# Patient Record
Sex: FEMALE | Race: AMERICAN INDIAN OR ALASKA NATIVE | NOT HISPANIC OR LATINO | Employment: OTHER | RURAL
[De-identification: names, ages, dates, MRNs, and addresses within clinical notes are randomized per-mention and may not be internally consistent; named-entity substitution may affect disease eponyms.]

---

## 2020-04-20 ENCOUNTER — HISTORICAL (OUTPATIENT)
Dept: ADMINISTRATIVE | Facility: HOSPITAL | Age: 70
End: 2020-04-20

## 2020-05-08 ENCOUNTER — HISTORICAL (OUTPATIENT)
Dept: ADMINISTRATIVE | Facility: HOSPITAL | Age: 70
End: 2020-05-08

## 2020-10-07 ENCOUNTER — OUT OF OFFICE VISIT (OUTPATIENT)
Dept: URBAN - METROPOLITAN AREA MEDICAL CENTER 9 | Facility: MEDICAL CENTER | Age: 70
End: 2020-10-07
Payer: MEDICARE

## 2020-10-07 DIAGNOSIS — D72.828 HIGH BLOOD MONOCYTE COUNT: ICD-10-CM

## 2020-10-07 DIAGNOSIS — R11.0 CHRONIC NAUSEA: ICD-10-CM

## 2020-10-07 DIAGNOSIS — K29.60 ADENOPAPILLOMATOSIS GASTRICA: ICD-10-CM

## 2020-10-07 DIAGNOSIS — R10.84 ABDOMINAL CRAMPING, GENERALIZED: ICD-10-CM

## 2020-10-07 DIAGNOSIS — K29.30 CHRONIC SUPERFICIAL GASTRITIS: ICD-10-CM

## 2020-10-07 PROCEDURE — 43239 EGD BIOPSY SINGLE/MULTIPLE: CPT | Performed by: STUDENT IN AN ORGANIZED HEALTH CARE EDUCATION/TRAINING PROGRAM

## 2020-10-07 PROCEDURE — 99204 OFFICE O/P NEW MOD 45 MIN: CPT | Performed by: PHYSICIAN ASSISTANT

## 2020-10-07 PROCEDURE — 99214 OFFICE O/P EST MOD 30 MIN: CPT | Performed by: PHYSICIAN ASSISTANT

## 2020-10-19 ENCOUNTER — HISTORICAL (OUTPATIENT)
Dept: ADMINISTRATIVE | Facility: HOSPITAL | Age: 70
End: 2020-10-19

## 2020-10-19 LAB
ALBUMIN SERPL BCP-MCNC: 3.6 G/DL (ref 3.5–5)
ALBUMIN/GLOB SERPL: 0.9 {RATIO}
ALP SERPL-CCNC: 102 U/L (ref 55–142)
ALT SERPL W P-5'-P-CCNC: 21 U/L (ref 13–56)
ANION GAP SERPL CALCULATED.3IONS-SCNC: 8.5 MMOL/L (ref 7–16)
AST SERPL W P-5'-P-CCNC: 14 U/L (ref 15–37)
BASOPHILS # BLD AUTO: 0.06 X10E3/UL (ref 0–0.2)
BASOPHILS NFR BLD AUTO: 0.5 % (ref 0–1)
BILIRUB SERPL-MCNC: 0.3 MG/DL (ref 0–1.2)
BUN SERPL-MCNC: 18 MG/DL (ref 7–18)
BUN/CREAT SERPL: 17
CALCIUM SERPL-MCNC: 9.8 MG/DL (ref 8.5–10.1)
CHLORIDE SERPL-SCNC: 97 MMOL/L (ref 98–107)
CHOLEST SERPL-MCNC: 277 MG/DL
CHOLEST/HDLC SERPL: 4 {RATIO}
CO2 SERPL-SCNC: 34 MMOL/L (ref 21–32)
CREAT SERPL-MCNC: 1.04 MG/DL (ref 0.5–1.02)
EOSINOPHIL # BLD AUTO: 0.01 X10E3/UL (ref 0–0.5)
EOSINOPHIL NFR BLD AUTO: 0.1 % (ref 1–4)
ERYTHROCYTE [DISTWIDTH] IN BLOOD BY AUTOMATED COUNT: 15.7 % (ref 11.5–14.5)
GLOBULIN SER-MCNC: 3.9 G/DL (ref 2–4)
GLUCOSE SERPL-MCNC: 126 MG/DL (ref 74–106)
HCT VFR BLD AUTO: 43.2 % (ref 38–47)
HDLC SERPL-MCNC: 69 MG/DL
HGB BLD-MCNC: 13.9 G/DL (ref 12–16)
IMM GRANULOCYTES # BLD AUTO: 0.05 X10E3/UL (ref 0–0.04)
IMM GRANULOCYTES NFR BLD: 0.4 % (ref 0–0.4)
LDLC SERPL CALC-MCNC: 148 MG/DL
LYMPHOCYTES # BLD AUTO: 1.88 X10E3/UL (ref 1–4.8)
LYMPHOCYTES NFR BLD AUTO: 14.5 % (ref 27–41)
MCH RBC QN AUTO: 29.1 PG (ref 27–31)
MCHC RBC AUTO-ENTMCNC: 32.2 G/DL (ref 32–36)
MCV RBC AUTO: 90.6 FL (ref 80–96)
MONOCYTES # BLD AUTO: 0.99 X10E3/UL (ref 0–0.8)
MONOCYTES NFR BLD AUTO: 7.7 % (ref 2–6)
MPC BLD CALC-MCNC: 9.5 FL (ref 9.4–12.4)
NEUTROPHILS # BLD AUTO: 9.95 X10E3/UL (ref 1.8–7.7)
NEUTROPHILS NFR BLD AUTO: 76.8 % (ref 53–65)
NRBC # BLD AUTO: 0 X10E3/UL (ref 0–0)
NRBC, AUTO (.00): 0 /100 (ref 0–0)
PLATELET # BLD AUTO: 420 X10E3/UL (ref 150–400)
POTASSIUM SERPL-SCNC: 3.5 MMOL/L (ref 3.5–5.1)
PROT SERPL-MCNC: 7.5 G/DL (ref 6.4–8.2)
RBC # BLD AUTO: 4.77 X10E6/UL (ref 4.2–5.4)
SODIUM SERPL-SCNC: 136 MMOL/L (ref 136–145)
TRIGL SERPL-MCNC: 299 MG/DL
VIT B12 SERPL-MCNC: 929 PG/ML (ref 193–986)
WBC # BLD AUTO: 12.94 X10E3/UL (ref 4.5–11)

## 2020-10-22 ENCOUNTER — OFFICE VISIT (OUTPATIENT)
Dept: URBAN - METROPOLITAN AREA CLINIC 40 | Facility: CLINIC | Age: 70
End: 2020-10-22

## 2020-10-22 NOTE — HPI-TODAY'S VISIT:
Fidelina Arnold is a 70 y.o. black female with history of chronic back pain and HTN who was admitted on 10/6/2020 for abdominal pain, elevated lipase, leukocytosis on chronic steroids with history of asthma. She has been staying with her daughter in [place] for the last several months, visiting from [place] for injection tx for chronic back pain. Seeing pain and spine specialist, Dr. Maldonado. States that due to lack of treatments in the last two months, she was taking either ibuprofen, Goody's, or Advil at least twice a day for months, until 1-2 weeks ago. She presented to ED recently with worsening midepigastric and LUQ abdominal pain with some nausea. Denies vomiting. Intermittent solid food dysphagia for years, on pantoprazole 40mg daily for years with history of GERD. No prior EGD. Reports a normal colonoscopy >10 years ago, in [place]. No recent rectal bleeding. Believes she may have seen a more pink color with stool a while back. Otherwise, normal consistency stool until onset of severe pain which resulted in some fecal incontinence. No BM in the last 24 hours. Ongoing pain though NPO. Denies alcohol or tobacco smoking. Weight loss of 25 pounds as she has not felt like eating for the last two months. Denies family history of gastric or colonic malignancy. CT A/P w/ IV contrast this admission without acute findings. Diverticulosis without diverticulitis. Prior cholecystectomy. Normal pancreas but lipase mildly elevated at 78 yesterday , now normalized at 26. LFTs normal. Leukocytosis on chronic steroids, improved with AM labs, holding prednisone. Blood cx's negative thusfar. U/A ok without complaint of dysuria. Resting comfortably in bed.    1. Abdominal Pain. Nausea. EGD 10/7 with mild gastritis, otherwise normal. Chronic, inactive gastritis. No Hpylori , per path. -Antiemetics and pain control as needed. -Continue pantoprazole 40mg. May take second daily dose as needed. She has been doing this at home. Add sucralfate 1g po bid with gastritis though no PUD on exam. -Avoid NSAIDs and monitor diet, as discussed. -Patient prefers follow up in [facility] [place] office. Will have her f/u in 2 weeks following discharge. Patient will need colonoscopy as outpatient.   2. Leukocytosis, trending down.  Likely secondary to chronic steroid use for asthma, prescribed by her provider in [place]. -Monitor CBC with outpatient f/u  -Blood cx's NGTD.

## 2020-12-24 ENCOUNTER — HISTORICAL (OUTPATIENT)
Dept: ADMINISTRATIVE | Facility: HOSPITAL | Age: 70
End: 2020-12-24

## 2021-03-12 RX ORDER — ATORVASTATIN CALCIUM 10 MG/1
10 TABLET, FILM COATED ORAL DAILY
COMMUNITY
End: 2021-07-15 | Stop reason: SDUPTHER

## 2021-03-12 RX ORDER — METRONIDAZOLE 500 MG/1
500 TABLET ORAL 2 TIMES DAILY
Status: ON HOLD | COMMUNITY
End: 2021-04-22

## 2021-03-12 RX ORDER — DULOXETIN HYDROCHLORIDE 60 MG/1
60 CAPSULE, DELAYED RELEASE ORAL 2 TIMES DAILY
COMMUNITY
End: 2021-04-06 | Stop reason: SDUPTHER

## 2021-03-12 RX ORDER — BUSPIRONE HYDROCHLORIDE 15 MG/1
15 TABLET ORAL 4 TIMES DAILY
COMMUNITY
End: 2021-07-15 | Stop reason: SDUPTHER

## 2021-03-12 RX ORDER — BENAZEPRIL HYDROCHLORIDE AND HYDROCHLOROTHIAZIDE 10; 12.5 MG/1; MG/1
1 TABLET ORAL DAILY
COMMUNITY
End: 2021-07-15 | Stop reason: ALTCHOICE

## 2021-03-12 RX ORDER — CYPROHEPTADINE HYDROCHLORIDE 4 MG/1
4 TABLET ORAL 3 TIMES DAILY
COMMUNITY
End: 2021-07-15 | Stop reason: SDUPTHER

## 2021-03-12 RX ORDER — CHLORTHALIDONE 25 MG/1
25 TABLET ORAL DAILY
COMMUNITY
End: 2021-07-15 | Stop reason: SDUPTHER

## 2021-03-12 RX ORDER — CIPROFLOXACIN 500 MG/1
500 TABLET ORAL EVERY 12 HOURS
Status: ON HOLD | COMMUNITY
End: 2021-04-22

## 2021-03-12 RX ORDER — AMLODIPINE BESYLATE 10 MG/1
10 TABLET ORAL DAILY
COMMUNITY
End: 2021-07-15 | Stop reason: SDUPTHER

## 2021-03-12 RX ORDER — CYCLOBENZAPRINE HCL 10 MG
10 TABLET ORAL EVERY 8 HOURS
COMMUNITY
End: 2021-04-06 | Stop reason: SDUPTHER

## 2021-03-18 ENCOUNTER — ANESTHESIA (OUTPATIENT)
Dept: PAIN MEDICINE | Facility: HOSPITAL | Age: 71
End: 2021-03-18
Payer: MEDICARE

## 2021-03-18 ENCOUNTER — HOSPITAL ENCOUNTER (OUTPATIENT)
Facility: HOSPITAL | Age: 71
Discharge: HOME | End: 2021-03-18
Attending: PAIN MEDICINE | Admitting: PAIN MEDICINE
Payer: MEDICARE

## 2021-03-18 ENCOUNTER — ANESTHESIA EVENT (OUTPATIENT)
Dept: PAIN MEDICINE | Facility: HOSPITAL | Age: 71
End: 2021-03-18
Payer: MEDICARE

## 2021-03-18 VITALS
OXYGEN SATURATION: 100 % | SYSTOLIC BLOOD PRESSURE: 130 MMHG | TEMPERATURE: 98 F | HEART RATE: 96 BPM | DIASTOLIC BLOOD PRESSURE: 70 MMHG | BODY MASS INDEX: 20.61 KG/M2 | HEIGHT: 62 IN | RESPIRATION RATE: 25 BRPM | WEIGHT: 112 LBS

## 2021-03-18 DIAGNOSIS — M54.14 RADICULOPATHY OF THORACIC REGION: ICD-10-CM

## 2021-03-18 PROCEDURE — 37000008 HC ANESTHESIA 1ST 15 MINUTES: Performed by: PAIN MEDICINE

## 2021-03-18 PROCEDURE — 62321 NJX INTERLAMINAR CRV/THRC: CPT | Performed by: PAIN MEDICINE

## 2021-03-18 PROCEDURE — 63600175 PHARM REV CODE 636 W HCPCS: Performed by: NURSE ANESTHETIST, CERTIFIED REGISTERED

## 2021-03-18 PROCEDURE — D9220A PRA ANESTHESIA: ICD-10-PCS | Mod: ,,, | Performed by: NURSE ANESTHETIST, CERTIFIED REGISTERED

## 2021-03-18 PROCEDURE — 62321 NJX INTERLAMINAR CRV/THRC: CPT | Mod: ,,, | Performed by: PAIN MEDICINE

## 2021-03-18 PROCEDURE — 27000284 HC CANNULA NASAL: Performed by: NURSE ANESTHETIST, CERTIFIED REGISTERED

## 2021-03-18 PROCEDURE — 62321 PR INJ CERV/THORAC, W/GUIDANCE: ICD-10-PCS | Mod: ,,, | Performed by: PAIN MEDICINE

## 2021-03-18 PROCEDURE — 37000009 HC ANESTHESIA EA ADD 15 MINS: Performed by: PAIN MEDICINE

## 2021-03-18 PROCEDURE — D9220A PRA ANESTHESIA: Mod: ,,, | Performed by: NURSE ANESTHETIST, CERTIFIED REGISTERED

## 2021-03-18 PROCEDURE — 27201423 OPTIME MED/SURG SUP & DEVICES STERILE SUPPLY: Performed by: PAIN MEDICINE

## 2021-03-18 RX ORDER — LIDOCAINE HCL/PF 100 MG/5ML
SYRINGE (ML) INTRAVENOUS
Status: DISCONTINUED | OUTPATIENT
Start: 2021-03-18 | End: 2021-03-18

## 2021-03-18 RX ORDER — PROPOFOL 10 MG/ML
INJECTION, EMULSION INTRAVENOUS
Status: DISCONTINUED | OUTPATIENT
Start: 2021-03-18 | End: 2021-03-18

## 2021-03-18 RX ORDER — HYDROCODONE BITARTRATE AND ACETAMINOPHEN 10; 325 MG/1; MG/1
1 TABLET ORAL EVERY 8 HOURS PRN
COMMUNITY
End: 2021-04-06 | Stop reason: SDUPTHER

## 2021-03-18 RX ORDER — FENTANYL CITRATE 50 UG/ML
INJECTION, SOLUTION INTRAMUSCULAR; INTRAVENOUS
Status: DISCONTINUED | OUTPATIENT
Start: 2021-03-18 | End: 2021-03-18

## 2021-03-18 RX ORDER — ONDANSETRON 2 MG/ML
INJECTION INTRAMUSCULAR; INTRAVENOUS
Status: DISCONTINUED | OUTPATIENT
Start: 2021-03-18 | End: 2021-03-18

## 2021-03-18 RX ADMIN — PROPOFOL 50 MG: 10 INJECTION, EMULSION INTRAVENOUS at 10:03

## 2021-03-18 RX ADMIN — FENTANYL CITRATE 50 MCG: 50 INJECTION, SOLUTION INTRAMUSCULAR; INTRAVENOUS at 10:03

## 2021-03-18 RX ADMIN — Medication 50 MG: at 10:03

## 2021-03-18 RX ADMIN — ONDANSETRON 4 MG: 2 INJECTION INTRAMUSCULAR; INTRAVENOUS at 10:03

## 2021-04-03 ENCOUNTER — HISTORICAL (OUTPATIENT)
Dept: ADMINISTRATIVE | Facility: HOSPITAL | Age: 71
End: 2021-04-03

## 2021-04-04 ENCOUNTER — HISTORICAL (OUTPATIENT)
Dept: ADMINISTRATIVE | Facility: HOSPITAL | Age: 71
End: 2021-04-04

## 2021-04-07 ENCOUNTER — OFFICE VISIT (OUTPATIENT)
Dept: PAIN MEDICINE | Facility: CLINIC | Age: 71
End: 2021-04-07
Attending: PAIN MEDICINE
Payer: MEDICARE

## 2021-04-07 VITALS
HEIGHT: 62 IN | WEIGHT: 110 LBS | SYSTOLIC BLOOD PRESSURE: 182 MMHG | DIASTOLIC BLOOD PRESSURE: 87 MMHG | BODY MASS INDEX: 20.24 KG/M2 | HEART RATE: 116 BPM

## 2021-04-07 DIAGNOSIS — M54.14 THORACIC RADICULOPATHY: ICD-10-CM

## 2021-04-07 DIAGNOSIS — F11.90 CHRONIC, CONTINUOUS USE OF OPIOIDS: ICD-10-CM

## 2021-04-07 DIAGNOSIS — M54.40 CHRONIC BILATERAL LOW BACK PAIN WITH SCIATICA, SCIATICA LATERALITY UNSPECIFIED: ICD-10-CM

## 2021-04-07 DIAGNOSIS — M47.816 LUMBAR SPONDYLOSIS: Primary | ICD-10-CM

## 2021-04-07 DIAGNOSIS — G89.29 CHRONIC BILATERAL LOW BACK PAIN WITH SCIATICA, SCIATICA LATERALITY UNSPECIFIED: ICD-10-CM

## 2021-04-07 PROCEDURE — 99999 PR PBB SHADOW E&M-EST. PATIENT-LVL V: ICD-10-PCS | Mod: PBBFAC,,, | Performed by: PAIN MEDICINE

## 2021-04-07 PROCEDURE — 99214 PR OFFICE/OUTPT VISIT, EST, LEVL IV, 30-39 MIN: ICD-10-PCS | Mod: S$PBB,,, | Performed by: PAIN MEDICINE

## 2021-04-07 PROCEDURE — 99999 PR PBB SHADOW E&M-EST. PATIENT-LVL V: CPT | Mod: PBBFAC,,, | Performed by: PAIN MEDICINE

## 2021-04-07 PROCEDURE — 99215 OFFICE O/P EST HI 40 MIN: CPT | Mod: PBBFAC | Performed by: PAIN MEDICINE

## 2021-04-07 PROCEDURE — 99214 OFFICE O/P EST MOD 30 MIN: CPT | Mod: S$PBB,,, | Performed by: PAIN MEDICINE

## 2021-04-07 RX ORDER — HYDROCODONE BITARTRATE AND ACETAMINOPHEN 10; 325 MG/1; MG/1
1 TABLET ORAL EVERY 8 HOURS PRN
Qty: 90 TABLET | Refills: 0 | Status: SHIPPED | OUTPATIENT
Start: 2021-04-07 | End: 2021-05-07

## 2021-04-07 RX ORDER — HYDROCODONE BITARTRATE AND ACETAMINOPHEN 10; 325 MG/1; MG/1
1 TABLET ORAL EVERY 8 HOURS PRN
Qty: 90 TABLET | Refills: 0 | Status: SHIPPED | OUTPATIENT
Start: 2021-05-07 | End: 2021-06-06

## 2021-04-07 RX ORDER — CYCLOBENZAPRINE HCL 10 MG
10 TABLET ORAL EVERY 8 HOURS
Qty: 90 TABLET | Refills: 5 | Status: SHIPPED | OUTPATIENT
Start: 2021-04-07 | End: 2021-05-06 | Stop reason: SDUPTHER

## 2021-04-07 RX ORDER — DULOXETIN HYDROCHLORIDE 60 MG/1
60 CAPSULE, DELAYED RELEASE ORAL 2 TIMES DAILY
Qty: 60 CAPSULE | Refills: 5 | Status: SHIPPED | OUTPATIENT
Start: 2021-04-07 | End: 2021-07-01 | Stop reason: SDUPTHER

## 2021-04-22 ENCOUNTER — ANESTHESIA (OUTPATIENT)
Dept: PAIN MEDICINE | Facility: HOSPITAL | Age: 71
End: 2021-04-22
Payer: MEDICARE

## 2021-04-22 ENCOUNTER — HOSPITAL ENCOUNTER (OUTPATIENT)
Facility: HOSPITAL | Age: 71
Discharge: HOME | End: 2021-04-22
Attending: PAIN MEDICINE | Admitting: PAIN MEDICINE
Payer: MEDICARE

## 2021-04-22 ENCOUNTER — ANESTHESIA EVENT (OUTPATIENT)
Dept: PAIN MEDICINE | Facility: HOSPITAL | Age: 71
End: 2021-04-22
Payer: MEDICARE

## 2021-04-22 VITALS
HEART RATE: 90 BPM | HEIGHT: 62 IN | WEIGHT: 98.81 LBS | TEMPERATURE: 97 F | BODY MASS INDEX: 18.18 KG/M2 | RESPIRATION RATE: 20 BRPM | DIASTOLIC BLOOD PRESSURE: 68 MMHG | SYSTOLIC BLOOD PRESSURE: 136 MMHG | OXYGEN SATURATION: 97 %

## 2021-04-22 DIAGNOSIS — M47.817 SPONDYLOSIS OF LUMBOSACRAL REGION WITHOUT MYELOPATHY OR RADICULOPATHY: Primary | ICD-10-CM

## 2021-04-22 PROCEDURE — 37000009 HC ANESTHESIA EA ADD 15 MINS: Performed by: PAIN MEDICINE

## 2021-04-22 PROCEDURE — 64493 INJ PARAVERT F JNT L/S 1 LEV: CPT | Mod: 50,,, | Performed by: PAIN MEDICINE

## 2021-04-22 PROCEDURE — 27201423 OPTIME MED/SURG SUP & DEVICES STERILE SUPPLY: Performed by: PAIN MEDICINE

## 2021-04-22 PROCEDURE — 25000003 PHARM REV CODE 250: Performed by: PAIN MEDICINE

## 2021-04-22 PROCEDURE — D9220A PRA ANESTHESIA: ICD-10-PCS | Mod: ,,, | Performed by: NURSE ANESTHETIST, CERTIFIED REGISTERED

## 2021-04-22 PROCEDURE — 25000003 PHARM REV CODE 250

## 2021-04-22 PROCEDURE — 64495 INJ PARAVERT F JNT L/S 3 LEV: CPT | Mod: 50,,, | Performed by: PAIN MEDICINE

## 2021-04-22 PROCEDURE — 64495 INJ PARAVERT F JNT L/S 3 LEV: CPT | Mod: 50 | Performed by: PAIN MEDICINE

## 2021-04-22 PROCEDURE — 63600175 PHARM REV CODE 636 W HCPCS

## 2021-04-22 PROCEDURE — 64493 PR INJ DX/THER AGNT PARAVERT FACET JOINT,IMG GUIDE,LUMBAR/SAC,1ST LVL: ICD-10-PCS | Mod: 50,,, | Performed by: PAIN MEDICINE

## 2021-04-22 PROCEDURE — 64493 INJ PARAVERT F JNT L/S 1 LEV: CPT | Mod: 50 | Performed by: PAIN MEDICINE

## 2021-04-22 PROCEDURE — 64494 INJ PARAVERT F JNT L/S 2 LEV: CPT | Mod: 50,,, | Performed by: PAIN MEDICINE

## 2021-04-22 PROCEDURE — 64494 PR INJ DX/THER AGNT PARAVERT FACET JOINT,IMG GUIDE,LUMBAR/SAC, 2ND LEVEL: ICD-10-PCS | Mod: 50,,, | Performed by: PAIN MEDICINE

## 2021-04-22 PROCEDURE — D9220A PRA ANESTHESIA: Mod: ,,, | Performed by: NURSE ANESTHETIST, CERTIFIED REGISTERED

## 2021-04-22 PROCEDURE — 64495 PR INJ DX/THER AGNT PARAVERT FACET JOINT,IMG GUIDE,LUMBAR/SAC, ADD LEVEL: ICD-10-PCS | Mod: 50,,, | Performed by: PAIN MEDICINE

## 2021-04-22 PROCEDURE — 37000008 HC ANESTHESIA 1ST 15 MINUTES: Performed by: PAIN MEDICINE

## 2021-04-22 PROCEDURE — 27000284 HC CANNULA NASAL: Performed by: NURSE ANESTHETIST, CERTIFIED REGISTERED

## 2021-04-22 PROCEDURE — 63600175 PHARM REV CODE 636 W HCPCS: Performed by: NURSE ANESTHETIST, CERTIFIED REGISTERED

## 2021-04-22 PROCEDURE — 64494 INJ PARAVERT F JNT L/S 2 LEV: CPT | Mod: 50 | Performed by: PAIN MEDICINE

## 2021-04-22 RX ORDER — LIDOCAINE HYDROCHLORIDE 20 MG/ML
INJECTION, SOLUTION EPIDURAL; INFILTRATION; INTRACAUDAL; PERINEURAL
Status: DISCONTINUED | OUTPATIENT
Start: 2021-04-22 | End: 2021-04-22

## 2021-04-22 RX ORDER — BUPIVACAINE HYDROCHLORIDE 2.5 MG/ML
INJECTION, SOLUTION INFILTRATION; PERINEURAL
Status: DISCONTINUED | OUTPATIENT
Start: 2021-04-22 | End: 2021-04-22 | Stop reason: HOSPADM

## 2021-04-22 RX ORDER — FENTANYL CITRATE 50 UG/ML
INJECTION, SOLUTION INTRAMUSCULAR; INTRAVENOUS
Status: DISCONTINUED | OUTPATIENT
Start: 2021-04-22 | End: 2021-04-22

## 2021-04-22 RX ORDER — PROPOFOL 10 MG/ML
INJECTION, EMULSION INTRAVENOUS
Status: DISCONTINUED | OUTPATIENT
Start: 2021-04-22 | End: 2021-04-22

## 2021-04-22 RX ORDER — SODIUM CHLORIDE 9 MG/ML
INJECTION, SOLUTION INTRAVENOUS CONTINUOUS
Status: DISCONTINUED | OUTPATIENT
Start: 2021-04-22 | End: 2021-04-22 | Stop reason: HOSPADM

## 2021-04-22 RX ORDER — SODIUM CHLORIDE 9 MG/ML
INJECTION, SOLUTION INTRAVENOUS CONTINUOUS PRN
Status: COMPLETED | OUTPATIENT
Start: 2021-04-22 | End: 2021-04-22

## 2021-04-22 RX ADMIN — LIDOCAINE HYDROCHLORIDE 100 MG: 20 INJECTION, SOLUTION EPIDURAL; INFILTRATION; INTRACAUDAL; PERINEURAL at 08:04

## 2021-04-22 RX ADMIN — PROPOFOL 50 MG: 10 INJECTION, EMULSION INTRAVENOUS at 08:04

## 2021-04-22 RX ADMIN — SODIUM CHLORIDE: 9 INJECTION, SOLUTION INTRAVENOUS at 08:04

## 2021-04-22 RX ADMIN — FENTANYL CITRATE 100 MCG: 50 INJECTION INTRAMUSCULAR; INTRAVENOUS at 08:04

## 2021-05-06 ENCOUNTER — OFFICE VISIT (OUTPATIENT)
Dept: PAIN MEDICINE | Facility: CLINIC | Age: 71
End: 2021-05-06
Payer: MEDICARE

## 2021-05-06 VITALS
RESPIRATION RATE: 20 BRPM | HEIGHT: 62 IN | HEART RATE: 109 BPM | SYSTOLIC BLOOD PRESSURE: 134 MMHG | WEIGHT: 98 LBS | DIASTOLIC BLOOD PRESSURE: 71 MMHG | BODY MASS INDEX: 18.03 KG/M2

## 2021-05-06 DIAGNOSIS — G89.4 CHRONIC PAIN SYNDROME: Chronic | ICD-10-CM

## 2021-05-06 DIAGNOSIS — M54.14 RADICULOPATHY OF THORACIC REGION: Chronic | ICD-10-CM

## 2021-05-06 DIAGNOSIS — M47.817 SPONDYLOSIS OF LUMBOSACRAL REGION WITHOUT MYELOPATHY OR RADICULOPATHY: Primary | ICD-10-CM

## 2021-05-06 PROCEDURE — 99214 HC OFFICE/OUTPT VISIT, EST, LEVL IV, 30-39 MIN: ICD-10-PCS | Mod: PBBFAC,,, | Performed by: PHYSICIAN ASSISTANT

## 2021-05-06 PROCEDURE — 99214 OFFICE O/P EST MOD 30 MIN: CPT | Mod: PBBFAC,,, | Performed by: PHYSICIAN ASSISTANT

## 2021-05-06 PROCEDURE — 99214 OFFICE O/P EST MOD 30 MIN: CPT | Mod: PBBFAC | Performed by: PHYSICIAN ASSISTANT

## 2021-05-06 PROCEDURE — 99214 OFFICE O/P EST MOD 30 MIN: CPT | Mod: S$PBB,,, | Performed by: PHYSICIAN ASSISTANT

## 2021-05-06 RX ORDER — CYCLOBENZAPRINE HCL 10 MG
10 TABLET ORAL EVERY 8 HOURS
Qty: 90 TABLET | Refills: 0 | Status: SHIPPED | OUTPATIENT
Start: 2021-05-06 | End: 2021-06-05

## 2021-05-20 ENCOUNTER — ANESTHESIA EVENT (OUTPATIENT)
Dept: PAIN MEDICINE | Facility: HOSPITAL | Age: 71
End: 2021-05-20
Payer: MEDICARE

## 2021-05-20 ENCOUNTER — ANESTHESIA (OUTPATIENT)
Dept: PAIN MEDICINE | Facility: HOSPITAL | Age: 71
End: 2021-05-20
Payer: MEDICARE

## 2021-05-20 ENCOUNTER — HOSPITAL ENCOUNTER (OUTPATIENT)
Facility: HOSPITAL | Age: 71
Discharge: HOME | End: 2021-05-20
Attending: PAIN MEDICINE | Admitting: PAIN MEDICINE
Payer: MEDICARE

## 2021-05-20 VITALS
DIASTOLIC BLOOD PRESSURE: 64 MMHG | TEMPERATURE: 99 F | BODY MASS INDEX: 17.92 KG/M2 | HEART RATE: 93 BPM | RESPIRATION RATE: 21 BRPM | SYSTOLIC BLOOD PRESSURE: 125 MMHG | WEIGHT: 97.38 LBS | HEIGHT: 62 IN | OXYGEN SATURATION: 98 %

## 2021-05-20 DIAGNOSIS — M47.817 SPONDYLOSIS OF LUMBOSACRAL REGION WITHOUT MYELOPATHY OR RADICULOPATHY: ICD-10-CM

## 2021-05-20 PROCEDURE — 63600175 PHARM REV CODE 636 W HCPCS: Performed by: PAIN MEDICINE

## 2021-05-20 PROCEDURE — D9220A PRA ANESTHESIA: Mod: ,,, | Performed by: NURSE ANESTHETIST, CERTIFIED REGISTERED

## 2021-05-20 PROCEDURE — 64636 PR DESTROY L/S FACET JNT ADDL: ICD-10-PCS | Mod: 50,59,, | Performed by: PAIN MEDICINE

## 2021-05-20 PROCEDURE — 63600175 PHARM REV CODE 636 W HCPCS: Performed by: NURSE ANESTHETIST, CERTIFIED REGISTERED

## 2021-05-20 PROCEDURE — 64635 DESTROY LUMB/SAC FACET JNT: CPT | Mod: 50,,, | Performed by: PAIN MEDICINE

## 2021-05-20 PROCEDURE — 64636 DESTROY L/S FACET JNT ADDL: CPT | Mod: 50 | Performed by: PAIN MEDICINE

## 2021-05-20 PROCEDURE — 64636 DESTROY L/S FACET JNT ADDL: CPT | Mod: 50,,, | Performed by: PAIN MEDICINE

## 2021-05-20 PROCEDURE — 64635 DESTROY LUMB/SAC FACET JNT: CPT | Mod: 50 | Performed by: PAIN MEDICINE

## 2021-05-20 PROCEDURE — 25000003 PHARM REV CODE 250: Performed by: PAIN MEDICINE

## 2021-05-20 PROCEDURE — D9220A PRA ANESTHESIA: ICD-10-PCS | Mod: ,,, | Performed by: NURSE ANESTHETIST, CERTIFIED REGISTERED

## 2021-05-20 PROCEDURE — 64635 PR DESTROY LUMB/SAC FACET JNT: ICD-10-PCS | Mod: 50,,, | Performed by: PAIN MEDICINE

## 2021-05-20 PROCEDURE — 27201423 OPTIME MED/SURG SUP & DEVICES STERILE SUPPLY: Performed by: PAIN MEDICINE

## 2021-05-20 PROCEDURE — 37000009 HC ANESTHESIA EA ADD 15 MINS: Performed by: PAIN MEDICINE

## 2021-05-20 PROCEDURE — 37000008 HC ANESTHESIA 1ST 15 MINUTES: Performed by: PAIN MEDICINE

## 2021-05-20 RX ORDER — PROPOFOL 10 MG/ML
INJECTION, EMULSION INTRAVENOUS
Status: DISCONTINUED | OUTPATIENT
Start: 2021-05-20 | End: 2021-05-20

## 2021-05-20 RX ORDER — ORPHENADRINE CITRATE 30 MG/ML
INJECTION INTRAMUSCULAR; INTRAVENOUS
Status: DISCONTINUED | OUTPATIENT
Start: 2021-05-20 | End: 2021-05-20

## 2021-05-20 RX ORDER — SODIUM CHLORIDE 9 MG/ML
INJECTION, SOLUTION INTRAVENOUS CONTINUOUS
Status: DISCONTINUED | OUTPATIENT
Start: 2021-05-20 | End: 2021-05-20 | Stop reason: HOSPADM

## 2021-05-20 RX ORDER — HYDROCODONE BITARTRATE AND ACETAMINOPHEN 10; 325 MG/1; MG/1
1 TABLET ORAL EVERY 8 HOURS PRN
Qty: 90 TABLET | Refills: 0 | Status: SHIPPED | OUTPATIENT
Start: 2021-06-05 | End: 2021-07-01 | Stop reason: SDUPTHER

## 2021-05-20 RX ORDER — PHENYLEPHRINE HYDROCHLORIDE 10 MG/ML
INJECTION INTRAVENOUS
Status: DISCONTINUED | OUTPATIENT
Start: 2021-05-20 | End: 2021-05-20

## 2021-05-20 RX ORDER — TRIAMCINOLONE ACETONIDE 40 MG/ML
INJECTION, SUSPENSION INTRA-ARTICULAR; INTRAMUSCULAR
Status: DISCONTINUED | OUTPATIENT
Start: 2021-05-20 | End: 2021-05-20 | Stop reason: HOSPADM

## 2021-05-20 RX ORDER — SODIUM CHLORIDE 9 MG/ML
INJECTION, SOLUTION INTRAVENOUS CONTINUOUS PRN
Status: COMPLETED | OUTPATIENT
Start: 2021-05-20 | End: 2021-05-20

## 2021-05-20 RX ORDER — BUPIVACAINE HYDROCHLORIDE 2.5 MG/ML
INJECTION, SOLUTION INFILTRATION; PERINEURAL
Status: DISCONTINUED | OUTPATIENT
Start: 2021-05-20 | End: 2021-05-20 | Stop reason: HOSPADM

## 2021-05-20 RX ADMIN — PROPOFOL 10 MG: 10 INJECTION, EMULSION INTRAVENOUS at 02:05

## 2021-05-20 RX ADMIN — PROPOFOL 40 MG: 10 INJECTION, EMULSION INTRAVENOUS at 02:05

## 2021-05-20 RX ADMIN — PROPOFOL 30 MG: 10 INJECTION, EMULSION INTRAVENOUS at 02:05

## 2021-05-20 RX ADMIN — PHENYLEPHRINE HYDROCHLORIDE 100 MCG: 10 INJECTION INTRAVENOUS at 02:05

## 2021-05-20 RX ADMIN — SODIUM CHLORIDE: 9 INJECTION, SOLUTION INTRAVENOUS at 02:05

## 2021-05-20 RX ADMIN — PHENYLEPHRINE HYDROCHLORIDE 150 MCG: 10 INJECTION INTRAVENOUS at 02:05

## 2021-05-20 RX ADMIN — PROPOFOL 50 MG: 10 INJECTION, EMULSION INTRAVENOUS at 02:05

## 2021-05-20 RX ADMIN — ORPHENADRINE CITRATE 30 MG: 30 INJECTION INTRAMUSCULAR; INTRAVENOUS at 02:05

## 2021-06-07 ENCOUNTER — TELEPHONE (OUTPATIENT)
Dept: FAMILY MEDICINE | Facility: CLINIC | Age: 71
End: 2021-06-07

## 2021-07-01 ENCOUNTER — OFFICE VISIT (OUTPATIENT)
Dept: PAIN MEDICINE | Facility: CLINIC | Age: 71
End: 2021-07-01
Payer: MEDICARE

## 2021-07-01 VITALS
SYSTOLIC BLOOD PRESSURE: 131 MMHG | BODY MASS INDEX: 18.03 KG/M2 | RESPIRATION RATE: 18 BRPM | HEART RATE: 109 BPM | HEIGHT: 62 IN | WEIGHT: 98 LBS | DIASTOLIC BLOOD PRESSURE: 87 MMHG

## 2021-07-01 DIAGNOSIS — M54.14 THORACIC RADICULOPATHY: Chronic | ICD-10-CM

## 2021-07-01 DIAGNOSIS — M43.16 SPONDYLOLISTHESIS, LUMBAR REGION: Chronic | ICD-10-CM

## 2021-07-01 DIAGNOSIS — M47.817 SPONDYLOSIS WITHOUT MYELOPATHY OR RADICULOPATHY, LUMBOSACRAL REGION: Primary | Chronic | ICD-10-CM

## 2021-07-01 DIAGNOSIS — Z79.899 ENCOUNTER FOR LONG-TERM (CURRENT) USE OF OTHER MEDICATIONS: ICD-10-CM

## 2021-07-01 LAB
CTP QC/QA: YES
POC (AMP) AMPHETAMINE: NEGATIVE
POC (BAR) BARBITURATES: NEGATIVE
POC (BUP) BUPRENORPHINE: NEGATIVE
POC (BZO) BENZODIAZEPINES: NEGATIVE
POC (COC) COCAINE: NEGATIVE
POC (MDMA) METHYLENEDIOXYMETHAMPHETAMINE 3,4: NEGATIVE
POC (MET) METHAMPHETAMINE: NEGATIVE
POC (MOP) OPIATES: ABNORMAL
POC (MTD) METHADONE: NEGATIVE
POC (OXY) OXYCODONE: NEGATIVE
POC (PCP) PHENCYCLIDINE: NEGATIVE
POC (TCA) TRICYCLIC ANTIDEPRESSANTS: NEGATIVE
POC TEMPERATURE (URINE): 94
POC THC: NEGATIVE

## 2021-07-01 PROCEDURE — 99214 PR OFFICE/OUTPT VISIT, EST, LEVL IV, 30-39 MIN: ICD-10-PCS | Mod: S$PBB,,, | Performed by: PHYSICIAN ASSISTANT

## 2021-07-01 PROCEDURE — 99214 OFFICE O/P EST MOD 30 MIN: CPT | Mod: S$PBB,,, | Performed by: PHYSICIAN ASSISTANT

## 2021-07-01 PROCEDURE — 99214 OFFICE O/P EST MOD 30 MIN: CPT | Mod: PBBFAC | Performed by: PHYSICIAN ASSISTANT

## 2021-07-01 PROCEDURE — 80305 DRUG TEST PRSMV DIR OPT OBS: CPT | Mod: PBBFAC | Performed by: PHYSICIAN ASSISTANT

## 2021-07-01 RX ORDER — HYDROCODONE BITARTRATE AND ACETAMINOPHEN 10; 325 MG/1; MG/1
1 TABLET ORAL EVERY 8 HOURS
Qty: 90 TABLET | Refills: 0 | Status: ON HOLD | OUTPATIENT
Start: 2021-08-04 | End: 2021-09-09 | Stop reason: HOSPADM

## 2021-07-01 RX ORDER — HYDROCODONE BITARTRATE AND ACETAMINOPHEN 10; 325 MG/1; MG/1
1 TABLET ORAL EVERY 8 HOURS
Qty: 90 TABLET | Refills: 0 | Status: SHIPPED | OUTPATIENT
Start: 2021-07-05 | End: 2021-08-04

## 2021-07-01 RX ORDER — DULOXETIN HYDROCHLORIDE 60 MG/1
60 CAPSULE, DELAYED RELEASE ORAL EVERY 12 HOURS
Qty: 60 CAPSULE | Refills: 2 | Status: SHIPPED | OUTPATIENT
Start: 2021-07-01 | End: 2021-10-04 | Stop reason: SDUPTHER

## 2021-07-01 RX ORDER — CYCLOBENZAPRINE HCL 10 MG
10 TABLET ORAL EVERY 8 HOURS
Qty: 90 TABLET | Refills: 2 | Status: SHIPPED | OUTPATIENT
Start: 2021-07-01 | End: 2021-09-29

## 2021-07-01 RX ORDER — HYDROCODONE BITARTRATE AND ACETAMINOPHEN 10; 325 MG/1; MG/1
1 TABLET ORAL EVERY 8 HOURS
Qty: 90 TABLET | Refills: 0 | Status: ON HOLD | OUTPATIENT
Start: 2021-09-03 | End: 2021-08-31

## 2021-07-15 ENCOUNTER — OFFICE VISIT (OUTPATIENT)
Dept: FAMILY MEDICINE | Facility: CLINIC | Age: 71
End: 2021-07-15
Payer: MEDICARE

## 2021-07-15 VITALS
RESPIRATION RATE: 13 BRPM | TEMPERATURE: 98 F | HEART RATE: 110 BPM | BODY MASS INDEX: 19.32 KG/M2 | HEIGHT: 62 IN | WEIGHT: 105 LBS | DIASTOLIC BLOOD PRESSURE: 92 MMHG | SYSTOLIC BLOOD PRESSURE: 157 MMHG

## 2021-07-15 DIAGNOSIS — R63.0 DECREASED APPETITE: ICD-10-CM

## 2021-07-15 DIAGNOSIS — E78.5 HYPERLIPIDEMIA, UNSPECIFIED HYPERLIPIDEMIA TYPE: ICD-10-CM

## 2021-07-15 DIAGNOSIS — I10 ESSENTIAL (PRIMARY) HYPERTENSION: Primary | ICD-10-CM

## 2021-07-15 DIAGNOSIS — F32.A DEPRESSIVE DISORDER: ICD-10-CM

## 2021-07-15 PROCEDURE — 99213 OFFICE O/P EST LOW 20 MIN: CPT | Mod: ,,, | Performed by: NURSE PRACTITIONER

## 2021-07-15 PROCEDURE — 99213 PR OFFICE/OUTPT VISIT, EST, LEVL III, 20-29 MIN: ICD-10-PCS | Mod: ,,, | Performed by: NURSE PRACTITIONER

## 2021-07-15 RX ORDER — LOSARTAN POTASSIUM AND HYDROCHLOROTHIAZIDE 12.5; 5 MG/1; MG/1
1 TABLET ORAL DAILY
Qty: 90 TABLET | Refills: 0 | Status: ON HOLD | OUTPATIENT
Start: 2021-07-15 | End: 2021-08-31

## 2021-07-15 RX ORDER — PREDNISONE 10 MG/1
TABLET ORAL
COMMUNITY
Start: 2021-07-05 | End: 2022-10-06 | Stop reason: SDUPTHER

## 2021-07-15 RX ORDER — BRIMONIDINE TARTRATE 2 MG/ML
1 SOLUTION/ DROPS OPHTHALMIC 2 TIMES DAILY
COMMUNITY
Start: 2021-06-23

## 2021-07-15 RX ORDER — ATORVASTATIN CALCIUM 10 MG/1
10 TABLET, FILM COATED ORAL NIGHTLY
Qty: 90 TABLET | Refills: 0 | Status: ON HOLD | OUTPATIENT
Start: 2021-07-15 | End: 2021-10-02

## 2021-07-15 RX ORDER — AMLODIPINE BESYLATE 10 MG/1
10 TABLET ORAL DAILY
Qty: 90 TABLET | Refills: 0 | Status: ON HOLD | OUTPATIENT
Start: 2021-07-15 | End: 2021-10-27 | Stop reason: HOSPADM

## 2021-07-15 RX ORDER — LISINOPRIL AND HYDROCHLOROTHIAZIDE 10; 12.5 MG/1; MG/1
1 TABLET ORAL DAILY
COMMUNITY
Start: 2021-04-19 | End: 2021-07-15 | Stop reason: SDUPTHER

## 2021-07-15 RX ORDER — BENAZEPRIL HYDROCHLORIDE AND HYDROCHLOROTHIAZIDE 10; 12.5 MG/1; MG/1
1 TABLET ORAL DAILY
Status: CANCELLED | OUTPATIENT
Start: 2021-07-15

## 2021-07-15 RX ORDER — UMECLIDINIUM 62.5 UG/1
AEROSOL, POWDER ORAL
Status: ON HOLD | COMMUNITY
Start: 2021-03-23 | End: 2021-08-31

## 2021-07-15 RX ORDER — PANTOPRAZOLE SODIUM 40 MG/1
40 TABLET, DELAYED RELEASE ORAL
COMMUNITY
Start: 2021-06-11 | End: 2022-05-06 | Stop reason: SDUPTHER

## 2021-07-15 RX ORDER — BUSPIRONE HYDROCHLORIDE 15 MG/1
15 TABLET ORAL 4 TIMES DAILY
Qty: 360 TABLET | Refills: 0 | Status: SHIPPED | OUTPATIENT
Start: 2021-07-15 | End: 2021-10-28 | Stop reason: SDUPTHER

## 2021-07-15 RX ORDER — LATANOPROST 50 UG/ML
1 SOLUTION/ DROPS OPHTHALMIC NIGHTLY
COMMUNITY
Start: 2021-06-23

## 2021-07-15 RX ORDER — CYPROHEPTADINE HYDROCHLORIDE 4 MG/1
4 TABLET ORAL 3 TIMES DAILY
Qty: 270 TABLET | Refills: 0 | Status: SHIPPED | OUTPATIENT
Start: 2021-07-15 | End: 2021-09-16 | Stop reason: SDUPTHER

## 2021-07-15 RX ORDER — CHLORTHALIDONE 25 MG/1
TABLET ORAL
Qty: 45 TABLET | Refills: 0 | Status: SHIPPED | OUTPATIENT
Start: 2021-07-15 | End: 2022-02-15 | Stop reason: SDUPTHER

## 2021-08-27 ENCOUNTER — HOSPITAL ENCOUNTER (INPATIENT)
Facility: HOSPITAL | Age: 71
LOS: 4 days | Discharge: NURSING, REHAB OR LONG-TERM CARE | DRG: 392 | End: 2021-08-31
Attending: EMERGENCY MEDICINE | Admitting: EMERGENCY MEDICINE
Payer: MEDICARE

## 2021-08-27 DIAGNOSIS — M62.81 MUSCLE WEAKNESS (GENERALIZED): ICD-10-CM

## 2021-08-27 DIAGNOSIS — R10.13 EPIGASTRIC PAIN: ICD-10-CM

## 2021-08-27 DIAGNOSIS — E87.6 HYPOKALEMIA: ICD-10-CM

## 2021-08-27 DIAGNOSIS — E87.1 HYPONATREMIA: ICD-10-CM

## 2021-08-27 DIAGNOSIS — K52.9 COLITIS: Primary | ICD-10-CM

## 2021-08-27 DIAGNOSIS — R19.7 DIARRHEA, UNSPECIFIED TYPE: ICD-10-CM

## 2021-08-27 PROBLEM — D72.829 LEUKOCYTOSIS: Status: ACTIVE | Noted: 2021-08-27

## 2021-08-27 PROBLEM — E78.5 HYPERLIPIDEMIA: Chronic | Status: ACTIVE | Noted: 2021-08-27

## 2021-08-27 PROBLEM — I10 ESSENTIAL HYPERTENSION, BENIGN: Chronic | Status: ACTIVE | Noted: 2021-08-27

## 2021-08-27 LAB
ALBUMIN SERPL BCP-MCNC: 2.6 G/DL (ref 3.5–5)
ALBUMIN/GLOB SERPL: 0.7 {RATIO}
ALP SERPL-CCNC: 119 U/L (ref 55–142)
ALT SERPL W P-5'-P-CCNC: 20 U/L (ref 13–56)
ANION GAP SERPL CALCULATED.3IONS-SCNC: 9 MMOL/L (ref 7–16)
AST SERPL W P-5'-P-CCNC: 28 U/L (ref 15–37)
BASOPHILS # BLD AUTO: 0.01 K/UL (ref 0–0.2)
BASOPHILS NFR BLD AUTO: 0 % (ref 0–1)
BILIRUB SERPL-MCNC: 1 MG/DL (ref 0–1.2)
BILIRUB UR QL STRIP: ABNORMAL
BUN SERPL-MCNC: 17 MG/DL (ref 7–18)
BUN/CREAT SERPL: 14 (ref 6–20)
CALCIUM SERPL-MCNC: 9 MG/DL (ref 8.5–10.1)
CHLORIDE SERPL-SCNC: 89 MMOL/L (ref 98–107)
CLARITY UR: CLEAR
CO2 SERPL-SCNC: 35 MMOL/L (ref 21–32)
COLOR UR: ABNORMAL
CREAT SERPL-MCNC: 1.24 MG/DL (ref 0.55–1.02)
DIFFERENTIAL METHOD BLD: ABNORMAL
EOSINOPHIL # BLD AUTO: 0 K/UL (ref 0–0.5)
EOSINOPHIL NFR BLD AUTO: 0 % (ref 1–4)
ERYTHROCYTE [DISTWIDTH] IN BLOOD BY AUTOMATED COUNT: 15.5 % (ref 11.5–14.5)
GLOBULIN SER-MCNC: 3.9 G/DL (ref 2–4)
GLUCOSE SERPL-MCNC: 93 MG/DL (ref 74–106)
GLUCOSE UR STRIP-MCNC: NEGATIVE MG/DL
HCT VFR BLD AUTO: 42.9 % (ref 38–47)
HGB BLD-MCNC: 14.7 G/DL (ref 12–16)
KETONES UR STRIP-SCNC: ABNORMAL MG/DL
LEUKOCYTE ESTERASE UR QL STRIP: NEGATIVE
LIPASE SERPL-CCNC: 52 U/L (ref 73–393)
LYMPHOCYTES # BLD AUTO: 1.17 K/UL (ref 1–4.8)
LYMPHOCYTES NFR BLD AUTO: 2.9 % (ref 27–41)
LYMPHOCYTES NFR BLD MANUAL: 2 % (ref 27–41)
MCH RBC QN AUTO: 29.5 PG (ref 27–31)
MCHC RBC AUTO-ENTMCNC: 34.3 G/DL (ref 32–36)
MCV RBC AUTO: 86 FL (ref 80–96)
MONOCYTES # BLD AUTO: 1.7 K/UL (ref 0–0.8)
MONOCYTES NFR BLD AUTO: 4.2 % (ref 2–6)
MONOCYTES NFR BLD MANUAL: 6 % (ref 2–6)
MPC BLD CALC-MCNC: 8.6 FL (ref 9.4–12.4)
NEUTROPHILS # BLD AUTO: 37.43 K/UL (ref 1.8–7.7)
NEUTROPHILS NFR BLD AUTO: 92.9 % (ref 53–65)
NEUTS BAND NFR BLD MANUAL: 2 % (ref 1–5)
NEUTS SEG NFR BLD MANUAL: 90 % (ref 50–62)
NITRITE UR QL STRIP: NEGATIVE
NRBC BLD MANUAL-RTO: ABNORMAL %
PH UR STRIP: 7.5 PH UNITS
PLATELET # BLD AUTO: 434 K/UL (ref 150–400)
PLATELET MORPHOLOGY: ABNORMAL
POTASSIUM SERPL-SCNC: 2.6 MMOL/L (ref 3.5–5.1)
POTASSIUM SERPL-SCNC: 2.8 MMOL/L (ref 3.5–5.1)
PROT SERPL-MCNC: 6.5 G/DL (ref 6.4–8.2)
PROT UR QL STRIP: NEGATIVE
RBC # BLD AUTO: 4.99 M/UL (ref 4.2–5.4)
RBC # UR STRIP: NEGATIVE /UL
RBC MORPH BLD: NORMAL
SARS-COV+SARS-COV-2 AG RESP QL IA.RAPID: NEGATIVE
SODIUM SERPL-SCNC: 130 MMOL/L (ref 136–145)
SP GR UR STRIP: 1.01
UROBILINOGEN UR STRIP-ACNC: 1 MG/DL
WBC # BLD AUTO: 40.31 K/UL (ref 4.5–11)

## 2021-08-27 PROCEDURE — 96361 HYDRATE IV INFUSION ADD-ON: CPT

## 2021-08-27 PROCEDURE — 25500020 PHARM REV CODE 255: Performed by: NURSE PRACTITIONER

## 2021-08-27 PROCEDURE — 11000001 HC ACUTE MED/SURG PRIVATE ROOM

## 2021-08-27 PROCEDURE — 63600175 PHARM REV CODE 636 W HCPCS

## 2021-08-27 PROCEDURE — 99284 EMERGENCY DEPT VISIT MOD MDM: CPT | Mod: GF | Performed by: NURSE PRACTITIONER

## 2021-08-27 PROCEDURE — 94640 AIRWAY INHALATION TREATMENT: CPT

## 2021-08-27 PROCEDURE — 83690 ASSAY OF LIPASE: CPT | Performed by: NURSE PRACTITIONER

## 2021-08-27 PROCEDURE — 25000003 PHARM REV CODE 250: Performed by: NURSE PRACTITIONER

## 2021-08-27 PROCEDURE — 81003 URINALYSIS AUTO W/O SCOPE: CPT | Performed by: NURSE PRACTITIONER

## 2021-08-27 PROCEDURE — 93010 ELECTROCARDIOGRAM REPORT: CPT | Mod: ICN,,, | Performed by: HOSPITALIST

## 2021-08-27 PROCEDURE — 63600175 PHARM REV CODE 636 W HCPCS: Performed by: NURSE PRACTITIONER

## 2021-08-27 PROCEDURE — 36415 COLL VENOUS BLD VENIPUNCTURE: CPT | Performed by: NURSE PRACTITIONER

## 2021-08-27 PROCEDURE — 25000003 PHARM REV CODE 250: Performed by: EMERGENCY MEDICINE

## 2021-08-27 PROCEDURE — 99900035 HC TECH TIME PER 15 MIN (STAT)

## 2021-08-27 PROCEDURE — 87506 IADNA-DNA/RNA PROBE TQ 6-11: CPT | Performed by: EMERGENCY MEDICINE

## 2021-08-27 PROCEDURE — 96375 TX/PRO/DX INJ NEW DRUG ADDON: CPT

## 2021-08-27 PROCEDURE — 80053 COMPREHEN METABOLIC PANEL: CPT | Performed by: NURSE PRACTITIONER

## 2021-08-27 PROCEDURE — 93010 EKG 12-LEAD: ICD-10-PCS | Mod: ICN,,, | Performed by: HOSPITALIST

## 2021-08-27 PROCEDURE — 63600175 PHARM REV CODE 636 W HCPCS: Performed by: EMERGENCY MEDICINE

## 2021-08-27 PROCEDURE — 94761 N-INVAS EAR/PLS OXIMETRY MLT: CPT

## 2021-08-27 PROCEDURE — 89055 LEUKOCYTE ASSESSMENT FECAL: CPT | Performed by: NURSE PRACTITIONER

## 2021-08-27 PROCEDURE — S0030 INJECTION, METRONIDAZOLE: HCPCS | Performed by: NURSE PRACTITIONER

## 2021-08-27 PROCEDURE — 84132 ASSAY OF SERUM POTASSIUM: CPT | Performed by: EMERGENCY MEDICINE

## 2021-08-27 PROCEDURE — 87426 SARSCOV CORONAVIRUS AG IA: CPT | Performed by: NURSE PRACTITIONER

## 2021-08-27 PROCEDURE — S5010 5% DEXTROSE AND 0.45% SALINE: HCPCS | Performed by: EMERGENCY MEDICINE

## 2021-08-27 PROCEDURE — 25000242 PHARM REV CODE 250 ALT 637 W/ HCPCS

## 2021-08-27 PROCEDURE — 85025 COMPLETE CBC W/AUTO DIFF WBC: CPT | Performed by: NURSE PRACTITIONER

## 2021-08-27 PROCEDURE — 93005 ELECTROCARDIOGRAM TRACING: CPT

## 2021-08-27 PROCEDURE — 87045 FECES CULTURE AEROBIC BACT: CPT | Performed by: NURSE PRACTITIONER

## 2021-08-27 PROCEDURE — 99285 EMERGENCY DEPT VISIT HI MDM: CPT | Mod: 25,CS

## 2021-08-27 PROCEDURE — 87040 BLOOD CULTURE FOR BACTERIA: CPT | Performed by: NURSE PRACTITIONER

## 2021-08-27 PROCEDURE — 96374 THER/PROPH/DIAG INJ IV PUSH: CPT

## 2021-08-27 RX ORDER — DEXTROSE MONOHYDRATE, SODIUM CHLORIDE, AND POTASSIUM CHLORIDE 50; 2.98; 4.5 G/1000ML; G/1000ML; G/1000ML
INJECTION, SOLUTION INTRAVENOUS ONCE
Status: DISCONTINUED | OUTPATIENT
Start: 2021-08-27 | End: 2021-08-27

## 2021-08-27 RX ORDER — METRONIDAZOLE 500 MG/100ML
500 INJECTION, SOLUTION INTRAVENOUS
Status: DISCONTINUED | OUTPATIENT
Start: 2021-08-27 | End: 2021-08-31 | Stop reason: HOSPADM

## 2021-08-27 RX ORDER — ACETAMINOPHEN 325 MG/1
650 TABLET ORAL EVERY 6 HOURS PRN
Status: DISCONTINUED | OUTPATIENT
Start: 2021-08-27 | End: 2021-08-27

## 2021-08-27 RX ORDER — BUSPIRONE HYDROCHLORIDE 5 MG/1
15 TABLET ORAL 4 TIMES DAILY
Status: DISCONTINUED | OUTPATIENT
Start: 2021-08-27 | End: 2021-08-30

## 2021-08-27 RX ORDER — POTASSIUM CHLORIDE 20 MEQ/1
20 TABLET, EXTENDED RELEASE ORAL 2 TIMES DAILY
Status: DISCONTINUED | OUTPATIENT
Start: 2021-08-27 | End: 2021-08-30

## 2021-08-27 RX ORDER — HYDROCODONE BITARTRATE AND ACETAMINOPHEN 10; 325 MG/1; MG/1
1 TABLET ORAL EVERY 8 HOURS
Status: DISCONTINUED | OUTPATIENT
Start: 2021-08-27 | End: 2021-08-27

## 2021-08-27 RX ORDER — LATANOPROST 50 UG/ML
1 SOLUTION/ DROPS OPHTHALMIC NIGHTLY
Status: DISCONTINUED | OUTPATIENT
Start: 2021-08-27 | End: 2021-08-31 | Stop reason: HOSPADM

## 2021-08-27 RX ORDER — CYPROHEPTADINE HYDROCHLORIDE 4 MG/1
4 TABLET ORAL 3 TIMES DAILY
Status: DISCONTINUED | OUTPATIENT
Start: 2021-08-27 | End: 2021-08-30

## 2021-08-27 RX ORDER — POTASSIUM CHLORIDE 750 MG/1
10 TABLET, EXTENDED RELEASE ORAL 2 TIMES DAILY
Status: DISCONTINUED | OUTPATIENT
Start: 2021-08-27 | End: 2021-08-27

## 2021-08-27 RX ORDER — HYDROCODONE BITARTRATE AND ACETAMINOPHEN 10; 325 MG/1; MG/1
1 TABLET ORAL EVERY 6 HOURS PRN
Status: DISCONTINUED | OUTPATIENT
Start: 2021-08-27 | End: 2021-08-31 | Stop reason: HOSPADM

## 2021-08-27 RX ORDER — POTASSIUM CHLORIDE 7.45 MG/ML
10 INJECTION INTRAVENOUS ONCE
Status: COMPLETED | OUTPATIENT
Start: 2021-08-27 | End: 2021-08-27

## 2021-08-27 RX ORDER — ATORVASTATIN CALCIUM 10 MG/1
10 TABLET, FILM COATED ORAL NIGHTLY
Status: DISCONTINUED | OUTPATIENT
Start: 2021-08-27 | End: 2021-08-30

## 2021-08-27 RX ORDER — ONDANSETRON 2 MG/ML
4 INJECTION INTRAMUSCULAR; INTRAVENOUS
Status: COMPLETED | OUTPATIENT
Start: 2021-08-27 | End: 2021-08-27

## 2021-08-27 RX ORDER — CIPROFLOXACIN 2 MG/ML
400 INJECTION, SOLUTION INTRAVENOUS
Status: DISCONTINUED | OUTPATIENT
Start: 2021-08-28 | End: 2021-08-31 | Stop reason: HOSPADM

## 2021-08-27 RX ORDER — LOSARTAN POTASSIUM 50 MG/1
50 TABLET ORAL DAILY
Status: DISCONTINUED | OUTPATIENT
Start: 2021-08-28 | End: 2021-08-30

## 2021-08-27 RX ORDER — CIPROFLOXACIN 2 MG/ML
400 INJECTION, SOLUTION INTRAVENOUS
Status: COMPLETED | OUTPATIENT
Start: 2021-08-27 | End: 2021-08-27

## 2021-08-27 RX ORDER — ONDANSETRON 2 MG/ML
4 INJECTION INTRAMUSCULAR; INTRAVENOUS EVERY 8 HOURS PRN
Status: DISCONTINUED | OUTPATIENT
Start: 2021-08-27 | End: 2021-08-30

## 2021-08-27 RX ORDER — IPRATROPIUM BROMIDE 0.5 MG/2.5ML
SOLUTION RESPIRATORY (INHALATION)
Status: COMPLETED
Start: 2021-08-27 | End: 2021-08-27

## 2021-08-27 RX ORDER — DULOXETIN HYDROCHLORIDE 30 MG/1
60 CAPSULE, DELAYED RELEASE ORAL EVERY 12 HOURS
Status: DISCONTINUED | OUTPATIENT
Start: 2021-08-27 | End: 2021-08-30

## 2021-08-27 RX ORDER — MORPHINE SULFATE 4 MG/ML
2 INJECTION, SOLUTION INTRAMUSCULAR; INTRAVENOUS
Status: COMPLETED | OUTPATIENT
Start: 2021-08-27 | End: 2021-08-27

## 2021-08-27 RX ORDER — CHLORTHALIDONE 25 MG/1
25 TABLET ORAL DAILY
Status: DISCONTINUED | OUTPATIENT
Start: 2021-08-28 | End: 2021-08-27

## 2021-08-27 RX ORDER — MORPHINE SULFATE 2 MG/ML
INJECTION, SOLUTION INTRAMUSCULAR; INTRAVENOUS
Status: COMPLETED
Start: 2021-08-27 | End: 2021-08-27

## 2021-08-27 RX ORDER — SODIUM CHLORIDE 9 MG/ML
INJECTION, SOLUTION INTRAVENOUS CONTINUOUS
Status: DISCONTINUED | OUTPATIENT
Start: 2021-08-27 | End: 2021-08-30

## 2021-08-27 RX ORDER — DEXTROSE MONOHYDRATE, SODIUM CHLORIDE, AND POTASSIUM CHLORIDE 50; 2.98; 4.5 G/1000ML; G/1000ML; G/1000ML
INJECTION, SOLUTION INTRAVENOUS
Status: DISCONTINUED | OUTPATIENT
Start: 2021-08-27 | End: 2021-08-27

## 2021-08-27 RX ORDER — IPRATROPIUM BROMIDE 0.5 MG/2.5ML
0.5 SOLUTION RESPIRATORY (INHALATION) EVERY 6 HOURS
Status: DISCONTINUED | OUTPATIENT
Start: 2021-08-27 | End: 2021-08-31 | Stop reason: HOSPADM

## 2021-08-27 RX ORDER — AMLODIPINE BESYLATE 5 MG/1
10 TABLET ORAL DAILY
Status: DISCONTINUED | OUTPATIENT
Start: 2021-08-28 | End: 2021-08-30

## 2021-08-27 RX ORDER — PANTOPRAZOLE SODIUM 40 MG/1
40 TABLET, DELAYED RELEASE ORAL
Status: DISCONTINUED | OUTPATIENT
Start: 2021-08-27 | End: 2021-08-30

## 2021-08-27 RX ORDER — MORPHINE SULFATE 4 MG/ML
2 INJECTION, SOLUTION INTRAMUSCULAR; INTRAVENOUS EVERY 6 HOURS PRN
Status: DISCONTINUED | OUTPATIENT
Start: 2021-08-27 | End: 2021-08-30

## 2021-08-27 RX ORDER — SODIUM CHLORIDE 0.9 % (FLUSH) 0.9 %
10 SYRINGE (ML) INJECTION
Status: DISCONTINUED | OUTPATIENT
Start: 2021-08-27 | End: 2021-08-31 | Stop reason: HOSPADM

## 2021-08-27 RX ORDER — BRIMONIDINE TARTRATE 2 MG/ML
1 SOLUTION/ DROPS OPHTHALMIC 2 TIMES DAILY
Status: DISCONTINUED | OUTPATIENT
Start: 2021-08-27 | End: 2021-08-31 | Stop reason: HOSPADM

## 2021-08-27 RX ORDER — CYCLOBENZAPRINE HCL 10 MG
10 TABLET ORAL EVERY 8 HOURS
Status: DISCONTINUED | OUTPATIENT
Start: 2021-08-27 | End: 2021-08-27

## 2021-08-27 RX ORDER — DEXTROSE MONOHYDRATE AND SODIUM CHLORIDE 5; .45 G/100ML; G/100ML
INJECTION, SOLUTION INTRAVENOUS CONTINUOUS
Status: DISCONTINUED | OUTPATIENT
Start: 2021-08-27 | End: 2021-08-30

## 2021-08-27 RX ADMIN — BUSPIRONE HYDROCHLORIDE 15 MG: 5 TABLET ORAL at 08:08

## 2021-08-27 RX ADMIN — ATORVASTATIN CALCIUM 10 MG: 10 TABLET, FILM COATED ORAL at 08:08

## 2021-08-27 RX ADMIN — BUSPIRONE HYDROCHLORIDE 15 MG: 5 TABLET ORAL at 05:08

## 2021-08-27 RX ADMIN — METRONIDAZOLE 500 MG: 500 INJECTION, SOLUTION INTRAVENOUS at 05:08

## 2021-08-27 RX ADMIN — MORPHINE SULFATE 2 MG: 4 INJECTION INTRAVENOUS at 11:08

## 2021-08-27 RX ADMIN — CYPROHEPTADINE HYDROCHLORIDE 4 MG: 4 TABLET ORAL at 08:08

## 2021-08-27 RX ADMIN — POTASSIUM CHLORIDE 10 MEQ: 7.46 INJECTION, SOLUTION INTRAVENOUS at 10:08

## 2021-08-27 RX ADMIN — DULOXETINE HYDROCHLORIDE 60 MG: 30 CAPSULE, DELAYED RELEASE ORAL at 08:08

## 2021-08-27 RX ADMIN — IPRATROPIUM BROMIDE 0.5 MG: 0.5 SOLUTION RESPIRATORY (INHALATION) at 05:08

## 2021-08-27 RX ADMIN — CIPROFLOXACIN 400 MG: 2 INJECTION, SOLUTION INTRAVENOUS at 03:08

## 2021-08-27 RX ADMIN — SODIUM CHLORIDE 1000 ML: 9 INJECTION, SOLUTION INTRAVENOUS at 11:08

## 2021-08-27 RX ADMIN — ONDANSETRON 4 MG: 2 INJECTION INTRAMUSCULAR; INTRAVENOUS at 11:08

## 2021-08-27 RX ADMIN — HYDROCODONE BITARTRATE AND ACETAMINOPHEN 1 TABLET: 10; 325 TABLET ORAL at 06:08

## 2021-08-27 RX ADMIN — POTASSIUM CHLORIDE 10 MEQ: 7.46 INJECTION, SOLUTION INTRAVENOUS at 09:08

## 2021-08-27 RX ADMIN — METRONIDAZOLE 500 MG: 500 INJECTION, SOLUTION INTRAVENOUS at 11:08

## 2021-08-27 RX ADMIN — POTASSIUM CHLORIDE 10 MEQ: 7.46 INJECTION, SOLUTION INTRAVENOUS at 08:08

## 2021-08-27 RX ADMIN — IOPAMIDOL 100 ML: 755 INJECTION, SOLUTION INTRAVENOUS at 01:08

## 2021-08-27 RX ADMIN — POTASSIUM CHLORIDE 10 MEQ: 7.46 INJECTION, SOLUTION INTRAVENOUS at 05:08

## 2021-08-27 RX ADMIN — PANTOPRAZOLE SODIUM 40 MG: 40 TABLET, DELAYED RELEASE ORAL at 05:08

## 2021-08-27 RX ADMIN — POTASSIUM CHLORIDE 20 MEQ: 20 TABLET, EXTENDED RELEASE ORAL at 08:08

## 2021-08-27 RX ADMIN — MORPHINE SULFATE 2 MG: 2 INJECTION, SOLUTION INTRAMUSCULAR; INTRAVENOUS at 10:08

## 2021-08-27 RX ADMIN — DEXTROSE AND SODIUM CHLORIDE: 5; 450 INJECTION, SOLUTION INTRAVENOUS at 05:08

## 2021-08-28 LAB
ANION GAP SERPL CALCULATED.3IONS-SCNC: 9 MMOL/L (ref 7–16)
BASOPHILS # BLD AUTO: 0 K/UL (ref 0–0.2)
BASOPHILS NFR BLD AUTO: 0 % (ref 0–1)
BUN SERPL-MCNC: 10 MG/DL (ref 7–18)
BUN/CREAT SERPL: 14 (ref 6–20)
C COLI+JEJ+UPSA DNA STL QL NAA+NON-PROBE: NEGATIVE
CALCIUM SERPL-MCNC: 8.1 MG/DL (ref 8.5–10.1)
CHLORIDE SERPL-SCNC: 92 MMOL/L (ref 98–107)
CO2 SERPL-SCNC: 30 MMOL/L (ref 21–32)
CREAT SERPL-MCNC: 0.74 MG/DL (ref 0.55–1.02)
DIFFERENTIAL METHOD BLD: ABNORMAL
E COLI SXT1 STL QL IA: NEGATIVE
E COLI SXT2 STL QL IA: NEGATIVE
EOSINOPHIL # BLD AUTO: 0 K/UL (ref 0–0.5)
EOSINOPHIL NFR BLD AUTO: 0 % (ref 1–4)
ERYTHROCYTE [DISTWIDTH] IN BLOOD BY AUTOMATED COUNT: 15.1 % (ref 11.5–14.5)
GLUCOSE SERPL-MCNC: 140 MG/DL (ref 74–106)
HCT VFR BLD AUTO: 37.2 % (ref 38–47)
HGB BLD-MCNC: 12.9 G/DL (ref 12–16)
LYMPHOCYTES # BLD AUTO: 1.9 K/UL (ref 1–4.8)
LYMPHOCYTES NFR BLD AUTO: 4.9 % (ref 27–41)
LYMPHOCYTES NFR BLD MANUAL: 5 % (ref 27–41)
MCH RBC QN AUTO: 29.5 PG (ref 27–31)
MCHC RBC AUTO-ENTMCNC: 34.7 G/DL (ref 32–36)
MCV RBC AUTO: 85.1 FL (ref 80–96)
MONOCYTES # BLD AUTO: 1.78 K/UL (ref 0–0.8)
MONOCYTES NFR BLD AUTO: 4.6 % (ref 2–6)
MONOCYTES NFR BLD MANUAL: 4 % (ref 2–6)
MPC BLD CALC-MCNC: 9 FL (ref 9.4–12.4)
NEUTROPHILS # BLD AUTO: 35 K/UL (ref 1.8–7.7)
NEUTROPHILS NFR BLD AUTO: 90.5 % (ref 53–65)
NEUTS BAND NFR BLD MANUAL: 3 % (ref 1–5)
NEUTS SEG NFR BLD MANUAL: 88 % (ref 50–62)
NOROVIRUS GI+II RNA STL QL NAA+NON-PROBE: NEGATIVE
NRBC BLD MANUAL-RTO: ABNORMAL %
PLATELET # BLD AUTO: 349 K/UL (ref 150–400)
PLATELET MORPHOLOGY: ABNORMAL
POIKILOCYTOSIS BLD QL SMEAR: ABNORMAL
POTASSIUM SERPL-SCNC: 3.1 MMOL/L (ref 3.5–5.1)
RBC # BLD AUTO: 4.37 M/UL (ref 4.2–5.4)
RVA RNA STL QL NAA+NON-PROBE: NEGATIVE
S ENT+BONG DNA STL QL NAA+NON-PROBE: NEGATIVE
SHIGELLA SPECIES NAT: NEGATIVE
SODIUM SERPL-SCNC: 128 MMOL/L (ref 136–145)
V CHOL+PARA+VUL DNA STL QL NAA+NON-PROBE: NEGATIVE
WBC # BLD AUTO: 38.68 K/UL (ref 4.5–11)
Y ENTEROCOL DNA STL QL NAA+NON-PROBE: NEGATIVE

## 2021-08-28 PROCEDURE — 11000001 HC ACUTE MED/SURG PRIVATE ROOM

## 2021-08-28 PROCEDURE — S0030 INJECTION, METRONIDAZOLE: HCPCS | Performed by: NURSE PRACTITIONER

## 2021-08-28 PROCEDURE — 25000242 PHARM REV CODE 250 ALT 637 W/ HCPCS: Performed by: EMERGENCY MEDICINE

## 2021-08-28 PROCEDURE — 36415 COLL VENOUS BLD VENIPUNCTURE: CPT | Performed by: NURSE PRACTITIONER

## 2021-08-28 PROCEDURE — 80048 BASIC METABOLIC PNL TOTAL CA: CPT | Performed by: NURSE PRACTITIONER

## 2021-08-28 PROCEDURE — 85025 COMPLETE CBC W/AUTO DIFF WBC: CPT | Performed by: NURSE PRACTITIONER

## 2021-08-28 PROCEDURE — 99232 SBSQ HOSP IP/OBS MODERATE 35: CPT | Mod: 95,,, | Performed by: EMERGENCY MEDICINE

## 2021-08-28 PROCEDURE — 94640 AIRWAY INHALATION TREATMENT: CPT

## 2021-08-28 PROCEDURE — 87493 C DIFF AMPLIFIED PROBE: CPT | Performed by: EMERGENCY MEDICINE

## 2021-08-28 PROCEDURE — 25000003 PHARM REV CODE 250: Performed by: EMERGENCY MEDICINE

## 2021-08-28 PROCEDURE — 94761 N-INVAS EAR/PLS OXIMETRY MLT: CPT

## 2021-08-28 PROCEDURE — 99232 PR SUBSEQUENT HOSPITAL CARE,LEVL II: ICD-10-PCS | Mod: 95,,, | Performed by: EMERGENCY MEDICINE

## 2021-08-28 PROCEDURE — S5010 5% DEXTROSE AND 0.45% SALINE: HCPCS | Performed by: EMERGENCY MEDICINE

## 2021-08-28 PROCEDURE — 25000003 PHARM REV CODE 250: Performed by: NURSE PRACTITIONER

## 2021-08-28 PROCEDURE — 63600175 PHARM REV CODE 636 W HCPCS: Performed by: NURSE PRACTITIONER

## 2021-08-28 PROCEDURE — 99900035 HC TECH TIME PER 15 MIN (STAT)

## 2021-08-28 RX ADMIN — POTASSIUM CHLORIDE 20 MEQ: 20 TABLET, EXTENDED RELEASE ORAL at 08:08

## 2021-08-28 RX ADMIN — BRIMONIDINE TARTRATE 1 DROP: 2 SOLUTION OPHTHALMIC at 09:08

## 2021-08-28 RX ADMIN — METRONIDAZOLE 500 MG: 500 INJECTION, SOLUTION INTRAVENOUS at 04:08

## 2021-08-28 RX ADMIN — PANTOPRAZOLE SODIUM 40 MG: 40 TABLET, DELAYED RELEASE ORAL at 03:08

## 2021-08-28 RX ADMIN — LOSARTAN POTASSIUM 50 MG: 50 TABLET, FILM COATED ORAL at 08:08

## 2021-08-28 RX ADMIN — CIPROFLOXACIN 400 MG: 2 INJECTION, SOLUTION INTRAVENOUS at 06:08

## 2021-08-28 RX ADMIN — DULOXETINE HYDROCHLORIDE 60 MG: 30 CAPSULE, DELAYED RELEASE ORAL at 08:08

## 2021-08-28 RX ADMIN — BUSPIRONE HYDROCHLORIDE 15 MG: 5 TABLET ORAL at 08:08

## 2021-08-28 RX ADMIN — BUSPIRONE HYDROCHLORIDE 15 MG: 5 TABLET ORAL at 05:08

## 2021-08-28 RX ADMIN — ATORVASTATIN CALCIUM 10 MG: 10 TABLET, FILM COATED ORAL at 09:08

## 2021-08-28 RX ADMIN — PANTOPRAZOLE SODIUM 40 MG: 40 TABLET, DELAYED RELEASE ORAL at 06:08

## 2021-08-28 RX ADMIN — IPRATROPIUM BROMIDE 0.5 MG: 0.5 SOLUTION RESPIRATORY (INHALATION) at 07:08

## 2021-08-28 RX ADMIN — IPRATROPIUM BROMIDE 0.5 MG: 0.5 SOLUTION RESPIRATORY (INHALATION) at 01:08

## 2021-08-28 RX ADMIN — IPRATROPIUM BROMIDE 0.5 MG: 0.5 SOLUTION RESPIRATORY (INHALATION) at 12:08

## 2021-08-28 RX ADMIN — METRONIDAZOLE 500 MG: 500 INJECTION, SOLUTION INTRAVENOUS at 08:08

## 2021-08-28 RX ADMIN — CYPROHEPTADINE HYDROCHLORIDE 4 MG: 4 TABLET ORAL at 03:08

## 2021-08-28 RX ADMIN — CYPROHEPTADINE HYDROCHLORIDE 4 MG: 4 TABLET ORAL at 09:08

## 2021-08-28 RX ADMIN — DEXTROSE AND SODIUM CHLORIDE: 5; 450 INJECTION, SOLUTION INTRAVENOUS at 11:08

## 2021-08-28 RX ADMIN — BUSPIRONE HYDROCHLORIDE 15 MG: 5 TABLET ORAL at 09:08

## 2021-08-28 RX ADMIN — AMLODIPINE BESYLATE 10 MG: 5 TABLET ORAL at 08:08

## 2021-08-28 RX ADMIN — POTASSIUM CHLORIDE 20 MEQ: 20 TABLET, EXTENDED RELEASE ORAL at 09:08

## 2021-08-28 RX ADMIN — HYDROCODONE BITARTRATE AND ACETAMINOPHEN 1 TABLET: 10; 325 TABLET ORAL at 09:08

## 2021-08-28 RX ADMIN — METRONIDAZOLE 500 MG: 500 INJECTION, SOLUTION INTRAVENOUS at 11:08

## 2021-08-28 RX ADMIN — BUSPIRONE HYDROCHLORIDE 15 MG: 5 TABLET ORAL at 01:08

## 2021-08-29 LAB
ANION GAP SERPL CALCULATED.3IONS-SCNC: 9 MMOL/L (ref 7–16)
BASOPHILS # BLD AUTO: 0 K/UL (ref 0–0.2)
BASOPHILS NFR BLD AUTO: 0 % (ref 0–1)
BUN SERPL-MCNC: 6 MG/DL (ref 7–18)
BUN/CREAT SERPL: 10 (ref 6–20)
CALCIUM SERPL-MCNC: 7.9 MG/DL (ref 8.5–10.1)
CHLORIDE SERPL-SCNC: 93 MMOL/L (ref 98–107)
CO2 SERPL-SCNC: 30 MMOL/L (ref 21–32)
CREAT SERPL-MCNC: 0.6 MG/DL (ref 0.55–1.02)
DIFFERENTIAL METHOD BLD: ABNORMAL
EOSINOPHIL # BLD AUTO: 0.02 K/UL (ref 0–0.5)
EOSINOPHIL NFR BLD AUTO: 0.1 % (ref 1–4)
ERYTHROCYTE [DISTWIDTH] IN BLOOD BY AUTOMATED COUNT: 14.9 % (ref 11.5–14.5)
GLUCOSE SERPL-MCNC: 100 MG/DL (ref 74–106)
HCT VFR BLD AUTO: 33.5 % (ref 38–47)
HGB BLD-MCNC: 11.6 G/DL (ref 12–16)
LYMPHOCYTES # BLD AUTO: 1.79 K/UL (ref 1–4.8)
LYMPHOCYTES NFR BLD AUTO: 6.9 % (ref 27–41)
LYMPHOCYTES NFR BLD MANUAL: 7 % (ref 27–41)
MCH RBC QN AUTO: 29.3 PG (ref 27–31)
MCHC RBC AUTO-ENTMCNC: 34.6 G/DL (ref 32–36)
MCV RBC AUTO: 84.6 FL (ref 80–96)
MONOCYTES # BLD AUTO: 1.05 K/UL (ref 0–0.8)
MONOCYTES NFR BLD AUTO: 4.1 % (ref 2–6)
MONOCYTES NFR BLD MANUAL: 6 % (ref 2–6)
MPC BLD CALC-MCNC: 8.5 FL (ref 9.4–12.4)
NEUTROPHILS # BLD AUTO: 23.03 K/UL (ref 1.8–7.7)
NEUTROPHILS NFR BLD AUTO: 88.9 % (ref 53–65)
NEUTS BAND NFR BLD MANUAL: 8 % (ref 1–5)
NEUTS SEG NFR BLD MANUAL: 79 % (ref 50–62)
NRBC BLD MANUAL-RTO: ABNORMAL %
PLATELET # BLD AUTO: 242 K/UL (ref 150–400)
PLATELET MORPHOLOGY: ABNORMAL
POTASSIUM SERPL-SCNC: 3.3 MMOL/L (ref 3.5–5.1)
RBC # BLD AUTO: 3.96 M/UL (ref 4.2–5.4)
RBC MORPH BLD: NORMAL
SODIUM SERPL-SCNC: 129 MMOL/L (ref 136–145)
WBC # BLD AUTO: 25.89 K/UL (ref 4.5–11)
WBC TOXIC VACUOLES BLD QL SMEAR: ABNORMAL

## 2021-08-29 PROCEDURE — 99900035 HC TECH TIME PER 15 MIN (STAT)

## 2021-08-29 PROCEDURE — 36415 COLL VENOUS BLD VENIPUNCTURE: CPT | Performed by: NURSE PRACTITIONER

## 2021-08-29 PROCEDURE — 99232 SBSQ HOSP IP/OBS MODERATE 35: CPT | Mod: 95,,, | Performed by: EMERGENCY MEDICINE

## 2021-08-29 PROCEDURE — 25000242 PHARM REV CODE 250 ALT 637 W/ HCPCS: Performed by: EMERGENCY MEDICINE

## 2021-08-29 PROCEDURE — 94761 N-INVAS EAR/PLS OXIMETRY MLT: CPT

## 2021-08-29 PROCEDURE — 27000944

## 2021-08-29 PROCEDURE — 99232 PR SUBSEQUENT HOSPITAL CARE,LEVL II: ICD-10-PCS | Mod: 95,,, | Performed by: EMERGENCY MEDICINE

## 2021-08-29 PROCEDURE — 63600175 PHARM REV CODE 636 W HCPCS: Performed by: NURSE PRACTITIONER

## 2021-08-29 PROCEDURE — S0030 INJECTION, METRONIDAZOLE: HCPCS | Performed by: NURSE PRACTITIONER

## 2021-08-29 PROCEDURE — 27000221 HC OXYGEN, UP TO 24 HOURS

## 2021-08-29 PROCEDURE — 11000001 HC ACUTE MED/SURG PRIVATE ROOM

## 2021-08-29 PROCEDURE — 94640 AIRWAY INHALATION TREATMENT: CPT

## 2021-08-29 PROCEDURE — 80048 BASIC METABOLIC PNL TOTAL CA: CPT | Performed by: NURSE PRACTITIONER

## 2021-08-29 PROCEDURE — 25000003 PHARM REV CODE 250: Performed by: NURSE PRACTITIONER

## 2021-08-29 PROCEDURE — 25000003 PHARM REV CODE 250: Performed by: EMERGENCY MEDICINE

## 2021-08-29 PROCEDURE — 85025 COMPLETE CBC W/AUTO DIFF WBC: CPT | Performed by: NURSE PRACTITIONER

## 2021-08-29 RX ADMIN — AMLODIPINE BESYLATE 10 MG: 5 TABLET ORAL at 09:08

## 2021-08-29 RX ADMIN — POTASSIUM CHLORIDE 20 MEQ: 20 TABLET, EXTENDED RELEASE ORAL at 09:08

## 2021-08-29 RX ADMIN — METRONIDAZOLE 500 MG: 500 INJECTION, SOLUTION INTRAVENOUS at 05:08

## 2021-08-29 RX ADMIN — PANTOPRAZOLE SODIUM 40 MG: 40 TABLET, DELAYED RELEASE ORAL at 03:08

## 2021-08-29 RX ADMIN — CIPROFLOXACIN 400 MG: 2 INJECTION, SOLUTION INTRAVENOUS at 06:08

## 2021-08-29 RX ADMIN — IPRATROPIUM BROMIDE 0.5 MG: 0.5 SOLUTION RESPIRATORY (INHALATION) at 07:08

## 2021-08-29 RX ADMIN — HYDROCODONE BITARTRATE AND ACETAMINOPHEN 1 TABLET: 10; 325 TABLET ORAL at 11:08

## 2021-08-29 RX ADMIN — BUSPIRONE HYDROCHLORIDE 15 MG: 5 TABLET ORAL at 09:08

## 2021-08-29 RX ADMIN — CYPROHEPTADINE HYDROCHLORIDE 4 MG: 4 TABLET ORAL at 09:08

## 2021-08-29 RX ADMIN — PANTOPRAZOLE SODIUM 40 MG: 40 TABLET, DELAYED RELEASE ORAL at 06:08

## 2021-08-29 RX ADMIN — METRONIDAZOLE 500 MG: 500 INJECTION, SOLUTION INTRAVENOUS at 11:08

## 2021-08-29 RX ADMIN — BUSPIRONE HYDROCHLORIDE 15 MG: 5 TABLET ORAL at 08:08

## 2021-08-29 RX ADMIN — HYDROCODONE BITARTRATE AND ACETAMINOPHEN 1 TABLET: 10; 325 TABLET ORAL at 12:08

## 2021-08-29 RX ADMIN — BRIMONIDINE TARTRATE 1 DROP: 2 SOLUTION OPHTHALMIC at 09:08

## 2021-08-29 RX ADMIN — POTASSIUM CHLORIDE 20 MEQ: 20 TABLET, EXTENDED RELEASE ORAL at 08:08

## 2021-08-29 RX ADMIN — CYPROHEPTADINE HYDROCHLORIDE 4 MG: 4 TABLET ORAL at 03:08

## 2021-08-29 RX ADMIN — LOSARTAN POTASSIUM 50 MG: 50 TABLET, FILM COATED ORAL at 09:08

## 2021-08-29 RX ADMIN — SODIUM CHLORIDE 75 ML/HR: 9 INJECTION, SOLUTION INTRAVENOUS at 03:08

## 2021-08-29 RX ADMIN — DULOXETINE HYDROCHLORIDE 60 MG: 30 CAPSULE, DELAYED RELEASE ORAL at 08:08

## 2021-08-29 RX ADMIN — BUSPIRONE HYDROCHLORIDE 15 MG: 5 TABLET ORAL at 05:08

## 2021-08-29 RX ADMIN — BRIMONIDINE TARTRATE 1 DROP: 2 SOLUTION OPHTHALMIC at 08:08

## 2021-08-29 RX ADMIN — BUSPIRONE HYDROCHLORIDE 15 MG: 5 TABLET ORAL at 12:08

## 2021-08-29 RX ADMIN — HYDROCODONE BITARTRATE AND ACETAMINOPHEN 1 TABLET: 10; 325 TABLET ORAL at 03:08

## 2021-08-29 RX ADMIN — IPRATROPIUM BROMIDE 0.5 MG: 0.5 SOLUTION RESPIRATORY (INHALATION) at 12:08

## 2021-08-29 RX ADMIN — ATORVASTATIN CALCIUM 10 MG: 10 TABLET, FILM COATED ORAL at 08:08

## 2021-08-29 RX ADMIN — SODIUM CHLORIDE: 9 INJECTION, SOLUTION INTRAVENOUS at 09:08

## 2021-08-29 RX ADMIN — DULOXETINE HYDROCHLORIDE 60 MG: 30 CAPSULE, DELAYED RELEASE ORAL at 09:08

## 2021-08-29 RX ADMIN — METRONIDAZOLE 500 MG: 500 INJECTION, SOLUTION INTRAVENOUS at 09:08

## 2021-08-30 LAB
ANION GAP SERPL CALCULATED.3IONS-SCNC: 10 MMOL/L (ref 7–16)
BASOPHILS # BLD AUTO: 0 K/UL (ref 0–0.2)
BASOPHILS NFR BLD AUTO: 0 % (ref 0–1)
BUN SERPL-MCNC: 4 MG/DL (ref 7–18)
BUN/CREAT SERPL: 6 (ref 6–20)
C DIFF TOX A+B STL IA-ACNC: NEGATIVE
CALCIUM SERPL-MCNC: 7.8 MG/DL (ref 8.5–10.1)
CHLORIDE SERPL-SCNC: 98 MMOL/L (ref 98–107)
CO2 SERPL-SCNC: 26 MMOL/L (ref 21–32)
CREAT SERPL-MCNC: 0.62 MG/DL (ref 0.55–1.02)
DIFFERENTIAL METHOD BLD: ABNORMAL
EOSINOPHIL # BLD AUTO: 0.04 K/UL (ref 0–0.5)
EOSINOPHIL NFR BLD AUTO: 0.3 % (ref 1–4)
ERYTHROCYTE [DISTWIDTH] IN BLOOD BY AUTOMATED COUNT: 14.9 % (ref 11.5–14.5)
GLUCOSE SERPL-MCNC: 108 MG/DL (ref 74–106)
HCT VFR BLD AUTO: 34.1 % (ref 38–47)
HGB BLD-MCNC: 11.5 G/DL (ref 12–16)
LYMPHOCYTES # BLD AUTO: 1.83 K/UL (ref 1–4.8)
LYMPHOCYTES NFR BLD AUTO: 12.1 % (ref 27–41)
LYMPHOCYTES NFR BLD MANUAL: 10 % (ref 27–41)
MAGNESIUM SERPL-MCNC: 1.3 MG/DL (ref 1.7–2.3)
MCH RBC QN AUTO: 29 PG (ref 27–31)
MCHC RBC AUTO-ENTMCNC: 33.7 G/DL (ref 32–36)
MCV RBC AUTO: 85.9 FL (ref 80–96)
MONOCYTES # BLD AUTO: 0.72 K/UL (ref 0–0.8)
MONOCYTES NFR BLD AUTO: 4.8 % (ref 2–6)
MONOCYTES NFR BLD MANUAL: 2 % (ref 2–6)
MPC BLD CALC-MCNC: 8.6 FL (ref 9.4–12.4)
NEUTROPHILS # BLD AUTO: 12.52 K/UL (ref 1.8–7.7)
NEUTROPHILS NFR BLD AUTO: 82.8 % (ref 53–65)
NEUTS BAND NFR BLD MANUAL: 1 % (ref 1–5)
NEUTS SEG NFR BLD MANUAL: 87 % (ref 50–62)
NRBC BLD MANUAL-RTO: ABNORMAL %
PLATELET # BLD AUTO: 229 K/UL (ref 150–400)
PLATELET MORPHOLOGY: ABNORMAL
POTASSIUM SERPL-SCNC: 3.3 MMOL/L (ref 3.5–5.1)
RBC # BLD AUTO: 3.97 M/UL (ref 4.2–5.4)
RBC MORPH BLD: NORMAL
SODIUM SERPL-SCNC: 131 MMOL/L (ref 136–145)
WBC # BLD AUTO: 15.11 K/UL (ref 4.5–11)

## 2021-08-30 PROCEDURE — 63600175 PHARM REV CODE 636 W HCPCS: Performed by: NURSE PRACTITIONER

## 2021-08-30 PROCEDURE — 25000242 PHARM REV CODE 250 ALT 637 W/ HCPCS: Performed by: EMERGENCY MEDICINE

## 2021-08-30 PROCEDURE — 25000003 PHARM REV CODE 250: Performed by: EMERGENCY MEDICINE

## 2021-08-30 PROCEDURE — S0030 INJECTION, METRONIDAZOLE: HCPCS | Performed by: NURSE PRACTITIONER

## 2021-08-30 PROCEDURE — 27000221 HC OXYGEN, UP TO 24 HOURS

## 2021-08-30 PROCEDURE — 83735 ASSAY OF MAGNESIUM: CPT | Performed by: NURSE PRACTITIONER

## 2021-08-30 PROCEDURE — 25500020 PHARM REV CODE 255: Performed by: EMERGENCY MEDICINE

## 2021-08-30 PROCEDURE — 99900035 HC TECH TIME PER 15 MIN (STAT)

## 2021-08-30 PROCEDURE — 94761 N-INVAS EAR/PLS OXIMETRY MLT: CPT

## 2021-08-30 PROCEDURE — 97161 PT EVAL LOW COMPLEX 20 MIN: CPT

## 2021-08-30 PROCEDURE — 80048 BASIC METABOLIC PNL TOTAL CA: CPT | Performed by: NURSE PRACTITIONER

## 2021-08-30 PROCEDURE — 97166 OT EVAL MOD COMPLEX 45 MIN: CPT

## 2021-08-30 PROCEDURE — 85025 COMPLETE CBC W/AUTO DIFF WBC: CPT | Performed by: NURSE PRACTITIONER

## 2021-08-30 PROCEDURE — 36415 COLL VENOUS BLD VENIPUNCTURE: CPT | Performed by: NURSE PRACTITIONER

## 2021-08-30 PROCEDURE — 94640 AIRWAY INHALATION TREATMENT: CPT

## 2021-08-30 PROCEDURE — 63600175 PHARM REV CODE 636 W HCPCS: Performed by: EMERGENCY MEDICINE

## 2021-08-30 PROCEDURE — 99232 PR SUBSEQUENT HOSPITAL CARE,LEVL II: ICD-10-PCS | Mod: ,,, | Performed by: NURSE PRACTITIONER

## 2021-08-30 PROCEDURE — C9113 INJ PANTOPRAZOLE SODIUM, VIA: HCPCS | Performed by: NURSE PRACTITIONER

## 2021-08-30 PROCEDURE — 11000001 HC ACUTE MED/SURG PRIVATE ROOM

## 2021-08-30 PROCEDURE — 25000003 PHARM REV CODE 250: Performed by: NURSE PRACTITIONER

## 2021-08-30 PROCEDURE — 99232 SBSQ HOSP IP/OBS MODERATE 35: CPT | Mod: ,,, | Performed by: NURSE PRACTITIONER

## 2021-08-30 RX ORDER — MORPHINE SULFATE 2 MG/ML
2 INJECTION, SOLUTION INTRAMUSCULAR; INTRAVENOUS EVERY 6 HOURS PRN
Status: DISCONTINUED | OUTPATIENT
Start: 2021-08-30 | End: 2021-08-31 | Stop reason: HOSPADM

## 2021-08-30 RX ORDER — PANTOPRAZOLE SODIUM 40 MG/10ML
40 INJECTION, POWDER, LYOPHILIZED, FOR SOLUTION INTRAVENOUS DAILY
Status: DISCONTINUED | OUTPATIENT
Start: 2021-08-30 | End: 2021-08-31 | Stop reason: HOSPADM

## 2021-08-30 RX ORDER — METOCLOPRAMIDE HYDROCHLORIDE 5 MG/ML
10 INJECTION INTRAMUSCULAR; INTRAVENOUS EVERY 6 HOURS
Status: DISCONTINUED | OUTPATIENT
Start: 2021-08-30 | End: 2021-08-31 | Stop reason: HOSPADM

## 2021-08-30 RX ORDER — BISACODYL 10 MG
10 SUPPOSITORY, RECTAL RECTAL DAILY PRN
Status: DISCONTINUED | OUTPATIENT
Start: 2021-08-30 | End: 2021-08-31 | Stop reason: HOSPADM

## 2021-08-30 RX ORDER — DEXTROSE MONOHYDRATE, SODIUM CHLORIDE, AND POTASSIUM CHLORIDE 50; 1.49; 4.5 G/1000ML; G/1000ML; G/1000ML
INJECTION, SOLUTION INTRAVENOUS CONTINUOUS
Status: DISCONTINUED | OUTPATIENT
Start: 2021-08-30 | End: 2021-08-31 | Stop reason: HOSPADM

## 2021-08-30 RX ORDER — MAGNESIUM SULFATE HEPTAHYDRATE 40 MG/ML
2 INJECTION, SOLUTION INTRAVENOUS ONCE
Status: COMPLETED | OUTPATIENT
Start: 2021-08-30 | End: 2021-08-30

## 2021-08-30 RX ORDER — HYDRALAZINE HYDROCHLORIDE 20 MG/ML
10 INJECTION INTRAMUSCULAR; INTRAVENOUS EVERY 4 HOURS PRN
Status: DISCONTINUED | OUTPATIENT
Start: 2021-08-30 | End: 2021-08-31 | Stop reason: HOSPADM

## 2021-08-30 RX ORDER — MAGNESIUM SULFATE HEPTAHYDRATE 40 MG/ML
2 INJECTION, SOLUTION INTRAVENOUS
Status: DISCONTINUED | OUTPATIENT
Start: 2021-08-30 | End: 2021-08-30

## 2021-08-30 RX ADMIN — PANTOPRAZOLE SODIUM 40 MG: 40 INJECTION, POWDER, FOR SOLUTION INTRAVENOUS at 10:08

## 2021-08-30 RX ADMIN — MORPHINE SULFATE 2 MG: 2 INJECTION, SOLUTION INTRAMUSCULAR; INTRAVENOUS at 10:08

## 2021-08-30 RX ADMIN — BRIMONIDINE TARTRATE 1 DROP: 2 SOLUTION OPHTHALMIC at 10:08

## 2021-08-30 RX ADMIN — IPRATROPIUM BROMIDE 0.5 MG: 0.5 SOLUTION RESPIRATORY (INHALATION) at 06:08

## 2021-08-30 RX ADMIN — IPRATROPIUM BROMIDE 0.5 MG: 0.5 SOLUTION RESPIRATORY (INHALATION) at 01:08

## 2021-08-30 RX ADMIN — BISACODYL 10 MG: 10 SUPPOSITORY RECTAL at 01:08

## 2021-08-30 RX ADMIN — MAGNESIUM SULFATE HEPTAHYDRATE 2 G: 40 INJECTION, SOLUTION INTRAVENOUS at 10:08

## 2021-08-30 RX ADMIN — IPRATROPIUM BROMIDE 0.5 MG: 0.5 SOLUTION RESPIRATORY (INHALATION) at 12:08

## 2021-08-30 RX ADMIN — MORPHINE SULFATE 2 MG: 2 INJECTION, SOLUTION INTRAMUSCULAR; INTRAVENOUS at 11:08

## 2021-08-30 RX ADMIN — METOCLOPRAMIDE 10 MG: 5 INJECTION, SOLUTION INTRAMUSCULAR; INTRAVENOUS at 12:08

## 2021-08-30 RX ADMIN — CIPROFLOXACIN 400 MG: 2 INJECTION, SOLUTION INTRAVENOUS at 05:08

## 2021-08-30 RX ADMIN — METRONIDAZOLE 500 MG: 500 INJECTION, SOLUTION INTRAVENOUS at 10:08

## 2021-08-30 RX ADMIN — METOCLOPRAMIDE 10 MG: 5 INJECTION, SOLUTION INTRAMUSCULAR; INTRAVENOUS at 05:08

## 2021-08-30 RX ADMIN — MORPHINE SULFATE 2 MG: 2 INJECTION, SOLUTION INTRAMUSCULAR; INTRAVENOUS at 05:08

## 2021-08-30 RX ADMIN — METOCLOPRAMIDE 10 MG: 5 INJECTION, SOLUTION INTRAMUSCULAR; INTRAVENOUS at 11:08

## 2021-08-30 RX ADMIN — MORPHINE SULFATE 2 MG: 4 INJECTION INTRAVENOUS at 03:08

## 2021-08-30 RX ADMIN — BRIMONIDINE TARTRATE 1 DROP: 2 SOLUTION OPHTHALMIC at 08:08

## 2021-08-30 RX ADMIN — METRONIDAZOLE 500 MG: 500 INJECTION, SOLUTION INTRAVENOUS at 05:08

## 2021-08-30 RX ADMIN — IOPAMIDOL 100 ML: 755 INJECTION, SOLUTION INTRAVENOUS at 02:08

## 2021-08-30 RX ADMIN — POTASSIUM CHLORIDE, DEXTROSE MONOHYDRATE AND SODIUM CHLORIDE: 150; 5; 450 INJECTION, SOLUTION INTRAVENOUS at 12:08

## 2021-08-30 RX ADMIN — LATANOPROST 1 DROP: 50 SOLUTION OPHTHALMIC at 08:08

## 2021-08-30 RX ADMIN — METRONIDAZOLE 500 MG: 500 INJECTION, SOLUTION INTRAVENOUS at 11:08

## 2021-08-31 ENCOUNTER — HOSPITAL ENCOUNTER (INPATIENT)
Facility: HOSPITAL | Age: 71
LOS: 9 days | Discharge: HOME | DRG: 556 | End: 2021-09-09
Attending: EMERGENCY MEDICINE | Admitting: EMERGENCY MEDICINE
Payer: MEDICARE

## 2021-08-31 VITALS
HEART RATE: 82 BPM | SYSTOLIC BLOOD PRESSURE: 111 MMHG | HEIGHT: 62 IN | OXYGEN SATURATION: 96 % | BODY MASS INDEX: 15.09 KG/M2 | DIASTOLIC BLOOD PRESSURE: 69 MMHG | RESPIRATION RATE: 18 BRPM | WEIGHT: 82 LBS | TEMPERATURE: 99 F

## 2021-08-31 DIAGNOSIS — I10 ESSENTIAL HYPERTENSION, BENIGN: Chronic | ICD-10-CM

## 2021-08-31 DIAGNOSIS — M43.16 SPONDYLOLISTHESIS, LUMBAR REGION: Chronic | ICD-10-CM

## 2021-08-31 DIAGNOSIS — M47.817 SPONDYLOSIS WITHOUT MYELOPATHY OR RADICULOPATHY, LUMBOSACRAL REGION: Chronic | ICD-10-CM

## 2021-08-31 DIAGNOSIS — E87.1 HYPONATREMIA: ICD-10-CM

## 2021-08-31 DIAGNOSIS — E78.5 HYPERLIPIDEMIA, UNSPECIFIED HYPERLIPIDEMIA TYPE: Chronic | ICD-10-CM

## 2021-08-31 DIAGNOSIS — E87.6 HYPOKALEMIA: ICD-10-CM

## 2021-08-31 DIAGNOSIS — I10 ESSENTIAL (PRIMARY) HYPERTENSION: ICD-10-CM

## 2021-08-31 DIAGNOSIS — G89.4 CHRONIC PAIN SYNDROME: Primary | Chronic | ICD-10-CM

## 2021-08-31 DIAGNOSIS — K52.9 COLITIS: ICD-10-CM

## 2021-08-31 DIAGNOSIS — M54.14 THORACIC RADICULOPATHY: Chronic | ICD-10-CM

## 2021-08-31 DIAGNOSIS — R63.4 WEIGHT LOSS: ICD-10-CM

## 2021-08-31 DIAGNOSIS — M62.81 MUSCLE WEAKNESS: ICD-10-CM

## 2021-08-31 LAB
ANION GAP SERPL CALCULATED.3IONS-SCNC: 9 MMOL/L (ref 7–16)
ANISOCYTOSIS BLD QL SMEAR: ABNORMAL
BASOPHILS # BLD AUTO: 0.02 K/UL (ref 0–0.2)
BASOPHILS NFR BLD AUTO: 0.2 % (ref 0–1)
BILIRUB UR QL STRIP: NEGATIVE
BUN SERPL-MCNC: 2 MG/DL (ref 7–18)
BUN/CREAT SERPL: 4 (ref 6–20)
CALCIUM SERPL-MCNC: 7.4 MG/DL (ref 8.5–10.1)
CHLORIDE SERPL-SCNC: 97 MMOL/L (ref 98–107)
CLARITY UR: ABNORMAL
CO2 SERPL-SCNC: 28 MMOL/L (ref 21–32)
COLOR UR: YELLOW
CREAT SERPL-MCNC: 0.49 MG/DL (ref 0.55–1.02)
DACRYOCYTES BLD QL SMEAR: ABNORMAL
DIFFERENTIAL METHOD BLD: ABNORMAL
EOSINOPHIL # BLD AUTO: 0.07 K/UL (ref 0–0.5)
EOSINOPHIL NFR BLD AUTO: 0.7 % (ref 1–4)
EOSINOPHIL NFR BLD MANUAL: 2 % (ref 1–4)
ERYTHROCYTE [DISTWIDTH] IN BLOOD BY AUTOMATED COUNT: 14.7 % (ref 11.5–14.5)
GLUCOSE SERPL-MCNC: 106 MG/DL (ref 74–106)
GLUCOSE UR STRIP-MCNC: NEGATIVE MG/DL
HCT VFR BLD AUTO: 34.5 % (ref 38–47)
HGB BLD-MCNC: 11.9 G/DL (ref 12–16)
HYPOCHROMIA BLD QL SMEAR: ABNORMAL
KETONES UR STRIP-SCNC: NEGATIVE MG/DL
LEUKOCYTE ESTERASE UR QL STRIP: NEGATIVE
LYMPHOCYTES # BLD AUTO: 1.58 K/UL (ref 1–4.8)
LYMPHOCYTES NFR BLD AUTO: 15 % (ref 27–41)
LYMPHOCYTES NFR BLD MANUAL: 17 % (ref 27–41)
MAGNESIUM SERPL-MCNC: 1.2 MG/DL (ref 1.7–2.3)
MCH RBC QN AUTO: 29 PG (ref 27–31)
MCHC RBC AUTO-ENTMCNC: 34.5 G/DL (ref 32–36)
MCV RBC AUTO: 84.1 FL (ref 80–96)
MONOCYTES # BLD AUTO: 0.89 K/UL (ref 0–0.8)
MONOCYTES NFR BLD AUTO: 8.5 % (ref 2–6)
MONOCYTES NFR BLD MANUAL: 4 % (ref 2–6)
MPC BLD CALC-MCNC: 8.7 FL (ref 9.4–12.4)
NEUTROPHILS # BLD AUTO: 7.97 K/UL (ref 1.8–7.7)
NEUTROPHILS NFR BLD AUTO: 75.6 % (ref 53–65)
NEUTS BAND NFR BLD MANUAL: 2 % (ref 1–5)
NEUTS SEG NFR BLD MANUAL: 75 % (ref 50–62)
NITRITE UR QL STRIP: NEGATIVE
NRBC BLD MANUAL-RTO: ABNORMAL %
PH UR STRIP: 7.5 PH UNITS
PLATELET # BLD AUTO: 201 K/UL (ref 150–400)
PLATELET MORPHOLOGY: NORMAL
POTASSIUM SERPL-SCNC: 3.4 MMOL/L (ref 3.5–5.1)
PROT UR QL STRIP: NEGATIVE
RBC # BLD AUTO: 4.1 M/UL (ref 4.2–5.4)
RBC # UR STRIP: NEGATIVE /UL
SODIUM SERPL-SCNC: 131 MMOL/L (ref 136–145)
SP GR UR STRIP: 1.02
UROBILINOGEN UR STRIP-ACNC: 0.2 MG/DL
WBC # BLD AUTO: 10.53 K/UL (ref 4.5–11)

## 2021-08-31 PROCEDURE — 63600175 PHARM REV CODE 636 W HCPCS: Performed by: NURSE PRACTITIONER

## 2021-08-31 PROCEDURE — 99239 PR HOSPITAL DISCHARGE DAY,>30 MIN: ICD-10-PCS | Mod: ,,, | Performed by: NURSE PRACTITIONER

## 2021-08-31 PROCEDURE — 99900035 HC TECH TIME PER 15 MIN (STAT)

## 2021-08-31 PROCEDURE — 97110 THERAPEUTIC EXERCISES: CPT

## 2021-08-31 PROCEDURE — 81003 URINALYSIS AUTO W/O SCOPE: CPT | Performed by: NURSE PRACTITIONER

## 2021-08-31 PROCEDURE — 36415 COLL VENOUS BLD VENIPUNCTURE: CPT | Performed by: NURSE PRACTITIONER

## 2021-08-31 PROCEDURE — S0030 INJECTION, METRONIDAZOLE: HCPCS | Performed by: NURSE PRACTITIONER

## 2021-08-31 PROCEDURE — S0030 INJECTION, METRONIDAZOLE: HCPCS | Performed by: EMERGENCY MEDICINE

## 2021-08-31 PROCEDURE — 99239 HOSP IP/OBS DSCHRG MGMT >30: CPT | Mod: ,,, | Performed by: NURSE PRACTITIONER

## 2021-08-31 PROCEDURE — 25000242 PHARM REV CODE 250 ALT 637 W/ HCPCS: Performed by: EMERGENCY MEDICINE

## 2021-08-31 PROCEDURE — 27000221 HC OXYGEN, UP TO 24 HOURS

## 2021-08-31 PROCEDURE — 80048 BASIC METABOLIC PNL TOTAL CA: CPT | Performed by: NURSE PRACTITIONER

## 2021-08-31 PROCEDURE — 25000003 PHARM REV CODE 250: Performed by: NURSE PRACTITIONER

## 2021-08-31 PROCEDURE — 11000004 HC SNF PRIVATE

## 2021-08-31 PROCEDURE — 85025 COMPLETE CBC W/AUTO DIFF WBC: CPT | Performed by: NURSE PRACTITIONER

## 2021-08-31 PROCEDURE — 94761 N-INVAS EAR/PLS OXIMETRY MLT: CPT

## 2021-08-31 PROCEDURE — 63600175 PHARM REV CODE 636 W HCPCS: Performed by: EMERGENCY MEDICINE

## 2021-08-31 PROCEDURE — 94640 AIRWAY INHALATION TREATMENT: CPT

## 2021-08-31 PROCEDURE — 97530 THERAPEUTIC ACTIVITIES: CPT

## 2021-08-31 PROCEDURE — 25000003 PHARM REV CODE 250: Performed by: EMERGENCY MEDICINE

## 2021-08-31 PROCEDURE — 83735 ASSAY OF MAGNESIUM: CPT | Performed by: NURSE PRACTITIONER

## 2021-08-31 PROCEDURE — C9113 INJ PANTOPRAZOLE SODIUM, VIA: HCPCS | Performed by: NURSE PRACTITIONER

## 2021-08-31 RX ORDER — ACETAMINOPHEN 325 MG/1
650 TABLET ORAL EVERY 6 HOURS PRN
Status: DISCONTINUED | OUTPATIENT
Start: 2021-08-31 | End: 2021-09-09 | Stop reason: HOSPADM

## 2021-08-31 RX ORDER — TALC
6 POWDER (GRAM) TOPICAL NIGHTLY PRN
Status: DISCONTINUED | OUTPATIENT
Start: 2021-08-31 | End: 2021-09-09 | Stop reason: HOSPADM

## 2021-08-31 RX ORDER — BUSPIRONE HYDROCHLORIDE 5 MG/1
15 TABLET ORAL 4 TIMES DAILY
Status: DISCONTINUED | OUTPATIENT
Start: 2021-08-31 | End: 2021-08-31

## 2021-08-31 RX ORDER — ATORVASTATIN CALCIUM 10 MG/1
10 TABLET, FILM COATED ORAL NIGHTLY
Status: DISCONTINUED | OUTPATIENT
Start: 2021-08-31 | End: 2021-09-09 | Stop reason: HOSPADM

## 2021-08-31 RX ORDER — MORPHINE SULFATE 2 MG/ML
2 INJECTION, SOLUTION INTRAMUSCULAR; INTRAVENOUS EVERY 6 HOURS PRN
Status: DISCONTINUED | OUTPATIENT
Start: 2021-08-31 | End: 2021-09-01

## 2021-08-31 RX ORDER — HYDROCODONE BITARTRATE AND ACETAMINOPHEN 10; 325 MG/1; MG/1
1 TABLET ORAL EVERY 8 HOURS PRN
Status: DISCONTINUED | OUTPATIENT
Start: 2021-08-31 | End: 2021-09-09 | Stop reason: HOSPADM

## 2021-08-31 RX ORDER — PREDNISONE 5 MG/1
5 TABLET ORAL DAILY
Status: DISCONTINUED | OUTPATIENT
Start: 2021-09-01 | End: 2021-09-09 | Stop reason: HOSPADM

## 2021-08-31 RX ORDER — PANTOPRAZOLE SODIUM 40 MG/1
40 TABLET, DELAYED RELEASE ORAL
Status: DISCONTINUED | OUTPATIENT
Start: 2021-09-01 | End: 2021-09-09 | Stop reason: HOSPADM

## 2021-08-31 RX ORDER — AMLODIPINE BESYLATE 5 MG/1
10 TABLET ORAL DAILY
Status: DISCONTINUED | OUTPATIENT
Start: 2021-09-01 | End: 2021-09-09 | Stop reason: HOSPADM

## 2021-08-31 RX ORDER — CYCLOBENZAPRINE HCL 10 MG
10 TABLET ORAL 3 TIMES DAILY PRN
Status: DISCONTINUED | OUTPATIENT
Start: 2021-08-31 | End: 2021-09-09 | Stop reason: HOSPADM

## 2021-08-31 RX ORDER — ONDANSETRON 4 MG/1
4 TABLET, ORALLY DISINTEGRATING ORAL EVERY 8 HOURS PRN
Status: DISCONTINUED | OUTPATIENT
Start: 2021-08-31 | End: 2021-09-09 | Stop reason: HOSPADM

## 2021-08-31 RX ORDER — CHLORTHALIDONE 25 MG/1
25 TABLET ORAL DAILY
Status: DISCONTINUED | OUTPATIENT
Start: 2021-09-01 | End: 2021-09-09 | Stop reason: HOSPADM

## 2021-08-31 RX ORDER — BUSPIRONE HYDROCHLORIDE 5 MG/1
15 TABLET ORAL 2 TIMES DAILY
Status: DISCONTINUED | OUTPATIENT
Start: 2021-08-31 | End: 2021-09-09 | Stop reason: HOSPADM

## 2021-08-31 RX ORDER — AMOXICILLIN 250 MG
1 CAPSULE ORAL 2 TIMES DAILY PRN
Status: DISCONTINUED | OUTPATIENT
Start: 2021-08-31 | End: 2021-09-09 | Stop reason: HOSPADM

## 2021-08-31 RX ORDER — MENTHOL AND ZINC OXIDE .44; 20.625 G/100G; G/100G
OINTMENT TOPICAL 2 TIMES DAILY
Status: DISCONTINUED | OUTPATIENT
Start: 2021-08-31 | End: 2021-08-31 | Stop reason: HOSPADM

## 2021-08-31 RX ORDER — CYPROHEPTADINE HYDROCHLORIDE 4 MG/1
4 TABLET ORAL 3 TIMES DAILY
Status: DISCONTINUED | OUTPATIENT
Start: 2021-08-31 | End: 2021-09-09 | Stop reason: HOSPADM

## 2021-08-31 RX ORDER — DULOXETIN HYDROCHLORIDE 30 MG/1
60 CAPSULE, DELAYED RELEASE ORAL EVERY 12 HOURS
Status: DISCONTINUED | OUTPATIENT
Start: 2021-08-31 | End: 2021-09-09 | Stop reason: HOSPADM

## 2021-08-31 RX ORDER — LATANOPROST 50 UG/ML
1 SOLUTION/ DROPS OPHTHALMIC NIGHTLY
Status: DISCONTINUED | OUTPATIENT
Start: 2021-08-31 | End: 2021-09-09 | Stop reason: HOSPADM

## 2021-08-31 RX ORDER — BRIMONIDINE TARTRATE 2 MG/ML
1 SOLUTION/ DROPS OPHTHALMIC 2 TIMES DAILY
Status: DISCONTINUED | OUTPATIENT
Start: 2021-08-31 | End: 2021-09-09 | Stop reason: HOSPADM

## 2021-08-31 RX ORDER — ENOXAPARIN SODIUM 100 MG/ML
30 INJECTION SUBCUTANEOUS EVERY 24 HOURS
Status: DISCONTINUED | OUTPATIENT
Start: 2021-08-31 | End: 2021-08-31 | Stop reason: HOSPADM

## 2021-08-31 RX ADMIN — METOCLOPRAMIDE 10 MG: 5 INJECTION, SOLUTION INTRAMUSCULAR; INTRAVENOUS at 01:08

## 2021-08-31 RX ADMIN — BRIMONIDINE TARTRATE 1 DROP: 2 SOLUTION OPHTHALMIC at 09:08

## 2021-08-31 RX ADMIN — HYDROCODONE BITARTRATE AND ACETAMINOPHEN 1 TABLET: 10; 325 TABLET ORAL at 07:08

## 2021-08-31 RX ADMIN — POTASSIUM CHLORIDE, DEXTROSE MONOHYDRATE AND SODIUM CHLORIDE: 150; 5; 450 INJECTION, SOLUTION INTRAVENOUS at 03:08

## 2021-08-31 RX ADMIN — ENOXAPARIN SODIUM 30 MG: 30 INJECTION SUBCUTANEOUS at 04:08

## 2021-08-31 RX ADMIN — CYPROHEPTADINE HYDROCHLORIDE 4 MG: 4 TABLET ORAL at 08:08

## 2021-08-31 RX ADMIN — IPRATROPIUM BROMIDE 0.5 MG: 0.5 SOLUTION RESPIRATORY (INHALATION) at 06:08

## 2021-08-31 RX ADMIN — BUSPIRONE HYDROCHLORIDE 15 MG: 5 TABLET ORAL at 08:08

## 2021-08-31 RX ADMIN — DULOXETINE 60 MG: 30 CAPSULE, DELAYED RELEASE ORAL at 08:08

## 2021-08-31 RX ADMIN — PANTOPRAZOLE SODIUM 40 MG: 40 INJECTION, POWDER, FOR SOLUTION INTRAVENOUS at 09:08

## 2021-08-31 RX ADMIN — MORPHINE SULFATE 2 MG: 2 INJECTION, SOLUTION INTRAMUSCULAR; INTRAVENOUS at 05:08

## 2021-08-31 RX ADMIN — MORPHINE SULFATE 2 MG: 2 INJECTION, SOLUTION INTRAMUSCULAR; INTRAVENOUS at 12:08

## 2021-08-31 RX ADMIN — METRONIDAZOLE 500 MG: 500 INJECTION, SOLUTION INTRAVENOUS at 09:08

## 2021-08-31 RX ADMIN — Medication: at 09:08

## 2021-08-31 RX ADMIN — IPRATROPIUM BROMIDE 0.5 MG: 0.5 SOLUTION RESPIRATORY (INHALATION) at 12:08

## 2021-08-31 RX ADMIN — ONDANSETRON 4 MG: 4 TABLET, ORALLY DISINTEGRATING ORAL at 07:08

## 2021-08-31 RX ADMIN — METRONIDAZOLE 500 MG: 500 INJECTION, SOLUTION INTRAVENOUS at 04:08

## 2021-08-31 RX ADMIN — IPRATROPIUM BROMIDE 0.5 MG: 0.5 SOLUTION RESPIRATORY (INHALATION) at 01:08

## 2021-08-31 RX ADMIN — CIPROFLOXACIN 400 MG: 2 INJECTION, SOLUTION INTRAVENOUS at 05:08

## 2021-08-31 RX ADMIN — BRIMONIDINE TARTRATE 1 DROP: 2 SOLUTION OPHTHALMIC at 08:08

## 2021-08-31 RX ADMIN — LATANOPROST 1 DROP: 50 SOLUTION OPHTHALMIC at 08:08

## 2021-08-31 RX ADMIN — METOCLOPRAMIDE 10 MG: 5 INJECTION, SOLUTION INTRAMUSCULAR; INTRAVENOUS at 05:08

## 2021-08-31 RX ADMIN — MORPHINE SULFATE 2 MG: 2 INJECTION, SOLUTION INTRAMUSCULAR; INTRAVENOUS at 11:08

## 2021-08-31 RX ADMIN — ATORVASTATIN CALCIUM 10 MG: 10 TABLET, FILM COATED ORAL at 08:08

## 2021-09-01 PROBLEM — Z79.899 DVT PROPHYLAXIS: Status: ACTIVE | Noted: 2021-09-01

## 2021-09-01 PROBLEM — R10.13 EPIGASTRIC PAIN: Status: RESOLVED | Noted: 2021-08-27 | Resolved: 2021-09-01

## 2021-09-01 PROBLEM — R63.4 WEIGHT LOSS: Status: ACTIVE | Noted: 2021-09-01

## 2021-09-01 PROBLEM — E83.42 HYPOMAGNESEMIA: Status: ACTIVE | Noted: 2021-09-01

## 2021-09-01 LAB — MAGNESIUM SERPL-MCNC: 1 MG/DL (ref 1.7–2.3)

## 2021-09-01 PROCEDURE — 27000221 HC OXYGEN, UP TO 24 HOURS

## 2021-09-01 PROCEDURE — 94761 N-INVAS EAR/PLS OXIMETRY MLT: CPT

## 2021-09-01 PROCEDURE — 11000004 HC SNF PRIVATE

## 2021-09-01 PROCEDURE — 25000003 PHARM REV CODE 250: Performed by: NURSE PRACTITIONER

## 2021-09-01 PROCEDURE — 63600175 PHARM REV CODE 636 W HCPCS: Performed by: EMERGENCY MEDICINE

## 2021-09-01 PROCEDURE — 99305 PR NURSING FACILITY CARE, INIT, MOD SEVERITY: ICD-10-PCS | Mod: AI,,, | Performed by: EMERGENCY MEDICINE

## 2021-09-01 PROCEDURE — 63600175 PHARM REV CODE 636 W HCPCS: Performed by: NURSE PRACTITIONER

## 2021-09-01 PROCEDURE — 83735 ASSAY OF MAGNESIUM: CPT | Performed by: NURSE PRACTITIONER

## 2021-09-01 PROCEDURE — 36415 COLL VENOUS BLD VENIPUNCTURE: CPT | Performed by: NURSE PRACTITIONER

## 2021-09-01 PROCEDURE — 99900035 HC TECH TIME PER 15 MIN (STAT)

## 2021-09-01 PROCEDURE — 25000003 PHARM REV CODE 250: Performed by: EMERGENCY MEDICINE

## 2021-09-01 PROCEDURE — 99305 1ST NF CARE MODERATE MDM 35: CPT | Mod: AI,,, | Performed by: EMERGENCY MEDICINE

## 2021-09-01 RX ORDER — DEXTROSE MONOHYDRATE, SODIUM CHLORIDE, AND POTASSIUM CHLORIDE 50; 1.49; 4.5 G/1000ML; G/1000ML; G/1000ML
INJECTION, SOLUTION INTRAVENOUS CONTINUOUS
Status: DISCONTINUED | OUTPATIENT
Start: 2021-09-01 | End: 2021-09-07

## 2021-09-01 RX ORDER — LANOLIN ALCOHOL/MO/W.PET/CERES
400 CREAM (GRAM) TOPICAL DAILY
Status: DISCONTINUED | OUTPATIENT
Start: 2021-09-01 | End: 2021-09-02

## 2021-09-01 RX ORDER — ENOXAPARIN SODIUM 100 MG/ML
30 INJECTION SUBCUTANEOUS EVERY 24 HOURS
Status: DISCONTINUED | OUTPATIENT
Start: 2021-09-01 | End: 2021-09-09 | Stop reason: HOSPADM

## 2021-09-01 RX ORDER — METRONIDAZOLE 500 MG/1
500 TABLET ORAL EVERY 8 HOURS
Status: DISCONTINUED | OUTPATIENT
Start: 2021-09-01 | End: 2021-09-03 | Stop reason: ALTCHOICE

## 2021-09-01 RX ORDER — CIPROFLOXACIN 500 MG/1
500 TABLET ORAL EVERY 12 HOURS
Status: DISCONTINUED | OUTPATIENT
Start: 2021-09-01 | End: 2021-09-03 | Stop reason: ALTCHOICE

## 2021-09-01 RX ORDER — POTASSIUM CHLORIDE 20 MEQ/1
20 TABLET, EXTENDED RELEASE ORAL DAILY
Status: DISCONTINUED | OUTPATIENT
Start: 2021-09-02 | End: 2021-09-01

## 2021-09-01 RX ADMIN — BUSPIRONE HYDROCHLORIDE 15 MG: 5 TABLET ORAL at 08:09

## 2021-09-01 RX ADMIN — PREDNISONE 5 MG: 5 TABLET ORAL at 09:09

## 2021-09-01 RX ADMIN — DULOXETINE 60 MG: 30 CAPSULE, DELAYED RELEASE ORAL at 08:09

## 2021-09-01 RX ADMIN — MAGNESIUM OXIDE 400 MG: 400 TABLET ORAL at 06:09

## 2021-09-01 RX ADMIN — HYDROCODONE BITARTRATE AND ACETAMINOPHEN 1 TABLET: 10; 325 TABLET ORAL at 08:09

## 2021-09-01 RX ADMIN — POTASSIUM CHLORIDE, DEXTROSE MONOHYDRATE AND SODIUM CHLORIDE 75 ML/HR: 150; 5; 450 INJECTION, SOLUTION INTRAVENOUS at 06:09

## 2021-09-01 RX ADMIN — LATANOPROST 1 DROP: 50 SOLUTION OPHTHALMIC at 08:09

## 2021-09-01 RX ADMIN — HYDROCODONE BITARTRATE AND ACETAMINOPHEN 1 TABLET: 10; 325 TABLET ORAL at 06:09

## 2021-09-01 RX ADMIN — CHLORTHALIDONE 25 MG: 25 TABLET ORAL at 09:09

## 2021-09-01 RX ADMIN — PANTOPRAZOLE SODIUM 40 MG: 40 TABLET, DELAYED RELEASE ORAL at 04:09

## 2021-09-01 RX ADMIN — METRONIDAZOLE 500 MG: 500 TABLET ORAL at 10:09

## 2021-09-01 RX ADMIN — DULOXETINE 60 MG: 30 CAPSULE, DELAYED RELEASE ORAL at 09:09

## 2021-09-01 RX ADMIN — ATORVASTATIN CALCIUM 10 MG: 10 TABLET, FILM COATED ORAL at 08:09

## 2021-09-01 RX ADMIN — CYPROHEPTADINE HYDROCHLORIDE 4 MG: 4 TABLET ORAL at 03:09

## 2021-09-01 RX ADMIN — ENOXAPARIN SODIUM 30 MG: 30 INJECTION SUBCUTANEOUS at 06:09

## 2021-09-01 RX ADMIN — AMLODIPINE BESYLATE 10 MG: 5 TABLET ORAL at 09:09

## 2021-09-01 RX ADMIN — PANTOPRAZOLE SODIUM 40 MG: 40 TABLET, DELAYED RELEASE ORAL at 05:09

## 2021-09-01 RX ADMIN — BUSPIRONE HYDROCHLORIDE 15 MG: 5 TABLET ORAL at 09:09

## 2021-09-01 RX ADMIN — BRIMONIDINE TARTRATE 1 DROP: 2 SOLUTION OPHTHALMIC at 09:09

## 2021-09-01 RX ADMIN — BRIMONIDINE TARTRATE 1 DROP: 2 SOLUTION OPHTHALMIC at 08:09

## 2021-09-01 RX ADMIN — CYPROHEPTADINE HYDROCHLORIDE 4 MG: 4 TABLET ORAL at 08:09

## 2021-09-01 RX ADMIN — HYDROCODONE BITARTRATE AND ACETAMINOPHEN 1 TABLET: 10; 325 TABLET ORAL at 03:09

## 2021-09-01 RX ADMIN — CYPROHEPTADINE HYDROCHLORIDE 4 MG: 4 TABLET ORAL at 09:09

## 2021-09-01 RX ADMIN — CIPROFLOXACIN HYDROCHLORIDE 500 MG: 500 TABLET, FILM COATED ORAL at 08:09

## 2021-09-02 PROCEDURE — 27000944

## 2021-09-02 PROCEDURE — 97161 PT EVAL LOW COMPLEX 20 MIN: CPT

## 2021-09-02 PROCEDURE — 63600175 PHARM REV CODE 636 W HCPCS: Performed by: EMERGENCY MEDICINE

## 2021-09-02 PROCEDURE — 99900035 HC TECH TIME PER 15 MIN (STAT)

## 2021-09-02 PROCEDURE — 11000004 HC SNF PRIVATE

## 2021-09-02 PROCEDURE — 25000003 PHARM REV CODE 250: Performed by: EMERGENCY MEDICINE

## 2021-09-02 PROCEDURE — 94761 N-INVAS EAR/PLS OXIMETRY MLT: CPT

## 2021-09-02 PROCEDURE — 63600175 PHARM REV CODE 636 W HCPCS: Performed by: NURSE PRACTITIONER

## 2021-09-02 PROCEDURE — 27000221 HC OXYGEN, UP TO 24 HOURS

## 2021-09-02 PROCEDURE — 97166 OT EVAL MOD COMPLEX 45 MIN: CPT

## 2021-09-02 PROCEDURE — 25000003 PHARM REV CODE 250: Performed by: NURSE PRACTITIONER

## 2021-09-02 RX ORDER — LANOLIN ALCOHOL/MO/W.PET/CERES
400 CREAM (GRAM) TOPICAL 2 TIMES DAILY
Status: COMPLETED | OUTPATIENT
Start: 2021-09-02 | End: 2021-09-07

## 2021-09-02 RX ADMIN — ATORVASTATIN CALCIUM 10 MG: 10 TABLET, FILM COATED ORAL at 08:09

## 2021-09-02 RX ADMIN — BUSPIRONE HYDROCHLORIDE 15 MG: 5 TABLET ORAL at 08:09

## 2021-09-02 RX ADMIN — MAGNESIUM OXIDE 400 MG: 400 TABLET ORAL at 08:09

## 2021-09-02 RX ADMIN — CYPROHEPTADINE HYDROCHLORIDE 4 MG: 4 TABLET ORAL at 08:09

## 2021-09-02 RX ADMIN — PANTOPRAZOLE SODIUM 40 MG: 40 TABLET, DELAYED RELEASE ORAL at 05:09

## 2021-09-02 RX ADMIN — CYPROHEPTADINE HYDROCHLORIDE 4 MG: 4 TABLET ORAL at 03:09

## 2021-09-02 RX ADMIN — METRONIDAZOLE 500 MG: 500 TABLET ORAL at 01:09

## 2021-09-02 RX ADMIN — CHLORTHALIDONE 25 MG: 25 TABLET ORAL at 08:09

## 2021-09-02 RX ADMIN — METRONIDAZOLE 500 MG: 500 TABLET ORAL at 05:09

## 2021-09-02 RX ADMIN — AMLODIPINE BESYLATE 10 MG: 5 TABLET ORAL at 08:09

## 2021-09-02 RX ADMIN — PREDNISONE 5 MG: 5 TABLET ORAL at 08:09

## 2021-09-02 RX ADMIN — HYDROCODONE BITARTRATE AND ACETAMINOPHEN 1 TABLET: 10; 325 TABLET ORAL at 04:09

## 2021-09-02 RX ADMIN — PANTOPRAZOLE SODIUM 40 MG: 40 TABLET, DELAYED RELEASE ORAL at 03:09

## 2021-09-02 RX ADMIN — LATANOPROST 1 DROP: 50 SOLUTION OPHTHALMIC at 08:09

## 2021-09-02 RX ADMIN — BRIMONIDINE TARTRATE 1 DROP: 2 SOLUTION OPHTHALMIC at 08:09

## 2021-09-02 RX ADMIN — CIPROFLOXACIN HYDROCHLORIDE 500 MG: 500 TABLET, FILM COATED ORAL at 08:09

## 2021-09-02 RX ADMIN — POTASSIUM CHLORIDE, DEXTROSE MONOHYDRATE AND SODIUM CHLORIDE: 150; 5; 450 INJECTION, SOLUTION INTRAVENOUS at 10:09

## 2021-09-02 RX ADMIN — ENOXAPARIN SODIUM 30 MG: 30 INJECTION SUBCUTANEOUS at 04:09

## 2021-09-02 RX ADMIN — DULOXETINE 60 MG: 30 CAPSULE, DELAYED RELEASE ORAL at 08:09

## 2021-09-02 RX ADMIN — METRONIDAZOLE 500 MG: 500 TABLET ORAL at 09:09

## 2021-09-02 RX ADMIN — POTASSIUM CHLORIDE, DEXTROSE MONOHYDRATE AND SODIUM CHLORIDE: 150; 5; 450 INJECTION, SOLUTION INTRAVENOUS at 08:09

## 2021-09-02 RX ADMIN — HYDROCODONE BITARTRATE AND ACETAMINOPHEN 1 TABLET: 10; 325 TABLET ORAL at 09:09

## 2021-09-02 RX ADMIN — HYDROCODONE BITARTRATE AND ACETAMINOPHEN 1 TABLET: 10; 325 TABLET ORAL at 12:09

## 2021-09-03 LAB
BACTERIA BLD CULT: NORMAL
BACTERIA BLD CULT: NORMAL

## 2021-09-03 PROCEDURE — 25000003 PHARM REV CODE 250: Performed by: EMERGENCY MEDICINE

## 2021-09-03 PROCEDURE — 63600175 PHARM REV CODE 636 W HCPCS: Performed by: EMERGENCY MEDICINE

## 2021-09-03 PROCEDURE — 94761 N-INVAS EAR/PLS OXIMETRY MLT: CPT

## 2021-09-03 PROCEDURE — 11000004 HC SNF PRIVATE

## 2021-09-03 PROCEDURE — 99900035 HC TECH TIME PER 15 MIN (STAT)

## 2021-09-03 PROCEDURE — 25000003 PHARM REV CODE 250: Performed by: NURSE PRACTITIONER

## 2021-09-03 PROCEDURE — 27000221 HC OXYGEN, UP TO 24 HOURS

## 2021-09-03 PROCEDURE — 97110 THERAPEUTIC EXERCISES: CPT | Mod: CQ

## 2021-09-03 PROCEDURE — 63600175 PHARM REV CODE 636 W HCPCS: Performed by: NURSE PRACTITIONER

## 2021-09-03 RX ADMIN — POTASSIUM CHLORIDE, DEXTROSE MONOHYDRATE AND SODIUM CHLORIDE: 150; 5; 450 INJECTION, SOLUTION INTRAVENOUS at 12:09

## 2021-09-03 RX ADMIN — DULOXETINE 60 MG: 30 CAPSULE, DELAYED RELEASE ORAL at 08:09

## 2021-09-03 RX ADMIN — CHLORTHALIDONE 25 MG: 25 TABLET ORAL at 09:09

## 2021-09-03 RX ADMIN — BRIMONIDINE TARTRATE 1 DROP: 2 SOLUTION OPHTHALMIC at 08:09

## 2021-09-03 RX ADMIN — AMLODIPINE BESYLATE 10 MG: 5 TABLET ORAL at 09:09

## 2021-09-03 RX ADMIN — MAGNESIUM OXIDE 400 MG: 400 TABLET ORAL at 08:09

## 2021-09-03 RX ADMIN — DULOXETINE 60 MG: 30 CAPSULE, DELAYED RELEASE ORAL at 09:09

## 2021-09-03 RX ADMIN — CYPROHEPTADINE HYDROCHLORIDE 4 MG: 4 TABLET ORAL at 08:09

## 2021-09-03 RX ADMIN — HYDROCODONE BITARTRATE AND ACETAMINOPHEN 1 TABLET: 10; 325 TABLET ORAL at 09:09

## 2021-09-03 RX ADMIN — ENOXAPARIN SODIUM 30 MG: 30 INJECTION SUBCUTANEOUS at 04:09

## 2021-09-03 RX ADMIN — MAGNESIUM OXIDE 400 MG: 400 TABLET ORAL at 09:09

## 2021-09-03 RX ADMIN — PANTOPRAZOLE SODIUM 40 MG: 40 TABLET, DELAYED RELEASE ORAL at 04:09

## 2021-09-03 RX ADMIN — LATANOPROST 1 DROP: 50 SOLUTION OPHTHALMIC at 08:09

## 2021-09-03 RX ADMIN — CYPROHEPTADINE HYDROCHLORIDE 4 MG: 4 TABLET ORAL at 02:09

## 2021-09-03 RX ADMIN — ATORVASTATIN CALCIUM 10 MG: 10 TABLET, FILM COATED ORAL at 08:09

## 2021-09-03 RX ADMIN — CIPROFLOXACIN HYDROCHLORIDE 500 MG: 500 TABLET, FILM COATED ORAL at 09:09

## 2021-09-03 RX ADMIN — BUSPIRONE HYDROCHLORIDE 15 MG: 5 TABLET ORAL at 09:09

## 2021-09-03 RX ADMIN — PANTOPRAZOLE SODIUM 40 MG: 40 TABLET, DELAYED RELEASE ORAL at 05:09

## 2021-09-03 RX ADMIN — METRONIDAZOLE 500 MG: 500 TABLET ORAL at 05:09

## 2021-09-03 RX ADMIN — CYPROHEPTADINE HYDROCHLORIDE 4 MG: 4 TABLET ORAL at 09:09

## 2021-09-03 RX ADMIN — BUSPIRONE HYDROCHLORIDE 15 MG: 5 TABLET ORAL at 08:09

## 2021-09-03 RX ADMIN — PREDNISONE 5 MG: 5 TABLET ORAL at 09:09

## 2021-09-03 RX ADMIN — BRIMONIDINE TARTRATE 1 DROP: 2 SOLUTION OPHTHALMIC at 09:09

## 2021-09-04 PROCEDURE — 63600175 PHARM REV CODE 636 W HCPCS: Performed by: NURSE PRACTITIONER

## 2021-09-04 PROCEDURE — 25000003 PHARM REV CODE 250: Performed by: NURSE PRACTITIONER

## 2021-09-04 PROCEDURE — 94761 N-INVAS EAR/PLS OXIMETRY MLT: CPT

## 2021-09-04 PROCEDURE — 27000221 HC OXYGEN, UP TO 24 HOURS

## 2021-09-04 PROCEDURE — 99900035 HC TECH TIME PER 15 MIN (STAT)

## 2021-09-04 PROCEDURE — 11000004 HC SNF PRIVATE

## 2021-09-04 PROCEDURE — 25000003 PHARM REV CODE 250: Performed by: EMERGENCY MEDICINE

## 2021-09-04 PROCEDURE — 63600175 PHARM REV CODE 636 W HCPCS: Performed by: EMERGENCY MEDICINE

## 2021-09-04 RX ADMIN — POTASSIUM CHLORIDE, DEXTROSE MONOHYDRATE AND SODIUM CHLORIDE: 150; 5; 450 INJECTION, SOLUTION INTRAVENOUS at 03:09

## 2021-09-04 RX ADMIN — ONDANSETRON 4 MG: 4 TABLET, ORALLY DISINTEGRATING ORAL at 07:09

## 2021-09-04 RX ADMIN — ENOXAPARIN SODIUM 30 MG: 30 INJECTION SUBCUTANEOUS at 04:09

## 2021-09-04 RX ADMIN — POTASSIUM CHLORIDE, DEXTROSE MONOHYDRATE AND SODIUM CHLORIDE: 150; 5; 450 INJECTION, SOLUTION INTRAVENOUS at 04:09

## 2021-09-04 RX ADMIN — AMLODIPINE BESYLATE 10 MG: 5 TABLET ORAL at 08:09

## 2021-09-04 RX ADMIN — BRIMONIDINE TARTRATE 1 DROP: 2 SOLUTION OPHTHALMIC at 08:09

## 2021-09-04 RX ADMIN — CYPROHEPTADINE HYDROCHLORIDE 4 MG: 4 TABLET ORAL at 03:09

## 2021-09-04 RX ADMIN — DULOXETINE 60 MG: 30 CAPSULE, DELAYED RELEASE ORAL at 08:09

## 2021-09-04 RX ADMIN — PANTOPRAZOLE SODIUM 40 MG: 40 TABLET, DELAYED RELEASE ORAL at 04:09

## 2021-09-04 RX ADMIN — BUSPIRONE HYDROCHLORIDE 15 MG: 5 TABLET ORAL at 08:09

## 2021-09-04 RX ADMIN — MAGNESIUM OXIDE 400 MG: 400 TABLET ORAL at 08:09

## 2021-09-04 RX ADMIN — PREDNISONE 5 MG: 5 TABLET ORAL at 08:09

## 2021-09-04 RX ADMIN — ATORVASTATIN CALCIUM 10 MG: 10 TABLET, FILM COATED ORAL at 08:09

## 2021-09-04 RX ADMIN — PANTOPRAZOLE SODIUM 40 MG: 40 TABLET, DELAYED RELEASE ORAL at 05:09

## 2021-09-04 RX ADMIN — CHLORTHALIDONE 25 MG: 25 TABLET ORAL at 08:09

## 2021-09-04 RX ADMIN — HYDROCODONE BITARTRATE AND ACETAMINOPHEN 1 TABLET: 10; 325 TABLET ORAL at 03:09

## 2021-09-04 RX ADMIN — CYPROHEPTADINE HYDROCHLORIDE 4 MG: 4 TABLET ORAL at 08:09

## 2021-09-04 RX ADMIN — LATANOPROST 1 DROP: 50 SOLUTION OPHTHALMIC at 08:09

## 2021-09-05 PROCEDURE — 27000221 HC OXYGEN, UP TO 24 HOURS

## 2021-09-05 PROCEDURE — 63600175 PHARM REV CODE 636 W HCPCS: Performed by: NURSE PRACTITIONER

## 2021-09-05 PROCEDURE — 99900035 HC TECH TIME PER 15 MIN (STAT)

## 2021-09-05 PROCEDURE — 11000004 HC SNF PRIVATE

## 2021-09-05 PROCEDURE — 27000944

## 2021-09-05 PROCEDURE — 63600175 PHARM REV CODE 636 W HCPCS: Performed by: EMERGENCY MEDICINE

## 2021-09-05 PROCEDURE — 94761 N-INVAS EAR/PLS OXIMETRY MLT: CPT

## 2021-09-05 PROCEDURE — 25000003 PHARM REV CODE 250: Performed by: NURSE PRACTITIONER

## 2021-09-05 PROCEDURE — 25000003 PHARM REV CODE 250: Performed by: EMERGENCY MEDICINE

## 2021-09-05 RX ADMIN — POTASSIUM CHLORIDE, DEXTROSE MONOHYDRATE AND SODIUM CHLORIDE: 150; 5; 450 INJECTION, SOLUTION INTRAVENOUS at 08:09

## 2021-09-05 RX ADMIN — BUSPIRONE HYDROCHLORIDE 15 MG: 5 TABLET ORAL at 08:09

## 2021-09-05 RX ADMIN — MAGNESIUM OXIDE 400 MG: 400 TABLET ORAL at 08:09

## 2021-09-05 RX ADMIN — ENOXAPARIN SODIUM 30 MG: 30 INJECTION SUBCUTANEOUS at 04:09

## 2021-09-05 RX ADMIN — DULOXETINE 60 MG: 30 CAPSULE, DELAYED RELEASE ORAL at 08:09

## 2021-09-05 RX ADMIN — BRIMONIDINE TARTRATE 1 DROP: 2 SOLUTION OPHTHALMIC at 08:09

## 2021-09-05 RX ADMIN — CHLORTHALIDONE 25 MG: 25 TABLET ORAL at 08:09

## 2021-09-05 RX ADMIN — PREDNISONE 5 MG: 5 TABLET ORAL at 08:09

## 2021-09-05 RX ADMIN — AMLODIPINE BESYLATE 10 MG: 5 TABLET ORAL at 08:09

## 2021-09-05 RX ADMIN — PANTOPRAZOLE SODIUM 40 MG: 40 TABLET, DELAYED RELEASE ORAL at 03:09

## 2021-09-05 RX ADMIN — CYPROHEPTADINE HYDROCHLORIDE 4 MG: 4 TABLET ORAL at 08:09

## 2021-09-05 RX ADMIN — SENNOSIDES AND DOCUSATE SODIUM 1 TABLET: 8.6; 5 TABLET ORAL at 08:09

## 2021-09-05 RX ADMIN — PANTOPRAZOLE SODIUM 40 MG: 40 TABLET, DELAYED RELEASE ORAL at 06:09

## 2021-09-05 RX ADMIN — CYPROHEPTADINE HYDROCHLORIDE 4 MG: 4 TABLET ORAL at 03:09

## 2021-09-05 RX ADMIN — ONDANSETRON 4 MG: 4 TABLET, ORALLY DISINTEGRATING ORAL at 07:09

## 2021-09-05 RX ADMIN — POTASSIUM CHLORIDE, DEXTROSE MONOHYDRATE AND SODIUM CHLORIDE: 150; 5; 450 INJECTION, SOLUTION INTRAVENOUS at 06:09

## 2021-09-05 RX ADMIN — ATORVASTATIN CALCIUM 10 MG: 10 TABLET, FILM COATED ORAL at 08:09

## 2021-09-05 RX ADMIN — LATANOPROST 1 DROP: 50 SOLUTION OPHTHALMIC at 08:09

## 2021-09-05 RX ADMIN — HYDROCODONE BITARTRATE AND ACETAMINOPHEN 1 TABLET: 10; 325 TABLET ORAL at 04:09

## 2021-09-06 PROCEDURE — 27000221 HC OXYGEN, UP TO 24 HOURS

## 2021-09-06 PROCEDURE — 99900035 HC TECH TIME PER 15 MIN (STAT)

## 2021-09-06 PROCEDURE — 11000004 HC SNF PRIVATE

## 2021-09-06 PROCEDURE — 63600175 PHARM REV CODE 636 W HCPCS: Performed by: NURSE PRACTITIONER

## 2021-09-06 PROCEDURE — 25000003 PHARM REV CODE 250: Performed by: EMERGENCY MEDICINE

## 2021-09-06 PROCEDURE — 94761 N-INVAS EAR/PLS OXIMETRY MLT: CPT

## 2021-09-06 PROCEDURE — 27000944

## 2021-09-06 PROCEDURE — 25000003 PHARM REV CODE 250: Performed by: NURSE PRACTITIONER

## 2021-09-06 PROCEDURE — 63600175 PHARM REV CODE 636 W HCPCS: Performed by: EMERGENCY MEDICINE

## 2021-09-06 RX ADMIN — CHLORTHALIDONE 25 MG: 25 TABLET ORAL at 08:09

## 2021-09-06 RX ADMIN — POTASSIUM CHLORIDE, DEXTROSE MONOHYDRATE AND SODIUM CHLORIDE: 150; 5; 450 INJECTION, SOLUTION INTRAVENOUS at 10:09

## 2021-09-06 RX ADMIN — MAGNESIUM OXIDE 400 MG: 400 TABLET ORAL at 09:09

## 2021-09-06 RX ADMIN — SENNOSIDES AND DOCUSATE SODIUM 1 TABLET: 8.6; 5 TABLET ORAL at 09:09

## 2021-09-06 RX ADMIN — PANTOPRAZOLE SODIUM 40 MG: 40 TABLET, DELAYED RELEASE ORAL at 06:09

## 2021-09-06 RX ADMIN — PREDNISONE 5 MG: 5 TABLET ORAL at 08:09

## 2021-09-06 RX ADMIN — AMLODIPINE BESYLATE 10 MG: 5 TABLET ORAL at 08:09

## 2021-09-06 RX ADMIN — DULOXETINE 60 MG: 30 CAPSULE, DELAYED RELEASE ORAL at 09:09

## 2021-09-06 RX ADMIN — BRIMONIDINE TARTRATE 1 DROP: 2 SOLUTION OPHTHALMIC at 09:09

## 2021-09-06 RX ADMIN — BUSPIRONE HYDROCHLORIDE 15 MG: 5 TABLET ORAL at 09:09

## 2021-09-06 RX ADMIN — BUSPIRONE HYDROCHLORIDE 15 MG: 5 TABLET ORAL at 08:09

## 2021-09-06 RX ADMIN — HYDROCODONE BITARTRATE AND ACETAMINOPHEN 1 TABLET: 10; 325 TABLET ORAL at 04:09

## 2021-09-06 RX ADMIN — CYPROHEPTADINE HYDROCHLORIDE 4 MG: 4 TABLET ORAL at 02:09

## 2021-09-06 RX ADMIN — BRIMONIDINE TARTRATE 1 DROP: 2 SOLUTION OPHTHALMIC at 08:09

## 2021-09-06 RX ADMIN — ATORVASTATIN CALCIUM 10 MG: 10 TABLET, FILM COATED ORAL at 09:09

## 2021-09-06 RX ADMIN — CYPROHEPTADINE HYDROCHLORIDE 4 MG: 4 TABLET ORAL at 09:09

## 2021-09-06 RX ADMIN — DULOXETINE 60 MG: 30 CAPSULE, DELAYED RELEASE ORAL at 08:09

## 2021-09-06 RX ADMIN — CYPROHEPTADINE HYDROCHLORIDE 4 MG: 4 TABLET ORAL at 08:09

## 2021-09-06 RX ADMIN — PANTOPRAZOLE SODIUM 40 MG: 40 TABLET, DELAYED RELEASE ORAL at 04:09

## 2021-09-06 RX ADMIN — LATANOPROST 1 DROP: 50 SOLUTION OPHTHALMIC at 09:09

## 2021-09-06 RX ADMIN — HYDROCODONE BITARTRATE AND ACETAMINOPHEN 1 TABLET: 10; 325 TABLET ORAL at 09:09

## 2021-09-06 RX ADMIN — ENOXAPARIN SODIUM 30 MG: 30 INJECTION SUBCUTANEOUS at 04:09

## 2021-09-06 RX ADMIN — MAGNESIUM OXIDE 400 MG: 400 TABLET ORAL at 08:09

## 2021-09-07 PROCEDURE — 11000004 HC SNF PRIVATE

## 2021-09-07 PROCEDURE — 94761 N-INVAS EAR/PLS OXIMETRY MLT: CPT

## 2021-09-07 PROCEDURE — 97535 SELF CARE MNGMENT TRAINING: CPT

## 2021-09-07 PROCEDURE — 27000944

## 2021-09-07 PROCEDURE — 25000003 PHARM REV CODE 250: Performed by: NURSE PRACTITIONER

## 2021-09-07 PROCEDURE — 25000003 PHARM REV CODE 250: Performed by: EMERGENCY MEDICINE

## 2021-09-07 PROCEDURE — 63600175 PHARM REV CODE 636 W HCPCS: Performed by: NURSE PRACTITIONER

## 2021-09-07 PROCEDURE — 99900035 HC TECH TIME PER 15 MIN (STAT)

## 2021-09-07 PROCEDURE — 97110 THERAPEUTIC EXERCISES: CPT | Mod: CQ

## 2021-09-07 PROCEDURE — 63600175 PHARM REV CODE 636 W HCPCS: Performed by: EMERGENCY MEDICINE

## 2021-09-07 RX ADMIN — POTASSIUM CHLORIDE, DEXTROSE MONOHYDRATE AND SODIUM CHLORIDE: 150; 5; 450 INJECTION, SOLUTION INTRAVENOUS at 08:09

## 2021-09-07 RX ADMIN — ENOXAPARIN SODIUM 30 MG: 30 INJECTION SUBCUTANEOUS at 04:09

## 2021-09-07 RX ADMIN — LATANOPROST 1 DROP: 50 SOLUTION OPHTHALMIC at 09:09

## 2021-09-07 RX ADMIN — DULOXETINE 60 MG: 30 CAPSULE, DELAYED RELEASE ORAL at 08:09

## 2021-09-07 RX ADMIN — BRIMONIDINE TARTRATE 1 DROP: 2 SOLUTION OPHTHALMIC at 08:09

## 2021-09-07 RX ADMIN — CHLORTHALIDONE 25 MG: 25 TABLET ORAL at 08:09

## 2021-09-07 RX ADMIN — SENNOSIDES AND DOCUSATE SODIUM 1 TABLET: 8.6; 5 TABLET ORAL at 09:09

## 2021-09-07 RX ADMIN — CYPROHEPTADINE HYDROCHLORIDE 4 MG: 4 TABLET ORAL at 02:09

## 2021-09-07 RX ADMIN — MAGNESIUM OXIDE 400 MG: 400 TABLET ORAL at 08:09

## 2021-09-07 RX ADMIN — HYDROCODONE BITARTRATE AND ACETAMINOPHEN 1 TABLET: 10; 325 TABLET ORAL at 08:09

## 2021-09-07 RX ADMIN — PANTOPRAZOLE SODIUM 40 MG: 40 TABLET, DELAYED RELEASE ORAL at 06:09

## 2021-09-07 RX ADMIN — BUSPIRONE HYDROCHLORIDE 15 MG: 5 TABLET ORAL at 08:09

## 2021-09-07 RX ADMIN — CYPROHEPTADINE HYDROCHLORIDE 4 MG: 4 TABLET ORAL at 08:09

## 2021-09-07 RX ADMIN — PANTOPRAZOLE SODIUM 40 MG: 40 TABLET, DELAYED RELEASE ORAL at 04:09

## 2021-09-07 RX ADMIN — DULOXETINE 60 MG: 30 CAPSULE, DELAYED RELEASE ORAL at 09:09

## 2021-09-07 RX ADMIN — BUSPIRONE HYDROCHLORIDE 15 MG: 5 TABLET ORAL at 09:09

## 2021-09-07 RX ADMIN — MELATONIN TAB 3 MG 6 MG: 3 TAB at 09:09

## 2021-09-07 RX ADMIN — ATORVASTATIN CALCIUM 10 MG: 10 TABLET, FILM COATED ORAL at 09:09

## 2021-09-07 RX ADMIN — AMLODIPINE BESYLATE 10 MG: 5 TABLET ORAL at 08:09

## 2021-09-07 RX ADMIN — CYPROHEPTADINE HYDROCHLORIDE 4 MG: 4 TABLET ORAL at 09:09

## 2021-09-07 RX ADMIN — PREDNISONE 5 MG: 5 TABLET ORAL at 08:09

## 2021-09-08 LAB
ALBUMIN SERPL BCP-MCNC: 2 G/DL (ref 3.5–5)
ALBUMIN/GLOB SERPL: 0.7 {RATIO}
ALP SERPL-CCNC: 214 U/L (ref 55–142)
ALT SERPL W P-5'-P-CCNC: 25 U/L (ref 13–56)
ANION GAP SERPL CALCULATED.3IONS-SCNC: 9 MMOL/L (ref 7–16)
ANION GAP SERPL CALCULATED.3IONS-SCNC: 9 MMOL/L (ref 7–16)
AST SERPL W P-5'-P-CCNC: 33 U/L (ref 15–37)
BASOPHILS # BLD AUTO: 0.02 K/UL (ref 0–0.2)
BASOPHILS NFR BLD AUTO: 0.3 % (ref 0–1)
BILIRUB SERPL-MCNC: 0.3 MG/DL (ref 0–1.2)
BUN SERPL-MCNC: 5 MG/DL (ref 7–18)
BUN SERPL-MCNC: 5 MG/DL (ref 7–18)
BUN/CREAT SERPL: 8 (ref 6–20)
BUN/CREAT SERPL: 8 (ref 6–20)
CALCIUM SERPL-MCNC: 7.6 MG/DL (ref 8.5–10.1)
CALCIUM SERPL-MCNC: 7.6 MG/DL (ref 8.5–10.1)
CHLORIDE SERPL-SCNC: 98 MMOL/L (ref 98–107)
CHLORIDE SERPL-SCNC: 99 MMOL/L (ref 98–107)
CO2 SERPL-SCNC: 34 MMOL/L (ref 21–32)
CO2 SERPL-SCNC: 34 MMOL/L (ref 21–32)
CREAT SERPL-MCNC: 0.6 MG/DL (ref 0.55–1.02)
CREAT SERPL-MCNC: 0.61 MG/DL (ref 0.55–1.02)
DIFFERENTIAL METHOD BLD: ABNORMAL
EOSINOPHIL # BLD AUTO: 0.02 K/UL (ref 0–0.5)
EOSINOPHIL NFR BLD AUTO: 0.3 % (ref 1–4)
ERYTHROCYTE [DISTWIDTH] IN BLOOD BY AUTOMATED COUNT: 15.1 % (ref 11.5–14.5)
GLOBULIN SER-MCNC: 2.9 G/DL (ref 2–4)
GLUCOSE SERPL-MCNC: 75 MG/DL (ref 74–106)
GLUCOSE SERPL-MCNC: 75 MG/DL (ref 74–106)
HCT VFR BLD AUTO: 29.7 % (ref 38–47)
HGB BLD-MCNC: 9.9 G/DL (ref 12–16)
LYMPHOCYTES # BLD AUTO: 2.49 K/UL (ref 1–4.8)
LYMPHOCYTES NFR BLD AUTO: 41.8 % (ref 27–41)
MAGNESIUM SERPL-MCNC: 1.3 MG/DL (ref 1.7–2.3)
MCH RBC QN AUTO: 28.3 PG (ref 27–31)
MCHC RBC AUTO-ENTMCNC: 33.3 G/DL (ref 32–36)
MCV RBC AUTO: 84.9 FL (ref 80–96)
MONOCYTES # BLD AUTO: 0.74 K/UL (ref 0–0.8)
MONOCYTES NFR BLD AUTO: 12.4 % (ref 2–6)
MPC BLD CALC-MCNC: 8.4 FL (ref 9.4–12.4)
NEUTROPHILS # BLD AUTO: 2.68 K/UL (ref 1.8–7.7)
NEUTROPHILS NFR BLD AUTO: 45.2 % (ref 53–65)
PLATELET # BLD AUTO: 620 K/UL (ref 150–400)
POTASSIUM SERPL-SCNC: 2.5 MMOL/L (ref 3.5–5.1)
POTASSIUM SERPL-SCNC: 2.5 MMOL/L (ref 3.5–5.1)
POTASSIUM SERPL-SCNC: 3 MMOL/L (ref 3.5–5.1)
PROT SERPL-MCNC: 4.9 G/DL (ref 6.4–8.2)
RBC # BLD AUTO: 3.5 M/UL (ref 4.2–5.4)
SODIUM SERPL-SCNC: 138 MMOL/L (ref 136–145)
SODIUM SERPL-SCNC: 139 MMOL/L (ref 136–145)
WBC # BLD AUTO: 5.95 K/UL (ref 4.5–11)

## 2021-09-08 PROCEDURE — 27000944

## 2021-09-08 PROCEDURE — 99900035 HC TECH TIME PER 15 MIN (STAT)

## 2021-09-08 PROCEDURE — 11000004 HC SNF PRIVATE

## 2021-09-08 PROCEDURE — 25000003 PHARM REV CODE 250: Performed by: EMERGENCY MEDICINE

## 2021-09-08 PROCEDURE — 83735 ASSAY OF MAGNESIUM: CPT | Performed by: EMERGENCY MEDICINE

## 2021-09-08 PROCEDURE — 80048 BASIC METABOLIC PNL TOTAL CA: CPT | Mod: XB | Performed by: EMERGENCY MEDICINE

## 2021-09-08 PROCEDURE — 36415 COLL VENOUS BLD VENIPUNCTURE: CPT | Performed by: EMERGENCY MEDICINE

## 2021-09-08 PROCEDURE — 85025 COMPLETE CBC W/AUTO DIFF WBC: CPT | Performed by: NURSE PRACTITIONER

## 2021-09-08 PROCEDURE — 93010 EKG 12-LEAD: ICD-10-PCS | Mod: ,,, | Performed by: INTERNAL MEDICINE

## 2021-09-08 PROCEDURE — 93010 ELECTROCARDIOGRAM REPORT: CPT | Mod: ,,, | Performed by: INTERNAL MEDICINE

## 2021-09-08 PROCEDURE — 84132 ASSAY OF SERUM POTASSIUM: CPT | Performed by: EMERGENCY MEDICINE

## 2021-09-08 PROCEDURE — 36415 COLL VENOUS BLD VENIPUNCTURE: CPT | Performed by: NURSE PRACTITIONER

## 2021-09-08 PROCEDURE — 94761 N-INVAS EAR/PLS OXIMETRY MLT: CPT

## 2021-09-08 PROCEDURE — 93005 ELECTROCARDIOGRAM TRACING: CPT

## 2021-09-08 PROCEDURE — 25000003 PHARM REV CODE 250: Performed by: NURSE PRACTITIONER

## 2021-09-08 PROCEDURE — 63600175 PHARM REV CODE 636 W HCPCS: Performed by: NURSE PRACTITIONER

## 2021-09-08 PROCEDURE — 80048 BASIC METABOLIC PNL TOTAL CA: CPT | Performed by: NURSE PRACTITIONER

## 2021-09-08 PROCEDURE — 63600175 PHARM REV CODE 636 W HCPCS: Performed by: EMERGENCY MEDICINE

## 2021-09-08 RX ORDER — POTASSIUM CHLORIDE 7.45 MG/ML
10 INJECTION INTRAVENOUS ONCE
Status: DISCONTINUED | OUTPATIENT
Start: 2021-09-08 | End: 2021-09-08 | Stop reason: SDUPTHER

## 2021-09-08 RX ORDER — LANOLIN ALCOHOL/MO/W.PET/CERES
400 CREAM (GRAM) TOPICAL 3 TIMES DAILY
Status: DISCONTINUED | OUTPATIENT
Start: 2021-09-08 | End: 2021-09-09 | Stop reason: HOSPADM

## 2021-09-08 RX ORDER — POTASSIUM CHLORIDE 7.45 MG/ML
10 INJECTION INTRAVENOUS
Status: COMPLETED | OUTPATIENT
Start: 2021-09-08 | End: 2021-09-08

## 2021-09-08 RX ORDER — POTASSIUM CHLORIDE 7.45 MG/ML
10 INJECTION INTRAVENOUS ONCE
Status: DISCONTINUED | OUTPATIENT
Start: 2021-09-08 | End: 2021-09-08

## 2021-09-08 RX ORDER — POTASSIUM CHLORIDE 7.45 MG/ML
10 INJECTION INTRAVENOUS ONCE
Status: COMPLETED | OUTPATIENT
Start: 2021-09-08 | End: 2021-09-08

## 2021-09-08 RX ORDER — POTASSIUM CHLORIDE 20 MEQ/1
20 TABLET, EXTENDED RELEASE ORAL 2 TIMES DAILY
Status: DISCONTINUED | OUTPATIENT
Start: 2021-09-08 | End: 2021-09-09

## 2021-09-08 RX ADMIN — CYPROHEPTADINE HYDROCHLORIDE 4 MG: 4 TABLET ORAL at 08:09

## 2021-09-08 RX ADMIN — DULOXETINE 60 MG: 30 CAPSULE, DELAYED RELEASE ORAL at 08:09

## 2021-09-08 RX ADMIN — POTASSIUM CHLORIDE 20 MEQ: 20 TABLET, EXTENDED RELEASE ORAL at 08:09

## 2021-09-08 RX ADMIN — BUSPIRONE HYDROCHLORIDE 15 MG: 5 TABLET ORAL at 08:09

## 2021-09-08 RX ADMIN — LATANOPROST 1 DROP: 50 SOLUTION OPHTHALMIC at 08:09

## 2021-09-08 RX ADMIN — CHLORTHALIDONE 25 MG: 25 TABLET ORAL at 08:09

## 2021-09-08 RX ADMIN — POTASSIUM CHLORIDE 10 MEQ: 7.46 INJECTION, SOLUTION INTRAVENOUS at 01:09

## 2021-09-08 RX ADMIN — PREDNISONE 5 MG: 5 TABLET ORAL at 08:09

## 2021-09-08 RX ADMIN — POTASSIUM CHLORIDE 10 MEQ: 7.46 INJECTION, SOLUTION INTRAVENOUS at 12:09

## 2021-09-08 RX ADMIN — PANTOPRAZOLE SODIUM 40 MG: 40 TABLET, DELAYED RELEASE ORAL at 06:09

## 2021-09-08 RX ADMIN — PANTOPRAZOLE SODIUM 40 MG: 40 TABLET, DELAYED RELEASE ORAL at 04:09

## 2021-09-08 RX ADMIN — ATORVASTATIN CALCIUM 10 MG: 10 TABLET, FILM COATED ORAL at 08:09

## 2021-09-08 RX ADMIN — MAGNESIUM OXIDE 400 MG: 400 TABLET ORAL at 08:09

## 2021-09-08 RX ADMIN — BRIMONIDINE TARTRATE 1 DROP: 2 SOLUTION OPHTHALMIC at 08:09

## 2021-09-08 RX ADMIN — POTASSIUM CHLORIDE 10 MEQ: 7.46 INJECTION, SOLUTION INTRAVENOUS at 10:09

## 2021-09-08 RX ADMIN — AMLODIPINE BESYLATE 10 MG: 5 TABLET ORAL at 08:09

## 2021-09-08 RX ADMIN — POTASSIUM CHLORIDE 10 MEQ: 7.46 INJECTION, SOLUTION INTRAVENOUS at 04:09

## 2021-09-08 RX ADMIN — MAGNESIUM OXIDE 400 MG: 400 TABLET ORAL at 01:09

## 2021-09-08 RX ADMIN — ENOXAPARIN SODIUM 30 MG: 30 INJECTION SUBCUTANEOUS at 04:09

## 2021-09-08 RX ADMIN — HYDROCODONE BITARTRATE AND ACETAMINOPHEN 1 TABLET: 10; 325 TABLET ORAL at 08:09

## 2021-09-08 RX ADMIN — CYPROHEPTADINE HYDROCHLORIDE 4 MG: 4 TABLET ORAL at 02:09

## 2021-09-09 VITALS
OXYGEN SATURATION: 96 % | DIASTOLIC BLOOD PRESSURE: 60 MMHG | TEMPERATURE: 99 F | SYSTOLIC BLOOD PRESSURE: 109 MMHG | RESPIRATION RATE: 20 BRPM | WEIGHT: 85 LBS | HEART RATE: 104 BPM | HEIGHT: 62 IN | BODY MASS INDEX: 15.64 KG/M2

## 2021-09-09 PROBLEM — K52.9 COLITIS: Status: RESOLVED | Noted: 2021-08-27 | Resolved: 2021-09-09

## 2021-09-09 PROBLEM — E87.1 HYPONATREMIA: Status: RESOLVED | Noted: 2021-08-27 | Resolved: 2021-09-09

## 2021-09-09 PROCEDURE — 25000003 PHARM REV CODE 250: Performed by: NURSE PRACTITIONER

## 2021-09-09 PROCEDURE — 25000003 PHARM REV CODE 250: Performed by: EMERGENCY MEDICINE

## 2021-09-09 PROCEDURE — 63600175 PHARM REV CODE 636 W HCPCS: Performed by: NURSE PRACTITIONER

## 2021-09-09 PROCEDURE — 99316 PR NURSING FAC DISCHRGE DAY,MORE 30 MIN: ICD-10-PCS | Mod: ,,, | Performed by: EMERGENCY MEDICINE

## 2021-09-09 PROCEDURE — 27000944

## 2021-09-09 PROCEDURE — 94761 N-INVAS EAR/PLS OXIMETRY MLT: CPT

## 2021-09-09 PROCEDURE — 99316 NF DSCHRG MGMT 30 MIN+: CPT | Mod: ,,, | Performed by: EMERGENCY MEDICINE

## 2021-09-09 PROCEDURE — 99900035 HC TECH TIME PER 15 MIN (STAT)

## 2021-09-09 RX ORDER — POTASSIUM CHLORIDE 20 MEQ/1
40 TABLET, EXTENDED RELEASE ORAL 2 TIMES DAILY
Qty: 60 TABLET | Refills: 0 | Status: SHIPPED | OUTPATIENT
Start: 2021-09-09 | End: 2022-02-15

## 2021-09-09 RX ORDER — LANOLIN ALCOHOL/MO/W.PET/CERES
400 CREAM (GRAM) TOPICAL 3 TIMES DAILY
Qty: 120 TABLET | Refills: 0 | Status: SHIPPED | OUTPATIENT
Start: 2021-09-09 | End: 2022-02-15

## 2021-09-09 RX ORDER — POTASSIUM CHLORIDE 20 MEQ/1
40 TABLET, EXTENDED RELEASE ORAL 2 TIMES DAILY
Status: DISCONTINUED | OUTPATIENT
Start: 2021-09-09 | End: 2021-09-09 | Stop reason: HOSPADM

## 2021-09-09 RX ORDER — UMECLIDINIUM 62.5 UG/1
AEROSOL, POWDER ORAL
Qty: 30 EACH | Refills: 0 | Status: SHIPPED | OUTPATIENT
Start: 2021-09-09 | End: 2022-08-26 | Stop reason: SDUPTHER

## 2021-09-09 RX ORDER — LOSARTAN POTASSIUM AND HYDROCHLOROTHIAZIDE 12.5; 5 MG/1; MG/1
1 TABLET ORAL DAILY
Qty: 90 TABLET | Refills: 0 | Status: SHIPPED | OUTPATIENT
Start: 2021-09-09 | End: 2022-02-15 | Stop reason: SDUPTHER

## 2021-09-09 RX ADMIN — MAGNESIUM OXIDE 400 MG: 400 TABLET ORAL at 08:09

## 2021-09-09 RX ADMIN — POTASSIUM CHLORIDE 40 MEQ: 20 TABLET, EXTENDED RELEASE ORAL at 08:09

## 2021-09-09 RX ADMIN — CYPROHEPTADINE HYDROCHLORIDE 4 MG: 4 TABLET ORAL at 08:09

## 2021-09-09 RX ADMIN — AMLODIPINE BESYLATE 10 MG: 5 TABLET ORAL at 08:09

## 2021-09-09 RX ADMIN — PREDNISONE 5 MG: 5 TABLET ORAL at 08:09

## 2021-09-09 RX ADMIN — CHLORTHALIDONE 25 MG: 25 TABLET ORAL at 08:09

## 2021-09-09 RX ADMIN — BUSPIRONE HYDROCHLORIDE 15 MG: 5 TABLET ORAL at 08:09

## 2021-09-09 RX ADMIN — BRIMONIDINE TARTRATE 1 DROP: 2 SOLUTION OPHTHALMIC at 08:09

## 2021-09-09 RX ADMIN — DULOXETINE 60 MG: 30 CAPSULE, DELAYED RELEASE ORAL at 08:09

## 2021-09-09 RX ADMIN — PANTOPRAZOLE SODIUM 40 MG: 40 TABLET, DELAYED RELEASE ORAL at 06:09

## 2021-09-10 ENCOUNTER — TELEPHONE (OUTPATIENT)
Dept: FAMILY MEDICINE | Facility: CLINIC | Age: 71
End: 2021-09-10
Payer: MEDICARE

## 2021-09-16 ENCOUNTER — OFFICE VISIT (OUTPATIENT)
Dept: FAMILY MEDICINE | Facility: CLINIC | Age: 71
End: 2021-09-16
Payer: MEDICARE

## 2021-09-16 VITALS
DIASTOLIC BLOOD PRESSURE: 73 MMHG | TEMPERATURE: 97 F | WEIGHT: 85 LBS | OXYGEN SATURATION: 100 % | SYSTOLIC BLOOD PRESSURE: 124 MMHG | HEIGHT: 62 IN | BODY MASS INDEX: 15.64 KG/M2 | HEART RATE: 108 BPM

## 2021-09-16 DIAGNOSIS — R63.0 DECREASED APPETITE: ICD-10-CM

## 2021-09-16 DIAGNOSIS — D72.829 LEUKOCYTOSIS, UNSPECIFIED TYPE: ICD-10-CM

## 2021-09-16 DIAGNOSIS — E87.6 HYPOKALEMIA: Primary | ICD-10-CM

## 2021-09-16 PROCEDURE — 80053 COMPREHEN METABOLIC PANEL: CPT | Mod: ,,, | Performed by: CLINICAL MEDICAL LABORATORY

## 2021-09-16 PROCEDURE — 99495 TRANSJ CARE MGMT MOD F2F 14D: CPT | Mod: ,,, | Performed by: NURSE PRACTITIONER

## 2021-09-16 PROCEDURE — 80053 COMPREHENSIVE METABOLIC PANEL: ICD-10-PCS | Mod: ,,, | Performed by: CLINICAL MEDICAL LABORATORY

## 2021-09-16 PROCEDURE — 85025 CBC WITH DIFFERENTIAL: ICD-10-PCS | Mod: ,,, | Performed by: CLINICAL MEDICAL LABORATORY

## 2021-09-16 PROCEDURE — 99495 TCM SERVICES (MODERATE COMPLEXITY): ICD-10-PCS | Mod: ,,, | Performed by: NURSE PRACTITIONER

## 2021-09-16 PROCEDURE — 85025 COMPLETE CBC W/AUTO DIFF WBC: CPT | Mod: ,,, | Performed by: CLINICAL MEDICAL LABORATORY

## 2021-09-16 RX ORDER — HYDROCODONE BITARTRATE AND ACETAMINOPHEN 10; 325 MG/1; MG/1
1 TABLET ORAL EVERY 6 HOURS PRN
COMMUNITY
End: 2021-10-04 | Stop reason: SDUPTHER

## 2021-09-16 RX ORDER — CYPROHEPTADINE HYDROCHLORIDE 4 MG/1
4 TABLET ORAL 3 TIMES DAILY
Qty: 270 TABLET | Refills: 0 | Status: SHIPPED | OUTPATIENT
Start: 2021-09-16 | End: 2021-12-15

## 2021-09-17 LAB
ALBUMIN SERPL BCP-MCNC: 3.3 G/DL (ref 3.5–5)
ALBUMIN/GLOB SERPL: 0.8 {RATIO}
ALP SERPL-CCNC: 214 U/L (ref 55–142)
ALT SERPL W P-5'-P-CCNC: 34 U/L (ref 13–56)
ANION GAP SERPL CALCULATED.3IONS-SCNC: 19 MMOL/L (ref 7–16)
AST SERPL W P-5'-P-CCNC: 47 U/L (ref 15–37)
BASOPHILS # BLD AUTO: 0.02 K/UL (ref 0–0.2)
BASOPHILS NFR BLD AUTO: 0.2 % (ref 0–1)
BILIRUB SERPL-MCNC: 0.5 MG/DL (ref 0–1.2)
BUN SERPL-MCNC: 19 MG/DL (ref 7–18)
BUN/CREAT SERPL: 17 (ref 6–20)
CALCIUM SERPL-MCNC: 7.8 MG/DL (ref 8.5–10.1)
CHLORIDE SERPL-SCNC: 93 MMOL/L (ref 98–107)
CO2 SERPL-SCNC: 22 MMOL/L (ref 21–32)
CREAT SERPL-MCNC: 1.1 MG/DL (ref 0.55–1.02)
DIFFERENTIAL METHOD BLD: ABNORMAL
EOSINOPHIL # BLD AUTO: 0 K/UL (ref 0–0.5)
EOSINOPHIL NFR BLD AUTO: 0 % (ref 1–4)
ERYTHROCYTE [DISTWIDTH] IN BLOOD BY AUTOMATED COUNT: 17.9 % (ref 11.5–14.5)
GLOBULIN SER-MCNC: 4 G/DL (ref 2–4)
GLUCOSE SERPL-MCNC: 92 MG/DL (ref 74–106)
HCT VFR BLD AUTO: 39.5 % (ref 38–47)
HGB BLD-MCNC: 13.2 G/DL (ref 12–16)
IMM GRANULOCYTES # BLD AUTO: 0.11 K/UL (ref 0–0.04)
IMM GRANULOCYTES NFR BLD: 0.9 % (ref 0–0.4)
LYMPHOCYTES # BLD AUTO: 0.74 K/UL (ref 1–4.8)
LYMPHOCYTES NFR BLD AUTO: 6.2 % (ref 27–41)
MCH RBC QN AUTO: 29.1 PG (ref 27–31)
MCHC RBC AUTO-ENTMCNC: 33.4 G/DL (ref 32–36)
MCV RBC AUTO: 87.2 FL (ref 80–96)
MONOCYTES # BLD AUTO: 0.74 K/UL (ref 0–0.8)
MONOCYTES NFR BLD AUTO: 6.2 % (ref 2–6)
MPC BLD CALC-MCNC: 10 FL (ref 9.4–12.4)
NEUTROPHILS # BLD AUTO: 10.31 K/UL (ref 1.8–7.7)
NEUTROPHILS NFR BLD AUTO: 86.5 % (ref 53–65)
NRBC # BLD AUTO: 0 X10E3/UL
NRBC, AUTO (.00): 0 %
PLATELET # BLD AUTO: 772 K/UL (ref 150–400)
POTASSIUM SERPL-SCNC: 5.6 MMOL/L (ref 3.5–5.1)
PROT SERPL-MCNC: 7.3 G/DL (ref 6.4–8.2)
RBC # BLD AUTO: 4.53 M/UL (ref 4.2–5.4)
SODIUM SERPL-SCNC: 128 MMOL/L (ref 136–145)
WBC # BLD AUTO: 11.92 K/UL (ref 4.5–11)

## 2021-09-20 ENCOUNTER — TELEPHONE (OUTPATIENT)
Dept: FAMILY MEDICINE | Facility: CLINIC | Age: 71
End: 2021-09-20

## 2021-10-02 ENCOUNTER — HOSPITAL ENCOUNTER (INPATIENT)
Facility: HOSPITAL | Age: 71
LOS: 2 days | Discharge: HOME | DRG: 536 | End: 2021-10-04
Attending: EMERGENCY MEDICINE | Admitting: EMERGENCY MEDICINE
Payer: MEDICARE

## 2021-10-02 DIAGNOSIS — R53.1 WEAKNESS: ICD-10-CM

## 2021-10-02 DIAGNOSIS — S32.592A FRACTURE OF MULTIPLE PUBIC RAMI, LEFT, CLOSED, INITIAL ENCOUNTER: Primary | ICD-10-CM
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
## 2021-10-02 DIAGNOSIS — I10 ESSENTIAL HYPERTENSION, BENIGN: Chronic | ICD-10-CM

## 2021-10-02 DIAGNOSIS — R55 SYNCOPE, UNSPECIFIED SYNCOPE TYPE: ICD-10-CM

## 2021-10-02 DIAGNOSIS — E78.5 HYPERLIPIDEMIA, UNSPECIFIED HYPERLIPIDEMIA TYPE: ICD-10-CM

## 2021-10-02 DIAGNOSIS — E87.6 HYPOKALEMIA: ICD-10-CM

## 2021-10-02 DIAGNOSIS — M62.81 MUSCLE WEAKNESS: ICD-10-CM

## 2021-10-02 LAB
ALBUMIN SERPL BCP-MCNC: 2.8 G/DL (ref 3.5–5)
ALBUMIN/GLOB SERPL: 0.9 {RATIO}
ALP SERPL-CCNC: 101 U/L (ref 55–142)
ALT SERPL W P-5'-P-CCNC: 27 U/L (ref 13–56)
ANION GAP SERPL CALCULATED.3IONS-SCNC: 10 MMOL/L (ref 7–16)
ANISOCYTOSIS BLD QL SMEAR: ABNORMAL
AST SERPL W P-5'-P-CCNC: 21 U/L (ref 15–37)
BASOPHILS # BLD AUTO: 0.01 K/UL (ref 0–0.2)
BASOPHILS NFR BLD AUTO: 0.1 % (ref 0–1)
BILIRUB SERPL-MCNC: 0.5 MG/DL (ref 0–1.2)
BILIRUB UR QL STRIP: NEGATIVE
BUN SERPL-MCNC: 9 MG/DL (ref 7–18)
BUN/CREAT SERPL: 11 (ref 6–20)
CALCIUM SERPL-MCNC: 8.7 MG/DL (ref 8.5–10.1)
CHLORIDE SERPL-SCNC: 95 MMOL/L (ref 98–107)
CLARITY UR: CLEAR
CO2 SERPL-SCNC: 29 MMOL/L (ref 21–32)
COLOR UR: YELLOW
CREAT SERPL-MCNC: 0.8 MG/DL (ref 0.55–1.02)
DIFFERENTIAL METHOD BLD: ABNORMAL
EOSINOPHIL # BLD AUTO: 0.01 K/UL (ref 0–0.5)
EOSINOPHIL NFR BLD AUTO: 0.1 % (ref 1–4)
EOSINOPHIL NFR BLD MANUAL: 1 % (ref 1–4)
ERYTHROCYTE [DISTWIDTH] IN BLOOD BY AUTOMATED COUNT: 17.5 % (ref 11.5–14.5)
GLOBULIN SER-MCNC: 3 G/DL (ref 2–4)
GLUCOSE SERPL-MCNC: 93 MG/DL (ref 74–106)
GLUCOSE UR STRIP-MCNC: NEGATIVE MG/DL
HCT VFR BLD AUTO: 33.5 % (ref 38–47)
HGB BLD-MCNC: 11.1 G/DL (ref 12–16)
KETONES UR STRIP-SCNC: NEGATIVE MG/DL
LEUKOCYTE ESTERASE UR QL STRIP: NEGATIVE
LIPASE SERPL-CCNC: 98 U/L (ref 73–393)
LYMPHOCYTES # BLD AUTO: 0.69 K/UL (ref 1–4.8)
LYMPHOCYTES NFR BLD AUTO: 5.5 % (ref 27–41)
LYMPHOCYTES NFR BLD MANUAL: 8 % (ref 27–41)
MAGNESIUM SERPL-MCNC: 1.9 MG/DL (ref 1.7–2.3)
MCH RBC QN AUTO: 28.9 PG (ref 27–31)
MCHC RBC AUTO-ENTMCNC: 33.1 G/DL (ref 32–36)
MCV RBC AUTO: 87.2 FL (ref 80–96)
MONOCYTES # BLD AUTO: 0.84 K/UL (ref 0–0.8)
MONOCYTES NFR BLD AUTO: 6.7 % (ref 2–6)
MONOCYTES NFR BLD MANUAL: 6 % (ref 2–6)
MPC BLD CALC-MCNC: 8.5 FL (ref 9.4–12.4)
NEUTROPHILS # BLD AUTO: 11.04 K/UL (ref 1.8–7.7)
NEUTROPHILS NFR BLD AUTO: 87.6 % (ref 53–65)
NEUTS BAND NFR BLD MANUAL: 3 % (ref 1–5)
NEUTS SEG NFR BLD MANUAL: 82 % (ref 50–62)
NITRITE UR QL STRIP: NEGATIVE
NRBC BLD MANUAL-RTO: ABNORMAL %
PH UR STRIP: 7 PH UNITS
PLATELET # BLD AUTO: 252 K/UL (ref 150–400)
PLATELET MORPHOLOGY: ABNORMAL
POTASSIUM SERPL-SCNC: 3.1 MMOL/L (ref 3.5–5.1)
PROT SERPL-MCNC: 5.8 G/DL (ref 6.4–8.2)
PROT UR QL STRIP: NEGATIVE
RBC # BLD AUTO: 3.84 M/UL (ref 4.2–5.4)
RBC # UR STRIP: NEGATIVE /UL
SARS-COV+SARS-COV-2 AG RESP QL IA.RAPID: NEGATIVE
SODIUM SERPL-SCNC: 131 MMOL/L (ref 136–145)
SP GR UR STRIP: 1.01
TROPONIN I SERPL-MCNC: <0.017 NG/ML
UROBILINOGEN UR STRIP-ACNC: 0.2 MG/DL
WBC # BLD AUTO: 12.59 K/UL (ref 4.5–11)

## 2021-10-02 PROCEDURE — 80053 COMPREHEN METABOLIC PANEL: CPT | Performed by: NURSE PRACTITIONER

## 2021-10-02 PROCEDURE — 83735 ASSAY OF MAGNESIUM: CPT | Performed by: NURSE PRACTITIONER

## 2021-10-02 PROCEDURE — 99305 1ST NF CARE MODERATE MDM 35: CPT | Mod: AI,,, | Performed by: NURSE PRACTITIONER

## 2021-10-02 PROCEDURE — 96376 TX/PRO/DX INJ SAME DRUG ADON: CPT

## 2021-10-02 PROCEDURE — 96361 HYDRATE IV INFUSION ADD-ON: CPT

## 2021-10-02 PROCEDURE — 99285 EMERGENCY DEPT VISIT HI MDM: CPT | Mod: GF | Performed by: NURSE PRACTITIONER

## 2021-10-02 PROCEDURE — 63600175 PHARM REV CODE 636 W HCPCS: Performed by: NURSE PRACTITIONER

## 2021-10-02 PROCEDURE — 93010 EKG 12-LEAD: ICD-10-PCS | Mod: ICN,,, | Performed by: HOSPITALIST

## 2021-10-02 PROCEDURE — 99900035 HC TECH TIME PER 15 MIN (STAT)

## 2021-10-02 PROCEDURE — 94761 N-INVAS EAR/PLS OXIMETRY MLT: CPT

## 2021-10-02 PROCEDURE — 99305 PR NURSING FACILITY CARE, INIT, MOD SEVERITY: ICD-10-PCS | Mod: AI,,, | Performed by: NURSE PRACTITIONER

## 2021-10-02 PROCEDURE — 11000001 HC ACUTE MED/SURG PRIVATE ROOM

## 2021-10-02 PROCEDURE — 25000003 PHARM REV CODE 250: Performed by: NURSE PRACTITIONER

## 2021-10-02 PROCEDURE — 84484 ASSAY OF TROPONIN QUANT: CPT | Performed by: NURSE PRACTITIONER

## 2021-10-02 PROCEDURE — 81003 URINALYSIS AUTO W/O SCOPE: CPT | Performed by: NURSE PRACTITIONER

## 2021-10-02 PROCEDURE — 99285 EMERGENCY DEPT VISIT HI MDM: CPT | Mod: 25,CS

## 2021-10-02 PROCEDURE — 96374 THER/PROPH/DIAG INJ IV PUSH: CPT

## 2021-10-02 PROCEDURE — 27000944

## 2021-10-02 PROCEDURE — 96375 TX/PRO/DX INJ NEW DRUG ADDON: CPT

## 2021-10-02 PROCEDURE — 87426 SARSCOV CORONAVIRUS AG IA: CPT | Performed by: NURSE PRACTITIONER

## 2021-10-02 PROCEDURE — 36415 COLL VENOUS BLD VENIPUNCTURE: CPT | Performed by: NURSE PRACTITIONER

## 2021-10-02 PROCEDURE — 85025 COMPLETE CBC W/AUTO DIFF WBC: CPT | Performed by: NURSE PRACTITIONER

## 2021-10-02 PROCEDURE — 93010 ELECTROCARDIOGRAM REPORT: CPT | Mod: ICN,,, | Performed by: HOSPITALIST

## 2021-10-02 PROCEDURE — 93005 ELECTROCARDIOGRAM TRACING: CPT

## 2021-10-02 PROCEDURE — 11000004 HC SNF PRIVATE

## 2021-10-02 PROCEDURE — 83690 ASSAY OF LIPASE: CPT | Performed by: NURSE PRACTITIONER

## 2021-10-02 RX ORDER — DULOXETIN HYDROCHLORIDE 30 MG/1
60 CAPSULE, DELAYED RELEASE ORAL EVERY 12 HOURS
Status: DISCONTINUED | OUTPATIENT
Start: 2021-10-02 | End: 2021-10-04 | Stop reason: HOSPADM

## 2021-10-02 RX ORDER — LOSARTAN POTASSIUM 50 MG/1
50 TABLET ORAL DAILY
Status: DISCONTINUED | OUTPATIENT
Start: 2021-10-03 | End: 2021-10-04 | Stop reason: HOSPADM

## 2021-10-02 RX ORDER — MORPHINE SULFATE 4 MG/ML
2 INJECTION, SOLUTION INTRAMUSCULAR; INTRAVENOUS
Status: COMPLETED | OUTPATIENT
Start: 2021-10-02 | End: 2021-10-02

## 2021-10-02 RX ORDER — ACETAMINOPHEN 325 MG/1
650 TABLET ORAL EVERY 6 HOURS PRN
Status: DISCONTINUED | OUTPATIENT
Start: 2021-10-02 | End: 2021-10-04 | Stop reason: HOSPADM

## 2021-10-02 RX ORDER — MORPHINE SULFATE 4 MG/ML
2 INJECTION, SOLUTION INTRAMUSCULAR; INTRAVENOUS
Status: DISCONTINUED | OUTPATIENT
Start: 2021-10-02 | End: 2021-10-04 | Stop reason: HOSPADM

## 2021-10-02 RX ORDER — POTASSIUM CHLORIDE 20 MEQ/1
40 TABLET, EXTENDED RELEASE ORAL 2 TIMES DAILY
Status: DISCONTINUED | OUTPATIENT
Start: 2021-10-02 | End: 2021-10-03

## 2021-10-02 RX ORDER — LATANOPROST 50 UG/ML
1 SOLUTION/ DROPS OPHTHALMIC NIGHTLY
Status: DISCONTINUED | OUTPATIENT
Start: 2021-10-02 | End: 2021-10-04 | Stop reason: HOSPADM

## 2021-10-02 RX ORDER — PANTOPRAZOLE SODIUM 40 MG/1
40 TABLET, DELAYED RELEASE ORAL
Status: DISCONTINUED | OUTPATIENT
Start: 2021-10-02 | End: 2021-10-04 | Stop reason: HOSPADM

## 2021-10-02 RX ORDER — AMOXICILLIN 250 MG
1 CAPSULE ORAL 2 TIMES DAILY
Status: DISCONTINUED | OUTPATIENT
Start: 2021-10-02 | End: 2021-10-04 | Stop reason: HOSPADM

## 2021-10-02 RX ORDER — BUSPIRONE HYDROCHLORIDE 5 MG/1
10 TABLET ORAL 3 TIMES DAILY
Status: DISCONTINUED | OUTPATIENT
Start: 2021-10-02 | End: 2021-10-04 | Stop reason: HOSPADM

## 2021-10-02 RX ORDER — SODIUM CHLORIDE 9 MG/ML
INJECTION, SOLUTION INTRAVENOUS CONTINUOUS
Status: DISCONTINUED | OUTPATIENT
Start: 2021-10-02 | End: 2021-10-03

## 2021-10-02 RX ORDER — LANOLIN ALCOHOL/MO/W.PET/CERES
400 CREAM (GRAM) TOPICAL 2 TIMES DAILY
Status: DISCONTINUED | OUTPATIENT
Start: 2021-10-02 | End: 2021-10-04 | Stop reason: HOSPADM

## 2021-10-02 RX ORDER — POTASSIUM CHLORIDE 20 MEQ/1
20 TABLET, EXTENDED RELEASE ORAL
Status: COMPLETED | OUTPATIENT
Start: 2021-10-02 | End: 2021-10-02

## 2021-10-02 RX ORDER — KETOROLAC TROMETHAMINE 30 MG/ML
15 INJECTION, SOLUTION INTRAMUSCULAR; INTRAVENOUS
Status: COMPLETED | OUTPATIENT
Start: 2021-10-02 | End: 2021-10-02

## 2021-10-02 RX ORDER — ONDANSETRON 2 MG/ML
4 INJECTION INTRAMUSCULAR; INTRAVENOUS EVERY 8 HOURS PRN
Status: DISCONTINUED | OUTPATIENT
Start: 2021-10-02 | End: 2021-10-04 | Stop reason: HOSPADM

## 2021-10-02 RX ORDER — ENOXAPARIN SODIUM 100 MG/ML
30 INJECTION SUBCUTANEOUS EVERY 24 HOURS
Status: DISCONTINUED | OUTPATIENT
Start: 2021-10-02 | End: 2021-10-04 | Stop reason: HOSPADM

## 2021-10-02 RX ORDER — IPRATROPIUM BROMIDE 0.5 MG/2.5ML
0.5 SOLUTION RESPIRATORY (INHALATION) EVERY 6 HOURS
Status: DISCONTINUED | OUTPATIENT
Start: 2021-10-02 | End: 2021-10-02

## 2021-10-02 RX ORDER — BRIMONIDINE TARTRATE 2 MG/ML
1 SOLUTION/ DROPS OPHTHALMIC 2 TIMES DAILY
Status: DISCONTINUED | OUTPATIENT
Start: 2021-10-02 | End: 2021-10-04 | Stop reason: HOSPADM

## 2021-10-02 RX ORDER — ONDANSETRON 2 MG/ML
4 INJECTION INTRAMUSCULAR; INTRAVENOUS
Status: COMPLETED | OUTPATIENT
Start: 2021-10-02 | End: 2021-10-02

## 2021-10-02 RX ORDER — HYDROCODONE BITARTRATE AND ACETAMINOPHEN 10; 325 MG/1; MG/1
1 TABLET ORAL EVERY 6 HOURS PRN
Status: DISCONTINUED | OUTPATIENT
Start: 2021-10-02 | End: 2021-10-04 | Stop reason: HOSPADM

## 2021-10-02 RX ORDER — PREDNISONE 5 MG/1
5 TABLET ORAL DAILY
Status: DISCONTINUED | OUTPATIENT
Start: 2021-10-03 | End: 2021-10-04 | Stop reason: HOSPADM

## 2021-10-02 RX ORDER — SODIUM CHLORIDE 0.9 % (FLUSH) 0.9 %
10 SYRINGE (ML) INJECTION
Status: DISCONTINUED | OUTPATIENT
Start: 2021-10-02 | End: 2021-10-04 | Stop reason: HOSPADM

## 2021-10-02 RX ORDER — MORPHINE SULFATE 4 MG/ML
2 INJECTION, SOLUTION INTRAMUSCULAR; INTRAVENOUS
Status: DISCONTINUED | OUTPATIENT
Start: 2021-10-02 | End: 2021-10-02

## 2021-10-02 RX ORDER — AMLODIPINE BESYLATE 5 MG/1
10 TABLET ORAL DAILY
Status: DISCONTINUED | OUTPATIENT
Start: 2021-10-03 | End: 2021-10-03

## 2021-10-02 RX ADMIN — MAGNESIUM OXIDE 400 MG: 400 TABLET ORAL at 09:10

## 2021-10-02 RX ADMIN — POTASSIUM CHLORIDE 20 MEQ: 20 TABLET, EXTENDED RELEASE ORAL at 04:10

## 2021-10-02 RX ADMIN — MORPHINE SULFATE 2 MG: 4 INJECTION INTRAVENOUS at 04:10

## 2021-10-02 RX ADMIN — DULOXETINE 60 MG: 30 CAPSULE, DELAYED RELEASE ORAL at 09:10

## 2021-10-02 RX ADMIN — HYDROCODONE BITARTRATE AND ACETAMINOPHEN 1 TABLET: 10; 325 TABLET ORAL at 09:10

## 2021-10-02 RX ADMIN — KETOROLAC TROMETHAMINE 15 MG: 30 INJECTION, SOLUTION INTRAMUSCULAR at 04:10

## 2021-10-02 RX ADMIN — POTASSIUM CHLORIDE 40 MEQ: 20 TABLET, EXTENDED RELEASE ORAL at 09:10

## 2021-10-02 RX ADMIN — ONDANSETRON 4 MG: 2 INJECTION INTRAMUSCULAR; INTRAVENOUS at 02:10

## 2021-10-02 RX ADMIN — PANTOPRAZOLE SODIUM 40 MG: 40 TABLET, DELAYED RELEASE ORAL at 06:10

## 2021-10-02 RX ADMIN — LATANOPROST 1 DROP: 50 SOLUTION OPHTHALMIC at 09:10

## 2021-10-02 RX ADMIN — ENOXAPARIN SODIUM 30 MG: 30 INJECTION SUBCUTANEOUS at 05:10

## 2021-10-02 RX ADMIN — SODIUM CHLORIDE 500 ML: 9 INJECTION, SOLUTION INTRAVENOUS at 02:10

## 2021-10-02 RX ADMIN — SENNOSIDES AND DOCUSATE SODIUM 1 TABLET: 50; 8.6 TABLET ORAL at 09:10

## 2021-10-02 RX ADMIN — BUSPIRONE HYDROCHLORIDE 10 MG: 5 TABLET ORAL at 09:10

## 2021-10-02 RX ADMIN — SODIUM CHLORIDE: 9 INJECTION, SOLUTION INTRAVENOUS at 05:10

## 2021-10-02 RX ADMIN — MORPHINE SULFATE 2 MG: 4 INJECTION INTRAVENOUS at 02:10

## 2021-10-03 PROBLEM — S20.212A RIB CONTUSION, LEFT, INITIAL ENCOUNTER: Status: ACTIVE | Noted: 2021-10-03

## 2021-10-03 LAB
ANION GAP SERPL CALCULATED.3IONS-SCNC: 7 MMOL/L (ref 7–16)
BASOPHILS # BLD AUTO: 0.01 K/UL (ref 0–0.2)
BASOPHILS NFR BLD AUTO: 0.1 % (ref 0–1)
BUN SERPL-MCNC: 8 MG/DL (ref 7–18)
BUN/CREAT SERPL: 15 (ref 6–20)
CALCIUM SERPL-MCNC: 7.6 MG/DL (ref 8.5–10.1)
CHLORIDE SERPL-SCNC: 104 MMOL/L (ref 98–107)
CO2 SERPL-SCNC: 31 MMOL/L (ref 21–32)
CREAT SERPL-MCNC: 0.55 MG/DL (ref 0.55–1.02)
DIFFERENTIAL METHOD BLD: ABNORMAL
EOSINOPHIL # BLD AUTO: 0.02 K/UL (ref 0–0.5)
EOSINOPHIL NFR BLD AUTO: 0.2 % (ref 1–4)
ERYTHROCYTE [DISTWIDTH] IN BLOOD BY AUTOMATED COUNT: 18.1 % (ref 11.5–14.5)
GLUCOSE SERPL-MCNC: 81 MG/DL (ref 74–106)
HCT VFR BLD AUTO: 29.2 % (ref 38–47)
HGB BLD-MCNC: 9.6 G/DL (ref 12–16)
LYMPHOCYTES # BLD AUTO: 2.99 K/UL (ref 1–4.8)
LYMPHOCYTES NFR BLD AUTO: 27.2 % (ref 27–41)
MCH RBC QN AUTO: 29.4 PG (ref 27–31)
MCHC RBC AUTO-ENTMCNC: 32.9 G/DL (ref 32–36)
MCV RBC AUTO: 89.6 FL (ref 80–96)
MONOCYTES # BLD AUTO: 1.2 K/UL (ref 0–0.8)
MONOCYTES NFR BLD AUTO: 10.9 % (ref 2–6)
MPC BLD CALC-MCNC: 8.1 FL (ref 9.4–12.4)
NEUTROPHILS # BLD AUTO: 6.78 K/UL (ref 1.8–7.7)
NEUTROPHILS NFR BLD AUTO: 61.6 % (ref 53–65)
PLATELET # BLD AUTO: 255 K/UL (ref 150–400)
POTASSIUM SERPL-SCNC: 5.3 MMOL/L (ref 3.5–5.1)
RBC # BLD AUTO: 3.26 M/UL (ref 4.2–5.4)
SODIUM SERPL-SCNC: 137 MMOL/L (ref 136–145)
WBC # BLD AUTO: 11 K/UL (ref 4.5–11)

## 2021-10-03 PROCEDURE — 80048 BASIC METABOLIC PNL TOTAL CA: CPT | Performed by: NURSE PRACTITIONER

## 2021-10-03 PROCEDURE — 11000001 HC ACUTE MED/SURG PRIVATE ROOM

## 2021-10-03 PROCEDURE — 25000003 PHARM REV CODE 250: Performed by: NURSE PRACTITIONER

## 2021-10-03 PROCEDURE — 94761 N-INVAS EAR/PLS OXIMETRY MLT: CPT

## 2021-10-03 PROCEDURE — 27000944

## 2021-10-03 PROCEDURE — 85025 COMPLETE CBC W/AUTO DIFF WBC: CPT | Performed by: NURSE PRACTITIONER

## 2021-10-03 PROCEDURE — 11000004 HC SNF PRIVATE

## 2021-10-03 PROCEDURE — 36415 COLL VENOUS BLD VENIPUNCTURE: CPT | Performed by: NURSE PRACTITIONER

## 2021-10-03 PROCEDURE — 63600175 PHARM REV CODE 636 W HCPCS: Performed by: NURSE PRACTITIONER

## 2021-10-03 PROCEDURE — 99900035 HC TECH TIME PER 15 MIN (STAT)

## 2021-10-03 RX ORDER — CYPROHEPTADINE HYDROCHLORIDE 4 MG/1
4 TABLET ORAL 3 TIMES DAILY
Status: DISCONTINUED | OUTPATIENT
Start: 2021-10-03 | End: 2021-10-04 | Stop reason: HOSPADM

## 2021-10-03 RX ADMIN — PREDNISONE 5 MG: 5 TABLET ORAL at 08:10

## 2021-10-03 RX ADMIN — BUSPIRONE HYDROCHLORIDE 10 MG: 5 TABLET ORAL at 02:10

## 2021-10-03 RX ADMIN — BUSPIRONE HYDROCHLORIDE 10 MG: 5 TABLET ORAL at 08:10

## 2021-10-03 RX ADMIN — BUSPIRONE HYDROCHLORIDE 10 MG: 5 TABLET ORAL at 09:10

## 2021-10-03 RX ADMIN — LATANOPROST 1 DROP: 50 SOLUTION OPHTHALMIC at 09:10

## 2021-10-03 RX ADMIN — PANTOPRAZOLE SODIUM 40 MG: 40 TABLET, DELAYED RELEASE ORAL at 04:10

## 2021-10-03 RX ADMIN — DULOXETINE 60 MG: 30 CAPSULE, DELAYED RELEASE ORAL at 09:10

## 2021-10-03 RX ADMIN — SENNOSIDES AND DOCUSATE SODIUM 1 TABLET: 50; 8.6 TABLET ORAL at 09:10

## 2021-10-03 RX ADMIN — BRIMONIDINE TARTRATE 1 DROP: 2 SOLUTION OPHTHALMIC at 08:10

## 2021-10-03 RX ADMIN — SENNOSIDES AND DOCUSATE SODIUM 1 TABLET: 50; 8.6 TABLET ORAL at 08:10

## 2021-10-03 RX ADMIN — ENOXAPARIN SODIUM 30 MG: 30 INJECTION SUBCUTANEOUS at 04:10

## 2021-10-03 RX ADMIN — CYPROHEPTADINE HYDROCHLORIDE 4 MG: 4 TABLET ORAL at 09:10

## 2021-10-03 RX ADMIN — MORPHINE SULFATE 2 MG: 4 INJECTION INTRAVENOUS at 02:10

## 2021-10-03 RX ADMIN — MAGNESIUM OXIDE 400 MG: 400 TABLET ORAL at 09:10

## 2021-10-03 RX ADMIN — PANTOPRAZOLE SODIUM 40 MG: 40 TABLET, DELAYED RELEASE ORAL at 06:10

## 2021-10-03 RX ADMIN — LOSARTAN POTASSIUM 50 MG: 50 TABLET, FILM COATED ORAL at 08:10

## 2021-10-03 RX ADMIN — HYDROCODONE BITARTRATE AND ACETAMINOPHEN 1 TABLET: 10; 325 TABLET ORAL at 03:10

## 2021-10-03 RX ADMIN — HYDROCODONE BITARTRATE AND ACETAMINOPHEN 1 TABLET: 10; 325 TABLET ORAL at 09:10

## 2021-10-03 RX ADMIN — BRIMONIDINE TARTRATE 1 DROP: 2 SOLUTION OPHTHALMIC at 09:10

## 2021-10-03 RX ADMIN — DULOXETINE 60 MG: 30 CAPSULE, DELAYED RELEASE ORAL at 08:10

## 2021-10-03 RX ADMIN — CYPROHEPTADINE HYDROCHLORIDE 4 MG: 4 TABLET ORAL at 02:10

## 2021-10-03 RX ADMIN — MAGNESIUM OXIDE 400 MG: 400 TABLET ORAL at 08:10

## 2021-10-04 ENCOUNTER — OFFICE VISIT (OUTPATIENT)
Dept: PAIN MEDICINE | Facility: CLINIC | Age: 71
End: 2021-10-04
Payer: MEDICARE

## 2021-10-04 VITALS
OXYGEN SATURATION: 96 % | HEIGHT: 62 IN | DIASTOLIC BLOOD PRESSURE: 77 MMHG | SYSTOLIC BLOOD PRESSURE: 125 MMHG | HEART RATE: 110 BPM | BODY MASS INDEX: 16.32 KG/M2 | WEIGHT: 88.69 LBS | TEMPERATURE: 99 F | RESPIRATION RATE: 20 BRPM

## 2021-10-04 DIAGNOSIS — M43.16 SPONDYLOLISTHESIS, LUMBAR REGION: Chronic | ICD-10-CM

## 2021-10-04 DIAGNOSIS — M47.817 SPONDYLOSIS WITHOUT MYELOPATHY OR RADICULOPATHY, LUMBOSACRAL REGION: Chronic | ICD-10-CM

## 2021-10-04 DIAGNOSIS — M54.16 LUMBAR RADICULOPATHY: Primary | Chronic | ICD-10-CM

## 2021-10-04 DIAGNOSIS — M54.14 THORACIC RADICULOPATHY: Chronic | ICD-10-CM

## 2021-10-04 PROBLEM — E78.5 HYPERLIPIDEMIA: Chronic | Status: RESOLVED | Noted: 2021-08-27 | Resolved: 2021-10-04

## 2021-10-04 PROBLEM — E87.6 HYPOKALEMIA: Status: RESOLVED | Noted: 2021-08-27 | Resolved: 2021-10-04

## 2021-10-04 PROBLEM — E78.49 OTHER HYPERLIPIDEMIA: Status: ACTIVE | Noted: 2021-08-27

## 2021-10-04 PROBLEM — S32.592A FRACTURE OF MULTIPLE PUBIC RAMI, LEFT, CLOSED, INITIAL ENCOUNTER: Chronic | Status: ACTIVE | Noted: 2021-10-02

## 2021-10-04 PROBLEM — E87.1 HYPONATREMIA: Status: RESOLVED | Noted: 2021-08-27 | Resolved: 2021-10-04

## 2021-10-04 LAB
ANION GAP SERPL CALCULATED.3IONS-SCNC: 6 MMOL/L (ref 7–16)
BASOPHILS # BLD AUTO: 0.02 K/UL (ref 0–0.2)
BASOPHILS NFR BLD AUTO: 0.2 % (ref 0–1)
BUN SERPL-MCNC: 8 MG/DL (ref 7–18)
BUN/CREAT SERPL: 16 (ref 6–20)
CALCIUM SERPL-MCNC: 8 MG/DL (ref 8.5–10.1)
CHLORIDE SERPL-SCNC: 104 MMOL/L (ref 98–107)
CO2 SERPL-SCNC: 30 MMOL/L (ref 21–32)
CREAT SERPL-MCNC: 0.49 MG/DL (ref 0.55–1.02)
DIFFERENTIAL METHOD BLD: ABNORMAL
EOSINOPHIL # BLD AUTO: 0.07 K/UL (ref 0–0.5)
EOSINOPHIL NFR BLD AUTO: 0.6 % (ref 1–4)
ERYTHROCYTE [DISTWIDTH] IN BLOOD BY AUTOMATED COUNT: 18.4 % (ref 11.5–14.5)
FECAL OCCULT BLOOD SCREEN, POC: NEGATIVE
GLUCOSE SERPL-MCNC: 80 MG/DL (ref 74–106)
HCT VFR BLD AUTO: 30.1 % (ref 38–47)
HGB BLD-MCNC: 9.7 G/DL (ref 12–16)
LYMPHOCYTES # BLD AUTO: 2.65 K/UL (ref 1–4.8)
LYMPHOCYTES NFR BLD AUTO: 23.1 % (ref 27–41)
MCH RBC QN AUTO: 29 PG (ref 27–31)
MCHC RBC AUTO-ENTMCNC: 32.2 G/DL (ref 32–36)
MCV RBC AUTO: 90.1 FL (ref 80–96)
MONOCYTES # BLD AUTO: 0.86 K/UL (ref 0–0.8)
MONOCYTES NFR BLD AUTO: 7.5 % (ref 2–6)
MPC BLD CALC-MCNC: 7.6 FL (ref 9.4–12.4)
NEUTROPHILS # BLD AUTO: 7.89 K/UL (ref 1.8–7.7)
NEUTROPHILS NFR BLD AUTO: 68.6 % (ref 53–65)
OCCULT BLOOD: NEGATIVE
PLATELET # BLD AUTO: 276 K/UL (ref 150–400)
POTASSIUM SERPL-SCNC: 4.2 MMOL/L (ref 3.5–5.1)
RBC # BLD AUTO: 3.34 M/UL (ref 4.2–5.4)
SODIUM SERPL-SCNC: 136 MMOL/L (ref 136–145)
WBC # BLD AUTO: 11.49 K/UL (ref 4.5–11)

## 2021-10-04 PROCEDURE — 99441 PR PHYSICIAN TELEPHONE EVALUATION 5-10 MIN: CPT | Mod: 95,,, | Performed by: PHYSICIAN ASSISTANT

## 2021-10-04 PROCEDURE — 99316 PR NURSING FAC DISCHRGE DAY,MORE 30 MIN: ICD-10-PCS | Mod: ,,, | Performed by: EMERGENCY MEDICINE

## 2021-10-04 PROCEDURE — 85025 COMPLETE CBC W/AUTO DIFF WBC: CPT | Performed by: EMERGENCY MEDICINE

## 2021-10-04 PROCEDURE — 36415 COLL VENOUS BLD VENIPUNCTURE: CPT | Performed by: NURSE PRACTITIONER

## 2021-10-04 PROCEDURE — 36415 COLL VENOUS BLD VENIPUNCTURE: CPT | Performed by: EMERGENCY MEDICINE

## 2021-10-04 PROCEDURE — 94761 N-INVAS EAR/PLS OXIMETRY MLT: CPT

## 2021-10-04 PROCEDURE — 82271 OCCULT BLOOD OTHER SOURCES: CPT | Performed by: NURSE PRACTITIONER

## 2021-10-04 PROCEDURE — 63600175 PHARM REV CODE 636 W HCPCS: Performed by: NURSE PRACTITIONER

## 2021-10-04 PROCEDURE — 97162 PT EVAL MOD COMPLEX 30 MIN: CPT

## 2021-10-04 PROCEDURE — 99441 PR PHYSICIAN TELEPHONE EVALUATION 5-10 MIN: ICD-10-PCS | Mod: 95,,, | Performed by: PHYSICIAN ASSISTANT

## 2021-10-04 PROCEDURE — 99316 NF DSCHRG MGMT 30 MIN+: CPT | Mod: ,,, | Performed by: EMERGENCY MEDICINE

## 2021-10-04 PROCEDURE — 80048 BASIC METABOLIC PNL TOTAL CA: CPT | Performed by: NURSE PRACTITIONER

## 2021-10-04 PROCEDURE — 25000003 PHARM REV CODE 250: Performed by: NURSE PRACTITIONER

## 2021-10-04 PROCEDURE — 97165 OT EVAL LOW COMPLEX 30 MIN: CPT

## 2021-10-04 PROCEDURE — 99900035 HC TECH TIME PER 15 MIN (STAT)

## 2021-10-04 RX ORDER — DULOXETIN HYDROCHLORIDE 60 MG/1
60 CAPSULE, DELAYED RELEASE ORAL EVERY 12 HOURS
Qty: 60 CAPSULE | Refills: 0 | Status: SHIPPED | OUTPATIENT
Start: 2021-10-04 | End: 2021-11-02 | Stop reason: SDUPTHER

## 2021-10-04 RX ORDER — HYDROCODONE BITARTRATE AND ACETAMINOPHEN 10; 325 MG/1; MG/1
1 TABLET ORAL EVERY 8 HOURS
Qty: 90 TABLET | Refills: 0 | Status: SHIPPED | OUTPATIENT
Start: 2021-10-04 | End: 2021-11-02 | Stop reason: SDUPTHER

## 2021-10-04 RX ORDER — CYCLOBENZAPRINE HCL 10 MG
10 TABLET ORAL EVERY 8 HOURS
Qty: 30 TABLET | Refills: 0 | Status: SHIPPED | OUTPATIENT
Start: 2021-10-04 | End: 2021-11-02 | Stop reason: SDUPTHER

## 2021-10-04 RX ORDER — ATORVASTATIN CALCIUM 10 MG/1
10 TABLET, FILM COATED ORAL NIGHTLY
Qty: 90 TABLET | Refills: 0 | Status: SHIPPED | OUTPATIENT
Start: 2021-10-04 | End: 2022-08-15 | Stop reason: SDUPTHER

## 2021-10-04 RX ORDER — TALC
6 POWDER (GRAM) TOPICAL NIGHTLY PRN
Status: CANCELLED | OUTPATIENT
Start: 2021-10-04

## 2021-10-04 RX ORDER — SODIUM CHLORIDE 0.9 % (FLUSH) 0.9 %
10 SYRINGE (ML) INJECTION
Status: CANCELLED | OUTPATIENT
Start: 2021-10-04

## 2021-10-04 RX ADMIN — LOSARTAN POTASSIUM 50 MG: 50 TABLET, FILM COATED ORAL at 08:10

## 2021-10-04 RX ADMIN — PREDNISONE 5 MG: 5 TABLET ORAL at 08:10

## 2021-10-04 RX ADMIN — MORPHINE SULFATE 2 MG: 4 INJECTION INTRAVENOUS at 09:10

## 2021-10-04 RX ADMIN — MORPHINE SULFATE 2 MG: 4 INJECTION INTRAVENOUS at 04:10

## 2021-10-04 RX ADMIN — BRIMONIDINE TARTRATE 1 DROP: 2 SOLUTION OPHTHALMIC at 08:10

## 2021-10-04 RX ADMIN — MAGNESIUM OXIDE 400 MG: 400 TABLET ORAL at 08:10

## 2021-10-04 RX ADMIN — CYPROHEPTADINE HYDROCHLORIDE 4 MG: 4 TABLET ORAL at 08:10

## 2021-10-04 RX ADMIN — BUSPIRONE HYDROCHLORIDE 10 MG: 5 TABLET ORAL at 08:10

## 2021-10-04 RX ADMIN — DULOXETINE 60 MG: 30 CAPSULE, DELAYED RELEASE ORAL at 08:10

## 2021-10-04 RX ADMIN — SENNOSIDES AND DOCUSATE SODIUM 1 TABLET: 50; 8.6 TABLET ORAL at 08:10

## 2021-10-04 RX ADMIN — PANTOPRAZOLE SODIUM 40 MG: 40 TABLET, DELAYED RELEASE ORAL at 06:10

## 2021-10-05 ENCOUNTER — TELEPHONE (OUTPATIENT)
Dept: FAMILY MEDICINE | Facility: CLINIC | Age: 71
End: 2021-10-05
Payer: MEDICARE

## 2021-10-19 ENCOUNTER — TELEPHONE (OUTPATIENT)
Dept: PAIN MEDICINE | Facility: CLINIC | Age: 71
End: 2021-10-19

## 2021-10-21 ENCOUNTER — HOSPITAL ENCOUNTER (EMERGENCY)
Facility: HOSPITAL | Age: 71
Discharge: HOME | End: 2021-10-21
Payer: MEDICARE

## 2021-10-21 VITALS
BODY MASS INDEX: 19.51 KG/M2 | TEMPERATURE: 98 F | HEIGHT: 62 IN | OXYGEN SATURATION: 95 % | WEIGHT: 106 LBS | HEART RATE: 99 BPM | DIASTOLIC BLOOD PRESSURE: 80 MMHG | SYSTOLIC BLOOD PRESSURE: 127 MMHG | RESPIRATION RATE: 20 BRPM

## 2021-10-21 DIAGNOSIS — N39.0 URINARY TRACT INFECTION WITHOUT HEMATURIA, SITE UNSPECIFIED: ICD-10-CM
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
## 2021-10-21 DIAGNOSIS — M25.552 ACUTE HIP PAIN, LEFT: ICD-10-CM
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
## 2021-10-21 LAB
ALBUMIN SERPL BCP-MCNC: 2.7 G/DL (ref 3.5–5)
ALBUMIN/GLOB SERPL: 0.8 {RATIO}
ALP SERPL-CCNC: 173 U/L (ref 55–142)
ALT SERPL W P-5'-P-CCNC: 18 U/L (ref 13–56)
ANION GAP SERPL CALCULATED.3IONS-SCNC: 9 MMOL/L (ref 7–16)
AST SERPL W P-5'-P-CCNC: 15 U/L (ref 15–37)
BACTERIA #/AREA URNS HPF: ABNORMAL /HPF
BASOPHILS # BLD AUTO: 0.02 K/UL (ref 0–0.2)
BASOPHILS NFR BLD AUTO: 0.2 % (ref 0–1)
BILIRUB SERPL-MCNC: 0.4 MG/DL (ref 0–1.2)
BILIRUB UR QL STRIP: NEGATIVE
BUN SERPL-MCNC: 15 MG/DL (ref 7–18)
BUN/CREAT SERPL: 15 (ref 6–20)
CALCIUM SERPL-MCNC: 8.6 MG/DL (ref 8.5–10.1)
CHLORIDE SERPL-SCNC: 97 MMOL/L (ref 98–107)
CLARITY UR: ABNORMAL
CO2 SERPL-SCNC: 31 MMOL/L (ref 21–32)
COLOR UR: YELLOW
CREAT SERPL-MCNC: 0.98 MG/DL (ref 0.55–1.02)
DIFFERENTIAL METHOD BLD: ABNORMAL
EOSINOPHIL # BLD AUTO: 0.02 K/UL (ref 0–0.5)
EOSINOPHIL NFR BLD AUTO: 0.2 % (ref 1–4)
ERYTHROCYTE [DISTWIDTH] IN BLOOD BY AUTOMATED COUNT: 17.4 % (ref 11.5–14.5)
GLOBULIN SER-MCNC: 3.2 G/DL (ref 2–4)
GLUCOSE SERPL-MCNC: 80 MG/DL (ref 74–106)
GLUCOSE UR STRIP-MCNC: NEGATIVE MG/DL
HCT VFR BLD AUTO: 33.6 % (ref 38–47)
HGB BLD-MCNC: 11.1 G/DL (ref 12–16)
KETONES UR STRIP-SCNC: NEGATIVE MG/DL
LEUKOCYTE ESTERASE UR QL STRIP: ABNORMAL
LYMPHOCYTES # BLD AUTO: 3.31 K/UL (ref 1–4.8)
LYMPHOCYTES NFR BLD AUTO: 30.5 % (ref 27–41)
MAGNESIUM SERPL-MCNC: 1.7 MG/DL (ref 1.7–2.3)
MCH RBC QN AUTO: 29.4 PG (ref 27–31)
MCHC RBC AUTO-ENTMCNC: 33 G/DL (ref 32–36)
MCV RBC AUTO: 89.1 FL (ref 80–96)
MONOCYTES # BLD AUTO: 1.03 K/UL (ref 0–0.8)
MONOCYTES NFR BLD AUTO: 9.5 % (ref 2–6)
MPC BLD CALC-MCNC: 8.2 FL (ref 9.4–12.4)
NEUTROPHILS # BLD AUTO: 6.47 K/UL (ref 1.8–7.7)
NEUTROPHILS NFR BLD AUTO: 59.6 % (ref 53–65)
NITRITE UR QL STRIP: NEGATIVE
PH UR STRIP: 6.5 PH UNITS
PLATELET # BLD AUTO: 385 K/UL (ref 150–400)
POTASSIUM SERPL-SCNC: 3.7 MMOL/L (ref 3.5–5.1)
PROT SERPL-MCNC: 5.9 G/DL (ref 6.4–8.2)
PROT UR QL STRIP: NEGATIVE
RBC # BLD AUTO: 3.77 M/UL (ref 4.2–5.4)
RBC # UR STRIP: ABNORMAL /UL
RBC #/AREA URNS HPF: ABNORMAL /HPF
SODIUM SERPL-SCNC: 133 MMOL/L (ref 136–145)
SP GR UR STRIP: 1.01
SQUAMOUS #/AREA URNS LPF: ABNORMAL /LPF
UROBILINOGEN UR STRIP-ACNC: 0.2 MG/DL
WBC # BLD AUTO: 10.85 K/UL (ref 4.5–11)
WBC #/AREA URNS HPF: ABNORMAL /HPF

## 2021-10-21 PROCEDURE — 93010 ELECTROCARDIOGRAM REPORT: CPT | Performed by: HOSPITALIST

## 2021-10-21 PROCEDURE — 80053 COMPREHEN METABOLIC PANEL: CPT | Performed by: NURSE PRACTITIONER

## 2021-10-21 PROCEDURE — 93005 ELECTROCARDIOGRAM TRACING: CPT

## 2021-10-21 PROCEDURE — 99285 EMERGENCY DEPT VISIT HI MDM: CPT | Mod: 25

## 2021-10-21 PROCEDURE — 25000003 PHARM REV CODE 250: Performed by: NURSE PRACTITIONER

## 2021-10-21 PROCEDURE — 85025 COMPLETE CBC W/AUTO DIFF WBC: CPT | Performed by: NURSE PRACTITIONER

## 2021-10-21 PROCEDURE — 81003 URINALYSIS AUTO W/O SCOPE: CPT | Performed by: NURSE PRACTITIONER

## 2021-10-21 PROCEDURE — 63600175 PHARM REV CODE 636 W HCPCS: Performed by: NURSE PRACTITIONER

## 2021-10-21 PROCEDURE — 83735 ASSAY OF MAGNESIUM: CPT | Performed by: NURSE PRACTITIONER

## 2021-10-21 PROCEDURE — 96375 TX/PRO/DX INJ NEW DRUG ADDON: CPT

## 2021-10-21 PROCEDURE — 99283 EMERGENCY DEPT VISIT LOW MDM: CPT | Mod: GF | Performed by: NURSE PRACTITIONER

## 2021-10-21 PROCEDURE — 96374 THER/PROPH/DIAG INJ IV PUSH: CPT

## 2021-10-21 PROCEDURE — 36415 COLL VENOUS BLD VENIPUNCTURE: CPT | Performed by: NURSE PRACTITIONER

## 2021-10-21 PROCEDURE — 81001 URINALYSIS AUTO W/SCOPE: CPT | Performed by: NURSE PRACTITIONER

## 2021-10-21 RX ORDER — CEFUROXIME AXETIL 250 MG/1
250 TABLET ORAL EVERY 12 HOURS
Qty: 14 TABLET | Refills: 0 | Status: SHIPPED | OUTPATIENT
Start: 2021-10-21 | End: 2021-10-28

## 2021-10-21 RX ORDER — KETOROLAC TROMETHAMINE 30 MG/ML
15 INJECTION, SOLUTION INTRAMUSCULAR; INTRAVENOUS
Status: COMPLETED | OUTPATIENT
Start: 2021-10-21 | End: 2021-10-21

## 2021-10-21 RX ORDER — ORPHENADRINE CITRATE 30 MG/ML
30 INJECTION INTRAMUSCULAR; INTRAVENOUS
Status: COMPLETED | OUTPATIENT
Start: 2021-10-21 | End: 2021-10-21

## 2021-10-21 RX ADMIN — CEFTRIAXONE 1 G: 1 INJECTION, POWDER, FOR SOLUTION INTRAMUSCULAR; INTRAVENOUS at 10:10

## 2021-10-21 RX ADMIN — KETOROLAC TROMETHAMINE 15 MG: 30 INJECTION, SOLUTION INTRAMUSCULAR at 08:10

## 2021-10-21 RX ADMIN — SODIUM CHLORIDE 500 ML: 9 INJECTION, SOLUTION INTRAVENOUS at 08:10

## 2021-10-21 RX ADMIN — ORPHENADRINE CITRATE 30 MG: 60 INJECTION INTRAMUSCULAR; INTRAVENOUS at 10:10

## 2021-10-22 ENCOUNTER — TELEPHONE (OUTPATIENT)
Dept: EMERGENCY MEDICINE | Facility: HOSPITAL | Age: 71
End: 2021-10-22

## 2021-10-25 ENCOUNTER — HOSPITAL ENCOUNTER (EMERGENCY)
Facility: HOSPITAL | Age: 71
Discharge: HOME | DRG: 560 | End: 2021-10-26
Attending: EMERGENCY MEDICINE | Admitting: EMERGENCY MEDICINE
Payer: MEDICARE

## 2021-10-25 DIAGNOSIS — M62.81 MUSCLE WEAKNESS: ICD-10-CM

## 2021-10-25 DIAGNOSIS — I10 ESSENTIAL HYPERTENSION, BENIGN: Chronic | ICD-10-CM

## 2021-10-25 DIAGNOSIS — S32.10XD CLOSED FRACTURE OF SACRUM WITH ROUTINE HEALING, UNSPECIFIED PORTION OF SACRUM, SUBSEQUENT ENCOUNTER: Primary | ICD-10-CM

## 2021-10-25 LAB
ALBUMIN SERPL BCP-MCNC: 2.6 G/DL (ref 3.5–5)
ALBUMIN/GLOB SERPL: 0.8 {RATIO}
ALP SERPL-CCNC: 170 U/L (ref 55–142)
ALT SERPL W P-5'-P-CCNC: 17 U/L (ref 13–56)
ANION GAP SERPL CALCULATED.3IONS-SCNC: 10 MMOL/L (ref 7–16)
AST SERPL W P-5'-P-CCNC: 15 U/L (ref 15–37)
BASOPHILS # BLD AUTO: 0.01 K/UL (ref 0–0.2)
BASOPHILS NFR BLD AUTO: 0.1 % (ref 0–1)
BILIRUB SERPL-MCNC: 0.4 MG/DL (ref 0–1.2)
BUN SERPL-MCNC: 12 MG/DL (ref 7–18)
BUN/CREAT SERPL: 18 (ref 6–20)
CALCIUM SERPL-MCNC: 9 MG/DL (ref 8.5–10.1)
CHLORIDE SERPL-SCNC: 100 MMOL/L (ref 98–107)
CO2 SERPL-SCNC: 31 MMOL/L (ref 21–32)
CREAT SERPL-MCNC: 0.66 MG/DL (ref 0.55–1.02)
DIFFERENTIAL METHOD BLD: ABNORMAL
EOSINOPHIL # BLD AUTO: 0 K/UL (ref 0–0.5)
EOSINOPHIL NFR BLD AUTO: 0 % (ref 1–4)
ERYTHROCYTE [DISTWIDTH] IN BLOOD BY AUTOMATED COUNT: 17.8 % (ref 11.5–14.5)
GLOBULIN SER-MCNC: 3.1 G/DL (ref 2–4)
GLUCOSE SERPL-MCNC: 103 MG/DL (ref 74–106)
HCT VFR BLD AUTO: 34.9 % (ref 38–47)
HGB BLD-MCNC: 11.4 G/DL (ref 12–16)
LYMPHOCYTES # BLD AUTO: 1.6 K/UL (ref 1–4.8)
LYMPHOCYTES NFR BLD AUTO: 13.4 % (ref 27–41)
MAGNESIUM SERPL-MCNC: 1.9 MG/DL (ref 1.7–2.3)
MCH RBC QN AUTO: 28.9 PG (ref 27–31)
MCHC RBC AUTO-ENTMCNC: 32.7 G/DL (ref 32–36)
MCV RBC AUTO: 88.4 FL (ref 80–96)
MONOCYTES # BLD AUTO: 0.35 K/UL (ref 0–0.8)
MONOCYTES NFR BLD AUTO: 2.9 % (ref 2–6)
MPC BLD CALC-MCNC: 8.2 FL (ref 9.4–12.4)
NEUTROPHILS # BLD AUTO: 9.98 K/UL (ref 1.8–7.7)
NEUTROPHILS NFR BLD AUTO: 83.6 % (ref 53–65)
PLATELET # BLD AUTO: 395 K/UL (ref 150–400)
POTASSIUM SERPL-SCNC: 3.8 MMOL/L (ref 3.5–5.1)
PROT SERPL-MCNC: 5.7 G/DL (ref 6.4–8.2)
RBC # BLD AUTO: 3.95 M/UL (ref 4.2–5.4)
SODIUM SERPL-SCNC: 137 MMOL/L (ref 136–145)
WBC # BLD AUTO: 11.94 K/UL (ref 4.5–11)

## 2021-10-25 PROCEDURE — 96374 THER/PROPH/DIAG INJ IV PUSH: CPT

## 2021-10-25 PROCEDURE — 11000004 HC SNF PRIVATE

## 2021-10-25 PROCEDURE — 83735 ASSAY OF MAGNESIUM: CPT | Performed by: NURSE PRACTITIONER

## 2021-10-25 PROCEDURE — 99284 EMERGENCY DEPT VISIT MOD MDM: CPT | Mod: GF | Performed by: NURSE PRACTITIONER

## 2021-10-25 PROCEDURE — 63600175 PHARM REV CODE 636 W HCPCS: Performed by: NURSE PRACTITIONER

## 2021-10-25 PROCEDURE — 99285 EMERGENCY DEPT VISIT HI MDM: CPT | Mod: 25

## 2021-10-25 PROCEDURE — 25000003 PHARM REV CODE 250: Performed by: NURSE PRACTITIONER

## 2021-10-25 PROCEDURE — 84075 ASSAY ALKALINE PHOSPHATASE: CPT | Performed by: NURSE PRACTITIONER

## 2021-10-25 PROCEDURE — 80053 COMPREHEN METABOLIC PANEL: CPT | Performed by: NURSE PRACTITIONER

## 2021-10-25 PROCEDURE — 25000242 PHARM REV CODE 250 ALT 637 W/ HCPCS: Performed by: EMERGENCY MEDICINE

## 2021-10-25 PROCEDURE — 94640 AIRWAY INHALATION TREATMENT: CPT

## 2021-10-25 PROCEDURE — 99900035 HC TECH TIME PER 15 MIN (STAT)

## 2021-10-25 PROCEDURE — 94761 N-INVAS EAR/PLS OXIMETRY MLT: CPT

## 2021-10-25 PROCEDURE — 36415 COLL VENOUS BLD VENIPUNCTURE: CPT | Performed by: NURSE PRACTITIONER

## 2021-10-25 PROCEDURE — 27000944

## 2021-10-25 PROCEDURE — 85025 COMPLETE CBC W/AUTO DIFF WBC: CPT | Performed by: NURSE PRACTITIONER

## 2021-10-25 RX ORDER — AMOXICILLIN 250 MG
1 CAPSULE ORAL 2 TIMES DAILY
Status: DISCONTINUED | OUTPATIENT
Start: 2021-10-25 | End: 2021-10-27 | Stop reason: HOSPADM

## 2021-10-25 RX ORDER — PREDNISONE 5 MG/1
10 TABLET ORAL DAILY
Status: DISCONTINUED | OUTPATIENT
Start: 2021-10-26 | End: 2021-10-27 | Stop reason: HOSPADM

## 2021-10-25 RX ORDER — DULOXETIN HYDROCHLORIDE 30 MG/1
60 CAPSULE, DELAYED RELEASE ORAL EVERY 12 HOURS
Status: DISCONTINUED | OUTPATIENT
Start: 2021-10-25 | End: 2021-10-27 | Stop reason: HOSPADM

## 2021-10-25 RX ORDER — HYDROCHLOROTHIAZIDE 12.5 MG/1
12.5 TABLET ORAL DAILY
Status: DISCONTINUED | OUTPATIENT
Start: 2021-10-26 | End: 2021-10-27 | Stop reason: HOSPADM

## 2021-10-25 RX ORDER — LANOLIN ALCOHOL/MO/W.PET/CERES
400 CREAM (GRAM) TOPICAL 3 TIMES DAILY
Status: DISCONTINUED | OUTPATIENT
Start: 2021-10-25 | End: 2021-10-27 | Stop reason: HOSPADM

## 2021-10-25 RX ORDER — LOSARTAN POTASSIUM AND HYDROCHLOROTHIAZIDE 12.5; 5 MG/1; MG/1
1 TABLET ORAL DAILY
Status: DISCONTINUED | OUTPATIENT
Start: 2021-10-26 | End: 2021-10-25

## 2021-10-25 RX ORDER — ATORVASTATIN CALCIUM 10 MG/1
10 TABLET, FILM COATED ORAL NIGHTLY
Status: DISCONTINUED | OUTPATIENT
Start: 2021-10-25 | End: 2021-10-27 | Stop reason: HOSPADM

## 2021-10-25 RX ORDER — KETOROLAC TROMETHAMINE 30 MG/ML
15 INJECTION, SOLUTION INTRAMUSCULAR; INTRAVENOUS
Status: COMPLETED | OUTPATIENT
Start: 2021-10-25 | End: 2021-10-25

## 2021-10-25 RX ORDER — CYCLOBENZAPRINE HCL 10 MG
10 TABLET ORAL EVERY 8 HOURS
Status: DISCONTINUED | OUTPATIENT
Start: 2021-10-25 | End: 2021-10-26

## 2021-10-25 RX ORDER — PANTOPRAZOLE SODIUM 40 MG/1
40 TABLET, DELAYED RELEASE ORAL
Status: DISCONTINUED | OUTPATIENT
Start: 2021-10-25 | End: 2021-10-27 | Stop reason: HOSPADM

## 2021-10-25 RX ORDER — CHLORTHALIDONE 25 MG/1
25 TABLET ORAL DAILY
Status: DISCONTINUED | OUTPATIENT
Start: 2021-10-26 | End: 2021-10-27 | Stop reason: HOSPADM

## 2021-10-25 RX ORDER — BRIMONIDINE TARTRATE 2 MG/ML
1 SOLUTION/ DROPS OPHTHALMIC 2 TIMES DAILY
Status: DISCONTINUED | OUTPATIENT
Start: 2021-10-25 | End: 2021-10-27 | Stop reason: HOSPADM

## 2021-10-25 RX ORDER — CALCIUM CARBONATE 200(500)MG
500 TABLET,CHEWABLE ORAL 2 TIMES DAILY PRN
Status: DISCONTINUED | OUTPATIENT
Start: 2021-10-25 | End: 2021-10-27 | Stop reason: HOSPADM

## 2021-10-25 RX ORDER — POTASSIUM CHLORIDE 20 MEQ/1
40 TABLET, EXTENDED RELEASE ORAL 2 TIMES DAILY
Status: DISCONTINUED | OUTPATIENT
Start: 2021-10-25 | End: 2021-10-27 | Stop reason: HOSPADM

## 2021-10-25 RX ORDER — HYDROCODONE BITARTRATE AND ACETAMINOPHEN 10; 325 MG/1; MG/1
1 TABLET ORAL EVERY 8 HOURS
Status: DISCONTINUED | OUTPATIENT
Start: 2021-10-25 | End: 2021-10-26

## 2021-10-25 RX ORDER — CYPROHEPTADINE HYDROCHLORIDE 4 MG/1
4 TABLET ORAL 3 TIMES DAILY
Status: DISCONTINUED | OUTPATIENT
Start: 2021-10-25 | End: 2021-10-27 | Stop reason: HOSPADM

## 2021-10-25 RX ORDER — ACETAMINOPHEN 325 MG/1
650 TABLET ORAL EVERY 6 HOURS PRN
Status: DISCONTINUED | OUTPATIENT
Start: 2021-10-25 | End: 2021-10-27 | Stop reason: HOSPADM

## 2021-10-25 RX ORDER — CEFUROXIME AXETIL 250 MG/1
250 TABLET ORAL EVERY 12 HOURS
Status: DISCONTINUED | OUTPATIENT
Start: 2021-10-25 | End: 2021-10-27 | Stop reason: HOSPADM

## 2021-10-25 RX ORDER — TALC
6 POWDER (GRAM) TOPICAL NIGHTLY PRN
Status: DISCONTINUED | OUTPATIENT
Start: 2021-10-25 | End: 2021-10-27 | Stop reason: HOSPADM

## 2021-10-25 RX ORDER — LATANOPROST 50 UG/ML
1 SOLUTION/ DROPS OPHTHALMIC NIGHTLY
Status: DISCONTINUED | OUTPATIENT
Start: 2021-10-25 | End: 2021-10-27 | Stop reason: HOSPADM

## 2021-10-25 RX ORDER — LOSARTAN POTASSIUM 50 MG/1
50 TABLET ORAL DAILY
Status: DISCONTINUED | OUTPATIENT
Start: 2021-10-26 | End: 2021-10-27 | Stop reason: HOSPADM

## 2021-10-25 RX ORDER — IPRATROPIUM BROMIDE 0.5 MG/2.5ML
0.5 SOLUTION RESPIRATORY (INHALATION) EVERY 6 HOURS
Status: DISCONTINUED | OUTPATIENT
Start: 2021-10-25 | End: 2021-10-27 | Stop reason: HOSPADM

## 2021-10-25 RX ADMIN — SENNOSIDES AND DOCUSATE SODIUM 1 TABLET: 8.6; 5 TABLET ORAL at 09:10

## 2021-10-25 RX ADMIN — CYPROHEPTADINE HYDROCHLORIDE 4 MG: 4 TABLET ORAL at 09:10

## 2021-10-25 RX ADMIN — CEFUROXIME AXETIL 250 MG: 250 TABLET ORAL at 09:10

## 2021-10-25 RX ADMIN — CYCLOBENZAPRINE 10 MG: 10 TABLET, FILM COATED ORAL at 09:10

## 2021-10-25 RX ADMIN — HYDROCODONE BITARTRATE AND ACETAMINOPHEN 1 TABLET: 10; 325 TABLET ORAL at 09:10

## 2021-10-25 RX ADMIN — MAGNESIUM OXIDE 400 MG: 400 TABLET ORAL at 09:10

## 2021-10-25 RX ADMIN — DULOXETINE 60 MG: 30 CAPSULE, DELAYED RELEASE ORAL at 09:10

## 2021-10-25 RX ADMIN — IPRATROPIUM BROMIDE 0.5 MG: 0.5 SOLUTION RESPIRATORY (INHALATION) at 05:10

## 2021-10-25 RX ADMIN — PANTOPRAZOLE SODIUM 40 MG: 40 TABLET, DELAYED RELEASE ORAL at 05:10

## 2021-10-25 RX ADMIN — KETOROLAC TROMETHAMINE 15 MG: 30 INJECTION, SOLUTION INTRAMUSCULAR at 02:10

## 2021-10-25 RX ADMIN — ATORVASTATIN CALCIUM 10 MG: 10 TABLET, FILM COATED ORAL at 09:10

## 2021-10-26 VITALS
TEMPERATURE: 98 F | DIASTOLIC BLOOD PRESSURE: 83 MMHG | HEIGHT: 62 IN | SYSTOLIC BLOOD PRESSURE: 146 MMHG | HEART RATE: 94 BPM | BODY MASS INDEX: 15.92 KG/M2 | RESPIRATION RATE: 20 BRPM | OXYGEN SATURATION: 97 % | WEIGHT: 86.5 LBS

## 2021-10-26 PROBLEM — M62.81 MUSCLE WEAKNESS (GENERALIZED): Status: RESOLVED | Noted: 2021-08-27 | Resolved: 2021-10-26

## 2021-10-26 PROBLEM — J43.9 PULMONARY EMPHYSEMA: Status: ACTIVE | Noted: 2021-10-26

## 2021-10-26 PROBLEM — K21.9 GASTROESOPHAGEAL REFLUX DISEASE WITHOUT ESOPHAGITIS: Status: ACTIVE | Noted: 2021-10-26

## 2021-10-26 PROCEDURE — 63600175 PHARM REV CODE 636 W HCPCS: Performed by: NURSE PRACTITIONER

## 2021-10-26 PROCEDURE — 94640 AIRWAY INHALATION TREATMENT: CPT

## 2021-10-26 PROCEDURE — 25000003 PHARM REV CODE 250: Performed by: EMERGENCY MEDICINE

## 2021-10-26 PROCEDURE — 25000242 PHARM REV CODE 250 ALT 637 W/ HCPCS: Performed by: EMERGENCY MEDICINE

## 2021-10-26 PROCEDURE — 25000003 PHARM REV CODE 250: Performed by: NURSE PRACTITIONER

## 2021-10-26 PROCEDURE — 99900035 HC TECH TIME PER 15 MIN (STAT)

## 2021-10-26 PROCEDURE — 99305 1ST NF CARE MODERATE MDM 35: CPT | Mod: AI,,, | Performed by: EMERGENCY MEDICINE

## 2021-10-26 PROCEDURE — 94761 N-INVAS EAR/PLS OXIMETRY MLT: CPT

## 2021-10-26 PROCEDURE — 99305 PR NURSING FACILITY CARE, INIT, MOD SEVERITY: ICD-10-PCS | Mod: AI,,, | Performed by: EMERGENCY MEDICINE

## 2021-10-26 PROCEDURE — 11000004 HC SNF PRIVATE

## 2021-10-26 PROCEDURE — 27000944

## 2021-10-26 RX ORDER — METHOCARBAMOL 500 MG/1
500 TABLET, FILM COATED ORAL 4 TIMES DAILY
Status: DISCONTINUED | OUTPATIENT
Start: 2021-10-26 | End: 2021-10-26

## 2021-10-26 RX ORDER — GABAPENTIN 100 MG/1
100 CAPSULE ORAL NIGHTLY
Status: DISCONTINUED | OUTPATIENT
Start: 2021-10-26 | End: 2021-10-27 | Stop reason: HOSPADM

## 2021-10-26 RX ORDER — ENOXAPARIN SODIUM 100 MG/ML
30 INJECTION SUBCUTANEOUS EVERY 24 HOURS
Status: DISCONTINUED | OUTPATIENT
Start: 2021-10-26 | End: 2021-10-27 | Stop reason: HOSPADM

## 2021-10-26 RX ORDER — HYDROCODONE BITARTRATE AND ACETAMINOPHEN 10; 325 MG/1; MG/1
1 TABLET ORAL EVERY 8 HOURS PRN
Status: DISCONTINUED | OUTPATIENT
Start: 2021-10-26 | End: 2021-10-27 | Stop reason: HOSPADM

## 2021-10-26 RX ORDER — METHOCARBAMOL 500 MG/1
500 TABLET, FILM COATED ORAL 3 TIMES DAILY PRN
Status: DISCONTINUED | OUTPATIENT
Start: 2021-10-26 | End: 2021-10-27 | Stop reason: HOSPADM

## 2021-10-26 RX ADMIN — IPRATROPIUM BROMIDE 0.5 MG: 0.5 SOLUTION RESPIRATORY (INHALATION) at 07:10

## 2021-10-26 RX ADMIN — LOSARTAN POTASSIUM 50 MG: 50 TABLET, FILM COATED ORAL at 09:10

## 2021-10-26 RX ADMIN — POTASSIUM CHLORIDE 40 MEQ: 20 TABLET, EXTENDED RELEASE ORAL at 09:10

## 2021-10-26 RX ADMIN — HYDROCHLOROTHIAZIDE 12.5 MG: 12.5 TABLET ORAL at 09:10

## 2021-10-26 RX ADMIN — CYCLOBENZAPRINE 10 MG: 10 TABLET, FILM COATED ORAL at 06:10

## 2021-10-26 RX ADMIN — MAGNESIUM OXIDE 400 MG: 400 TABLET ORAL at 09:10

## 2021-10-26 RX ADMIN — CHLORTHALIDONE 25 MG: 25 TABLET ORAL at 09:10

## 2021-10-26 RX ADMIN — CEFUROXIME AXETIL 250 MG: 250 TABLET ORAL at 09:10

## 2021-10-26 RX ADMIN — METHOCARBAMOL 500 MG: 500 TABLET ORAL at 02:10

## 2021-10-26 RX ADMIN — SENNOSIDES AND DOCUSATE SODIUM 1 TABLET: 8.6; 5 TABLET ORAL at 09:10

## 2021-10-26 RX ADMIN — PREDNISONE 10 MG: 5 TABLET ORAL at 09:10

## 2021-10-26 RX ADMIN — CYPROHEPTADINE HYDROCHLORIDE 4 MG: 4 TABLET ORAL at 09:10

## 2021-10-26 RX ADMIN — BRIMONIDINE TARTRATE 1 DROP: 2 SOLUTION OPHTHALMIC at 09:10

## 2021-10-26 RX ADMIN — DULOXETINE 60 MG: 30 CAPSULE, DELAYED RELEASE ORAL at 09:10

## 2021-10-26 RX ADMIN — CYPROHEPTADINE HYDROCHLORIDE 4 MG: 4 TABLET ORAL at 02:10

## 2021-10-26 RX ADMIN — IPRATROPIUM BROMIDE 0.5 MG: 0.5 SOLUTION RESPIRATORY (INHALATION) at 12:10

## 2021-10-26 RX ADMIN — HYDROCODONE BITARTRATE AND ACETAMINOPHEN 1 TABLET: 10; 325 TABLET ORAL at 02:10

## 2021-10-26 RX ADMIN — HYDROCODONE BITARTRATE AND ACETAMINOPHEN 1 TABLET: 10; 325 TABLET ORAL at 06:10

## 2021-10-26 RX ADMIN — MAGNESIUM OXIDE 400 MG: 400 TABLET ORAL at 02:10

## 2021-10-26 RX ADMIN — PANTOPRAZOLE SODIUM 40 MG: 40 TABLET, DELAYED RELEASE ORAL at 06:10

## 2021-10-27 PROCEDURE — 99900035 HC TECH TIME PER 15 MIN (STAT)

## 2021-10-28 ENCOUNTER — TELEPHONE (OUTPATIENT)
Dept: FAMILY MEDICINE | Facility: CLINIC | Age: 71
End: 2021-10-28
Payer: MEDICARE

## 2021-10-28 DIAGNOSIS — F32.A DEPRESSIVE DISORDER: ICD-10-CM

## 2021-10-28 RX ORDER — BUSPIRONE HYDROCHLORIDE 15 MG/1
15 TABLET ORAL 4 TIMES DAILY
Qty: 360 TABLET | Refills: 0 | Status: SHIPPED | OUTPATIENT
Start: 2021-10-28 | End: 2022-02-15 | Stop reason: SDUPTHER

## 2021-10-28 RX ORDER — BUSPIRONE HYDROCHLORIDE 15 MG/1
15 TABLET ORAL 4 TIMES DAILY
Qty: 360 TABLET | Refills: 0 | Status: CANCELLED | OUTPATIENT
Start: 2021-10-28 | End: 2022-01-26

## 2021-11-03 ENCOUNTER — OFFICE VISIT (OUTPATIENT)
Dept: PAIN MEDICINE | Facility: CLINIC | Age: 71
End: 2021-11-03
Payer: MEDICARE

## 2021-11-03 VITALS
DIASTOLIC BLOOD PRESSURE: 83 MMHG | RESPIRATION RATE: 20 BRPM | WEIGHT: 86 LBS | SYSTOLIC BLOOD PRESSURE: 138 MMHG | HEART RATE: 104 BPM | BODY MASS INDEX: 15.83 KG/M2 | HEIGHT: 62 IN

## 2021-11-03 DIAGNOSIS — M47.817 SPONDYLOSIS WITHOUT MYELOPATHY OR RADICULOPATHY, LUMBOSACRAL REGION: Chronic | ICD-10-CM

## 2021-11-03 DIAGNOSIS — M43.16 SPONDYLOLISTHESIS, LUMBAR REGION: Chronic | ICD-10-CM

## 2021-11-03 DIAGNOSIS — Z79.899 ENCOUNTER FOR LONG-TERM (CURRENT) USE OF OTHER MEDICATIONS: ICD-10-CM

## 2021-11-03 DIAGNOSIS — M54.16 LUMBAR RADICULOPATHY: Primary | Chronic | ICD-10-CM

## 2021-11-03 DIAGNOSIS — R10.2 PELVIC PAIN IN FEMALE: ICD-10-CM

## 2021-11-03 DIAGNOSIS — M54.14 THORACIC RADICULOPATHY: Chronic | ICD-10-CM

## 2021-11-03 LAB

## 2021-11-03 PROCEDURE — 99214 OFFICE O/P EST MOD 30 MIN: CPT | Mod: PBBFAC | Performed by: PHYSICIAN ASSISTANT

## 2021-11-03 PROCEDURE — G0481 PR DRUG TEST DEF 8-14 CLASSES: ICD-10-PCS | Mod: ,,, | Performed by: CLINICAL MEDICAL LABORATORY

## 2021-11-03 PROCEDURE — 99214 PR OFFICE/OUTPT VISIT, EST, LEVL IV, 30-39 MIN: ICD-10-PCS | Mod: S$PBB,25,, | Performed by: PHYSICIAN ASSISTANT

## 2021-11-03 PROCEDURE — G0481 DRUG TEST DEF 8-14 CLASSES: HCPCS | Mod: ,,, | Performed by: CLINICAL MEDICAL LABORATORY

## 2021-11-03 PROCEDURE — 80305 DRUG TEST PRSMV DIR OPT OBS: CPT | Mod: PBBFAC | Performed by: PHYSICIAN ASSISTANT

## 2021-11-03 PROCEDURE — 99214 OFFICE O/P EST MOD 30 MIN: CPT | Mod: S$PBB,25,, | Performed by: PHYSICIAN ASSISTANT

## 2021-11-03 PROCEDURE — 96372 THER/PROPH/DIAG INJ SC/IM: CPT | Mod: PBBFAC | Performed by: PHYSICIAN ASSISTANT

## 2021-11-03 RX ORDER — KETOROLAC TROMETHAMINE 30 MG/ML
60 INJECTION, SOLUTION INTRAMUSCULAR; INTRAVENOUS
Status: COMPLETED | OUTPATIENT
Start: 2021-11-03 | End: 2021-11-03

## 2021-11-03 RX ORDER — HYDROCODONE BITARTRATE AND ACETAMINOPHEN 10; 325 MG/1; MG/1
1 TABLET ORAL EVERY 8 HOURS
Qty: 90 TABLET | Refills: 0 | Status: SHIPPED | OUTPATIENT
Start: 2021-11-03 | End: 2021-12-03

## 2021-11-03 RX ORDER — HYDROCODONE BITARTRATE AND ACETAMINOPHEN 10; 325 MG/1; MG/1
1 TABLET ORAL EVERY 8 HOURS
Qty: 90 TABLET | Refills: 0 | Status: SHIPPED | OUTPATIENT
Start: 2022-01-02 | End: 2022-01-05 | Stop reason: SDUPTHER

## 2021-11-03 RX ORDER — CYCLOBENZAPRINE HCL 10 MG
10 TABLET ORAL EVERY 8 HOURS
Qty: 30 TABLET | Refills: 2 | Status: SHIPPED | OUTPATIENT
Start: 2021-11-03 | End: 2021-11-29 | Stop reason: SDUPTHER

## 2021-11-03 RX ORDER — DULOXETIN HYDROCHLORIDE 60 MG/1
60 CAPSULE, DELAYED RELEASE ORAL EVERY 12 HOURS
Qty: 60 CAPSULE | Refills: 2 | Status: SHIPPED | OUTPATIENT
Start: 2021-11-03 | End: 2021-12-07 | Stop reason: SDUPTHER

## 2021-11-03 RX ORDER — HYDROCODONE BITARTRATE AND ACETAMINOPHEN 10; 325 MG/1; MG/1
1 TABLET ORAL EVERY 8 HOURS
Qty: 90 TABLET | Refills: 0 | Status: SHIPPED | OUTPATIENT
Start: 2021-12-03 | End: 2021-12-07 | Stop reason: SDUPTHER

## 2021-11-03 RX ADMIN — KETOROLAC TROMETHAMINE 60 MG: 30 INJECTION, SOLUTION INTRAMUSCULAR at 08:11

## 2021-11-05 LAB
6-ACETYLMORPHINE, URINE (RUSH): NEGATIVE 10 NG/ML
7-AMINOCLONAZEPAM, URINE (RUSH): NEGATIVE 25 NG/ML
A-HYDROXYALPRAZOLAM, URINE (RUSH): NEGATIVE 25 NG/ML
ACETYL FENTANYL, URINE (RUSH): NEGATIVE 2.5 NG/ML
ACETYL NORFENTANYL OXALATE, URINE (RUSH): NEGATIVE 5 NG/ML
AMPHET UR QL SCN: NEGATIVE 100 NG/ML
BENZOYLECGONINE, URINE (RUSH): NEGATIVE 100 NG/ML
BUPRENORPHINE UR QL SCN: NEGATIVE 25 NG/ML
CODEINE, URINE (RUSH): NEGATIVE 25 NG/ML
CREAT UR-MCNC: 35 MG/DL (ref 28–219)
EDDP, URINE (RUSH): NEGATIVE 25 NG/ML
FENTANYL, URINE (RUSH): NEGATIVE 2.5 NG/ML
HYDROCODONE, URINE (RUSH): NEGATIVE 25 NG/ML
HYDROMORPHONE, URINE (RUSH): NEGATIVE 25 NG/ML
LORAZEPAM, URINE (RUSH): NEGATIVE 25 NG/ML
METHADONE UR QL SCN: NEGATIVE 25 NG/ML
METHAMPHET UR QL SCN: NEGATIVE 100 NG/ML
MORPHINE, URINE (RUSH): NEGATIVE 25 NG/ML
NORBUPRENORPHINE, URINE (RUSH): NEGATIVE 25 NG/ML
NORDIAZEPAM, URINE (RUSH): NEGATIVE 25 NG/ML
NORFENTANYL OXALATE, URINE (RUSH): NEGATIVE 5 NG/ML
NORHYDROCODONE, URINE (RUSH): NEGATIVE 50 NG/ML
NOROXYCODONE HCL, URINE (RUSH): NEGATIVE 50 NG/ML
OXAZEPAM, URINE (RUSH): NEGATIVE 25 NG/ML
OXYCODONE UR QL SCN: NEGATIVE 25 NG/ML
OXYMORPHONE, URINE (RUSH): NEGATIVE 25 NG/ML
PH UR STRIP: 7.5 PH UNITS
SP GR UR STRIP: 1.01
TAPENTADOL, URINE (RUSH): NEGATIVE 25 NG/ML
TEMAZEPAM, URINE (RUSH): NEGATIVE 25 NG/ML
THC-COOH, URINE (RUSH): NEGATIVE 25 NG/ML
TRAMADOL, URINE (RUSH): NEGATIVE 100 NG/ML

## 2021-11-29 DIAGNOSIS — M47.817 SPONDYLOSIS WITHOUT MYELOPATHY OR RADICULOPATHY, LUMBOSACRAL REGION: Chronic | ICD-10-CM

## 2021-11-29 DIAGNOSIS — M54.14 THORACIC RADICULOPATHY: Chronic | ICD-10-CM

## 2021-11-29 DIAGNOSIS — M43.16 SPONDYLOLISTHESIS, LUMBAR REGION: Chronic | ICD-10-CM

## 2021-11-29 DIAGNOSIS — M54.16 LUMBAR RADICULOPATHY: Chronic | ICD-10-CM

## 2021-11-29 RX ORDER — CYCLOBENZAPRINE HCL 10 MG
10 TABLET ORAL EVERY 8 HOURS
Qty: 30 TABLET | Refills: 2 | Status: SHIPPED | OUTPATIENT
Start: 2021-11-29 | End: 2021-12-07 | Stop reason: SDUPTHER

## 2021-12-07 RX ORDER — HYDROCODONE BITARTRATE AND ACETAMINOPHEN 10; 325 MG/1; MG/1
1 TABLET ORAL EVERY 8 HOURS
Qty: 90 TABLET | Refills: 0 | Status: CANCELLED | OUTPATIENT
Start: 2021-12-07 | End: 2022-01-06

## 2021-12-07 RX ORDER — HYDROCODONE BITARTRATE AND ACETAMINOPHEN 10; 325 MG/1; MG/1
1 TABLET ORAL EVERY 8 HOURS
Qty: 90 TABLET | Refills: 0 | Status: CANCELLED | OUTPATIENT
Start: 2022-01-02 | End: 2022-02-01

## 2021-12-08 ENCOUNTER — OFFICE VISIT (OUTPATIENT)
Dept: PAIN MEDICINE | Facility: CLINIC | Age: 71
End: 2021-12-08
Payer: MEDICARE

## 2021-12-08 VITALS
SYSTOLIC BLOOD PRESSURE: 154 MMHG | HEIGHT: 62 IN | BODY MASS INDEX: 16.2 KG/M2 | RESPIRATION RATE: 18 BRPM | HEART RATE: 108 BPM | DIASTOLIC BLOOD PRESSURE: 77 MMHG | WEIGHT: 88 LBS

## 2021-12-08 DIAGNOSIS — S32.000S LUMBAR COMPRESSION FRACTURE, SEQUELA: ICD-10-CM

## 2021-12-08 DIAGNOSIS — S22.000S FRACTURE, THORACIC VERTEBRA, COMPRESSION, SEQUELA: ICD-10-CM

## 2021-12-08 DIAGNOSIS — M47.817 SPONDYLOSIS WITHOUT MYELOPATHY OR RADICULOPATHY, LUMBOSACRAL REGION: Chronic | ICD-10-CM

## 2021-12-08 DIAGNOSIS — M54.14 THORACIC RADICULOPATHY: Primary | Chronic | ICD-10-CM

## 2021-12-08 DIAGNOSIS — M54.16 LUMBAR RADICULOPATHY: Chronic | ICD-10-CM

## 2021-12-08 DIAGNOSIS — M43.16 SPONDYLOLISTHESIS, LUMBAR REGION: Chronic | ICD-10-CM

## 2021-12-08 PROCEDURE — 99214 OFFICE O/P EST MOD 30 MIN: CPT | Mod: S$PBB,,, | Performed by: PHYSICIAN ASSISTANT

## 2021-12-08 PROCEDURE — 99214 PR OFFICE/OUTPT VISIT, EST, LEVL IV, 30-39 MIN: ICD-10-PCS | Mod: S$PBB,,, | Performed by: PHYSICIAN ASSISTANT

## 2021-12-08 PROCEDURE — 99215 OFFICE O/P EST HI 40 MIN: CPT | Mod: PBBFAC | Performed by: PHYSICIAN ASSISTANT

## 2021-12-08 RX ORDER — HYDROCODONE BITARTRATE AND ACETAMINOPHEN 10; 325 MG/1; MG/1
1 TABLET ORAL EVERY 8 HOURS
Qty: 90 TABLET | Refills: 0 | Status: SHIPPED | OUTPATIENT
Start: 2021-12-31 | End: 2022-01-30

## 2021-12-08 RX ORDER — DULOXETIN HYDROCHLORIDE 60 MG/1
60 CAPSULE, DELAYED RELEASE ORAL EVERY 12 HOURS
Qty: 60 CAPSULE | Refills: 1 | Status: SHIPPED | OUTPATIENT
Start: 2021-12-08 | End: 2022-01-05 | Stop reason: SDUPTHER

## 2021-12-08 RX ORDER — CYCLOBENZAPRINE HCL 10 MG
10 TABLET ORAL EVERY 8 HOURS
Qty: 30 TABLET | Refills: 1 | Status: SHIPPED | OUTPATIENT
Start: 2021-12-08 | End: 2022-01-05 | Stop reason: SDUPTHER

## 2021-12-23 ENCOUNTER — ANESTHESIA (OUTPATIENT)
Dept: PAIN MEDICINE | Facility: HOSPITAL | Age: 71
End: 2021-12-23
Payer: MEDICARE

## 2021-12-23 ENCOUNTER — ANESTHESIA EVENT (OUTPATIENT)
Dept: PAIN MEDICINE | Facility: HOSPITAL | Age: 71
End: 2021-12-23
Payer: MEDICARE

## 2021-12-23 ENCOUNTER — HOSPITAL ENCOUNTER (OUTPATIENT)
Facility: HOSPITAL | Age: 71
Discharge: HOME | End: 2021-12-23
Attending: PAIN MEDICINE | Admitting: PAIN MEDICINE
Payer: MEDICARE

## 2021-12-23 VITALS
SYSTOLIC BLOOD PRESSURE: 129 MMHG | OXYGEN SATURATION: 99 % | DIASTOLIC BLOOD PRESSURE: 75 MMHG | BODY MASS INDEX: 16.01 KG/M2 | HEIGHT: 62 IN | WEIGHT: 87 LBS | RESPIRATION RATE: 10 BRPM | HEART RATE: 122 BPM | TEMPERATURE: 98 F

## 2021-12-23 DIAGNOSIS — M54.6 PAIN IN THORACIC SPINE: ICD-10-CM

## 2021-12-23 PROCEDURE — D9220A PRA ANESTHESIA: ICD-10-PCS | Mod: ,,, | Performed by: NURSE ANESTHETIST, CERTIFIED REGISTERED

## 2021-12-23 PROCEDURE — 25000003 PHARM REV CODE 250: Performed by: NURSE ANESTHETIST, CERTIFIED REGISTERED

## 2021-12-23 PROCEDURE — 62321 NJX INTERLAMINAR CRV/THRC: CPT | Performed by: PAIN MEDICINE

## 2021-12-23 PROCEDURE — 27000655: Performed by: NURSE ANESTHETIST, CERTIFIED REGISTERED

## 2021-12-23 PROCEDURE — 25000003 PHARM REV CODE 250: Performed by: PAIN MEDICINE

## 2021-12-23 PROCEDURE — 62321 NJX INTERLAMINAR CRV/THRC: CPT | Mod: ,,, | Performed by: PAIN MEDICINE

## 2021-12-23 PROCEDURE — 62321 PR INJ CERV/THORAC, W/GUIDANCE: ICD-10-PCS | Mod: ,,, | Performed by: PAIN MEDICINE

## 2021-12-23 PROCEDURE — 27000260 *HC AIRWAY ORAL: Performed by: NURSE ANESTHETIST, CERTIFIED REGISTERED

## 2021-12-23 PROCEDURE — 37000009 HC ANESTHESIA EA ADD 15 MINS: Performed by: PAIN MEDICINE

## 2021-12-23 PROCEDURE — 25500020 PHARM REV CODE 255: Performed by: PAIN MEDICINE

## 2021-12-23 PROCEDURE — 63600175 PHARM REV CODE 636 W HCPCS: Performed by: NURSE ANESTHETIST, CERTIFIED REGISTERED

## 2021-12-23 PROCEDURE — 27000284 HC CANNULA NASAL: Performed by: NURSE ANESTHETIST, CERTIFIED REGISTERED

## 2021-12-23 PROCEDURE — 27201423 OPTIME MED/SURG SUP & DEVICES STERILE SUPPLY: Performed by: PAIN MEDICINE

## 2021-12-23 PROCEDURE — 63600175 PHARM REV CODE 636 W HCPCS: Performed by: PAIN MEDICINE

## 2021-12-23 PROCEDURE — D9220A PRA ANESTHESIA: Mod: ,,, | Performed by: NURSE ANESTHETIST, CERTIFIED REGISTERED

## 2021-12-23 PROCEDURE — 37000008 HC ANESTHESIA 1ST 15 MINUTES: Performed by: PAIN MEDICINE

## 2021-12-23 RX ORDER — KETAMINE HYDROCHLORIDE 50 MG/ML
INJECTION, SOLUTION INTRAMUSCULAR; INTRAVENOUS
Status: DISCONTINUED | OUTPATIENT
Start: 2021-12-23 | End: 2021-12-23

## 2021-12-23 RX ORDER — TRIAMCINOLONE ACETONIDE 40 MG/ML
INJECTION, SUSPENSION INTRA-ARTICULAR; INTRAMUSCULAR
Status: DISCONTINUED | OUTPATIENT
Start: 2021-12-23 | End: 2021-12-23 | Stop reason: HOSPADM

## 2021-12-23 RX ORDER — LIDOCAINE HYDROCHLORIDE 20 MG/ML
INJECTION, SOLUTION EPIDURAL; INFILTRATION; INTRACAUDAL; PERINEURAL
Status: DISCONTINUED | OUTPATIENT
Start: 2021-12-23 | End: 2021-12-23

## 2021-12-23 RX ORDER — SODIUM CHLORIDE 9 MG/ML
INJECTION, SOLUTION INTRAVENOUS CONTINUOUS
Status: DISCONTINUED | OUTPATIENT
Start: 2021-12-23 | End: 2021-12-23 | Stop reason: HOSPADM

## 2021-12-23 RX ORDER — PROPOFOL 10 MG/ML
VIAL (ML) INTRAVENOUS
Status: DISCONTINUED | OUTPATIENT
Start: 2021-12-23 | End: 2021-12-23

## 2021-12-23 RX ORDER — ETOMIDATE 2 MG/ML
INJECTION INTRAVENOUS
Status: DISCONTINUED | OUTPATIENT
Start: 2021-12-23 | End: 2021-12-23

## 2021-12-23 RX ORDER — FENTANYL CITRATE 50 UG/ML
INJECTION, SOLUTION INTRAMUSCULAR; INTRAVENOUS
Status: DISCONTINUED | OUTPATIENT
Start: 2021-12-23 | End: 2021-12-23

## 2021-12-23 RX ORDER — IOPAMIDOL 612 MG/ML
INJECTION, SOLUTION INTRATHECAL
Status: DISCONTINUED | OUTPATIENT
Start: 2021-12-23 | End: 2021-12-23 | Stop reason: HOSPADM

## 2021-12-23 RX ADMIN — ETOMIDATE 2 MG: 2 INJECTION, SOLUTION INTRAVENOUS at 10:12

## 2021-12-23 RX ADMIN — PROPOFOL 10 MG: 10 INJECTION, EMULSION INTRAVENOUS at 10:12

## 2021-12-23 RX ADMIN — KETAMINE HYDROCHLORIDE 5 MG: 50 INJECTION INTRAMUSCULAR; INTRAVENOUS at 10:12

## 2021-12-23 RX ADMIN — FENTANYL CITRATE 100 MCG: 50 INJECTION INTRAMUSCULAR; INTRAVENOUS at 10:12

## 2021-12-23 RX ADMIN — PROPOFOL 20 MG: 10 INJECTION, EMULSION INTRAVENOUS at 10:12

## 2021-12-23 RX ADMIN — SODIUM CHLORIDE: 9 INJECTION, SOLUTION INTRAVENOUS at 10:12

## 2021-12-23 RX ADMIN — LIDOCAINE HYDROCHLORIDE 50 MG: 20 INJECTION, SOLUTION EPIDURAL; INFILTRATION; INTRACAUDAL; PERINEURAL at 10:12

## 2022-01-04 ENCOUNTER — TELEPHONE (OUTPATIENT)
Dept: FAMILY MEDICINE | Facility: CLINIC | Age: 72
End: 2022-01-04
Payer: MEDICARE

## 2022-01-04 NOTE — TELEPHONE ENCOUNTER
Pt requesting periactin refilled, to the pharmacy of [facility]. Was last wrote on 09/16.2021 by oscar 4mg 3 times daily 1/2 tablet.

## 2022-01-05 NOTE — PROGRESS NOTES
Disclaimer:  This note has been generated using voice recognition software.  There may be type of graft focal areas that have been missed during a proof reading      Subjective:      Patient ID: Amelie Cerrato is a 71 y.o. female.    Chief Complaint: Low-back Pain, Joint Pain, and Mid-back Pain      Mid-back Pain  This is a chronic problem. The current episode started more than 1 year ago. The problem occurs daily. The problem has been gradually improving since onset. Associated symptoms include leg pain. Pertinent negatives include no bladder incontinence, chest pain or dysuria.   Low-back Pain  Associated symptoms include leg pain. Pertinent negatives include no bladder incontinence, chest pain or dysuria.     Review of Systems   Constitutional: Negative for appetite change, chills, diaphoresis, fatigue and unexpected weight change.   HENT: Negative for drooling, ear discharge, ear pain, facial swelling, nosebleeds, sore throat, trouble swallowing, voice change and goiter.    Eyes: Negative for photophobia, pain, discharge, redness and visual disturbance.   Respiratory: Negative for apnea, cough, choking, chest tightness, shortness of breath, wheezing and stridor.    Cardiovascular: Negative for chest pain, palpitations and leg swelling.   Gastrointestinal: Negative for abdominal distention, change in bowel habit, diarrhea, rectal pain, vomiting, fecal incontinence and change in bowel habit.   Endocrine: Negative for cold intolerance, heat intolerance, polydipsia, polyphagia and polyuria.   Genitourinary: Negative for bladder incontinence, dysuria, flank pain, frequency, hematuria and hot flashes.   Musculoskeletal: Positive for arthralgias, back pain, leg pain, myalgias, neck pain and neck stiffness.   Integumentary:  Negative for color change, pallor and rash.   Allergic/Immunologic: Negative for immunocompromised state.   Neurological: Negative for dizziness, vertigo, seizures, syncope, facial asymmetry, speech  "difficulty, light-headedness, disturbances in coordination, memory loss and coordination difficulties.   Hematological: Negative for adenopathy. Does not bruise/bleed easily.   Psychiatric/Behavioral: Negative for agitation, behavioral problems, confusion, decreased concentration, dysphoric mood, hallucinations, self-injury and suicidal ideas. The patient is not nervous/anxious and is not hyperactive.             Objective:  Vitals:    01/10/22 0903   BP: (!) 145/85   Pulse: 103   Resp: 18   Weight: 39 kg (86 lb)   Height: 5' 2" (1.575 m)   PainSc: 0-No pain         Physical Exam  Vitals and nursing note reviewed. Exam conducted with a chaperone present.   Constitutional:       General: She is awake. She is not in acute distress.     Appearance: Normal appearance. She is not toxic-appearing.   HENT:      Head: Normocephalic and atraumatic.      Nose: Nose normal.      Mouth/Throat:      Mouth: Mucous membranes are moist.      Pharynx: Oropharynx is clear.   Eyes:      Conjunctiva/sclera: Conjunctivae normal.      Pupils: Pupils are equal, round, and reactive to light.   Cardiovascular:      Rate and Rhythm: Normal rate.   Pulmonary:      Effort: Pulmonary effort is normal. No respiratory distress.   Abdominal:      Palpations: Abdomen is soft.   Musculoskeletal:         General: Normal range of motion.      Cervical back: Normal range of motion and neck supple.   Skin:     General: Skin is warm and dry.   Neurological:      General: No focal deficit present.      Mental Status: She is alert and oriented to person, place, and time. Mental status is at baseline.      Cranial Nerves: Cranial nerves are intact. No cranial nerve deficit (II-XII).   Psychiatric:         Mood and Affect: Mood normal.         Behavior: Behavior normal. Behavior is cooperative.         Thought Content: Thought content normal.           CT Hip Without Contrast Right  Narrative: EXAMINATION:  CT HIP WITHOUT CONTRAST RIGHT    CLINICAL " HISTORY:  Hip trauma, fracture suspected, xray done;    TECHNIQUE:  Axial CT imaging of the right hip is performed without contrast.  Computer reformatting is viewed in the sagittal and coronal plane.    CT dose reduction technique used - Dose Rite and tube current modulation.    COMPARISON:  X-ray 21 October 2021, 2 October 2021    FINDINGS:  There is osteopenia with limited detail.  There is suggestion of right sacral wing fracture nondisplaced.  There is left sacral wing fracture with small amount of sclerosis around the fracture.  Comminuted fracture of the left pubic bone incompletely imaged.  There is mild-to-moderate displacement of the visualized fragments.  No other definite evidence of fracture is seen. The alignment appears within normal limits.    Muscles, tendons and ligaments appear within normal limits for CT.    No abnormal fluid collection or abnormal soft tissue density is identified.    No other abnormalities are identified.  Impression: Nondisplaced right sacral wing fracture with osteopenia, detail somewhat limited.  There is left sacral wing fracture with sclerosis suggesting a more subacute injury on the left.  There is comminuted left pubic bone fracture appears subacute, incompletely imaged.    Electronically signed by: Marc Acosta  Date:    10/25/2021  Time:    12:34       Office Visit on 11/03/2021   Component Date Value Ref Range Status    POC Amphetamines 11/03/2021 Negative  Negative, Inconclusive Final    POC Barbiturates 11/03/2021 Negative  Negative, Inconclusive Final    POC Benzodiazepines 11/03/2021 Negative  Negative, Inconclusive Final    POC Cocaine 11/03/2021 Negative  Negative, Inconclusive Final    POC THC 11/03/2021 Negative  Negative, Inconclusive Final    POC Methadone 11/03/2021 Negative  Negative, Inconclusive Final    POC Methamphetamine 11/03/2021 Negative  Negative, Inconclusive Final    POC Opiates 11/03/2021 Negative  Negative, Inconclusive Final     POC Oxycodone 11/03/2021 Negative  Negative, Inconclusive Final    POC Phencyclidine 11/03/2021 Negative  Negative, Inconclusive Final    POC Methylenedioxymethamphetamine * 11/03/2021 Negative  Negative, Inconclusive Final    POC Tricyclic Antidepressants 11/03/2021 Negative  Negative, Inconclusive Final    POC Buprenorphine 11/03/2021 Negative   Final     Acceptable 11/03/2021 Yes   Final    POC Temperature (Urine) 11/03/2021 92   Final    pH, UA 11/03/2021 7.5  5.0, 5.5, 6.0, 6.5, 7.0, 7.5, 8.0 pH Units Final    Specific [place], UA 11/03/2021 1.010  <=1.005, 1.010, 1.015, 1.020, 1.025, 1.030 Final    Creatinine, Urine 11/03/2021 35  28 - 219 mg/dL Final    6-Acetylmorphine 11/03/2021 Negative  10 ng/mL Final    7-Aminoclonazepam 11/03/2021 Negative  Negative 25 ng/mL Final    a-Hydroxyalprazolam 11/03/2021 Negative  25 ng/mL Final    Acetyl Fentanyl 11/03/2021 Negative  2.5 ng/mL Final    Acetyl Norfentanyl Oxalate 11/03/2021 Negative  5 ng/mL Final    Benzoylecgonine 11/03/2021 Negative  100 ng/mL Final    Buprenorphine 11/03/2021 Negative  25 ng/mL Final    Codeine 11/03/2021 Negative  25 ng/mL Final    EDDP 11/03/2021 Negative  25 ng/mL Final    Fentanyl 11/03/2021 Negative  2.5 ng/mL Final    Hydrocodone 11/03/2021 Negative  25 ng/mL Final    Hydromorphone 11/03/2021 Negative  25 ng/mL Final    Lorazepam 11/03/2021 Negative  25 ng/mL Final    Morphine 11/03/2021 Negative  25 ng/mL Final    Norbuprenorphine 11/03/2021 Negative  25 ng/mL Final    Nordiazepam 11/03/2021 Negative  25 ng/mL Final    Norfentanyl Oxalate 11/03/2021 Negative  5 ng/mL Final    Norhydrocodone 11/03/2021 Negative  50 ng/mL Final    Noroxycodone HCL 11/03/2021 Negative  50 ng/mL Final    Oxazepam 11/03/2021 Negative  25 ng/mL Final    Oxymorphone 11/03/2021 Negative  25 ng/mL Final    Tapentadol 11/03/2021 Negative  25 ng/mL Final    Temazepam 11/03/2021 Negative  25 ng/mL Final     THC-COOH 11/03/2021 Negative  25 ng/mL Final    Tramadol 11/03/2021 Negative  100 ng/mL Final    Amphetamine, Urine 11/03/2021 Negative  Negative 100 ng/mL Final    Methamphetamines, Urine 11/03/2021 Negative  Negative 100 ng/mL Final    Methadone, Urine 11/03/2021 Negative  Negative 25 ng/mL Final    Oxycodone, Urine 11/03/2021 Negative  Negative 25 ng/mL Final   Admission on 10/25/2021, Discharged on 10/26/2021   Component Date Value Ref Range Status    Magnesium 10/25/2021 1.9  1.7 - 2.3 mg/dL Final    Sodium 10/25/2021 137  136 - 145 mmol/L Final    Potassium 10/25/2021 3.8  3.5 - 5.1 mmol/L Final    Chloride 10/25/2021 100  98 - 107 mmol/L Final    CO2 10/25/2021 31  21 - 32 mmol/L Final    Anion Gap 10/25/2021 10  7 - 16 mmol/L Final    Glucose 10/25/2021 103  74 - 106 mg/dL Final    BUN 10/25/2021 12  7 - 18 mg/dL Final    Creatinine 10/25/2021 0.66  0.55 - 1.02 mg/dL Final    BUN/Creatinine Ratio 10/25/2021 18  6 - 20 Final    Calcium 10/25/2021 9.0  8.5 - 10.1 mg/dL Final    Total Protein 10/25/2021 5.7* 6.4 - 8.2 g/dL Final    Albumin 10/25/2021 2.6* 3.5 - 5.0 g/dL Final    Globulin 10/25/2021 3.1  2.0 - 4.0 g/dL Final    A/G Ratio 10/25/2021 0.8   Final    Bilirubin, Total 10/25/2021 0.4  0.0 - 1.2 mg/dL Final    Alk Phos 10/25/2021 170* 55 - 142 U/L Final    ALT 10/25/2021 17  13 - 56 U/L Final    AST 10/25/2021 15  15 - 37 U/L Final    eGFR  10/25/2021 114  >=60 mL/min/1.73m² Final    WBC 10/25/2021 11.94* 4.50 - 11.00 K/uL Final    RBC 10/25/2021 3.95* 4.20 - 5.40 M/uL Final    Hemoglobin 10/25/2021 11.4* 12.0 - 16.0 g/dL Final    Hematocrit 10/25/2021 34.9* 38.0 - 47.0 % Final    MCV 10/25/2021 88.4  80.0 - 96.0 fL Final    MCH 10/25/2021 28.9  27.0 - 31.0 pg Final    MCHC 10/25/2021 32.7  32.0 - 36.0 g/dL Final    RDW 10/25/2021 17.8* 11.5 - 14.5 % Final    Platelet Count 10/25/2021 395  150 - 400 K/uL Final    MPV 10/25/2021 8.2* 9.4 - 12.4 fL Final     Neutrophils % 10/25/2021 83.6* 53.0 - 65.0 % Final    Lymphocytes % 10/25/2021 13.4* 27.0 - 41.0 % Final    Neutrophils, Abs 10/25/2021 9.98* 1.80 - 7.70 K/uL Final    Lymphocytes, Absolute 10/25/2021 1.60  1.00 - 4.80 K/uL Final    Diff Type 10/25/2021 Auto   Final    Monocytes % 10/25/2021 2.9  2.0 - 6.0 % Final    Eosinophils % 10/25/2021 0.0* 1.0 - 4.0 % Final    Basophils % 10/25/2021 0.1  0.0 - 1.0 % Final    Monocytes, Absolute 10/25/2021 0.35  0.00 - 0.80 K/uL Final    Eosinophils, Absolute 10/25/2021 0.00  0.00 - 0.50 K/uL Final    Basophils, Absolute 10/25/2021 0.01  0.00 - 0.20 K/uL Final   Admission on 10/21/2021, Discharged on 10/21/2021   Component Date Value Ref Range Status    Sodium 10/21/2021 133* 136 - 145 mmol/L Final    Potassium 10/21/2021 3.7  3.5 - 5.1 mmol/L Final    Chloride 10/21/2021 97* 98 - 107 mmol/L Final    CO2 10/21/2021 31  21 - 32 mmol/L Final    Anion Gap 10/21/2021 9  7 - 16 mmol/L Final    Glucose 10/21/2021 80  74 - 106 mg/dL Final    BUN 10/21/2021 15  7 - 18 mg/dL Final    Creatinine 10/21/2021 0.98  0.55 - 1.02 mg/dL Final    BUN/Creatinine Ratio 10/21/2021 15  6 - 20 Final    Calcium 10/21/2021 8.6  8.5 - 10.1 mg/dL Final    Total Protein 10/21/2021 5.9* 6.4 - 8.2 g/dL Final    Albumin 10/21/2021 2.7* 3.5 - 5.0 g/dL Final    Globulin 10/21/2021 3.2  2.0 - 4.0 g/dL Final    A/G Ratio 10/21/2021 0.8   Final    Bilirubin, Total 10/21/2021 0.4  >0.0 - 1.2 mg/dL Final    Alk Phos 10/21/2021 173* 55 - 142 U/L Final    ALT 10/21/2021 18  13 - 56 U/L Final    AST 10/21/2021 15  15 - 37 U/L Final    eGFR  10/21/2021 72  >=60 mL/min/1.73m² Final    Color, UA 10/21/2021 Yellow  Colorless, Straw, Yellow, Dark Yellow Final    Clarity, UA 10/21/2021 Slightly Cloudy* Clear Final    pH, UA 10/21/2021 6.5  5.0, 5.5, 6.0, 6.5, 7.0, 7.5, 8.0 pH Units Final    Leukocytes, UA 10/21/2021 Moderate* Negative Final    Nitrites, UA 10/21/2021  Negative  Negative Final    Protein, UA 10/21/2021 Negative  Negative Final    Glucose, UA 10/21/2021 Negative  Negative mg/dL Final    Ketones, UA 10/21/2021 Negative  Negative, Trace mg/dL Final    Urobilinogen, UA 10/21/2021 0.2  0.2, 1.0 mg/dL Final    Bilirubin, UA 10/21/2021 Negative  Negative Final    Blood, UA 10/21/2021 Trace-Intact* Negative Final    Specific [place], UA 10/21/2021 1.010  <=1.005, 1.010, 1.015, 1.020, 1.025, 1.030 Final    Magnesium 10/21/2021 1.7  1.7 - 2.3 mg/dL Final    WBC 10/21/2021 10.85  4.50 - 11.00 K/uL Final    RBC 10/21/2021 3.77* 4.20 - 5.40 M/uL Final    Hemoglobin 10/21/2021 11.1* 12.0 - 16.0 g/dL Final    Hematocrit 10/21/2021 33.6* 38.0 - 47.0 % Final    MCV 10/21/2021 89.1  80.0 - 96.0 fL Final    MCH 10/21/2021 29.4  27.0 - 31.0 pg Final    MCHC 10/21/2021 33.0  32.0 - 36.0 g/dL Final    RDW 10/21/2021 17.4* 11.5 - 14.5 % Final    Platelet Count 10/21/2021 385  150 - 400 K/uL Final    MPV 10/21/2021 8.2* 9.4 - 12.4 fL Final    Neutrophils % 10/21/2021 59.6  53.0 - 65.0 % Final    Lymphocytes % 10/21/2021 30.5  27.0 - 41.0 % Final    Neutrophils, Abs 10/21/2021 6.47  1.80 - 7.70 K/uL Final    Lymphocytes, Absolute 10/21/2021 3.31  1.00 - 4.80 K/uL Final    Diff Type 10/21/2021 Auto   Final    Monocytes % 10/21/2021 9.5* 2.0 - 6.0 % Final    Eosinophils % 10/21/2021 0.2* 1.0 - 4.0 % Final    Basophils % 10/21/2021 0.2  0.0 - 1.0 % Final    Monocytes, Absolute 10/21/2021 1.03* 0.00 - 0.80 K/uL Final    Eosinophils, Absolute 10/21/2021 0.02  0.00 - 0.50 K/uL Final    Basophils, Absolute 10/21/2021 0.02  0.00 - 0.20 K/uL Final    WBC, UA 10/21/2021 5-10* None Seen, 0-5 /hpf Final    RBC, UA 10/21/2021 0-3  None Seen, 0-3 /hpf Final    Bacteria, UA 10/21/2021 Few* None Seen, None Seen To Occasional, Rare /hpf Final    Squamous Epithelial Cells, UA 10/21/2021 Occasional* None Seen, Rare, None Seen To Occasional /lpf Final   Admission on  10/02/2021, Discharged on 10/04/2021   Component Date Value Ref Range Status    Sodium 10/02/2021 131* 136 - 145 mmol/L Final    Potassium 10/02/2021 3.1* 3.5 - 5.1 mmol/L Final    Chloride 10/02/2021 95* 98 - 107 mmol/L Final    CO2 10/02/2021 29  21 - 32 mmol/L Final    Anion Gap 10/02/2021 10  7 - 16 mmol/L Final    Glucose 10/02/2021 93  74 - 106 mg/dL Final    BUN 10/02/2021 9  7 - 18 mg/dL Final    Creatinine 10/02/2021 0.80  0.55 - 1.02 mg/dL Final    BUN/Creatinine Ratio 10/02/2021 11  6 - 20 Final    Calcium 10/02/2021 8.7  8.5 - 10.1 mg/dL Final    Total Protein 10/02/2021 5.8* 6.4 - 8.2 g/dL Final    Albumin 10/02/2021 2.8* 3.5 - 5.0 g/dL Final    Globulin 10/02/2021 3.0  2.0 - 4.0 g/dL Final    A/G Ratio 10/02/2021 0.9   Final    Bilirubin, Total 10/02/2021 0.5  >0.0 - 1.2 mg/dL Final    Alk Phos 10/02/2021 101  55 - 142 U/L Final    ALT 10/02/2021 27  13 - 56 U/L Final    AST 10/02/2021 21  15 - 37 U/L Final    eGFR  10/02/2021 91  >=60 mL/min/1.73m² Final    Color, UA 10/02/2021 Yellow  Colorless, Straw, Yellow, Dark Yellow Final    Clarity, UA 10/02/2021 Clear  Clear Final    pH, UA 10/02/2021 7.0  5.0, 5.5, 6.0, 6.5, 7.0, 7.5, 8.0 pH Units Final    Leukocytes, UA 10/02/2021 Negative  Negative Final    Nitrites, UA 10/02/2021 Negative  Negative Final    Protein, UA 10/02/2021 Negative  Negative Final    Glucose, UA 10/02/2021 Negative  Negative mg/dL Final    Ketones, UA 10/02/2021 Negative  Negative, Trace mg/dL Final    Urobilinogen, UA 10/02/2021 0.2  0.2, 1.0 mg/dL Final    Bilirubin, UA 10/02/2021 Negative  Negative Final    Blood, UA 10/02/2021 Negative  Negative Final    Specific [place], UA 10/02/2021 1.010  <=1.005, 1.010, 1.015, 1.020, 1.025, 1.030 Final    WBC 10/02/2021 12.59* 4.50 - 11.00 K/uL Final    RBC 10/02/2021 3.84* 4.20 - 5.40 M/uL Final    Hemoglobin 10/02/2021 11.1* 12.0 - 16.0 g/dL Final    Hematocrit 10/02/2021 33.5* 38.0 -  47.0 % Final    MCV 10/02/2021 87.2  80.0 - 96.0 fL Final    MCH 10/02/2021 28.9  27.0 - 31.0 pg Final    MCHC 10/02/2021 33.1  32.0 - 36.0 g/dL Final    RDW 10/02/2021 17.5* 11.5 - 14.5 % Final    Platelet Count 10/02/2021 252  150 - 400 K/uL Final    MPV 10/02/2021 8.5* 9.4 - 12.4 fL Final    Neutrophils % 10/02/2021 87.6* 53.0 - 65.0 % Final    Lymphocytes % 10/02/2021 5.5* 27.0 - 41.0 % Final    Neutrophils, Abs 10/02/2021 11.04* 1.80 - 7.70 K/uL Final    Lymphocytes, Absolute 10/02/2021 0.69* 1.00 - 4.80 K/uL Final    Diff Type 10/02/2021 Manual   Final    Monocytes % 10/02/2021 6.7* 2.0 - 6.0 % Final    Eosinophils % 10/02/2021 0.1* 1.0 - 4.0 % Final    Basophils % 10/02/2021 0.1  0.0 - 1.0 % Final    Monocytes, Absolute 10/02/2021 0.84* 0.00 - 0.80 K/uL Final    Eosinophils, Absolute 10/02/2021 0.01  0.00 - 0.50 K/uL Final    Basophils, Absolute 10/02/2021 0.01  0.00 - 0.20 K/uL Final    Troponin-I 10/02/2021 <0.017  <=0.056 ng/mL Final    Lipase 10/02/2021 98  73 - 393 U/L Final    Magnesium 10/02/2021 1.9  1.7 - 2.3 mg/dL Final    Segmented Neutrophils, Man % 10/02/2021 82* 50 - 62 % Final    Bands, Man % 10/02/2021 3  1 - 5 % Final    Lymphocytes, Man % 10/02/2021 8* 27 - 41 % Final    Monocytes, Man % 10/02/2021 6  2 - 6 % Final    Eosinophils, Man % 10/02/2021 1  1 - 4 % Final    Platelet Morphology 10/02/2021 Large & Giant Platelets* Normal Final    Anisocytosis 10/02/2021 1+   Final    COVID-19 Ag 10/02/2021 Negative  Negative, Invalid Final    Sodium 10/03/2021 137  136 - 145 mmol/L Final    Potassium 10/03/2021 5.3* 3.5 - 5.1 mmol/L Final    Chloride 10/03/2021 104  98 - 107 mmol/L Final    CO2 10/03/2021 31  21 - 32 mmol/L Final    Anion Gap 10/03/2021 7  7 - 16 mmol/L Final    Glucose 10/03/2021 81  74 - 106 mg/dL Final    BUN 10/03/2021 8  7 - 18 mg/dL Final    Creatinine 10/03/2021 0.55  0.55 - 1.02 mg/dL Final    BUN/Creatinine Ratio 10/03/2021 15  6 - 20  Final    Calcium 10/03/2021 7.6* 8.5 - 10.1 mg/dL Final    eGFR  10/03/2021 140  >=60 mL/min/1.73m² Final    WBC 10/03/2021 11.00  4.50 - 11.00 K/uL Final    RBC 10/03/2021 3.26* 4.20 - 5.40 M/uL Final    Hemoglobin 10/03/2021 9.6* 12.0 - 16.0 g/dL Final    Hematocrit 10/03/2021 29.2* 38.0 - 47.0 % Final    MCV 10/03/2021 89.6  80.0 - 96.0 fL Final    MCH 10/03/2021 29.4  27.0 - 31.0 pg Final    MCHC 10/03/2021 32.9  32.0 - 36.0 g/dL Final    RDW 10/03/2021 18.1* 11.5 - 14.5 % Final    Platelet Count 10/03/2021 255  150 - 400 K/uL Final    MPV 10/03/2021 8.1* 9.4 - 12.4 fL Final    Neutrophils % 10/03/2021 61.6  53.0 - 65.0 % Final    Lymphocytes % 10/03/2021 27.2  27.0 - 41.0 % Final    Neutrophils, Abs 10/03/2021 6.78  1.80 - 7.70 K/uL Final    Lymphocytes, Absolute 10/03/2021 2.99  1.00 - 4.80 K/uL Final    Diff Type 10/03/2021 Auto   Final    Monocytes % 10/03/2021 10.9* 2.0 - 6.0 % Final    Eosinophils % 10/03/2021 0.2* 1.0 - 4.0 % Final    Basophils % 10/03/2021 0.1  0.0 - 1.0 % Final    Monocytes, Absolute 10/03/2021 1.20* 0.00 - 0.80 K/uL Final    Eosinophils, Absolute 10/03/2021 0.02  0.00 - 0.50 K/uL Final    Basophils, Absolute 10/03/2021 0.01  0.00 - 0.20 K/uL Final    Occult Blood 10/04/2021 Negative  Negative, Invalid Final    Sodium 10/04/2021 136  136 - 145 mmol/L Final    Potassium 10/04/2021 4.2  3.5 - 5.1 mmol/L Final    Chloride 10/04/2021 104  98 - 107 mmol/L Final    CO2 10/04/2021 30  21 - 32 mmol/L Final    Anion Gap 10/04/2021 6* 7 - 16 mmol/L Final    Glucose 10/04/2021 80  74 - 106 mg/dL Final    BUN 10/04/2021 8  7 - 18 mg/dL Final    Creatinine 10/04/2021 0.49* 0.55 - 1.02 mg/dL Final    BUN/Creatinine Ratio 10/04/2021 16  6 - 20 Final    Calcium 10/04/2021 8.0* 8.5 - 10.1 mg/dL Final    eGFR  10/04/2021 160  >=60 mL/min/1.73m² Final    WBC 10/04/2021 11.49* 4.50 - 11.00 K/uL Final    RBC 10/04/2021 3.34* 4.20 - 5.40  M/uL Final    Hemoglobin 10/04/2021 9.7* 12.0 - 16.0 g/dL Final    Hematocrit 10/04/2021 30.1* 38.0 - 47.0 % Final    MCV 10/04/2021 90.1  80.0 - 96.0 fL Final    MCH 10/04/2021 29.0  27.0 - 31.0 pg Final    MCHC 10/04/2021 32.2  32.0 - 36.0 g/dL Final    RDW 10/04/2021 18.4* 11.5 - 14.5 % Final    Platelet Count 10/04/2021 276  150 - 400 K/uL Final    MPV 10/04/2021 7.6* 9.4 - 12.4 fL Final    Neutrophils % 10/04/2021 68.6* 53.0 - 65.0 % Final    Lymphocytes % 10/04/2021 23.1* 27.0 - 41.0 % Final    Neutrophils, Abs 10/04/2021 7.89* 1.80 - 7.70 K/uL Final    Lymphocytes, Absolute 10/04/2021 2.65  1.00 - 4.80 K/uL Final    Diff Type 10/04/2021 Auto   Final    Monocytes % 10/04/2021 7.5* 2.0 - 6.0 % Final    Eosinophils % 10/04/2021 0.6* 1.0 - 4.0 % Final    Basophils % 10/04/2021 0.2  0.0 - 1.0 % Final    Monocytes, Absolute 10/04/2021 0.86* 0.00 - 0.80 K/uL Final    Eosinophils, Absolute 10/04/2021 0.07  0.00 - 0.50 K/uL Final    Basophils, Absolute 10/04/2021 0.02  0.00 - 0.20 K/uL Final   Office Visit on 09/16/2021   Component Date Value Ref Range Status    Sodium 09/16/2021 128* 136 - 145 mmol/L Final    Potassium 09/16/2021 5.6* 3.5 - 5.1 mmol/L Final    Chloride 09/16/2021 93* 98 - 107 mmol/L Final    CO2 09/16/2021 22  21 - 32 mmol/L Final    Anion Gap 09/16/2021 19* 7 - 16 mmol/L Final    Glucose 09/16/2021 92  74 - 106 mg/dL Final    BUN 09/16/2021 19* 7 - 18 mg/dL Final    Creatinine 09/16/2021 1.10* 0.55 - 1.02 mg/dL Final    BUN/Creatinine Ratio 09/16/2021 17  6 - 20 Final    Calcium 09/16/2021 7.8* 8.5 - 10.1 mg/dL Final    Total Protein 09/16/2021 7.3  6.4 - 8.2 g/dL Final    Albumin 09/16/2021 3.3* 3.5 - 5.0 g/dL Final    Globulin 09/16/2021 4.0  2.0 - 4.0 g/dL Final    A/G Ratio 09/16/2021 0.8   Final    Bilirubin, Total 09/16/2021 0.5  >0.0 - 1.2 mg/dL Final    Alk Phos 09/16/2021 214* 55 - 142 U/L Final    ALT 09/16/2021 34  13 - 56 U/L Final    AST 09/16/2021  47* 15 - 37 U/L Final    eGFR  09/16/2021 63  >=60 mL/min/1.73m² Final    WBC 09/16/2021 11.92* 4.50 - 11.00 K/uL Final    RBC 09/16/2021 4.53  4.20 - 5.40 M/uL Final    Hemoglobin 09/16/2021 13.2  12.0 - 16.0 g/dL Final    Hematocrit 09/16/2021 39.5  38.0 - 47.0 % Final    MCV 09/16/2021 87.2  80.0 - 96.0 fL Final    MCH 09/16/2021 29.1  27.0 - 31.0 pg Final    MCHC 09/16/2021 33.4  32.0 - 36.0 g/dL Final    RDW 09/16/2021 17.9* 11.5 - 14.5 % Final    Platelet Count 09/16/2021 772* 150 - 400 K/uL Final    MPV 09/16/2021 10.0  9.4 - 12.4 fL Final    Neutrophils % 09/16/2021 86.5* 53.0 - 65.0 % Final    Lymphocytes % 09/16/2021 6.2* 27.0 - 41.0 % Final    Monocytes % 09/16/2021 6.2* 2.0 - 6.0 % Final    Eosinophils % 09/16/2021 0.0* 1.0 - 4.0 % Final    Basophils % 09/16/2021 0.2  0.0 - 1.0 % Final    Immature Granulocytes % 09/16/2021 0.9* 0.0 - 0.4 % Final    nRBC, Auto 09/16/2021 0.0  <=0.0 % Final    Neutrophils, Abs 09/16/2021 10.31* 1.80 - 7.70 K/uL Final    Lymphocytes, Absolute 09/16/2021 0.74* 1.00 - 4.80 K/uL Final    Monocytes, Absolute 09/16/2021 0.74  0.00 - 0.80 K/uL Final    Eosinophils, Absolute 09/16/2021 0.00  0.00 - 0.50 K/uL Final    Basophils, Absolute 09/16/2021 0.02  0.00 - 0.20 K/uL Final    Immature Granulocytes, Absolute 09/16/2021 0.11* 0.00 - 0.04 K/uL Final    nRBC, Absolute 09/16/2021 0.00  <=0.00 x10e3/uL Final    Diff Type 09/16/2021 Auto   Final   Admission on 08/31/2021, Discharged on 09/09/2021   Component Date Value Ref Range Status    Magnesium 08/31/2021 1.2* 1.7 - 2.3 mg/dL Final    Color, UA 08/31/2021 Yellow  Colorless, Straw, Yellow, Dark Yellow Final    Clarity, UA 08/31/2021 Slightly Cloudy* Clear Final    pH, UA 08/31/2021 7.5  5.0, 5.5, 6.0, 6.5, 7.0, 7.5, 8.0 pH Units Final    Leukocytes, UA 08/31/2021 Negative  Negative Final    Nitrites, UA 08/31/2021 Negative  Negative Final    Protein, UA 08/31/2021 Negative  Negative  Final    Glucose, UA 08/31/2021 Negative  Negative mg/dL Final    Ketones, UA 08/31/2021 Negative  Negative, Trace mg/dL Final    Urobilinogen, UA 08/31/2021 0.2  0.2, 1.0 mg/dL Final    Bilirubin, UA 08/31/2021 Negative  Negative Final    Blood, UA 08/31/2021 Negative  Negative Final    Specific Gravity, UA 08/31/2021 1.020  <=1.005, 1.010, 1.015, 1.020, 1.025, 1.030 Final    Magnesium 09/01/2021 1.0* 1.7 - 2.3 mg/dL Final    Sodium 09/08/2021 139  136 - 145 mmol/L Final    Potassium 09/08/2021 2.5* 3.5 - 5.1 mmol/L Final    Chloride 09/08/2021 99  98 - 107 mmol/L Final    CO2 09/08/2021 34* 21 - 32 mmol/L Final    Anion Gap 09/08/2021 9  7 - 16 mmol/L Final    Glucose 09/08/2021 75  74 - 106 mg/dL Final    BUN 09/08/2021 5* 7 - 18 mg/dL Final    Creatinine 09/08/2021 0.61  0.55 - 1.02 mg/dL Final    BUN/Creatinine Ratio 09/08/2021 8  6 - 20 Final    Calcium 09/08/2021 7.6* 8.5 - 10.1 mg/dL Final    eGFR  09/08/2021 124  >=60 mL/min/1.73m² Final    WBC 09/08/2021 5.95  4.50 - 11.00 K/uL Final    RBC 09/08/2021 3.50* 4.20 - 5.40 M/uL Final    Hemoglobin 09/08/2021 9.9* 12.0 - 16.0 g/dL Final    Hematocrit 09/08/2021 29.7* 38.0 - 47.0 % Final    MCV 09/08/2021 84.9  80.0 - 96.0 fL Final    MCH 09/08/2021 28.3  27.0 - 31.0 pg Final    MCHC 09/08/2021 33.3  32.0 - 36.0 g/dL Final    RDW 09/08/2021 15.1* 11.5 - 14.5 % Final    Platelet Count 09/08/2021 620* 150 - 400 K/uL Final    MPV 09/08/2021 8.4* 9.4 - 12.4 fL Final    Neutrophils % 09/08/2021 45.2* 53.0 - 65.0 % Final    Lymphocytes % 09/08/2021 41.8* 27.0 - 41.0 % Final    Neutrophils, Abs 09/08/2021 2.68  1.80 - 7.70 K/uL Final    Lymphocytes, Absolute 09/08/2021 2.49  1.00 - 4.80 K/uL Final    Diff Type 09/08/2021 Auto   Final    Monocytes % 09/08/2021 12.4* 2.0 - 6.0 % Final    Eosinophils % 09/08/2021 0.3* 1.0 - 4.0 % Final    Basophils % 09/08/2021 0.3  0.0 - 1.0 % Final    Monocytes, Absolute 09/08/2021  0.74  0.00 - 0.80 K/uL Final    Eosinophils, Absolute 09/08/2021 0.02  0.00 - 0.50 K/uL Final    Basophils, Absolute 09/08/2021 0.02  0.00 - 0.20 K/uL Final    Magnesium 09/08/2021 1.3* 1.7 - 2.3 mg/dL Final    Sodium 09/08/2021 138  136 - 145 mmol/L Final    Potassium 09/08/2021 2.5* 3.5 - 5.1 mmol/L Final    Chloride 09/08/2021 98  98 - 107 mmol/L Final    CO2 09/08/2021 34* 21 - 32 mmol/L Final    Anion Gap 09/08/2021 9  7 - 16 mmol/L Final    Glucose 09/08/2021 75  74 - 106 mg/dL Final    BUN 09/08/2021 5* 7 - 18 mg/dL Final    Creatinine 09/08/2021 0.60  0.55 - 1.02 mg/dL Final    BUN/Creatinine Ratio 09/08/2021 8  6 - 20 Final    Calcium 09/08/2021 7.6* 8.5 - 10.1 mg/dL Final    Total Protein 09/08/2021 4.9* 6.4 - 8.2 g/dL Final    Albumin 09/08/2021 2.0* 3.5 - 5.0 g/dL Final    Globulin 09/08/2021 2.9  2.0 - 4.0 g/dL Final    A/G Ratio 09/08/2021 0.7   Final    Bilirubin, Total 09/08/2021 0.3  >0.0 - 1.2 mg/dL Final    Alk Phos 09/08/2021 214* 55 - 142 U/L Final    ALT 09/08/2021 25  13 - 56 U/L Final    AST 09/08/2021 33  15 - 37 U/L Final    eGFR  09/08/2021 127  >=60 mL/min/1.73m² Final    Potassium 09/08/2021 3.0* 3.5 - 5.1 mmol/L Final   No results displayed because visit has over 200 results.              Assessment:      1. Thoracic radiculopathy    2. Spondylolisthesis, lumbar region    3. Lumbar radiculopathy    4. Spondylosis without myelopathy or radiculopathy, lumbosacral region          No orders of the defined types were placed in this encounter.        A's of Opioid Risk Assessment  Activity:Patient can perform ADL.   Analgesia:Patients pain is partially controlled by current medication. Patient has tried OTC medications such as Tylenol and Ibuprofen with out relief.   Adverse Effects: Patient denies constipation or sedation.  Aberrant Behavior:  reviewed with no aberrant drug seeking/taking behavior.  Overdose reversal drug naloxone discussed    Drug  screen reviewed      Requested Prescriptions     Pending Prescriptions Disp Refills    cyclobenzaprine (FLEXERIL) 10 MG tablet 30 tablet 1     Sig: Take 1 tablet (10 mg total) by mouth every 8 (eight) hours.    DULoxetine (CYMBALTA) 60 MG capsule 60 capsule 1     Sig: Take 1 capsule (60 mg total) by mouth every 12 (twelve) hours.    HYDROcodone-acetaminophen (NORCO)  mg per tablet 90 tablet 0     Sig: Take 1 tablet by mouth every 8 (eight) hours.    HYDROcodone-acetaminophen (NORCO)  mg per tablet 90 tablet 0     Sig: Take 1 tablet by mouth every 8 (eight) hours.         Plan:    Follow-up after thoracic T9/10 epidural steroid injection December 23, 2021, she states she is 95% relief is maintaining this relief to date    She would like to continue conservative management this point    Continue home exercise program as directed    Continue current medication    Follow-up 2 months    Dr. Burns, December 2022    Bring original prescription medication bottles/container/box with labels to each visit

## 2022-01-10 ENCOUNTER — OFFICE VISIT (OUTPATIENT)
Dept: PAIN MEDICINE | Facility: CLINIC | Age: 72
End: 2022-01-10
Payer: MEDICARE

## 2022-01-10 VITALS
DIASTOLIC BLOOD PRESSURE: 85 MMHG | BODY MASS INDEX: 15.83 KG/M2 | SYSTOLIC BLOOD PRESSURE: 145 MMHG | WEIGHT: 86 LBS | HEIGHT: 62 IN | HEART RATE: 103 BPM | RESPIRATION RATE: 18 BRPM

## 2022-01-10 DIAGNOSIS — M54.16 LUMBAR RADICULOPATHY: Chronic | ICD-10-CM

## 2022-01-10 DIAGNOSIS — M43.16 SPONDYLOLISTHESIS, LUMBAR REGION: Chronic | ICD-10-CM

## 2022-01-10 DIAGNOSIS — M47.817 SPONDYLOSIS WITHOUT MYELOPATHY OR RADICULOPATHY, LUMBOSACRAL REGION: Chronic | ICD-10-CM

## 2022-01-10 DIAGNOSIS — M54.14 THORACIC RADICULOPATHY: Primary | Chronic | ICD-10-CM

## 2022-01-10 PROCEDURE — 99214 OFFICE O/P EST MOD 30 MIN: CPT | Mod: PBBFAC | Performed by: PHYSICIAN ASSISTANT

## 2022-01-10 PROCEDURE — 99214 OFFICE O/P EST MOD 30 MIN: CPT | Mod: S$PBB,,, | Performed by: PHYSICIAN ASSISTANT

## 2022-01-10 PROCEDURE — 99214 PR OFFICE/OUTPT VISIT, EST, LEVL IV, 30-39 MIN: ICD-10-PCS | Mod: S$PBB,,, | Performed by: PHYSICIAN ASSISTANT

## 2022-01-10 RX ORDER — CYCLOBENZAPRINE HCL 10 MG
10 TABLET ORAL EVERY 8 HOURS
Qty: 30 TABLET | Refills: 1 | Status: SHIPPED | OUTPATIENT
Start: 2022-01-10 | End: 2022-03-17 | Stop reason: SDUPTHER

## 2022-01-10 RX ORDER — DULOXETIN HYDROCHLORIDE 60 MG/1
60 CAPSULE, DELAYED RELEASE ORAL EVERY 12 HOURS
Qty: 60 CAPSULE | Refills: 1 | Status: SHIPPED | OUTPATIENT
Start: 2022-01-10 | End: 2022-03-17 | Stop reason: SDUPTHER

## 2022-01-10 RX ORDER — HYDROCODONE BITARTRATE AND ACETAMINOPHEN 10; 325 MG/1; MG/1
1 TABLET ORAL EVERY 8 HOURS
Qty: 90 TABLET | Refills: 0 | Status: SHIPPED | OUTPATIENT
Start: 2022-01-28 | End: 2022-03-17 | Stop reason: SDUPTHER

## 2022-01-10 RX ORDER — HYDROCODONE BITARTRATE AND ACETAMINOPHEN 10; 325 MG/1; MG/1
1 TABLET ORAL EVERY 8 HOURS
Qty: 90 TABLET | Refills: 0 | Status: SHIPPED | OUTPATIENT
Start: 2022-03-01 | End: 2022-02-15 | Stop reason: SDUPTHER

## 2022-01-10 NOTE — PATIENT INSTRUCTIONS
Patient Education       Radiculopathy Discharge Instructions   About this topic   Nerves leave the spine and go out to the body. Sometimes, a nerve is damaged where it leaves the spine. This is called radiculopathy. You may have pain and weakness of your arms or legs. You may also have numbness or tingling. These signs are caused by the nerve being pinched or squeezed. You may notice problems in your neck, face, shoulders, or arms. Other people have problems in the hips, legs, and feet. Sometimes, the pain is in your chest and back. Where you feel these signs on your body will depend on what nerve is hurt.  Radiculopathy may be caused by many things. The spinal canal may become smaller or more narrow which is spinal stenosis. Problems with the discs in your back can also cause pressure on your nerves as they leave your spine.       What care is needed at home?   · Ask your doctor what you need to do when you go home. Make sure you ask questions if you do not understand what the doctor says. This way you will know what you need to do.  · Practice good ways to lift. Always keep your back straight and bend your knees when picking up an object.  · Do not lift heavy objects that make you strain your arms, legs, or back to lift them up.  · You can use a cane, walker, or crutches to help you get around and reduce your chances of falling or getting hurt.  · Avoid sitting or standing for a long period of time.  · Do not drive unless your doctor says it is OK.  · Always sit and stand up straight.  · Sleep on a mattress that gives you good support.  · Keep a healthy weight.  · Get lots of rest.  What follow-up care is needed?   · Your doctor may ask you to make visits to the office to check on your progress. Be sure to keep these visits. Together you can make a plan for more care.  · Your doctor may have you see a chiropractor or massage therapist.  · You may also need to see a physical therapist (PT). The PT will teach you  exercises to help you improve movement and reduce pain.  What drugs may be needed?   The doctor may order drugs to:  · Help with pain  · Relax the muscles  Will physical activity be limited?   You may have to limit your activity. Talk to your doctor about the right amount of activity for you.  When do I need to call the doctor?   · Back pain after a fall or something hits against your spine  · Redness or swelling on the back or spine  · Pain that travels down to your legs below the knee  · Weakness or numbness in your buttocks, thigh, leg, or pelvis  · Weakness or numbness in your shoulders, arms, neck, or face  · Not able to walk or pain that increases when you walk or move affected body parts  · Pain that awakens you at night or gets worse when you lie down  · Pain will not go away after treatment  · Loss of control of bowel movements or bladder function  Teach Back: Helping You Understand   The Teach Back Method helps you understand the information we are giving you. After you talk with the staff, tell them in your own words what you learned. This helps to make sure the staff has described each thing clearly. It also helps to explain things that may have been confusing. Before going home, make sure you can do these:  · I can tell you about my condition.  · I can tell you what may help ease my pain.  · I can tell you what I will do if I have more pain; weakness or numbness in my buttocks, thigh, leg, or pelvis; or more pain when I lie down.  Where can I learn more?   [employer]  https://orthoinfo.aaos.org/en/diseases--conditions/cervical-radiculopathy-pinched-nerve   Last Reviewed Date   2021-09-28  Consumer Information Use and Disclaimer   This information is not specific medical advice and does not replace information you receive from your health care provider. This is only a brief summary of general information. It does NOT include all information about conditions, illnesses,  injuries, tests, procedures, treatments, therapies, discharge instructions or life-style choices that may apply to you. You must talk with your health care provider for complete information about your health and treatment options. This information should not be used to decide whether or not to accept your health care providers advice, instructions or recommendations. Only your health care provider has the knowledge and training to provide advice that is right for you.  Copyright   Copyright © 2021 [employer] and its affiliates and/or licensors. All rights reserved.

## 2022-01-31 DIAGNOSIS — I10 ESSENTIAL (PRIMARY) HYPERTENSION: ICD-10-CM

## 2022-01-31 RX ORDER — LOSARTAN POTASSIUM AND HYDROCHLOROTHIAZIDE 12.5; 5 MG/1; MG/1
1 TABLET ORAL DAILY
Qty: 90 TABLET | Refills: 0 | Status: CANCELLED | OUTPATIENT
Start: 2022-01-31 | End: 2022-05-01

## 2022-02-15 ENCOUNTER — OFFICE VISIT (OUTPATIENT)
Dept: FAMILY MEDICINE | Facility: CLINIC | Age: 72
End: 2022-02-15
Payer: MEDICARE

## 2022-02-15 VITALS
BODY MASS INDEX: 16.38 KG/M2 | HEIGHT: 62 IN | DIASTOLIC BLOOD PRESSURE: 90 MMHG | TEMPERATURE: 92 F | SYSTOLIC BLOOD PRESSURE: 189 MMHG | WEIGHT: 89 LBS

## 2022-02-15 DIAGNOSIS — F32.A DEPRESSIVE DISORDER: Chronic | ICD-10-CM

## 2022-02-15 DIAGNOSIS — Z13.220 SCREENING FOR LIPOID DISORDERS: ICD-10-CM

## 2022-02-15 DIAGNOSIS — R63.0 DECREASED APPETITE: Chronic | ICD-10-CM

## 2022-02-15 DIAGNOSIS — Z11.59 NEED FOR HEPATITIS C SCREENING TEST: ICD-10-CM

## 2022-02-15 DIAGNOSIS — I10 ESSENTIAL (PRIMARY) HYPERTENSION: Primary | Chronic | ICD-10-CM

## 2022-02-15 LAB
ANION GAP SERPL CALCULATED.3IONS-SCNC: 8 MMOL/L (ref 7–16)
BASOPHILS # BLD AUTO: 0.05 K/UL (ref 0–0.2)
BASOPHILS NFR BLD AUTO: 0.5 % (ref 0–1)
BUN SERPL-MCNC: 14 MG/DL (ref 7–18)
BUN/CREAT SERPL: 16 (ref 6–20)
CALCIUM SERPL-MCNC: 9.3 MG/DL (ref 8.5–10.1)
CHLORIDE SERPL-SCNC: 107 MMOL/L (ref 98–107)
CHOLEST SERPL-MCNC: 183 MG/DL (ref 0–200)
CHOLEST/HDLC SERPL: 2 {RATIO}
CO2 SERPL-SCNC: 30 MMOL/L (ref 21–32)
CREAT SERPL-MCNC: 0.85 MG/DL (ref 0.55–1.02)
DIFFERENTIAL METHOD BLD: ABNORMAL
EOSINOPHIL # BLD AUTO: 0.26 K/UL (ref 0–0.5)
EOSINOPHIL NFR BLD AUTO: 2.9 % (ref 1–4)
ERYTHROCYTE [DISTWIDTH] IN BLOOD BY AUTOMATED COUNT: 18.6 % (ref 11.5–14.5)
GLUCOSE SERPL-MCNC: 79 MG/DL (ref 74–106)
HCT VFR BLD AUTO: 39.8 % (ref 38–47)
HCV AB SER QL: NORMAL
HDLC SERPL-MCNC: 92 MG/DL (ref 40–60)
HGB BLD-MCNC: 12.5 G/DL (ref 12–16)
IMM GRANULOCYTES # BLD AUTO: 0.03 K/UL (ref 0–0.04)
IMM GRANULOCYTES NFR BLD: 0.3 % (ref 0–0.4)
LDLC SERPL CALC-MCNC: 66 MG/DL
LDLC/HDLC SERPL: 0.7 {RATIO}
LYMPHOCYTES # BLD AUTO: 2.95 K/UL (ref 1–4.8)
LYMPHOCYTES NFR BLD AUTO: 32.4 % (ref 27–41)
MCH RBC QN AUTO: 29.3 PG (ref 27–31)
MCHC RBC AUTO-ENTMCNC: 31.4 G/DL (ref 32–36)
MCV RBC AUTO: 93.4 FL (ref 80–96)
MONOCYTES # BLD AUTO: 0.84 K/UL (ref 0–0.8)
MONOCYTES NFR BLD AUTO: 9.2 % (ref 2–6)
MPC BLD CALC-MCNC: 9.2 FL (ref 9.4–12.4)
NEUTROPHILS # BLD AUTO: 4.98 K/UL (ref 1.8–7.7)
NEUTROPHILS NFR BLD AUTO: 54.7 % (ref 53–65)
NONHDLC SERPL-MCNC: 91 MG/DL
NRBC # BLD AUTO: 0 X10E3/UL
NRBC, AUTO (.00): 0 %
PLATELET # BLD AUTO: 374 K/UL (ref 150–400)
POTASSIUM SERPL-SCNC: 5 MMOL/L (ref 3.5–5.1)
RBC # BLD AUTO: 4.26 M/UL (ref 4.2–5.4)
SODIUM SERPL-SCNC: 140 MMOL/L (ref 136–145)
TRIGL SERPL-MCNC: 125 MG/DL (ref 35–150)
VLDLC SERPL-MCNC: 25 MG/DL
WBC # BLD AUTO: 9.11 K/UL (ref 4.5–11)

## 2022-02-15 PROCEDURE — 80061 LIPID PANEL: ICD-10-PCS | Mod: ,,, | Performed by: CLINICAL MEDICAL LABORATORY

## 2022-02-15 PROCEDURE — 85025 CBC WITH DIFFERENTIAL: ICD-10-PCS | Mod: ,,, | Performed by: CLINICAL MEDICAL LABORATORY

## 2022-02-15 PROCEDURE — 80048 BASIC METABOLIC PANEL: ICD-10-PCS | Mod: ,,, | Performed by: CLINICAL MEDICAL LABORATORY

## 2022-02-15 PROCEDURE — 80061 LIPID PANEL: CPT | Mod: ,,, | Performed by: CLINICAL MEDICAL LABORATORY

## 2022-02-15 PROCEDURE — 99214 PR OFFICE/OUTPT VISIT, EST, LEVL IV, 30-39 MIN: ICD-10-PCS | Mod: ,,, | Performed by: FAMILY MEDICINE

## 2022-02-15 PROCEDURE — 86803 HEPATITIS C ANTIBODY: ICD-10-PCS | Mod: ,,, | Performed by: CLINICAL MEDICAL LABORATORY

## 2022-02-15 PROCEDURE — 80048 BASIC METABOLIC PNL TOTAL CA: CPT | Mod: ,,, | Performed by: CLINICAL MEDICAL LABORATORY

## 2022-02-15 PROCEDURE — 99214 OFFICE O/P EST MOD 30 MIN: CPT | Mod: ,,, | Performed by: FAMILY MEDICINE

## 2022-02-15 PROCEDURE — 85025 COMPLETE CBC W/AUTO DIFF WBC: CPT | Mod: ,,, | Performed by: CLINICAL MEDICAL LABORATORY

## 2022-02-15 PROCEDURE — 86803 HEPATITIS C AB TEST: CPT | Mod: ,,, | Performed by: CLINICAL MEDICAL LABORATORY

## 2022-02-15 RX ORDER — BUSPIRONE HYDROCHLORIDE 15 MG/1
15 TABLET ORAL 3 TIMES DAILY
Qty: 270 TABLET | Refills: 1 | Status: SHIPPED | OUTPATIENT
Start: 2022-02-15 | End: 2022-08-15 | Stop reason: SDUPTHER

## 2022-02-15 RX ORDER — LOSARTAN POTASSIUM AND HYDROCHLOROTHIAZIDE 12.5; 5 MG/1; MG/1
1 TABLET ORAL DAILY
Qty: 90 TABLET | Refills: 1 | Status: SHIPPED | OUTPATIENT
Start: 2022-02-15 | End: 2022-08-15 | Stop reason: SDUPTHER

## 2022-02-15 RX ORDER — CYPROHEPTADINE HYDROCHLORIDE 4 MG/1
4 TABLET ORAL 3 TIMES DAILY
Qty: 90 TABLET | Refills: 1 | Status: SHIPPED | OUTPATIENT
Start: 2022-02-15 | End: 2022-05-06 | Stop reason: SDUPTHER

## 2022-02-15 RX ORDER — CHLORTHALIDONE 25 MG/1
TABLET ORAL
Qty: 45 TABLET | Refills: 1 | Status: SHIPPED | OUTPATIENT
Start: 2022-02-15 | End: 2022-08-15 | Stop reason: SDUPTHER

## 2022-02-15 NOTE — PROGRESS NOTES
Mani Washington MD   [facility]  [facility] [address] [place] [address]  [phone]      PATIENT NAME: Amelie Cerrato  : 1950  DATE: 2/15/22  MRN: [id number]      Billing Provider: Mani Washington MD  Level of Service:   Patient PCP Information       Provider PCP Type    Mani Washington MD General            Reason for Visit / Chief Complaint: Follow-up (Medication refills/)       Update PCP  Update Chief Complaint         History of Present Illness / Problem Focused Workflow     Amelie Cerrato presents to the clinic with Follow-up (Medication refills/)       70 yo AAF here for follow up treatment of HTN and MDD.  Needs med refills.  Used to be on periactin but has not been for past 7  Months approximately.  Reports that appetite is poor and has been losing weight.  Would like to restart med.  Attends pain management for chronic back pain.  Says that she feel several years ago and fractured multiple vertebrae.        Review of Systems     Review of Systems   Constitutional: Positive for appetite change and unexpected weight change. Negative for activity change, chills and fever.   HENT: Negative for sore throat.    Eyes: Negative for pain.   Respiratory: Negative for cough, chest tightness and shortness of breath.    Cardiovascular: Negative for chest pain and palpitations.   Gastrointestinal: Negative for abdominal pain, anal bleeding, blood in stool, change in bowel habit, nausea, vomiting and change in bowel habit.   Musculoskeletal: Positive for arthralgias and back pain.   Neurological: Negative for dizziness, syncope and weakness.   Psychiatric/Behavioral: Negative for confusion.        Medical / Social / Family History     Past Medical History:   Diagnosis Date    Acid reflux     Anxiety     Chronic pain syndrome     Coccyx sprain     broken tailbone     COPD (chronic obstructive pulmonary disease)     Depression     Fall  at home 10/01/2021    Fracture of multiple pubic rami, left, closed, initial encounter 10/01/2021    HLD (hyperlipidemia)     Hypertension     Hypokalemia     Hypomagnesemia     Low back pain     Lumbar radiculopathy     Lumbar stenosis     Lumbosacral spondylosis     Neuropathy     Radiculopathy, lumbosacral region     Renal disorder     Spondylolisthesis, lumbar region     Spondylosis without myelopathy or radiculopathy, lumbosacral region     Thoracic radiculopathy        Past Surgical History:   Procedure Laterality Date    CHOLECYSTECTOMY      CYSTOSCOPY      EPIDURAL STEROID INJECTION INTO LUMBAR SPINE N/A 05/27/2020    L1-2 WILD     EPIDURAL STEROID INJECTION INTO THORACIC SPINE N/A 02/03/2021    T9-10 WILD     EPIDURAL STEROID INJECTION INTO THORACIC SPINE N/A 3/18/2021    Procedure: INJECTION, STEROID, SPINE, THORACIC, EPIDURAL;  Surgeon: Arelis Burns MD;  Location: [facility];  Service: Pain Management;  Laterality: N/A;    EPIDURAL STEROID INJECTION INTO THORACIC SPINE N/A 12/23/2021    Procedure: Injection-steroid-epidural-thoracic T9/10;  Surgeon: Arelis Burns MD;  Location: [facility];  Service: Pain Management;  Laterality: N/A;  HAD VAC  WILL BRING CARD    HYSTERECTOMY      INJECTION OF ANESTHETIC AGENT AROUND MEDIAL BRANCH NERVES INNERVATING LUMBAR FACET JOINT Bilateral 4/22/2021    Procedure: Block-nerve-medial branch-lumbar Bilateral L3-4,4-5,5-S1 MBB #2;  Surgeon: Arelis Burns MD;  Location: [facility];  Service: Pain Management;  Laterality: Bilateral;    INJECTION OF FACET JOINT Bilateral 12/04/2020    RADIOFREQUENCY ABLATION OF LUMBAR MEDIAL BRANCH NERVE AT SINGLE LEVEL Bilateral 5/20/2021    Procedure: RADIOFREQUENCY ABLATION, NERVE, SPINAL, LUMBAR, MEDIAL BRANCH, 1 LEVEL;  Surgeon: Arelis Burns MD;  Location: [facility];  Service: Pain Management;  Laterality: Bilateral;  Bilateral L3-S1 RFTC (both sides same day)        Social History    reports that she has quit smoking. She has never used smokeless tobacco. She reports that she does not drink alcohol and does not use drugs.   Social History     Tobacco Use    Smoking status: Former Smoker    Smokeless tobacco: Never Used   Substance Use Topics    Alcohol use: Never     Comment: unknown    Drug use: Never     Types: Hydrocodone       Family History  Family History   Problem Relation Age of Onset    Hypertension Mother     Heart disease Father        Medications and Allergies     Medications  Outpatient Medications Marked as Taking for the 2/15/22 encounter (Office Visit) with Mani Washington MD   Medication Sig Dispense Refill    atorvastatin (LIPITOR) 10 MG tablet Take 1 tablet (10 mg total) by mouth every evening. 90 tablet 0    brimonidine 0.2% (ALPHAGAN) 0.2 % Drop Place 1 drop into both eyes 2 (two) times daily.      cyclobenzaprine (FLEXERIL) 10 MG tablet Take 1 tablet (10 mg total) by mouth every 8 (eight) hours. 30 tablet 1    DULoxetine (CYMBALTA) 60 MG capsule Take 1 capsule (60 mg total) by mouth every 12 (twelve) hours. 60 capsule 1    HYDROcodone-acetaminophen (NORCO)  mg per tablet Take 1 tablet by mouth every 8 (eight) hours. 90 tablet 0    INCRUSE ELLIPTA 62.5 mcg/actuation inhalation capsule INHALE ONE PUFF BY MOUTH DAILY AT THE SAME TIME EACH DAY 30 each 0    latanoprost 0.005 % ophthalmic solution Place 1 drop into both eyes every evening.      pantoprazole (PROTONIX) 40 MG tablet Take 40 mg by mouth 2 (two) times daily before meals.      predniSONE (DELTASONE) 10 MG tablet TAKE ONE TABLET BY MOUTH EVERY DAY OR EVERY OTHER DAY as directed      [DISCONTINUED] busPIRone (BUSPAR) 15 MG tablet Take 1 tablet (15 mg total) by mouth 4 (four) times daily. 360 tablet 0    [DISCONTINUED] chlorthalidone (HYGROTEN) 25 MG Tab Take one half tablet by mouth once daily (Patient taking differently: 25 mg. Take one half tablet by mouth once daily)  45 tablet 0    [DISCONTINUED] losartan-hydrochlorothiazide 50-12.5 mg (HYZAAR) 50-12.5 mg per tablet Take 1 tablet by mouth once daily. 90 tablet 0       Allergies  Review of patient's allergies indicates:   Allergen Reactions    Sulfa (sulfonamide antibiotics) Nausea And Vomiting       Physical Examination     Vitals:    02/15/22 0911   BP: (!) 189/90   Temp: (!) 92 °F (33.3 °C)     Physical Exam  Vitals reviewed.   Constitutional:       Appearance: Normal appearance.   HENT:      Head: Normocephalic and atraumatic.   Eyes:      Extraocular Movements: Extraocular movements intact.      Conjunctiva/sclera: Conjunctivae normal.      Pupils: Pupils are equal, round, and reactive to light.   Cardiovascular:      Rate and Rhythm: Normal rate and regular rhythm.      Heart sounds: Normal heart sounds.   Pulmonary:      Effort: Pulmonary effort is normal.      Breath sounds: Normal breath sounds.   Musculoskeletal:         General: Normal range of motion.      Cervical back: Normal range of motion.   Skin:     General: Skin is warm and dry.   Neurological:      General: No focal deficit present.      Mental Status: She is alert and oriented to person, place, and time.      Gait: Gait abnormal (ambulates with cane).   Psychiatric:         Mood and Affect: Mood normal.         Behavior: Behavior normal.          Assessment and Plan (including Health Maintenance)      Problem List  Smart Sets  Document Outside HM   :    Plan: will restart periactin  Continue all other current care  Will get labs today.  Has been anemic in past.  Says that she is UTD on colonoscopy        Health Maintenance Due   Topic Date Due    Hepatitis C Screening  Never done    Lipid Panel  Never done    COVID-19 Vaccine (1) Never done    Pneumococcal Vaccines (Age 65+) (1 of 2 - PPSV23) Never done    TETANUS VACCINE  Never done    Mammogram  Never done    DEXA SCAN  Never done    Shingles Vaccine (1 of 2) Never done    Influenza Vaccine (1)  Never done       Problem List Items Addressed This Visit        Psychiatric    Depressive disorder    Relevant Medications    busPIRone (BUSPAR) 15 MG tablet       Cardiac/Vascular    Essential (primary) hypertension - Primary    Relevant Medications    chlorthalidone (HYGROTEN) 25 MG Tab    losartan-hydrochlorothiazide 50-12.5 mg (HYZAAR) 50-12.5 mg per tablet    Other Relevant Orders    CBC Auto Differential    Basic Metabolic Panel    Lipid Panel       Other    Decreased appetite    Relevant Medications    cyproheptadine (PERIACTIN) 4 mg tablet      Other Visit Diagnoses     Screening for lipoid disorders        Relevant Orders    Lipid Panel    Need for hepatitis C screening test        Relevant Orders    Hepatitis C Antibody          Health Maintenance Topics with due status: Not Due       Topic Last Completion Date    Colorectal Cancer Screening 10/04/2021       Future Appointments   Date Time Provider Department Center   3/22/2022  8:00 AM SAILAJA [facility]   5/12/2022  2:00 PM AWV [facility]URSEN[facility]            Signature:  MD NINA [facility]  [facility]  [address] [place] [id number]  [phone]    Date of encounter: 2/15/22

## 2022-03-11 DIAGNOSIS — Z71.89 COMPLEX CARE COORDINATION: ICD-10-CM

## 2022-03-17 NOTE — PROGRESS NOTES
Disclaimer:  This note has been generated using voice recognition software.  There may be type of graft focal areas that have been missed during a proof reading      Subjective:      Patient ID: Amelie Cerrato is a 71 y.o. female.    Chief Complaint: Low-back Pain      Mid-back Pain  This is a chronic problem. The current episode started more than 1 year ago. The problem occurs daily. The problem has been waxing and waning since onset. Associated symptoms include leg pain. Pertinent negatives include no bladder incontinence, chest pain or dysuria.   Low-back Pain  Associated symptoms include leg pain. Pertinent negatives include no bladder incontinence, chest pain or dysuria.     Review of Systems   Constitutional: Negative for appetite change, chills, fatigue and unexpected weight change.   HENT: Negative for drooling, ear discharge, ear pain, facial swelling, nosebleeds, sore throat, trouble swallowing, voice change and goiter.    Eyes: Negative for photophobia, pain, discharge, redness and visual disturbance.   Respiratory: Negative for apnea, cough, choking, chest tightness, shortness of breath, wheezing and stridor.    Cardiovascular: Negative for chest pain, palpitations and leg swelling.   Gastrointestinal: Negative for abdominal distention, change in bowel habit, diarrhea, rectal pain, vomiting, fecal incontinence and change in bowel habit.   Endocrine: Negative for cold intolerance, heat intolerance, polydipsia, polyphagia and polyuria.   Genitourinary: Negative for bladder incontinence, dysuria, flank pain, frequency, hematuria and hot flashes.   Musculoskeletal: Positive for arthralgias, back pain, leg pain, myalgias, neck pain and neck stiffness.   Integumentary:  Negative for color change, pallor and rash.   Allergic/Immunologic: Negative for immunocompromised state.   Neurological: Negative for dizziness, vertigo, seizures, syncope, facial asymmetry, speech difficulty, light-headedness, disturbances in  "coordination, memory loss and coordination difficulties.   Hematological: Negative for adenopathy. Does not bruise/bleed easily.   Psychiatric/Behavioral: Negative for agitation, behavioral problems, confusion, decreased concentration, dysphoric mood, hallucinations, self-injury and suicidal ideas. The patient is not nervous/anxious and is not hyperactive.             Objective:  Vitals:    03/22/22 0806   BP: (!) 200/86   Pulse: (!) 111   Resp: 18   Weight: 40.8 kg (90 lb)   Height: 5' 2" (1.575 m)   PainSc:   3         Physical Exam  Vitals and nursing note reviewed. Exam conducted with a chaperone present.   Constitutional:       General: She is awake. She is not in acute distress.     Appearance: Normal appearance.   HENT:      Head: Normocephalic and atraumatic.      Nose: Nose normal.      Mouth/Throat:      Mouth: Mucous membranes are moist.      Pharynx: Oropharynx is clear.   Eyes:      Conjunctiva/sclera: Conjunctivae normal.      Pupils: Pupils are equal, round, and reactive to light.   Cardiovascular:      Rate and Rhythm: Normal rate.   Pulmonary:      Effort: Pulmonary effort is normal. No respiratory distress.   Abdominal:      Palpations: Abdomen is soft.   Musculoskeletal:         General: Normal range of motion.      Right shoulder: Tenderness present.      Left shoulder: Tenderness present.      Cervical back: Normal range of motion and neck supple. Tenderness present.      Thoracic back: Tenderness present.      Lumbar back: Tenderness present.   Skin:     General: Skin is warm and dry.   Neurological:      General: No focal deficit present.      Mental Status: She is alert and oriented to person, place, and time. Mental status is at baseline.      Cranial Nerves: Cranial nerves are intact. No cranial nerve deficit (II-XII).   Psychiatric:         Mood and Affect: Mood normal.         Behavior: Behavior normal. Behavior is cooperative.         Thought Content: Thought content normal.           CT " Hip Without Contrast Right  Narrative: EXAMINATION:  CT HIP WITHOUT CONTRAST RIGHT    CLINICAL HISTORY:  Hip trauma, fracture suspected, xray done;    TECHNIQUE:  Axial CT imaging of the right hip is performed without contrast.  Computer reformatting is viewed in the sagittal and coronal plane.    CT dose reduction technique used - Dose Rite and tube current modulation.    COMPARISON:  X-ray 21 October 2021, 2 October 2021    FINDINGS:  There is osteopenia with limited detail.  There is suggestion of right sacral wing fracture nondisplaced.  There is left sacral wing fracture with small amount of sclerosis around the fracture.  Comminuted fracture of the left pubic bone incompletely imaged.  There is mild-to-moderate displacement of the visualized fragments.  No other definite evidence of fracture is seen. The alignment appears within normal limits.    Muscles, tendons and ligaments appear within normal limits for CT.    No abnormal fluid collection or abnormal soft tissue density is identified.    No other abnormalities are identified.  Impression: Nondisplaced right sacral wing fracture with osteopenia, detail somewhat limited.  There is left sacral wing fracture with sclerosis suggesting a more subacute injury on the left.  There is comminuted left pubic bone fracture appears subacute, incompletely imaged.    Electronically signed by: Marc Acosta  Date:    10/25/2021  Time:    12:34       Office Visit on 02/15/2022   Component Date Value Ref Range Status    Sodium 02/15/2022 140  136 - 145 mmol/L Final    Potassium 02/15/2022 5.0  3.5 - 5.1 mmol/L Final    Chloride 02/15/2022 107  98 - 107 mmol/L Final    CO2 02/15/2022 30  21 - 32 mmol/L Final    Anion Gap 02/15/2022 8  7 - 16 mmol/L Final    Glucose 02/15/2022 79  74 - 106 mg/dL Final    BUN 02/15/2022 14  7 - 18 mg/dL Final    Creatinine 02/15/2022 0.85  0.55 - 1.02 mg/dL Final    BUN/Creatinine Ratio 02/15/2022 16  6 - 20 Final    Calcium  02/15/2022 9.3  8.5 - 10.1 mg/dL Final    eGFR 02/15/2022 70  >=60 mL/min/1.73m² Final    Triglycerides 02/15/2022 125  35 - 150 mg/dL Final    Cholesterol 02/15/2022 183  0 - 200 mg/dL Final    HDL Cholesterol 02/15/2022 92 (A) 40 - 60 mg/dL Final    Cholesterol/HDL Ratio (Risk Factor) 02/15/2022 2.0   Final    Non-HDL 02/15/2022 91  mg/dL Final    LDL Calculated 02/15/2022 66  mg/dL Final    LDL/HDL 02/15/2022 0.7   Final    VLDL 02/15/2022 25  mg/dL Final    Hepatitis C Ab 02/15/2022 Non-Reactive  Non-Reactive Final    WBC 02/15/2022 9.11  4.50 - 11.00 K/uL Final    RBC 02/15/2022 4.26  4.20 - 5.40 M/uL Final    Hemoglobin 02/15/2022 12.5  12.0 - 16.0 g/dL Final    Hematocrit 02/15/2022 39.8  38.0 - 47.0 % Final    MCV 02/15/2022 93.4  80.0 - 96.0 fL Final    MCH 02/15/2022 29.3  27.0 - 31.0 pg Final    MCHC 02/15/2022 31.4 (A) 32.0 - 36.0 g/dL Final    RDW 02/15/2022 18.6 (A) 11.5 - 14.5 % Final    Platelet Count 02/15/2022 374  150 - 400 K/uL Final    MPV 02/15/2022 9.2 (A) 9.4 - 12.4 fL Final    Neutrophils % 02/15/2022 54.7  53.0 - 65.0 % Final    Lymphocytes % 02/15/2022 32.4  27.0 - 41.0 % Final    Monocytes % 02/15/2022 9.2 (A) 2.0 - 6.0 % Final    Eosinophils % 02/15/2022 2.9  1.0 - 4.0 % Final    Basophils % 02/15/2022 0.5  0.0 - 1.0 % Final    Immature Granulocytes % 02/15/2022 0.3  0.0 - 0.4 % Final    nRBC, Auto 02/15/2022 0.0  <=0.0 % Final    Neutrophils, Abs 02/15/2022 4.98  1.80 - 7.70 K/uL Final    Lymphocytes, Absolute 02/15/2022 2.95  1.00 - 4.80 K/uL Final    Monocytes, Absolute 02/15/2022 0.84 (A) 0.00 - 0.80 K/uL Final    Eosinophils, Absolute 02/15/2022 0.26  0.00 - 0.50 K/uL Final    Basophils, Absolute 02/15/2022 0.05  0.00 - 0.20 K/uL Final    Immature Granulocytes, Absolute 02/15/2022 0.03  0.00 - 0.04 K/uL Final    nRBC, Absolute 02/15/2022 0.00  <=0.00 x10e3/uL Final    Diff Type 02/15/2022 Auto   Final   Office Visit on 11/03/2021   Component Date  Value Ref Range Status    POC Amphetamines 11/03/2021 Negative  Negative, Inconclusive Final    POC Barbiturates 11/03/2021 Negative  Negative, Inconclusive Final    POC Benzodiazepines 11/03/2021 Negative  Negative, Inconclusive Final    POC Cocaine 11/03/2021 Negative  Negative, Inconclusive Final    POC THC 11/03/2021 Negative  Negative, Inconclusive Final    POC Methadone 11/03/2021 Negative  Negative, Inconclusive Final    POC Methamphetamine 11/03/2021 Negative  Negative, Inconclusive Final    POC Opiates 11/03/2021 Negative  Negative, Inconclusive Final    POC Oxycodone 11/03/2021 Negative  Negative, Inconclusive Final    POC Phencyclidine 11/03/2021 Negative  Negative, Inconclusive Final    POC Methylenedioxymethamphetamine * 11/03/2021 Negative  Negative, Inconclusive Final    POC Tricyclic Antidepressants 11/03/2021 Negative  Negative, Inconclusive Final    POC Buprenorphine 11/03/2021 Negative   Final     Acceptable 11/03/2021 Yes   Final    POC Temperature (Urine) 11/03/2021 92   Final    pH, UA 11/03/2021 7.5  5.0, 5.5, 6.0, 6.5, 7.0, 7.5, 8.0 pH Units Final    Specific [place], UA 11/03/2021 1.010  <=1.005, 1.010, 1.015, 1.020, 1.025, 1.030 Final    Creatinine, Urine 11/03/2021 35  28 - 219 mg/dL Final    6-Acetylmorphine 11/03/2021 Negative  10 ng/mL Final    7-Aminoclonazepam 11/03/2021 Negative  Negative 25 ng/mL Final    a-Hydroxyalprazolam 11/03/2021 Negative  25 ng/mL Final    Acetyl Fentanyl 11/03/2021 Negative  2.5 ng/mL Final    Acetyl Norfentanyl Oxalate 11/03/2021 Negative  5 ng/mL Final    Benzoylecgonine 11/03/2021 Negative  100 ng/mL Final    Buprenorphine 11/03/2021 Negative  25 ng/mL Final    Codeine 11/03/2021 Negative  25 ng/mL Final    EDDP 11/03/2021 Negative  25 ng/mL Final    Fentanyl 11/03/2021 Negative  2.5 ng/mL Final    Hydrocodone 11/03/2021 Negative  25 ng/mL Final    Hydromorphone 11/03/2021 Negative  25 ng/mL Final    Lorazepam  11/03/2021 Negative  25 ng/mL Final    Morphine 11/03/2021 Negative  25 ng/mL Final    Norbuprenorphine 11/03/2021 Negative  25 ng/mL Final    Nordiazepam 11/03/2021 Negative  25 ng/mL Final    Norfentanyl Oxalate 11/03/2021 Negative  5 ng/mL Final    Norhydrocodone 11/03/2021 Negative  50 ng/mL Final    Noroxycodone HCL 11/03/2021 Negative  50 ng/mL Final    Oxazepam 11/03/2021 Negative  25 ng/mL Final    Oxymorphone 11/03/2021 Negative  25 ng/mL Final    Tapentadol 11/03/2021 Negative  25 ng/mL Final    Temazepam 11/03/2021 Negative  25 ng/mL Final    THC-COOH 11/03/2021 Negative  25 ng/mL Final    Tramadol 11/03/2021 Negative  100 ng/mL Final    Amphetamine, Urine 11/03/2021 Negative  Negative 100 ng/mL Final    Methamphetamines, Urine 11/03/2021 Negative  Negative 100 ng/mL Final    Methadone, Urine 11/03/2021 Negative  Negative 25 ng/mL Final    Oxycodone, Urine 11/03/2021 Negative  Negative 25 ng/mL Final   Admission on 10/25/2021, Discharged on 10/26/2021   Component Date Value Ref Range Status    Magnesium 10/25/2021 1.9  1.7 - 2.3 mg/dL Final    Sodium 10/25/2021 137  136 - 145 mmol/L Final    Potassium 10/25/2021 3.8  3.5 - 5.1 mmol/L Final    Chloride 10/25/2021 100  98 - 107 mmol/L Final    CO2 10/25/2021 31  21 - 32 mmol/L Final    Anion Gap 10/25/2021 10  7 - 16 mmol/L Final    Glucose 10/25/2021 103  74 - 106 mg/dL Final    BUN 10/25/2021 12  7 - 18 mg/dL Final    Creatinine 10/25/2021 0.66  0.55 - 1.02 mg/dL Final    BUN/Creatinine Ratio 10/25/2021 18  6 - 20 Final    Calcium 10/25/2021 9.0  8.5 - 10.1 mg/dL Final    Total Protein 10/25/2021 5.7 (A) 6.4 - 8.2 g/dL Final    Albumin 10/25/2021 2.6 (A) 3.5 - 5.0 g/dL Final    Globulin 10/25/2021 3.1  2.0 - 4.0 g/dL Final    A/G Ratio 10/25/2021 0.8   Final    Bilirubin, Total 10/25/2021 0.4  0.0 - 1.2 mg/dL Final    Alk Phos 10/25/2021 170 (A) 55 - 142 U/L Final    ALT 10/25/2021 17  13 - 56 U/L Final    AST 10/25/2021  15  15 - 37 U/L Final    eGFR  10/25/2021 114  >=60 mL/min/1.73m² Final    WBC 10/25/2021 11.94 (A) 4.50 - 11.00 K/uL Final    RBC 10/25/2021 3.95 (A) 4.20 - 5.40 M/uL Final    Hemoglobin 10/25/2021 11.4 (A) 12.0 - 16.0 g/dL Final    Hematocrit 10/25/2021 34.9 (A) 38.0 - 47.0 % Final    MCV 10/25/2021 88.4  80.0 - 96.0 fL Final    MCH 10/25/2021 28.9  27.0 - 31.0 pg Final    MCHC 10/25/2021 32.7  32.0 - 36.0 g/dL Final    RDW 10/25/2021 17.8 (A) 11.5 - 14.5 % Final    Platelet Count 10/25/2021 395  150 - 400 K/uL Final    MPV 10/25/2021 8.2 (A) 9.4 - 12.4 fL Final    Neutrophils % 10/25/2021 83.6 (A) 53.0 - 65.0 % Final    Lymphocytes % 10/25/2021 13.4 (A) 27.0 - 41.0 % Final    Neutrophils, Abs 10/25/2021 9.98 (A) 1.80 - 7.70 K/uL Final    Lymphocytes, Absolute 10/25/2021 1.60  1.00 - 4.80 K/uL Final    Diff Type 10/25/2021 Auto   Final    Monocytes % 10/25/2021 2.9  2.0 - 6.0 % Final    Eosinophils % 10/25/2021 0.0 (A) 1.0 - 4.0 % Final    Basophils % 10/25/2021 0.1  0.0 - 1.0 % Final    Monocytes, Absolute 10/25/2021 0.35  0.00 - 0.80 K/uL Final    Eosinophils, Absolute 10/25/2021 0.00  0.00 - 0.50 K/uL Final    Basophils, Absolute 10/25/2021 0.01  0.00 - 0.20 K/uL Final   Admission on 10/21/2021, Discharged on 10/21/2021   Component Date Value Ref Range Status    Sodium 10/21/2021 133 (A) 136 - 145 mmol/L Final    Potassium 10/21/2021 3.7  3.5 - 5.1 mmol/L Final    Chloride 10/21/2021 97 (A) 98 - 107 mmol/L Final    CO2 10/21/2021 31  21 - 32 mmol/L Final    Anion Gap 10/21/2021 9  7 - 16 mmol/L Final    Glucose 10/21/2021 80  74 - 106 mg/dL Final    BUN 10/21/2021 15  7 - 18 mg/dL Final    Creatinine 10/21/2021 0.98  0.55 - 1.02 mg/dL Final    BUN/Creatinine Ratio 10/21/2021 15  6 - 20 Final    Calcium 10/21/2021 8.6  8.5 - 10.1 mg/dL Final    Total Protein 10/21/2021 5.9 (A) 6.4 - 8.2 g/dL Final    Albumin 10/21/2021 2.7 (A) 3.5 - 5.0 g/dL Final    Globulin  10/21/2021 3.2  2.0 - 4.0 g/dL Final    A/G Ratio 10/21/2021 0.8   Final    Bilirubin, Total 10/21/2021 0.4  >0.0 - 1.2 mg/dL Final    Alk Phos 10/21/2021 173 (A) 55 - 142 U/L Final    ALT 10/21/2021 18  13 - 56 U/L Final    AST 10/21/2021 15  15 - 37 U/L Final    eGFR  10/21/2021 72  >=60 mL/min/1.73m² Final    Color, UA 10/21/2021 Yellow  Colorless, Straw, Yellow, Dark Yellow Final    Clarity, UA 10/21/2021 Slightly Cloudy (A) Clear Final    pH, UA 10/21/2021 6.5  5.0, 5.5, 6.0, 6.5, 7.0, 7.5, 8.0 pH Units Final    Leukocytes, UA 10/21/2021 Moderate (A) Negative Final    Nitrites, UA 10/21/2021 Negative  Negative Final    Protein, UA 10/21/2021 Negative  Negative Final    Glucose, UA 10/21/2021 Negative  Negative mg/dL Final    Ketones, UA 10/21/2021 Negative  Negative, Trace mg/dL Final    Urobilinogen, UA 10/21/2021 0.2  0.2, 1.0 mg/dL Final    Bilirubin, UA 10/21/2021 Negative  Negative Final    Blood, UA 10/21/2021 Trace-Intact (A) Negative Final    Specific [place], UA 10/21/2021 1.010  <=1.005, 1.010, 1.015, 1.020, 1.025, 1.030 Final    Magnesium 10/21/2021 1.7  1.7 - 2.3 mg/dL Final    WBC 10/21/2021 10.85  4.50 - 11.00 K/uL Final    RBC 10/21/2021 3.77 (A) 4.20 - 5.40 M/uL Final    Hemoglobin 10/21/2021 11.1 (A) 12.0 - 16.0 g/dL Final    Hematocrit 10/21/2021 33.6 (A) 38.0 - 47.0 % Final    MCV 10/21/2021 89.1  80.0 - 96.0 fL Final    MCH 10/21/2021 29.4  27.0 - 31.0 pg Final    MCHC 10/21/2021 33.0  32.0 - 36.0 g/dL Final    RDW 10/21/2021 17.4 (A) 11.5 - 14.5 % Final    Platelet Count 10/21/2021 385  150 - 400 K/uL Final    MPV 10/21/2021 8.2 (A) 9.4 - 12.4 fL Final    Neutrophils % 10/21/2021 59.6  53.0 - 65.0 % Final    Lymphocytes % 10/21/2021 30.5  27.0 - 41.0 % Final    Neutrophils, Abs 10/21/2021 6.47  1.80 - 7.70 K/uL Final    Lymphocytes, Absolute 10/21/2021 3.31  1.00 - 4.80 K/uL Final    Diff Type 10/21/2021 Auto   Final    Monocytes %  10/21/2021 9.5 (A) 2.0 - 6.0 % Final    Eosinophils % 10/21/2021 0.2 (A) 1.0 - 4.0 % Final    Basophils % 10/21/2021 0.2  0.0 - 1.0 % Final    Monocytes, Absolute 10/21/2021 1.03 (A) 0.00 - 0.80 K/uL Final    Eosinophils, Absolute 10/21/2021 0.02  0.00 - 0.50 K/uL Final    Basophils, Absolute 10/21/2021 0.02  0.00 - 0.20 K/uL Final    WBC, UA 10/21/2021 5-10 (A) None Seen, 0-5 /hpf Final    RBC, UA 10/21/2021 0-3  None Seen, 0-3 /hpf Final    Bacteria, UA 10/21/2021 Few (A) None Seen, None Seen To Occasional, Rare /hpf Final    Squamous Epithelial Cells, UA 10/21/2021 Occasional (A) None Seen, Rare, None Seen To Occasional /lpf Final   Admission on 10/02/2021, Discharged on 10/04/2021   Component Date Value Ref Range Status    Sodium 10/02/2021 131 (A) 136 - 145 mmol/L Final    Potassium 10/02/2021 3.1 (A) 3.5 - 5.1 mmol/L Final    Chloride 10/02/2021 95 (A) 98 - 107 mmol/L Final    CO2 10/02/2021 29  21 - 32 mmol/L Final    Anion Gap 10/02/2021 10  7 - 16 mmol/L Final    Glucose 10/02/2021 93  74 - 106 mg/dL Final    BUN 10/02/2021 9  7 - 18 mg/dL Final    Creatinine 10/02/2021 0.80  0.55 - 1.02 mg/dL Final    BUN/Creatinine Ratio 10/02/2021 11  6 - 20 Final    Calcium 10/02/2021 8.7  8.5 - 10.1 mg/dL Final    Total Protein 10/02/2021 5.8 (A) 6.4 - 8.2 g/dL Final    Albumin 10/02/2021 2.8 (A) 3.5 - 5.0 g/dL Final    Globulin 10/02/2021 3.0  2.0 - 4.0 g/dL Final    A/G Ratio 10/02/2021 0.9   Final    Bilirubin, Total 10/02/2021 0.5  >0.0 - 1.2 mg/dL Final    Alk Phos 10/02/2021 101  55 - 142 U/L Final    ALT 10/02/2021 27  13 - 56 U/L Final    AST 10/02/2021 21  15 - 37 U/L Final    eGFR  10/02/2021 91  >=60 mL/min/1.73m² Final    Color, UA 10/02/2021 Yellow  Colorless, Straw, Yellow, Dark Yellow Final    Clarity, UA 10/02/2021 Clear  Clear Final    pH, UA 10/02/2021 7.0  5.0, 5.5, 6.0, 6.5, 7.0, 7.5, 8.0 pH Units Final    Leukocytes, UA 10/02/2021 Negative  Negative  Final    Nitrites, UA 10/02/2021 Negative  Negative Final    Protein, UA 10/02/2021 Negative  Negative Final    Glucose, UA 10/02/2021 Negative  Negative mg/dL Final    Ketones, UA 10/02/2021 Negative  Negative, Trace mg/dL Final    Urobilinogen, UA 10/02/2021 0.2  0.2, 1.0 mg/dL Final    Bilirubin, UA 10/02/2021 Negative  Negative Final    Blood, UA 10/02/2021 Negative  Negative Final    Specific [place], UA 10/02/2021 1.010  <=1.005, 1.010, 1.015, 1.020, 1.025, 1.030 Final    WBC 10/02/2021 12.59 (A) 4.50 - 11.00 K/uL Final    RBC 10/02/2021 3.84 (A) 4.20 - 5.40 M/uL Final    Hemoglobin 10/02/2021 11.1 (A) 12.0 - 16.0 g/dL Final    Hematocrit 10/02/2021 33.5 (A) 38.0 - 47.0 % Final    MCV 10/02/2021 87.2  80.0 - 96.0 fL Final    MCH 10/02/2021 28.9  27.0 - 31.0 pg Final    MCHC 10/02/2021 33.1  32.0 - 36.0 g/dL Final    RDW 10/02/2021 17.5 (A) 11.5 - 14.5 % Final    Platelet Count 10/02/2021 252  150 - 400 K/uL Final    MPV 10/02/2021 8.5 (A) 9.4 - 12.4 fL Final    Neutrophils % 10/02/2021 87.6 (A) 53.0 - 65.0 % Final    Lymphocytes % 10/02/2021 5.5 (A) 27.0 - 41.0 % Final    Neutrophils, Abs 10/02/2021 11.04 (A) 1.80 - 7.70 K/uL Final    Lymphocytes, Absolute 10/02/2021 0.69 (A) 1.00 - 4.80 K/uL Final    Diff Type 10/02/2021 Manual   Final    Monocytes % 10/02/2021 6.7 (A) 2.0 - 6.0 % Final    Eosinophils % 10/02/2021 0.1 (A) 1.0 - 4.0 % Final    Basophils % 10/02/2021 0.1  0.0 - 1.0 % Final    Monocytes, Absolute 10/02/2021 0.84 (A) 0.00 - 0.80 K/uL Final    Eosinophils, Absolute 10/02/2021 0.01  0.00 - 0.50 K/uL Final    Basophils, Absolute 10/02/2021 0.01  0.00 - 0.20 K/uL Final    Troponin-I 10/02/2021 <0.017  <=0.056 ng/mL Final    Lipase 10/02/2021 98  73 - 393 U/L Final    Magnesium 10/02/2021 1.9  1.7 - 2.3 mg/dL Final    Segmented Neutrophils, Man % 10/02/2021 82 (A) 50 - 62 % Final    Bands, Man % 10/02/2021 3  1 - 5 % Final    Lymphocytes, Man % 10/02/2021 8 (A) 27 -  41 % Final    Monocytes, Man % 10/02/2021 6  2 - 6 % Final    Eosinophils, Man % 10/02/2021 1  1 - 4 % Final    Platelet Morphology 10/02/2021 Large & Giant Platelets (A) Normal Final    Anisocytosis 10/02/2021 1+   Final    COVID-19 Ag 10/02/2021 Negative  Negative, Invalid Final    Sodium 10/03/2021 137  136 - 145 mmol/L Final    Potassium 10/03/2021 5.3 (A) 3.5 - 5.1 mmol/L Final    Chloride 10/03/2021 104  98 - 107 mmol/L Final    CO2 10/03/2021 31  21 - 32 mmol/L Final    Anion Gap 10/03/2021 7  7 - 16 mmol/L Final    Glucose 10/03/2021 81  74 - 106 mg/dL Final    BUN 10/03/2021 8  7 - 18 mg/dL Final    Creatinine 10/03/2021 0.55  0.55 - 1.02 mg/dL Final    BUN/Creatinine Ratio 10/03/2021 15  6 - 20 Final    Calcium 10/03/2021 7.6 (A) 8.5 - 10.1 mg/dL Final    eGFR  10/03/2021 140  >=60 mL/min/1.73m² Final    WBC 10/03/2021 11.00  4.50 - 11.00 K/uL Final    RBC 10/03/2021 3.26 (A) 4.20 - 5.40 M/uL Final    Hemoglobin 10/03/2021 9.6 (A) 12.0 - 16.0 g/dL Final    Hematocrit 10/03/2021 29.2 (A) 38.0 - 47.0 % Final    MCV 10/03/2021 89.6  80.0 - 96.0 fL Final    MCH 10/03/2021 29.4  27.0 - 31.0 pg Final    MCHC 10/03/2021 32.9  32.0 - 36.0 g/dL Final    RDW 10/03/2021 18.1 (A) 11.5 - 14.5 % Final    Platelet Count 10/03/2021 255  150 - 400 K/uL Final    MPV 10/03/2021 8.1 (A) 9.4 - 12.4 fL Final    Neutrophils % 10/03/2021 61.6  53.0 - 65.0 % Final    Lymphocytes % 10/03/2021 27.2  27.0 - 41.0 % Final    Neutrophils, Abs 10/03/2021 6.78  1.80 - 7.70 K/uL Final    Lymphocytes, Absolute 10/03/2021 2.99  1.00 - 4.80 K/uL Final    Diff Type 10/03/2021 Auto   Final    Monocytes % 10/03/2021 10.9 (A) 2.0 - 6.0 % Final    Eosinophils % 10/03/2021 0.2 (A) 1.0 - 4.0 % Final    Basophils % 10/03/2021 0.1  0.0 - 1.0 % Final    Monocytes, Absolute 10/03/2021 1.20 (A) 0.00 - 0.80 K/uL Final    Eosinophils, Absolute 10/03/2021 0.02  0.00 - 0.50 K/uL Final    Basophils,  Absolute 10/03/2021 0.01  0.00 - 0.20 K/uL Final    Occult Blood 10/04/2021 Negative  Negative, Invalid Final    Sodium 10/04/2021 136  136 - 145 mmol/L Final    Potassium 10/04/2021 4.2  3.5 - 5.1 mmol/L Final    Chloride 10/04/2021 104  98 - 107 mmol/L Final    CO2 10/04/2021 30  21 - 32 mmol/L Final    Anion Gap 10/04/2021 6 (A) 7 - 16 mmol/L Final    Glucose 10/04/2021 80  74 - 106 mg/dL Final    BUN 10/04/2021 8  7 - 18 mg/dL Final    Creatinine 10/04/2021 0.49 (A) 0.55 - 1.02 mg/dL Final    BUN/Creatinine Ratio 10/04/2021 16  6 - 20 Final    Calcium 10/04/2021 8.0 (A) 8.5 - 10.1 mg/dL Final    eGFR  10/04/2021 160  >=60 mL/min/1.73m² Final    WBC 10/04/2021 11.49 (A) 4.50 - 11.00 K/uL Final    RBC 10/04/2021 3.34 (A) 4.20 - 5.40 M/uL Final    Hemoglobin 10/04/2021 9.7 (A) 12.0 - 16.0 g/dL Final    Hematocrit 10/04/2021 30.1 (A) 38.0 - 47.0 % Final    MCV 10/04/2021 90.1  80.0 - 96.0 fL Final    MCH 10/04/2021 29.0  27.0 - 31.0 pg Final    MCHC 10/04/2021 32.2  32.0 - 36.0 g/dL Final    RDW 10/04/2021 18.4 (A) 11.5 - 14.5 % Final    Platelet Count 10/04/2021 276  150 - 400 K/uL Final    MPV 10/04/2021 7.6 (A) 9.4 - 12.4 fL Final    Neutrophils % 10/04/2021 68.6 (A) 53.0 - 65.0 % Final    Lymphocytes % 10/04/2021 23.1 (A) 27.0 - 41.0 % Final    Neutrophils, Abs 10/04/2021 7.89 (A) 1.80 - 7.70 K/uL Final    Lymphocytes, Absolute 10/04/2021 2.65  1.00 - 4.80 K/uL Final    Diff Type 10/04/2021 Auto   Final    Monocytes % 10/04/2021 7.5 (A) 2.0 - 6.0 % Final    Eosinophils % 10/04/2021 0.6 (A) 1.0 - 4.0 % Final    Basophils % 10/04/2021 0.2  0.0 - 1.0 % Final    Monocytes, Absolute 10/04/2021 0.86 (A) 0.00 - 0.80 K/uL Final    Eosinophils, Absolute 10/04/2021 0.07  0.00 - 0.50 K/uL Final    Basophils, Absolute 10/04/2021 0.02  0.00 - 0.20 K/uL Final           Assessment:      1. Spondylosis without myelopathy or radiculopathy, lumbosacral region    2.  Spondylolisthesis, lumbar region    3. Thoracic radiculopathy    4. Encounter for long-term (current) use of other medications    5. Fracture, thoracic vertebra, compression, sequela          Orders Placed This Encounter   Procedures    Ambulatory referral/consult to Internal Medicine     Standing Status:   Future     Standing Expiration Date:   4/23/2023     Referral Priority:   Routine     Referral Type:   Consultation     Referral Reason:   Specialty Services Required     Referred to Provider:   Tracey Kumari NP     Number of Visits Requested:   1    POCT Urine Drug Screen Presump     Interpretive Information:     Negative:  No drug detected at the cut off level.   Positive:  This result represents presumptive positive for the   tested drug, other substances may yield a positive response other   than the analyte of interest. This result should be utilized for   diagnostic purpose only. Confirmation testing will be performed upon physician request only.            A's of Opioid Risk Assessment  Activity:Patient can perform ADL.   Analgesia:Patients pain is partially controlled by current medication. Patient has tried OTC medications such as Tylenol and Ibuprofen with out relief.   Adverse Effects: Patient denies constipation or sedation.  Aberrant Behavior:  reviewed with no aberrant drug seeking/taking behavior.  Overdose reversal drug naloxone discussed    Drug screen reviewed      Requested Prescriptions     Signed Prescriptions Disp Refills    cyclobenzaprine (FLEXERIL) 10 MG tablet 30 tablet 1     Sig: Take 1 tablet (10 mg total) by mouth every 8 (eight) hours.    DULoxetine (CYMBALTA) 60 MG capsule 60 capsule 1     Sig: Take 1 capsule (60 mg total) by mouth every 12 (twelve) hours.    HYDROcodone-acetaminophen (NORCO)  mg per tablet 90 tablet 0     Sig: Take 1 tablet by mouth every 8 (eight) hours.    HYDROcodone-acetaminophen (NORCO)  mg per tablet 90 tablet 0     Sig: Take 1 tablet  by mouth every 8 (eight) hours.         Plan:    Continues complaining late affects of thoracic compression fractures    Requesting assistance with osteoporosis treatment/Prolia    Will make referral to osteoporosis clinic [facility] Pamela Kumari    She would like to continue conservative management this point    Continue home exercise program as directed    Continue current medication    Follow-up 2 months    Dr. Burns, December 2022    Bring original prescription medication bottles/container/box with labels to each visit

## 2022-03-22 ENCOUNTER — OFFICE VISIT (OUTPATIENT)
Dept: PAIN MEDICINE | Facility: CLINIC | Age: 72
End: 2022-03-22
Payer: MEDICARE

## 2022-03-22 VITALS
DIASTOLIC BLOOD PRESSURE: 86 MMHG | HEART RATE: 111 BPM | RESPIRATION RATE: 18 BRPM | BODY MASS INDEX: 16.56 KG/M2 | SYSTOLIC BLOOD PRESSURE: 200 MMHG | WEIGHT: 90 LBS | HEIGHT: 62 IN

## 2022-03-22 DIAGNOSIS — M43.16 SPONDYLOLISTHESIS, LUMBAR REGION: Chronic | ICD-10-CM

## 2022-03-22 DIAGNOSIS — S22.000S FRACTURE, THORACIC VERTEBRA, COMPRESSION, SEQUELA: ICD-10-CM

## 2022-03-22 DIAGNOSIS — M47.817 SPONDYLOSIS WITHOUT MYELOPATHY OR RADICULOPATHY, LUMBOSACRAL REGION: Primary | Chronic | ICD-10-CM

## 2022-03-22 DIAGNOSIS — Z79.899 ENCOUNTER FOR LONG-TERM (CURRENT) USE OF OTHER MEDICATIONS: ICD-10-CM

## 2022-03-22 DIAGNOSIS — M54.14 THORACIC RADICULOPATHY: Chronic | ICD-10-CM

## 2022-03-22 LAB
CTP QC/QA: YES
POC (AMP) AMPHETAMINE: NEGATIVE
POC (BAR) BARBITURATES: NEGATIVE
POC (BUP) BUPRENORPHINE: NEGATIVE
POC (BZO) BENZODIAZEPINES: NEGATIVE
POC (COC) COCAINE: NEGATIVE
POC (MDMA) METHYLENEDIOXYMETHAMPHETAMINE 3,4: NEGATIVE
POC (MET) METHAMPHETAMINE: NEGATIVE
POC (MOP) OPIATES: ABNORMAL
POC (MTD) METHADONE: NEGATIVE
POC (OXY) OXYCODONE: NEGATIVE
POC (PCP) PHENCYCLIDINE: NEGATIVE
POC (TCA) TRICYCLIC ANTIDEPRESSANTS: NEGATIVE
POC TEMPERATURE (URINE): 92
POC THC: NEGATIVE

## 2022-03-22 PROCEDURE — 99214 PR OFFICE/OUTPT VISIT, EST, LEVL IV, 30-39 MIN: ICD-10-PCS | Mod: S$PBB,,, | Performed by: PHYSICIAN ASSISTANT

## 2022-03-22 PROCEDURE — 99214 OFFICE O/P EST MOD 30 MIN: CPT | Mod: S$PBB,,, | Performed by: PHYSICIAN ASSISTANT

## 2022-03-22 PROCEDURE — 99214 OFFICE O/P EST MOD 30 MIN: CPT | Mod: PBBFAC | Performed by: PHYSICIAN ASSISTANT

## 2022-03-22 PROCEDURE — 80305 DRUG TEST PRSMV DIR OPT OBS: CPT | Mod: PBBFAC | Performed by: PHYSICIAN ASSISTANT

## 2022-03-22 RX ORDER — HYDROCODONE BITARTRATE AND ACETAMINOPHEN 10; 325 MG/1; MG/1
1 TABLET ORAL EVERY 8 HOURS
Qty: 90 TABLET | Refills: 0 | Status: SHIPPED | OUTPATIENT
Start: 2022-04-29 | End: 2022-05-18 | Stop reason: SDUPTHER

## 2022-03-22 RX ORDER — CYCLOBENZAPRINE HCL 10 MG
10 TABLET ORAL EVERY 8 HOURS
Qty: 30 TABLET | Refills: 1 | Status: SHIPPED | OUTPATIENT
Start: 2022-03-22 | End: 2022-05-09

## 2022-03-22 RX ORDER — HYDROCODONE BITARTRATE AND ACETAMINOPHEN 10; 325 MG/1; MG/1
1 TABLET ORAL EVERY 8 HOURS
Qty: 90 TABLET | Refills: 0 | Status: SHIPPED | OUTPATIENT
Start: 2022-03-30 | End: 2022-04-29

## 2022-03-22 RX ORDER — DULOXETIN HYDROCHLORIDE 60 MG/1
60 CAPSULE, DELAYED RELEASE ORAL EVERY 12 HOURS
Qty: 60 CAPSULE | Refills: 1 | Status: SHIPPED | OUTPATIENT
Start: 2022-03-22 | End: 2022-05-18 | Stop reason: SDUPTHER

## 2022-03-25 DIAGNOSIS — Z78.0 ENCOUNTER FOR OSTEOPOROSIS SCREENING IN ASYMPTOMATIC POSTMENOPAUSAL PATIENT: Primary | ICD-10-CM

## 2022-03-25 DIAGNOSIS — Z13.820 ENCOUNTER FOR OSTEOPOROSIS SCREENING IN ASYMPTOMATIC POSTMENOPAUSAL PATIENT: Primary | ICD-10-CM

## 2022-04-21 ENCOUNTER — HOSPITAL ENCOUNTER (EMERGENCY)
Facility: HOSPITAL | Age: 72
Discharge: HOME | End: 2022-04-21
Attending: EMERGENCY MEDICINE
Payer: MEDICARE

## 2022-04-21 VITALS
SYSTOLIC BLOOD PRESSURE: 157 MMHG | OXYGEN SATURATION: 99 % | HEART RATE: 98 BPM | BODY MASS INDEX: 17.85 KG/M2 | DIASTOLIC BLOOD PRESSURE: 91 MMHG | WEIGHT: 97 LBS | HEIGHT: 62 IN | RESPIRATION RATE: 20 BRPM | TEMPERATURE: 99 F

## 2022-04-21 DIAGNOSIS — F41.9 ANXIETY: ICD-10-CM

## 2022-04-21 DIAGNOSIS — I10 HYPERTENSION, UNSPECIFIED TYPE: Primary | ICD-10-CM

## 2022-04-21 PROCEDURE — 99281 EMR DPT VST MAYX REQ PHY/QHP: CPT

## 2022-04-21 PROCEDURE — 99283 EMERGENCY DEPT VISIT LOW MDM: CPT | Performed by: EMERGENCY MEDICINE

## 2022-04-22 ENCOUNTER — TELEPHONE (OUTPATIENT)
Dept: EMERGENCY MEDICINE | Facility: HOSPITAL | Age: 72
End: 2022-04-22
Payer: MEDICARE

## 2022-04-28 ENCOUNTER — TELEPHONE (OUTPATIENT)
Dept: FAMILY MEDICINE | Facility: CLINIC | Age: 72
End: 2022-04-28
Payer: MEDICARE

## 2022-04-28 NOTE — TELEPHONE ENCOUNTER
----- Message from Angie Del Valle sent at 4/27/2022  1:51 PM CDT -----  Pt requested refill on [facility] [place] (the Pharmacy)

## 2022-05-06 DIAGNOSIS — R63.0 DECREASED APPETITE: Chronic | ICD-10-CM

## 2022-05-06 RX ORDER — CYPROHEPTADINE HYDROCHLORIDE 4 MG/1
4 TABLET ORAL 3 TIMES DAILY
Qty: 90 TABLET | Refills: 1 | Status: SHIPPED | OUTPATIENT
Start: 2022-05-06 | End: 2022-08-26 | Stop reason: SDUPTHER

## 2022-05-06 RX ORDER — PANTOPRAZOLE SODIUM 40 MG/1
40 TABLET, DELAYED RELEASE ORAL
Qty: 90 TABLET | Refills: 1 | Status: SHIPPED | OUTPATIENT
Start: 2022-05-06 | End: 2022-07-18 | Stop reason: SDUPTHER

## 2022-05-06 NOTE — TELEPHONE ENCOUNTER
----- Message from Sesar Valderrama sent at 5/6/2022  3:32 PM CDT -----  Regarding: med refill  Med refill request for cyproheptadine 4 mg also pantoprozole 40 mg called into [facility]

## 2022-05-15 NOTE — ED PROVIDER NOTES
Encounter Date: 4/21/2022       History     Chief Complaint   Patient presents with    Hypertension     PT IS A71 YR OLD BF THAT PRESENTS WITH COMPLAINT ELEVATED HTN WITH HX OF HTN. PT STATES SHE IS USING A BP MACHINE MIEASURING BP AT THE WRIST. PT DENIES CP, HA,EXTREMITY PAIN, N/V/D, VISUAL DISTURBANCE OR SYNCOPE.   PT IS ANXIOUS WITH HX ANXIETY.  PT STATES SHE IS COMPLIANT WITH MEDICATIONS-LOSARTAN/HCTZ AND PCP IS MIRIAN CULVER NP.        Review of patient's allergies indicates:   Allergen Reactions    Sulfa (sulfonamide antibiotics) Nausea And Vomiting     Past Medical History:   Diagnosis Date    Acid reflux     Anxiety     Chronic pain syndrome     Coccyx sprain     broken tailbone     COPD (chronic obstructive pulmonary disease)     Depression     Fall at home 10/01/2021    Fracture of multiple pubic rami, left, closed, initial encounter 10/01/2021    HLD (hyperlipidemia)     Hypertension     Hypokalemia     Hypomagnesemia     Low back pain     Lumbar radiculopathy     Lumbar stenosis     Lumbosacral spondylosis     Neuropathy     Radiculopathy, lumbosacral region     Renal disorder     Spondylolisthesis, lumbar region     Spondylosis without myelopathy or radiculopathy, lumbosacral region     Thoracic radiculopathy      Past Surgical History:   Procedure Laterality Date    CHOLECYSTECTOMY      CYSTOSCOPY      EPIDURAL STEROID INJECTION INTO LUMBAR SPINE N/A 05/27/2020    L1-2 WILD     EPIDURAL STEROID INJECTION INTO THORACIC SPINE N/A 02/03/2021    T9-10 WILD     EPIDURAL STEROID INJECTION INTO THORACIC SPINE N/A 3/18/2021    Procedure: INJECTION, STEROID, SPINE, THORACIC, EPIDURAL;  Surgeon: Arelis Burns MD;  Location: [facility];  Service: Pain Management;  Laterality: N/A;    EPIDURAL STEROID INJECTION INTO THORACIC SPINE N/A 12/23/2021    Procedure: Injection-steroid-epidural-thoracic T9/10;  Surgeon: Arelis Burns MD;  Location: [facility];  Service: Pain  Management;  Laterality: N/A;  HAD VAC  WILL BRING CARD    HYSTERECTOMY      INJECTION OF ANESTHETIC AGENT AROUND MEDIAL BRANCH NERVES INNERVATING LUMBAR FACET JOINT Bilateral 4/22/2021    Procedure: Block-nerve-medial branch-lumbar Bilateral L3-4,4-5,5-S1 MBB #2;  Surgeon: Arelis Burns MD;  Location: [facility];  Service: Pain Management;  Laterality: Bilateral;    INJECTION OF FACET JOINT Bilateral 12/04/2020    RADIOFREQUENCY ABLATION OF LUMBAR MEDIAL BRANCH NERVE AT SINGLE LEVEL Bilateral 5/20/2021    Procedure: RADIOFREQUENCY ABLATION, NERVE, SPINAL, LUMBAR, MEDIAL BRANCH, 1 LEVEL;  Surgeon: Arelis Burns MD;  Location: [facility];  Service: Pain Management;  Laterality: Bilateral;  Bilateral L3-S1 RFTC (both sides same day)     Family History   Problem Relation Age of Onset    Hypertension Mother     Heart disease Father      Social History     Tobacco Use    Smoking status: Former Smoker    Smokeless tobacco: Never Used   Substance Use Topics    Alcohol use: Never     Comment: unknown    Drug use: Never     Types: Hydrocodone     Review of Systems   Constitutional: Negative.    HENT: Negative.    Eyes: Negative.    Cardiovascular: Negative.    Gastrointestinal: Negative.    Endocrine: Negative.    Musculoskeletal: Positive for arthralgias.   Skin: Negative.    Allergic/Immunologic: Negative.    Neurological: Negative.    Hematological: Negative.    Psychiatric/Behavioral: The patient is nervous/anxious.    All other systems reviewed and are negative.      Physical Exam     Initial Vitals [04/21/22 0926]   BP Pulse Resp Temp SpO2   (!) 157/91 98 20 98.5 °F (36.9 °C) 99 %      MAP       --         Physical Exam    Constitutional: She appears well-developed and well-nourished. She is cooperative. No distress.   HENT:   Head: Normocephalic and atraumatic.   Right Ear: External ear normal.   Left Ear: External ear normal.   Nose: Nose normal.   Mouth/Throat: Oropharynx is clear and  moist.   Eyes: Conjunctivae and EOM are normal. Pupils are equal, round, and reactive to light.   Neck: Trachea normal. Neck supple.   Normal range of motion.  Cardiovascular: Normal rate, regular rhythm, normal heart sounds and intact distal pulses.   Pulses:       Radial pulses are 3+ on the right side and 3+ on the left side.        Dorsalis pedis pulses are 3+ on the right side and 3+ on the left side.   Pulmonary/Chest: Breath sounds normal.   Abdominal: Abdomen is soft. Bowel sounds are normal. She exhibits no distension. There is no abdominal tenderness.   Musculoskeletal:         General: Normal range of motion.      Right shoulder: Normal.      Left shoulder: Normal.      Right upper arm: Normal.      Left upper arm: Normal.      Right elbow: Normal.      Left elbow: Normal.      Right forearm: Normal.      Left forearm: Normal.      Right wrist: Normal.      Left wrist: Normal.      Right hand: Normal.      Left hand: Normal.      Cervical back: Normal range of motion and neck supple.     Lymphadenopathy:     She has no cervical adenopathy.     She has no axillary adenopathy.   Neurological: She is alert and oriented to person, place, and time. She has normal strength. No cranial nerve deficit or sensory deficit. She displays a negative Romberg sign. GCS eye subscore is 4. GCS verbal subscore is 5. GCS motor subscore is 6.   Reflex Scores:       Brachioradialis reflexes are 2+ on the right side and 2+ on the left side.       Patellar reflexes are 2+ on the right side and 2+ on the left side.  Skin: Skin is warm and dry. Capillary refill takes less than 2 seconds.   Psychiatric: She has a normal mood and affect. Her speech is normal and behavior is normal. Judgment and thought content normal. Cognition and memory are normal.         Medical Screening Exam   See Full Note    ED Course   Procedures  Labs Reviewed - No data to display       Imaging Results    None          Medications - No data to  display  Medical Decision Making:   ED Management:  PT HAS HX HTN AND CONCERNED RE ELEVATION NOTED PTA  PT HAS ANXIETY  BP STABLE ON ED EVALUATION  PT WILL BE DISCHARGED TO FOLLOW UP WITH PCP MIRIAN CLUVER WITHIN 1 WEEK  PT ADVISED TO OBTAIN ANOTHER BP MACHINE   PT ADVISED TO STAY CALM  HTN AND ANXIETY INSTRUCTION SHEETS GIVEN ON DISCHARGE                   Clinical Impression:   Final diagnoses:  [I10] Hypertension, unspecified type (Primary)  [F41.9] Anxiety          ED Disposition Condition    Discharge Stable        ED Prescriptions     None        Follow-up Information     Follow up With Specialties Details Why Contact Info    [facility] In 1 week  [address] [place] [address]  [facility] [place] [id number]  [phone]             Christine Fu MD  06/01/22 0006

## 2022-05-18 NOTE — PROGRESS NOTES
Subjective:      Patient ID: Amelie Cerrato is a 71 y.o. female.    Chief Complaint: Low-back Pain      Mid-back Pain  This is a chronic problem. The current episode started more than 1 year ago. The problem occurs daily. The problem is unchanged. Associated symptoms include leg pain. Pertinent negatives include no bladder incontinence, chest pain or dysuria.   Low-back Pain  Associated symptoms include leg pain. Pertinent negatives include no bladder incontinence, chest pain or dysuria.     Review of Systems   Constitutional: Negative for activity change, appetite change, chills, fatigue and unexpected weight change.   HENT: Negative for drooling, ear discharge, ear pain, facial swelling, mouth sores, nosebleeds, sore throat, trouble swallowing, voice change and goiter.    Eyes: Negative for photophobia, pain, discharge, redness and visual disturbance.   Respiratory: Negative for apnea, cough, choking, chest tightness, shortness of breath, wheezing and stridor.    Cardiovascular: Negative for chest pain, palpitations and leg swelling.   Gastrointestinal: Negative for abdominal distention, change in bowel habit, diarrhea, rectal pain, vomiting, fecal incontinence and change in bowel habit.   Endocrine: Negative for cold intolerance, heat intolerance, polydipsia, polyphagia and polyuria.   Genitourinary: Negative for bladder incontinence, dysuria, flank pain, frequency, hematuria and hot flashes.   Musculoskeletal: Positive for arthralgias, back pain, leg pain, myalgias, neck pain and neck stiffness.   Integumentary:  Negative for color change, pallor and rash.   Allergic/Immunologic: Negative for immunocompromised state.   Neurological: Negative for dizziness, vertigo, seizures, syncope, facial asymmetry, speech difficulty, light-headedness, disturbances in coordination, memory loss and coordination difficulties.   Hematological: Negative for adenopathy. Does not bruise/bleed easily.   Psychiatric/Behavioral: Negative  "for agitation, behavioral problems, confusion, decreased concentration, dysphoric mood, hallucinations, self-injury and suicidal ideas. The patient is not nervous/anxious and is not hyperactive.             Objective:  Vitals:    05/23/22 0806   BP: (!) 183/80   Pulse: 96   Resp: 18   Weight: 45.4 kg (100 lb)   Height: 5' 2" (1.575 m)   PainSc:   9         Physical Exam  Vitals and nursing note reviewed. Exam conducted with a chaperone present.   Constitutional:       General: She is awake. She is not in acute distress.     Appearance: Normal appearance. She is not toxic-appearing.   HENT:      Head: Normocephalic and atraumatic.      Nose: Nose normal.      Mouth/Throat:      Mouth: Mucous membranes are moist.      Pharynx: Oropharynx is clear.   Eyes:      Conjunctiva/sclera: Conjunctivae normal.      Pupils: Pupils are equal, round, and reactive to light.   Cardiovascular:      Rate and Rhythm: Normal rate.   Pulmonary:      Effort: Pulmonary effort is normal. No respiratory distress.   Abdominal:      Palpations: Abdomen is soft.   Musculoskeletal:         General: Normal range of motion.      Right shoulder: Tenderness present.      Left shoulder: Tenderness present.      Cervical back: Normal range of motion and neck supple. Tenderness present.      Thoracic back: Tenderness present.      Lumbar back: Tenderness present.   Skin:     General: Skin is warm and dry.   Neurological:      General: No focal deficit present.      Mental Status: She is alert and oriented to person, place, and time. Mental status is at baseline.      Cranial Nerves: Cranial nerves are intact. No cranial nerve deficit (II-XII).   Psychiatric:         Mood and Affect: Mood normal.         Behavior: Behavior normal. Behavior is cooperative.         Thought Content: Thought content normal.           CT Hip Without Contrast Right  Narrative: EXAMINATION:  CT HIP WITHOUT CONTRAST RIGHT    CLINICAL HISTORY:  Hip trauma, fracture suspected, " xray done;    TECHNIQUE:  Axial CT imaging of the right hip is performed without contrast.  Computer reformatting is viewed in the sagittal and coronal plane.    CT dose reduction technique used - Dose Rite and tube current modulation.    COMPARISON:  X-ray 21 October 2021, 2 October 2021    FINDINGS:  There is osteopenia with limited detail.  There is suggestion of right sacral wing fracture nondisplaced.  There is left sacral wing fracture with small amount of sclerosis around the fracture.  Comminuted fracture of the left pubic bone incompletely imaged.  There is mild-to-moderate displacement of the visualized fragments.  No other definite evidence of fracture is seen. The alignment appears within normal limits.    Muscles, tendons and ligaments appear within normal limits for CT.    No abnormal fluid collection or abnormal soft tissue density is identified.    No other abnormalities are identified.  Impression: Nondisplaced right sacral wing fracture with osteopenia, detail somewhat limited.  There is left sacral wing fracture with sclerosis suggesting a more subacute injury on the left.  There is comminuted left pubic bone fracture appears subacute, incompletely imaged.    Electronically signed by: Marc Acosta  Date:    10/25/2021  Time:    12:34       Office Visit on 03/22/2022   Component Date Value Ref Range Status    POC Amphetamines 03/22/2022 Negative  Negative, Inconclusive Final    POC Barbiturates 03/22/2022 Negative  Negative, Inconclusive Final    POC Benzodiazepines 03/22/2022 Negative  Negative, Inconclusive Final    POC Cocaine 03/22/2022 Negative  Negative, Inconclusive Final    POC THC 03/22/2022 Negative  Negative, Inconclusive Final    POC Methadone 03/22/2022 Negative  Negative, Inconclusive Final    POC Methamphetamine 03/22/2022 Negative  Negative, Inconclusive Final    POC Opiates 03/22/2022 Presumptive Positive (A) Negative, Inconclusive Final    POC Oxycodone 03/22/2022  Negative  Negative, Inconclusive Final    POC Phencyclidine 03/22/2022 Negative  Negative, Inconclusive Final    POC Methylenedioxymethamphetamine * 03/22/2022 Negative  Negative, Inconclusive Final    POC Tricyclic Antidepressants 03/22/2022 Negative  Negative, Inconclusive Final    POC Buprenorphine 03/22/2022 Negative   Final     Acceptable 03/22/2022 Yes   Final    POC Temperature (Urine) 03/22/2022 92   Final   Office Visit on 02/15/2022   Component Date Value Ref Range Status    Sodium 02/15/2022 140  136 - 145 mmol/L Final    Potassium 02/15/2022 5.0  3.5 - 5.1 mmol/L Final    Chloride 02/15/2022 107  98 - 107 mmol/L Final    CO2 02/15/2022 30  21 - 32 mmol/L Final    Anion Gap 02/15/2022 8  7 - 16 mmol/L Final    Glucose 02/15/2022 79  74 - 106 mg/dL Final    BUN 02/15/2022 14  7 - 18 mg/dL Final    Creatinine 02/15/2022 0.85  0.55 - 1.02 mg/dL Final    BUN/Creatinine Ratio 02/15/2022 16  6 - 20 Final    Calcium 02/15/2022 9.3  8.5 - 10.1 mg/dL Final    eGFR 02/15/2022 70  >=60 mL/min/1.73m² Final    Triglycerides 02/15/2022 125  35 - 150 mg/dL Final    Cholesterol 02/15/2022 183  0 - 200 mg/dL Final    HDL Cholesterol 02/15/2022 92 (A) 40 - 60 mg/dL Final    Cholesterol/HDL Ratio (Risk Factor) 02/15/2022 2.0   Final    Non-HDL 02/15/2022 91  mg/dL Final    LDL Calculated 02/15/2022 66  mg/dL Final    LDL/HDL 02/15/2022 0.7   Final    VLDL 02/15/2022 25  mg/dL Final    Hepatitis C Ab 02/15/2022 Non-Reactive  Non-Reactive Final    WBC 02/15/2022 9.11  4.50 - 11.00 K/uL Final    RBC 02/15/2022 4.26  4.20 - 5.40 M/uL Final    Hemoglobin 02/15/2022 12.5  12.0 - 16.0 g/dL Final    Hematocrit 02/15/2022 39.8  38.0 - 47.0 % Final    MCV 02/15/2022 93.4  80.0 - 96.0 fL Final    MCH 02/15/2022 29.3  27.0 - 31.0 pg Final    MCHC 02/15/2022 31.4 (A) 32.0 - 36.0 g/dL Final    RDW 02/15/2022 18.6 (A) 11.5 - 14.5 % Final    Platelet Count 02/15/2022 374  150 - 400 K/uL  Final    MPV 02/15/2022 9.2 (A) 9.4 - 12.4 fL Final    Neutrophils % 02/15/2022 54.7  53.0 - 65.0 % Final    Lymphocytes % 02/15/2022 32.4  27.0 - 41.0 % Final    Monocytes % 02/15/2022 9.2 (A) 2.0 - 6.0 % Final    Eosinophils % 02/15/2022 2.9  1.0 - 4.0 % Final    Basophils % 02/15/2022 0.5  0.0 - 1.0 % Final    Immature Granulocytes % 02/15/2022 0.3  0.0 - 0.4 % Final    nRBC, Auto 02/15/2022 0.0  <=0.0 % Final    Neutrophils, Abs 02/15/2022 4.98  1.80 - 7.70 K/uL Final    Lymphocytes, Absolute 02/15/2022 2.95  1.00 - 4.80 K/uL Final    Monocytes, Absolute 02/15/2022 0.84 (A) 0.00 - 0.80 K/uL Final    Eosinophils, Absolute 02/15/2022 0.26  0.00 - 0.50 K/uL Final    Basophils, Absolute 02/15/2022 0.05  0.00 - 0.20 K/uL Final    Immature Granulocytes, Absolute 02/15/2022 0.03  0.00 - 0.04 K/uL Final    nRBC, Absolute 02/15/2022 0.00  <=0.00 x10e3/uL Final    Diff Type 02/15/2022 Auto   Final           Assessment:      1. Spondylosis without myelopathy or radiculopathy, lumbosacral region    2. Spondylolisthesis, lumbar region    3. Thoracic radiculopathy          No orders of the defined types were placed in this encounter.        A's of Opioid Risk Assessment  Activity:Patient can perform ADL.   Analgesia:Patients pain is partially controlled by current medication. Patient has tried OTC medications such as Tylenol and Ibuprofen with out relief.   Adverse Effects: Patient denies constipation or sedation.  Aberrant Behavior:  reviewed with no aberrant drug seeking/taking behavior.  Overdose reversal drug naloxone discussed    Drug screen reviewed      Requested Prescriptions     Signed Prescriptions Disp Refills    DULoxetine (CYMBALTA) 60 MG capsule 60 capsule 2     Sig: Take 1 capsule (60 mg total) by mouth every 12 (twelve) hours.    cyclobenzaprine (FLEXERIL) 10 MG tablet 30 tablet 2     Sig: Take 1 tablet (10 mg total) by mouth every 8 (eight) hours.    HYDROcodone-acetaminophen (NORCO)   mg per tablet 90 tablet 0     Sig: Take 1 tablet by mouth every 8 (eight) hours.    HYDROcodone-acetaminophen (NORCO)  mg per tablet 90 tablet 0     Sig: Take 1 tablet by mouth every 8 (eight) hours.    HYDROcodone-acetaminophen (NORCO)  mg per tablet 90 tablet 0     Sig: Take 1 tablet by mouth every 8 (eight) hours.         Plan:    Doing well current medications she states is helping control her discomfort    She would like to continue with conservative management    Continue home exercise program as directed    Continue current medication    Follow-up 2 months    Dr. Burns, December 2022    Bring original prescription medication bottles/container/box with labels to each visit

## 2022-05-23 ENCOUNTER — OFFICE VISIT (OUTPATIENT)
Dept: PAIN MEDICINE | Facility: CLINIC | Age: 72
End: 2022-05-23
Payer: MEDICARE

## 2022-05-23 VITALS
SYSTOLIC BLOOD PRESSURE: 183 MMHG | BODY MASS INDEX: 18.4 KG/M2 | HEIGHT: 62 IN | DIASTOLIC BLOOD PRESSURE: 80 MMHG | WEIGHT: 100 LBS | RESPIRATION RATE: 18 BRPM | HEART RATE: 96 BPM

## 2022-05-23 DIAGNOSIS — M54.14 THORACIC RADICULOPATHY: Chronic | ICD-10-CM

## 2022-05-23 DIAGNOSIS — M43.16 SPONDYLOLISTHESIS, LUMBAR REGION: Chronic | ICD-10-CM

## 2022-05-23 DIAGNOSIS — M47.817 SPONDYLOSIS WITHOUT MYELOPATHY OR RADICULOPATHY, LUMBOSACRAL REGION: Primary | Chronic | ICD-10-CM

## 2022-05-23 PROCEDURE — 99214 OFFICE O/P EST MOD 30 MIN: CPT | Mod: S$PBB,,, | Performed by: PHYSICIAN ASSISTANT

## 2022-05-23 PROCEDURE — 99214 OFFICE O/P EST MOD 30 MIN: CPT | Mod: PBBFAC | Performed by: PHYSICIAN ASSISTANT

## 2022-05-23 PROCEDURE — 99214 PR OFFICE/OUTPT VISIT, EST, LEVL IV, 30-39 MIN: ICD-10-PCS | Mod: S$PBB,,, | Performed by: PHYSICIAN ASSISTANT

## 2022-05-23 RX ORDER — HYDROCODONE BITARTRATE AND ACETAMINOPHEN 10; 325 MG/1; MG/1
1 TABLET ORAL EVERY 8 HOURS
Qty: 90 TABLET | Refills: 0 | Status: SHIPPED | OUTPATIENT
Start: 2022-07-28 | End: 2022-08-16 | Stop reason: SDUPTHER

## 2022-05-23 RX ORDER — CYCLOBENZAPRINE HCL 10 MG
10 TABLET ORAL EVERY 8 HOURS
Qty: 30 TABLET | Refills: 2 | Status: SHIPPED | OUTPATIENT
Start: 2022-05-23 | End: 2022-08-16 | Stop reason: SDUPTHER

## 2022-05-23 RX ORDER — HYDROCODONE BITARTRATE AND ACETAMINOPHEN 10; 325 MG/1; MG/1
1 TABLET ORAL EVERY 8 HOURS
Qty: 90 TABLET | Refills: 0 | Status: SHIPPED | OUTPATIENT
Start: 2022-06-28 | End: 2022-07-28

## 2022-05-23 RX ORDER — DULOXETIN HYDROCHLORIDE 60 MG/1
60 CAPSULE, DELAYED RELEASE ORAL EVERY 12 HOURS
Qty: 60 CAPSULE | Refills: 2 | Status: SHIPPED | OUTPATIENT
Start: 2022-05-23 | End: 2022-08-16 | Stop reason: SDUPTHER

## 2022-05-23 RX ORDER — HYDROCODONE BITARTRATE AND ACETAMINOPHEN 10; 325 MG/1; MG/1
1 TABLET ORAL EVERY 8 HOURS
Qty: 90 TABLET | Refills: 0 | Status: SHIPPED | OUTPATIENT
Start: 2022-05-28 | End: 2022-06-27

## 2022-06-20 ENCOUNTER — HOSPITAL ENCOUNTER (EMERGENCY)
Facility: HOSPITAL | Age: 72
Discharge: HOME | End: 2022-06-20
Payer: MEDICARE

## 2022-06-20 VITALS
DIASTOLIC BLOOD PRESSURE: 83 MMHG | WEIGHT: 113 LBS | HEIGHT: 62 IN | HEART RATE: 83 BPM | OXYGEN SATURATION: 96 % | BODY MASS INDEX: 20.8 KG/M2 | SYSTOLIC BLOOD PRESSURE: 147 MMHG | RESPIRATION RATE: 18 BRPM | TEMPERATURE: 99 F

## 2022-06-20 DIAGNOSIS — R07.9 CHEST PAIN: ICD-10-CM

## 2022-06-20 DIAGNOSIS — R06.02 SHORTNESS OF BREATH: ICD-10-CM

## 2022-06-20 DIAGNOSIS — R10.9 ABDOMINAL PAIN: ICD-10-CM

## 2022-06-20 DIAGNOSIS — K59.00 CONSTIPATION, UNSPECIFIED CONSTIPATION TYPE: Primary | ICD-10-CM

## 2022-06-20 LAB
ALBUMIN SERPL BCP-MCNC: 2.8 G/DL (ref 3.5–5)
ALBUMIN/GLOB SERPL: 0.8 {RATIO}
ALP SERPL-CCNC: 74 U/L (ref 55–142)
ALT SERPL W P-5'-P-CCNC: 41 U/L (ref 13–56)
ANION GAP SERPL CALCULATED.3IONS-SCNC: 8 MMOL/L (ref 7–16)
AST SERPL W P-5'-P-CCNC: 42 U/L (ref 15–37)
BASOPHILS # BLD AUTO: 0.04 K/UL (ref 0–0.2)
BASOPHILS NFR BLD AUTO: 0.4 % (ref 0–1)
BILIRUB SERPL-MCNC: 0.4 MG/DL (ref 0–1.2)
BUN SERPL-MCNC: 11 MG/DL (ref 7–18)
BUN/CREAT SERPL: 10 (ref 6–20)
CALCIUM SERPL-MCNC: 8.8 MG/DL (ref 8.5–10.1)
CHLORIDE SERPL-SCNC: 106 MMOL/L (ref 98–107)
CO2 SERPL-SCNC: 33 MMOL/L (ref 21–32)
CREAT SERPL-MCNC: 1.08 MG/DL (ref 0.55–1.02)
DIFFERENTIAL METHOD BLD: ABNORMAL
EOSINOPHIL # BLD AUTO: 0.3 K/UL (ref 0–0.5)
EOSINOPHIL NFR BLD AUTO: 3.2 % (ref 1–4)
ERYTHROCYTE [DISTWIDTH] IN BLOOD BY AUTOMATED COUNT: 18.3 % (ref 11.5–14.5)
FLUAV AG UPPER RESP QL IA.RAPID: NEGATIVE
FLUBV AG UPPER RESP QL IA.RAPID: NEGATIVE
GLOBULIN SER-MCNC: 3.5 G/DL (ref 2–4)
GLUCOSE SERPL-MCNC: 98 MG/DL (ref 74–106)
HCT VFR BLD AUTO: 39.3 % (ref 38–47)
HGB BLD-MCNC: 12.2 G/DL (ref 12–16)
LIPASE SERPL-CCNC: 78 U/L (ref 73–393)
LYMPHOCYTES # BLD AUTO: 3.04 K/UL (ref 1–4.8)
LYMPHOCYTES NFR BLD AUTO: 31.9 % (ref 27–41)
MCH RBC QN AUTO: 28.6 PG (ref 27–31)
MCHC RBC AUTO-ENTMCNC: 31 G/DL (ref 32–36)
MCV RBC AUTO: 92 FL (ref 80–96)
MONOCYTES # BLD AUTO: 1.12 K/UL (ref 0–0.8)
MONOCYTES NFR BLD AUTO: 11.8 % (ref 2–6)
MPC BLD CALC-MCNC: 9.2 FL (ref 9.4–12.4)
NEUTROPHILS # BLD AUTO: 5.02 K/UL (ref 1.8–7.7)
NEUTROPHILS NFR BLD AUTO: 52.7 % (ref 53–65)
PLATELET # BLD AUTO: 340 K/UL (ref 150–400)
POTASSIUM SERPL-SCNC: 3.6 MMOL/L (ref 3.5–5.1)
PROT SERPL-MCNC: 6.3 G/DL (ref 6.4–8.2)
RBC # BLD AUTO: 4.27 M/UL (ref 4.2–5.4)
SARS-COV+SARS-COV-2 AG RESP QL IA.RAPID: NEGATIVE
SODIUM SERPL-SCNC: 143 MMOL/L (ref 136–145)
TROPONIN I SERPL HS-MCNC: 66.1 PG/ML
TROPONIN I SERPL HS-MCNC: 70.4 PG/ML
WBC # BLD AUTO: 9.52 K/UL (ref 4.5–11)

## 2022-06-20 PROCEDURE — 99285 EMERGENCY DEPT VISIT HI MDM: CPT | Mod: 25 | Performed by: NURSE PRACTITIONER

## 2022-06-20 PROCEDURE — 84484 ASSAY OF TROPONIN QUANT: CPT | Performed by: NURSE PRACTITIONER

## 2022-06-20 PROCEDURE — 87428 SARSCOV & INF VIR A&B AG IA: CPT | Performed by: NURSE PRACTITIONER

## 2022-06-20 PROCEDURE — 93010 ELECTROCARDIOGRAM REPORT: CPT | Performed by: HOSPITALIST

## 2022-06-20 PROCEDURE — 27000221 HC OXYGEN, UP TO 24 HOURS

## 2022-06-20 PROCEDURE — 36415 COLL VENOUS BLD VENIPUNCTURE: CPT | Performed by: NURSE PRACTITIONER

## 2022-06-20 PROCEDURE — 85025 COMPLETE CBC W/AUTO DIFF WBC: CPT | Performed by: NURSE PRACTITIONER

## 2022-06-20 PROCEDURE — 93005 ELECTROCARDIOGRAM TRACING: CPT

## 2022-06-20 PROCEDURE — 99283 EMERGENCY DEPT VISIT LOW MDM: CPT | Mod: GF | Performed by: NURSE PRACTITIONER

## 2022-06-20 PROCEDURE — 80053 COMPREHEN METABOLIC PANEL: CPT | Performed by: NURSE PRACTITIONER

## 2022-06-20 PROCEDURE — 94761 N-INVAS EAR/PLS OXIMETRY MLT: CPT

## 2022-06-20 PROCEDURE — 83690 ASSAY OF LIPASE: CPT | Performed by: NURSE PRACTITIONER

## 2022-06-20 RX ORDER — MAG HYDROX/ALUMINUM HYD/SIMETH 200-200-20
30 SUSPENSION, ORAL (FINAL DOSE FORM) ORAL ONCE
Status: DISCONTINUED | OUTPATIENT
Start: 2022-06-20 | End: 2022-06-20

## 2022-06-20 RX ORDER — LIDOCAINE HYDROCHLORIDE 20 MG/ML
10 SOLUTION OROPHARYNGEAL ONCE
Status: DISCONTINUED | OUTPATIENT
Start: 2022-06-20 | End: 2022-06-20

## 2022-06-20 NOTE — ED PROVIDER NOTES
Encounter Date: 6/20/2022       History     Chief Complaint   Patient presents with    Abdominal Pain     Abd pain x 2 weeks     Patient presents to the ED with complaints of abdominal pain and SOB that has been ongoing for 2-3 weeks. Patient denies any fever, N/V/D. Patient states she was seen by a physician in [place] but he did not find anything wrong with her last week and she states she had a CT scan at that time.         Review of patient's allergies indicates:   Allergen Reactions    Sulfa (sulfonamide antibiotics) Nausea And Vomiting     Past Medical History:   Diagnosis Date    Acid reflux     Anxiety     Chronic pain syndrome     Coccyx sprain     broken tailbone     COPD (chronic obstructive pulmonary disease)     Depression     Fall at home 10/01/2021    Fracture of multiple pubic rami, left, closed, initial encounter 10/01/2021    HLD (hyperlipidemia)     Hypertension     Hypokalemia     Hypomagnesemia     Low back pain     Lumbar radiculopathy     Lumbar stenosis     Lumbosacral spondylosis     Neuropathy     Radiculopathy, lumbosacral region     Renal disorder     Spondylolisthesis, lumbar region     Spondylosis without myelopathy or radiculopathy, lumbosacral region     Thoracic radiculopathy      Past Surgical History:   Procedure Laterality Date    CHOLECYSTECTOMY      CYSTOSCOPY      EPIDURAL STEROID INJECTION INTO LUMBAR SPINE N/A 05/27/2020    L1-2 WILD     EPIDURAL STEROID INJECTION INTO THORACIC SPINE N/A 02/03/2021    T9-10 WILD     EPIDURAL STEROID INJECTION INTO THORACIC SPINE N/A 3/18/2021    Procedure: INJECTION, STEROID, SPINE, THORACIC, EPIDURAL;  Surgeon: Arelis Burns MD;  Location: Counts include 234 beds at the [facility];  Service: Pain Management;  Laterality: N/A;    EPIDURAL STEROID INJECTION INTO THORACIC SPINE N/A 12/23/2021    Procedure: Injection-steroid-epidural-thoracic T9/10;  Surgeon: Arelis Burns MD;  Location: Counts include 234 beds at the [facility];  Service: Pain Management;   Laterality: N/A;  HAD VAC  WILL BRING CARD    HYSTERECTOMY      INJECTION OF ANESTHETIC AGENT AROUND MEDIAL BRANCH NERVES INNERVATING LUMBAR FACET JOINT Bilateral 4/22/2021    Procedure: Block-nerve-medial branch-lumbar Bilateral L3-4,4-5,5-S1 MBB #2;  Surgeon: Arelis Burns MD;  Location: [facility];  Service: Pain Management;  Laterality: Bilateral;    INJECTION OF FACET JOINT Bilateral 12/04/2020    RADIOFREQUENCY ABLATION OF LUMBAR MEDIAL BRANCH NERVE AT SINGLE LEVEL Bilateral 5/20/2021    Procedure: RADIOFREQUENCY ABLATION, NERVE, SPINAL, LUMBAR, MEDIAL BRANCH, 1 LEVEL;  Surgeon: Arelis Burns MD;  Location: [facility];  Service: Pain Management;  Laterality: Bilateral;  Bilateral L3-S1 RFTC (both sides same day)     Family History   Problem Relation Age of Onset    Hypertension Mother     Heart disease Father      Social History     Tobacco Use    Smoking status: Former Smoker    Smokeless tobacco: Never Used   Substance Use Topics    Alcohol use: Never     Comment: unknown    Drug use: Never     Types: Hydrocodone     Review of Systems   Constitutional: Negative.    Respiratory: Positive for shortness of breath.    Gastrointestinal: Positive for abdominal pain. Negative for diarrhea, nausea and vomiting.   Musculoskeletal: Negative.    Skin: Negative.    Neurological: Negative.    Psychiatric/Behavioral: Negative.    All other systems reviewed and are negative.      Physical Exam     Initial Vitals [06/20/22 1451]   BP Pulse Resp Temp SpO2   (!) 161/107 87 20 99.3 °F (37.4 °C) (!) 89 %      MAP       --         Physical Exam    Vitals reviewed.  Constitutional: She appears well-developed and well-nourished.   HENT:   Head: Normocephalic.   Neck: Neck supple.   Normal range of motion.  Cardiovascular: Normal rate, regular rhythm, normal heart sounds and intact distal pulses.   Pulmonary/Chest: Breath sounds normal.   Abdominal: Abdomen is soft. Bowel sounds are normal.    Musculoskeletal:         General: Normal range of motion.      Cervical back: Normal range of motion and neck supple.     Neurological: She is alert and oriented to person, place, and time. She has normal strength. GCS score is 15. GCS eye subscore is 4. GCS verbal subscore is 5. GCS motor subscore is 6.   Skin: Skin is warm and dry. Capillary refill takes less than 2 seconds.   Psychiatric: She has a normal mood and affect. Her behavior is normal. Judgment and thought content normal.         Medical Screening Exam   See Full Note    ED Course   Procedures  Labs Reviewed   COMPREHENSIVE METABOLIC PANEL - Abnormal; Notable for the following components:       Result Value    CO2 33 (*)     Creatinine 1.08 (*)     Total Protein 6.3 (*)     Albumin 2.8 (*)     AST 42 (*)     eGFR 53 (*)     All other components within normal limits   TROPONIN I - Abnormal; Notable for the following components:    Troponin I High Sensitivity 70.4 (*)     All other components within normal limits   CBC WITH DIFFERENTIAL - Abnormal; Notable for the following components:    MCHC 31.0 (*)     RDW 18.3 (*)     MPV 9.2 (*)     Neutrophils % 52.7 (*)     Monocytes % 11.8 (*)     Monocytes, Absolute 1.12 (*)     All other components within normal limits   TROPONIN I - Abnormal; Notable for the following components:    Troponin I High Sensitivity 66.1 (*)     All other components within normal limits   LIPASE - Normal   SARS-COV2 (COVID) W/ FLU ANTIGEN - Normal    Narrative:     Negative SARS-CoV results should not be used as the sole basis for treatment or patient management decisions; negative results should be considered in the context of a patient's recent exposures, history and the presene of clinical signs and symptoms consistent with COVID-19.  Negative results should be treated as presumptive and confirmed by molecular assay, if necessary for patient management.   CBC W/ AUTO DIFFERENTIAL    Narrative:     The following orders were  created for panel order CBC auto differential.  Procedure                               Abnormality         Status                     ---------                               -----------         ------                     CBC with Differential[[id number]]        Abnormal            Final result                 Please view results for these tests on the individual orders.   URINALYSIS, REFLEX TO URINE CULTURE        ECG Results          EKG 12-lead (In process)  Result time 06/20/22 16:40:22    In process by Interface, Lab In [facility] (06/20/22 16:40:22)                 Narrative:    Test Reason : R07.9,    Vent. Rate : 075 BPM     Atrial Rate : 075 BPM     P-R Int : 122 ms          QRS Dur : 078 ms      QT Int : 402 ms       P-R-T Axes : 011 -29 020 degrees     QTc Int : 448 ms    Program found technically poor ECG  Normal sinus rhythm  Cannot rule out Anterior infarct (cited on or before 27-AUG-2021)  Abnormal ECG  When compared with ECG of 20-JUN-2022 14:48,  No significant change was found    Referred By: [email or web address]   SELF           Confirmed By:                              EKG 12-lead (In process)  Result time 06/20/22 15:16:51    In process by Interface, Lab In [facility] (06/20/22 15:16:51)                 Narrative:    Test Reason : R06.02,    Vent. Rate : 087 BPM     Atrial Rate : 087 BPM     P-R Int : 114 ms          QRS Dur : 076 ms      QT Int : 366 ms       P-R-T Axes : 007 -27 032 degrees     QTc Int : 440 ms    Program found technically poor ECG  Normal sinus rhythm  Cannot rule out Anterior infarct ,age undetermined  Abnormal ECG  When compared with ECG of 21-OCT-2021 20:36,  No significant change was found    Referred By: [email or web address]   SELF           Confirmed By:                             Imaging Results          X-Ray Chest AP Portable (Final result)  Result time 06/20/22 15:26:38    Final result by Nuno Machado MD (06/20/22 15:26:38)                 Narrative:    EXAMINATION:  XR CHEST AP  PORTABLE    CLINICAL HISTORY:  Shortness of breath    TECHNIQUE:  Single frontal view of the chest was performed.    COMPARISON:  08/30/2021    FINDINGS:  Cardiac silhouette is borderline in size and similar prior.  The lungs appear clear with similar appearance of a linear atelectasis in the left mid lung.  No focal infiltrate.  No pneumothorax.  No pleural effusion.  Previous thoracolumbar vertebral augmentations seen.      Electronically signed by: Nuno Machado  Date:    06/20/2022  Time:    15:26                             X-Ray Abdomen AP 1 View (KUB) (Final result)  Result time 06/20/22 15:27:06    Final result by Nuno Machado MD (06/20/22 15:27:06)                 Impression:      Mild colonic stool.      Electronically signed by: Nuno Machado  Date:    06/20/2022  Time:    15:27             Narrative:    EXAMINATION:  XR ABDOMEN AP 1 VIEW    CLINICAL HISTORY:  Unspecified abdominal pain    TECHNIQUE:  AP View(s) of the abdomen was performed.    COMPARISON:  08/31/2021    FINDINGS:  Previous thoracolumbar vertebral augmentation.  There is mild degree of colonic stool.  No bowel obstruction.  Surgical clips right upper quadrant.                                 Medications - No data to display  Medical Decision Making:   ED Management:  Patient's initial troponin elevated at 70, slightly above normal, repeat EKG and troponin obtained, second troponin went down. X-ray showed some constipation, labs were essentially negative. Will discharge home to follow up with PCP. COVID and flu negative.                   Clinical Impression:   Final diagnoses:  [R10.9] Abdominal pain  [R06.02] Shortness of breath  [R07.9] Chest pain  [K59.00] Constipation, unspecified constipation type (Primary)          ED Disposition Condition    Discharge Stable        ED Prescriptions     None        Follow-up Information     Follow up With Specialties Details Why Contact Info    [facility] In 2 days  [address]  [place]  [facility] [address]  [phone]             Anjana Rivera, [facility]  06/20/22 1805       Anjana Rivera, [facility]  06/20/22 1827

## 2022-06-27 ENCOUNTER — OFFICE VISIT (OUTPATIENT)
Dept: FAMILY MEDICINE | Facility: CLINIC | Age: 72
End: 2022-06-27
Payer: MEDICARE

## 2022-06-27 VITALS
OXYGEN SATURATION: 98 % | HEART RATE: 102 BPM | BODY MASS INDEX: 19.75 KG/M2 | DIASTOLIC BLOOD PRESSURE: 86 MMHG | TEMPERATURE: 97 F | WEIGHT: 108 LBS | SYSTOLIC BLOOD PRESSURE: 159 MMHG

## 2022-06-27 DIAGNOSIS — G89.29 OTHER CHRONIC PAIN: ICD-10-CM

## 2022-06-27 DIAGNOSIS — R10.9 ABDOMINAL PAIN, UNSPECIFIED ABDOMINAL LOCATION: Primary | ICD-10-CM

## 2022-06-27 DIAGNOSIS — K59.00 CONSTIPATION, UNSPECIFIED CONSTIPATION TYPE: ICD-10-CM

## 2022-06-27 PROCEDURE — 99213 OFFICE O/P EST LOW 20 MIN: CPT | Mod: ,,, | Performed by: NURSE PRACTITIONER

## 2022-06-27 PROCEDURE — 99213 PR OFFICE/OUTPT VISIT, EST, LEVL III, 20-29 MIN: ICD-10-PCS | Mod: ,,, | Performed by: NURSE PRACTITIONER

## 2022-06-27 RX ORDER — TIZANIDINE 4 MG/1
4 TABLET ORAL NIGHTLY PRN
COMMUNITY
Start: 2022-06-08 | End: 2022-08-16

## 2022-06-27 RX ORDER — POLYETHYLENE GLYCOL 3350 17 G/17G
17 POWDER, FOR SOLUTION ORAL DAILY
Qty: 1530 G | Refills: 0 | Status: SHIPPED | OUTPATIENT
Start: 2022-06-27 | End: 2022-08-26 | Stop reason: SDUPTHER

## 2022-06-27 NOTE — PATIENT INSTRUCTIONS
Referral to GI, someone will call you with date and time of appointment, if you have not heard from anyone in two weeks, call us here at clinic     Miralax as prescribed     Take all medication only as prescribed   Education; constipation discharge instructions   S/S for which to report to ED discussed

## 2022-06-27 NOTE — PROGRESS NOTES
"Clinic note     Patient name: Amelie Cerrato is a 71 y.o. female   Chief compliant   Chief Complaint   Patient presents with    Referral     States she needs a referral dr dr hutton for medicare. C/o of abd pain, and back pian with SOB at night. Pt states she is out of norco and can not fill till tomorrow.     Medication Refill       Subjective     History of present illness   In clinic for follow up r/t ED visit on 6/20/22 for abdominal pain and constipation, records reveiwed; she is  requesting GI referral   Reports RUQ pain for "weeks" Denies any n/v, diarrhea, fever   She states she has recently had CT which was negative in [place] while staying with her daughter   She is not currently taking medication for constipation   Last BM was yesterday, denies any hematochezia or melena   She states she is in a lot of pain, she is prescribed Norco 10 mg one tablet q 8 hour prn by SAILAJA Arroyo at pain management; however she states she is out of pills due to "taking an extra one" for abdominal pain    reviewed, with last fill date noted 5/28/22; results to be scanned into record  Discussed the dangers of overtaking medications and instructed to take medication only as prescribed;  understanding verbalized         Social History     Tobacco Use    Smoking status: Former Smoker    Smokeless tobacco: Never Used   Substance Use Topics    Alcohol use: Never     Comment: unknown    Drug use: Never     Types: Hydrocodone       Review of patient's allergies indicates:   Allergen Reactions    Sulfa (sulfonamide antibiotics) Nausea And Vomiting       Past Medical History:   Diagnosis Date    Acid reflux     Anxiety     Chronic pain syndrome     Coccyx sprain     broken tailbone     COPD (chronic obstructive pulmonary disease)     Depression     Fall at home 10/01/2021    Fracture of multiple pubic rami, left, closed, initial encounter 10/01/2021    HLD (hyperlipidemia)     Hypertension     Hypokalemia     " Hypomagnesemia     Low back pain     Lumbar radiculopathy     Lumbar stenosis     Lumbosacral spondylosis     Neuropathy     Radiculopathy, lumbosacral region     Renal disorder     Spondylolisthesis, lumbar region     Spondylosis without myelopathy or radiculopathy, lumbosacral region     Thoracic radiculopathy        Past Surgical History:   Procedure Laterality Date    CHOLECYSTECTOMY      CYSTOSCOPY      EPIDURAL STEROID INJECTION INTO LUMBAR SPINE N/A 05/27/2020    L1-2 WILD     EPIDURAL STEROID INJECTION INTO THORACIC SPINE N/A 02/03/2021    T9-10 WILD     EPIDURAL STEROID INJECTION INTO THORACIC SPINE N/A 3/18/2021    Procedure: INJECTION, STEROID, SPINE, THORACIC, EPIDURAL;  Surgeon: Arelis Burns MD;  Location: [facility];  Service: Pain Management;  Laterality: N/A;    EPIDURAL STEROID INJECTION INTO THORACIC SPINE N/A 12/23/2021    Procedure: Injection-steroid-epidural-thoracic T9/10;  Surgeon: Arelis Burns MD;  Location: [facility];  Service: Pain Management;  Laterality: N/A;  HAD VAC  WILL BRING CARD    HYSTERECTOMY      INJECTION OF ANESTHETIC AGENT AROUND MEDIAL BRANCH NERVES INNERVATING LUMBAR FACET JOINT Bilateral 4/22/2021    Procedure: Block-nerve-medial branch-lumbar Bilateral L3-4,4-5,5-S1 MBB #2;  Surgeon: Arelis Burns MD;  Location: [facility];  Service: Pain Management;  Laterality: Bilateral;    INJECTION OF FACET JOINT Bilateral 12/04/2020    RADIOFREQUENCY ABLATION OF LUMBAR MEDIAL BRANCH NERVE AT SINGLE LEVEL Bilateral 5/20/2021    Procedure: RADIOFREQUENCY ABLATION, NERVE, SPINAL, LUMBAR, MEDIAL BRANCH, 1 LEVEL;  Surgeon: Arelis Burns MD;  Location: [facility];  Service: Pain Management;  Laterality: Bilateral;  Bilateral L3-S1 RFTC (both sides same day)        Family History   Problem Relation Age of Onset    Hypertension Mother     Heart disease Father          Current Outpatient Medications:     chlorthalidone  (HYGROTEN) 25 MG Tab, Take one half tablet by mouth once daily, Disp: 45 tablet, Rfl: 1    cyproheptadine (PERIACTIN) 4 mg tablet, Take 1 tablet (4 mg total) by mouth 3 (three) times daily., Disp: 90 tablet, Rfl: 1    DULoxetine (CYMBALTA) 60 MG capsule, Take 1 capsule (60 mg total) by mouth every 12 (twelve) hours., Disp: 60 capsule, Rfl: 2    HYDROcodone-acetaminophen (NORCO)  mg per tablet, Take 1 tablet by mouth every 8 (eight) hours., Disp: 90 tablet, Rfl: 0    pantoprazole (PROTONIX) 40 MG tablet, Take 1 tablet (40 mg total) by mouth 2 (two) times daily before meals., Disp: 90 tablet, Rfl: 1    predniSONE (DELTASONE) 10 MG tablet, TAKE ONE TABLET BY MOUTH EVERY DAY OR EVERY OTHER DAY as directed, Disp: , Rfl:     atorvastatin (LIPITOR) 10 MG tablet, Take 1 tablet (10 mg total) by mouth every evening., Disp: 90 tablet, Rfl: 0    brimonidine 0.2% (ALPHAGAN) 0.2 % Drop, Place 1 drop into both eyes 2 (two) times daily., Disp: , Rfl:     busPIRone (BUSPAR) 15 MG tablet, Take 1 tablet (15 mg total) by mouth 3 (three) times daily., Disp: 270 tablet, Rfl: 1    cyclobenzaprine (FLEXERIL) 10 MG tablet, Take 1 tablet (10 mg total) by mouth every 8 (eight) hours., Disp: 30 tablet, Rfl: 2    [START ON 6/28/2022] HYDROcodone-acetaminophen (NORCO)  mg per tablet, Take 1 tablet by mouth every 8 (eight) hours. (Patient not taking: Reported on 6/27/2022), Disp: 90 tablet, Rfl: 0    [START ON 7/28/2022] HYDROcodone-acetaminophen (NORCO)  mg per tablet, Take 1 tablet by mouth every 8 (eight) hours. (Patient not taking: Reported on 6/27/2022), Disp: 90 tablet, Rfl: 0    INCRUSE ELLIPTA 62.5 mcg/actuation inhalation capsule, INHALE ONE PUFF BY MOUTH DAILY AT THE SAME TIME EACH DAY, Disp: 30 each, Rfl: 0    latanoprost 0.005 % ophthalmic solution, Place 1 drop into both eyes every evening., Disp: , Rfl:     losartan-hydrochlorothiazide 50-12.5 mg (HYZAAR) 50-12.5 mg per tablet, Take 1 tablet by mouth  once daily., Disp: 90 tablet, Rfl: 1    polyethylene glycol (GLYCOLAX) 17 gram/dose powder, Take 17 g by mouth once daily. Mix with 8 ounces of juice or water, Disp: 1530 g, Rfl: 0    tiZANidine (ZANAFLEX) 4 MG tablet, Take 4 mg by mouth nightly as needed., Disp: , Rfl:     Review of Systems   Constitutional: Negative for appetite change, chills, fatigue, fever and unexpected weight change.   Respiratory: Positive for shortness of breath. Negative for cough.    Cardiovascular: Negative for chest pain, palpitations and leg swelling.   Gastrointestinal: Positive for abdominal pain and constipation. Negative for blood in stool, change in bowel habit, diarrhea, nausea, vomiting and change in bowel habit.   Genitourinary: Negative for dysuria and frequency.   Musculoskeletal: Positive for back pain. Negative for arthralgias, gait problem and myalgias.   Neurological: Negative for dizziness, syncope, light-headedness and headaches.   Psychiatric/Behavioral: Negative for confusion and sleep disturbance.       Objective     BP (!) 159/86   Pulse 102   Temp 97.4 °F (36.3 °C)   Wt 49 kg (108 lb)   LMP  (LMP Unknown)   SpO2 98%   BMI 19.75 kg/m²     Physical Exam   Constitutional: She is oriented to person, place, and time. She is cooperative. No distress.   HENT:   Head: Normocephalic and atraumatic.   Mouth/Throat: Mucous membranes are moist.   Cardiovascular: Normal rate and regular rhythm.   Pulmonary/Chest: Effort normal and breath sounds normal. No respiratory distress. She has no wheezes. She has no rhonchi. She has no rales.   Abdominal: Soft. Bowel sounds are normal. She exhibits no distension. There is abdominal tenderness.   RUQ and epigastric tenderness   Musculoskeletal:         General: Normal range of motion.      Cervical back: Neck supple.      Right lower leg: No edema.      Left lower leg: No edema.   Neurological: She is alert and oriented to person, place, and time. Gait normal.   Skin: Skin is  warm and dry.   Psychiatric: Her speech is normal and behavior is normal. Thought content normal. Her mood appears anxious. Thought content is not paranoid. She expresses no homicidal and no suicidal ideation. She expresses no suicidal plans and no homicidal plans.     XR ABDOMEN AP 1 VIEW     CLINICAL HISTORY:  Unspecified abdominal pain     TECHNIQUE:  AP View(s) of the abdomen was performed.     COMPARISON:  08/31/2021     FINDINGS:  Previous thoracolumbar vertebral augmentation.  There is mild degree of colonic stool.  No bowel obstruction.  Surgical clips right upper quadrant.     Impression:     Mild colonic stool.        Electronically signed by: Nuno Machado  Date:                                            06/20/2022  Time:                                           15:27             Exam Ended: 06/20/22 15:22           EXAMINATION:  XR CHEST AP PORTABLE     CLINICAL HISTORY:  Shortness of breath     TECHNIQUE:  Single frontal view of the chest was performed.     COMPARISON:  08/30/2021     FINDINGS:  Cardiac silhouette is borderline in size and similar prior.  The lungs appear clear with similar appearance of a linear atelectasis in the left mid lung.  No focal infiltrate.  No pneumothorax.  No pleural effusion.  Previous thoracolumbar vertebral augmentations seen.        Electronically signed by: Nuno Machado  Date:                                            06/20/2022  Time:                                           15:26             Exam Ended: 06/20/22 15:22             Lab Results   Component Value Date    WBC 9.52 06/20/2022    HGB 12.2 06/20/2022    HCT 39.3 06/20/2022    MCV 92.0 06/20/2022     06/20/2022       CMP  Sodium   Date Value Ref Range Status   06/20/2022 143 136 - 145 mmol/L Final     Potassium   Date Value Ref Range Status   06/20/2022 3.6 3.5 - 5.1 mmol/L Final     Chloride   Date Value Ref Range Status   06/20/2022 106 98 - 107 mmol/L Final     CO2   Date Value Ref Range Status    06/20/2022 33 (H) 21 - 32 mmol/L Final     Glucose   Date Value Ref Range Status   06/20/2022 98 74 - 106 mg/dL Final     BUN   Date Value Ref Range Status   06/20/2022 11 7 - 18 mg/dL Final     Creatinine   Date Value Ref Range Status   06/20/2022 1.08 (H) 0.55 - 1.02 mg/dL Final     Calcium   Date Value Ref Range Status   06/20/2022 8.8 8.5 - 10.1 mg/dL Final     Total Protein   Date Value Ref Range Status   06/20/2022 6.3 (L) 6.4 - 8.2 g/dL Final     Albumin   Date Value Ref Range Status   06/20/2022 2.8 (L) 3.5 - 5.0 g/dL Final     Bilirubin, Total   Date Value Ref Range Status   06/20/2022 0.4 0.0 - 1.2 mg/dL Final     Alk Phos   Date Value Ref Range Status   06/20/2022 74 55 - 142 U/L Final     AST   Date Value Ref Range Status   06/20/2022 42 (H) 15 - 37 U/L Final     ALT   Date Value Ref Range Status   06/20/2022 41 13 - 56 U/L Final     Anion Gap   Date Value Ref Range Status   06/20/2022 8 7 - 16 mmol/L Final     eGFR    Date Value Ref Range Status   10/25/2021 114 >=60 mL/min/1.73m² Final     eGFR   Date Value Ref Range Status   06/20/2022 53 (L) >=60 mL/min/1.73m² Final     No results found for: TSH  Lab Results   Component Value Date    CHOL 183 02/15/2022    CHOL 277 10/19/2020     Lab Results   Component Value Date    HDL 92 (H) 02/15/2022    HDL 69 10/19/2020     Lab Results   Component Value Date    LDLCALC 66 02/15/2022    LDLCALC 148 10/19/2020     Lab Results   Component Value Date    TRIG 125 02/15/2022    TRIG 299 10/19/2020     Lab Results   Component Value Date    CHOLHDL 2.0 02/15/2022    CHOLHDL 4.0 10/19/2020     Lab Results   Component Value Date    HGBA1C 5.9 04/19/2021         Assessment and Plan   Abdominal pain, unspecified abdominal location  -     Ambulatory referral/consult to Gastroenterology; Future; Expected date: 07/04/2022    Constipation, unspecified constipation type  -     Ambulatory referral/consult to Gastroenterology; Future; Expected date:  07/04/2022  -     polyethylene glycol (GLYCOLAX) 17 gram/dose powder; Take 17 g by mouth once daily. Mix with 8 ounces of juice or water  Dispense: 1530 g; Refill: 0    Other chronic pain          Patient Instructions  Patient Instructions   Referral to GI, someone will call you with date and time of appointment, if you have not heard from anyone in two weeks, call us here at clinic     Miralax as prescribed     Take all medication only as prescribed   Education; constipation discharge instructions   S/S for which to report to ED discussed

## 2022-06-29 ENCOUNTER — OFFICE VISIT (OUTPATIENT)
Dept: GASTROENTEROLOGY | Facility: CLINIC | Age: 72
End: 2022-06-29
Payer: MEDICARE

## 2022-06-29 VITALS
DIASTOLIC BLOOD PRESSURE: 79 MMHG | HEIGHT: 62 IN | RESPIRATION RATE: 20 BRPM | BODY MASS INDEX: 19.57 KG/M2 | OXYGEN SATURATION: 98 % | SYSTOLIC BLOOD PRESSURE: 138 MMHG | WEIGHT: 106.38 LBS | HEART RATE: 104 BPM

## 2022-06-29 DIAGNOSIS — R68.81 EARLY SATIETY: ICD-10-CM

## 2022-06-29 DIAGNOSIS — Z12.11 SCREENING FOR MALIGNANT NEOPLASM OF COLON: ICD-10-CM

## 2022-06-29 DIAGNOSIS — R10.9 ABDOMINAL PAIN, UNSPECIFIED ABDOMINAL LOCATION: Primary | ICD-10-CM

## 2022-06-29 DIAGNOSIS — K59.00 CONSTIPATION, UNSPECIFIED CONSTIPATION TYPE: ICD-10-CM

## 2022-06-29 PROCEDURE — 99214 PR OFFICE/OUTPT VISIT, EST, LEVL IV, 30-39 MIN: ICD-10-PCS | Mod: ,,, | Performed by: NURSE PRACTITIONER

## 2022-06-29 PROCEDURE — 99214 OFFICE O/P EST MOD 30 MIN: CPT | Mod: ,,, | Performed by: NURSE PRACTITIONER

## 2022-06-29 NOTE — PATIENT INSTRUCTIONS
Small frequent meals. No raw fruits or vegetables    Continue Protonix    Milk of magnesia for constipation

## 2022-06-29 NOTE — PROGRESS NOTES
Amelie Cerrato is a 71 y.o. female here for Establish Care, Abdominal Pain (3 months, was told was constipation, took medicine but did not relieve abd pain. Having bowel movements daily. /Hurts in the RUQ), Constipation, and Bloated (Every day)        PCP: Nabila Alegria  Referring Provider: Nabila Alegria, Fnp  [address] [facility]  [facility] [place] [id number]     HPI:  Presents for abdominal pain. Reports pain has been present for approx 3 months. Eating increases the abdominal pain. Pain is epigastric and RUQ. Endorses early satiety and decreased appetite. Has had previous cholecystectomy. Abdominal xray reviewed, did show mild colonic stool, no obstruction. Does have abdominal bloating. States that she has a daily bowel movement. Previous colonoscopy was over 10 years ago. Denies hematochezia or melena. CT abdomen and pelvis in August 2021 from the [facility], revealed colonic wall thickening possible enterocolitis or ileus. She states she recently had a CT scan in [place] that was read as normal. Report is not available.        ROS:  Review of Systems   Constitutional: Negative for appetite change, fatigue, fever and unexpected weight change.   HENT: Negative for trouble swallowing.    Respiratory: Negative for shortness of breath.    Cardiovascular: Negative for chest pain.   Gastrointestinal: Positive for abdominal distention, abdominal pain and constipation. Negative for anal bleeding, blood in stool, change in bowel habit, diarrhea, nausea, rectal pain, vomiting, reflux, fecal incontinence and change in bowel habit.   Genitourinary: Negative for dysuria and urgency.   Musculoskeletal: Positive for back pain and gait problem. Negative for joint swelling.   Integumentary:  Negative for pallor and rash.   Neurological: Negative for dizziness and light-headedness.   Psychiatric/Behavioral: The patient is not nervous/anxious.           PMHX:  has a past medical history of Acid reflux, Anxiety,  Chronic pain syndrome, Coccyx sprain, COPD (chronic obstructive pulmonary disease), Depression, Fall at home (10/01/2021), Fracture of multiple pubic rami, left, closed, initial encounter (10/01/2021), HLD (hyperlipidemia), Hypertension, Hypokalemia, Hypomagnesemia, Low back pain, Lumbar radiculopathy, Lumbar stenosis, Lumbosacral spondylosis, Neuropathy, Radiculopathy, lumbosacral region, Renal disorder, Spondylolisthesis, lumbar region, Spondylosis without myelopathy or radiculopathy, lumbosacral region, and Thoracic radiculopathy.    PSHX:  has a past surgical history that includes Hysterectomy; Cholecystectomy; Epidural steroid injection into lumbar spine (N/A, 05/27/2020); Epidural steroid injection into thoracic spine (N/A, 02/03/2021); Injection of facet joint (Bilateral, 12/04/2020); Epidural steroid injection into thoracic spine (N/A, 3/18/2021); Injection of anesthetic agent around medial branch nerves innervating lumbar facet joint (Bilateral, 4/22/2021); Radiofrequency ablation of lumbar medial branch nerve at single level (Bilateral, 5/20/2021); Cystoscopy; and Epidural steroid injection into thoracic spine (N/A, 12/23/2021).    PFHX: family history includes Heart disease in her father; Hypertension in her mother.    PSlHX:  reports that she has quit smoking. She has never used smokeless tobacco. She reports that she does not drink alcohol and does not use drugs.        Review of patient's allergies indicates:   Allergen Reactions    Insect venom      Note: - Phreesia 05/07/2019    Sulfa (sulfonamide antibiotics) Nausea And Vomiting       Medication List with Changes/Refills   Current Medications    ATORVASTATIN (LIPITOR) 10 MG TABLET    Take 1 tablet (10 mg total) by mouth every evening.    BRIMONIDINE 0.2% (ALPHAGAN) 0.2 % DROP    Place 1 drop into both eyes 2 (two) times daily.    BUSPIRONE (BUSPAR) 15 MG TABLET    Take 1 tablet (15 mg total) by mouth 3 (three) times daily.    CHLORTHALIDONE  "(HYGROTEN) 25 MG TAB    Take one half tablet by mouth once daily    CYCLOBENZAPRINE (FLEXERIL) 10 MG TABLET    Take 1 tablet (10 mg total) by mouth every 8 (eight) hours.    CYPROHEPTADINE (PERIACTIN) 4 MG TABLET    Take 1 tablet (4 mg total) by mouth 3 (three) times daily.    DULOXETINE (CYMBALTA) 60 MG CAPSULE    Take 1 capsule (60 mg total) by mouth every 12 (twelve) hours.    HYDROCODONE-ACETAMINOPHEN (NORCO)  MG PER TABLET    Take 1 tablet by mouth every 8 (eight) hours.    HYDROCODONE-ACETAMINOPHEN (NORCO)  MG PER TABLET    Take 1 tablet by mouth every 8 (eight) hours.    INCRUSE ELLIPTA 62.5 MCG/ACTUATION INHALATION CAPSULE    INHALE ONE PUFF BY MOUTH DAILY AT THE SAME TIME EACH DAY    LATANOPROST 0.005 % OPHTHALMIC SOLUTION    Place 1 drop into both eyes every evening.    LOSARTAN-HYDROCHLOROTHIAZIDE 50-12.5 MG (HYZAAR) 50-12.5 MG PER TABLET    Take 1 tablet by mouth once daily.    PANTOPRAZOLE (PROTONIX) 40 MG TABLET    Take 1 tablet (40 mg total) by mouth 2 (two) times daily before meals.    POLYETHYLENE GLYCOL (GLYCOLAX) 17 GRAM/DOSE POWDER    Take 17 g by mouth once daily. Mix with 8 ounces of juice or water    PREDNISONE (DELTASONE) 10 MG TABLET    TAKE ONE TABLET BY MOUTH EVERY DAY OR EVERY OTHER DAY as directed    TIZANIDINE (ZANAFLEX) 4 MG TABLET    Take 4 mg by mouth nightly as needed.        Objective Findings:  Vital Signs:  /79   Pulse 104   Resp 20   Ht 5' 2" (1.575 m)   Wt 48.3 kg (106 lb 6.4 oz)   LMP  (LMP Unknown)   SpO2 98%   BMI 19.46 kg/m²  Body mass index is 19.46 kg/m².    Physical Exam:  Physical Exam  Vitals and nursing note reviewed.   Constitutional:       General: She is not in acute distress.     Appearance: Normal appearance. She is not ill-appearing.   HENT:      Mouth/Throat:      Mouth: Mucous membranes are moist.   Cardiovascular:      Rate and Rhythm: Normal rate and regular rhythm.   Pulmonary:      Breath sounds: No wheezing, rhonchi or rales. "   Abdominal:      General: Bowel sounds are normal. There is no distension.      Palpations: Abdomen is soft. There is no mass.      Tenderness: There is abdominal tenderness. There is no guarding or rebound.      Hernia: No hernia is present.   Skin:     General: Skin is warm and dry.      Coloration: Skin is not jaundiced or pale.   Neurological:      Mental Status: She is alert and oriented to person, place, and time.      Gait: Gait abnormal.   Psychiatric:         Mood and Affect: Mood normal.          Labs:  Lab Results   Component Value Date    WBC 9.52 06/20/2022    HGB 12.2 06/20/2022    HCT 39.3 06/20/2022    MCV 92.0 06/20/2022    RDW 18.3 (H) 06/20/2022     06/20/2022    LYMPH 31.9 06/20/2022    LYMPH 3.04 06/20/2022    MONO 11.8 (H) 06/20/2022    EOS 0.30 06/20/2022    BASO 0.04 06/20/2022     Lab Results   Component Value Date     06/20/2022    K 3.6 06/20/2022     06/20/2022    CO2 33 (H) 06/20/2022    GLU 98 06/20/2022    BUN 11 06/20/2022    CREATININE 1.08 (H) 06/20/2022    CALCIUM 8.8 06/20/2022    PROT 6.3 (L) 06/20/2022    ALBUMIN 2.8 (L) 06/20/2022    BILITOT 0.4 06/20/2022    ALKPHOS 74 06/20/2022    AST 42 (H) 06/20/2022    ALT 41 06/20/2022         Imaging: X-Ray Abdomen AP 1 View (KUB)    Result Date: 6/20/2022  EXAMINATION: XR ABDOMEN AP 1 VIEW CLINICAL HISTORY: Unspecified abdominal pain TECHNIQUE: AP View(s) of the abdomen was performed. COMPARISON: 08/31/2021 FINDINGS: Previous thoracolumbar vertebral augmentation.  There is mild degree of colonic stool.  No bowel obstruction.  Surgical clips right upper quadrant.     Mild colonic stool. Electronically signed by: Nuno Machado Date:    06/20/2022 Time:    15:27    X-Ray Chest AP Portable    Result Date: 6/20/2022  EXAMINATION: XR CHEST AP PORTABLE CLINICAL HISTORY: Shortness of breath TECHNIQUE: Single frontal view of the chest was performed. COMPARISON: 08/30/2021 FINDINGS: Cardiac silhouette is borderline in size and  similar prior.  The lungs appear clear with similar appearance of a linear atelectasis in the left mid lung.  No focal infiltrate.  No pneumothorax.  No pleural effusion.  Previous thoracolumbar vertebral augmentations seen. Electronically signed by: Nuno Machado Date:    06/20/2022 Time:    15:26        Assessment:  Amelie Cerrato is a 71 y.o. female here with:  1. Abdominal pain, unspecified abdominal location    2. Constipation, unspecified constipation type    3. Early satiety    4. Screening for malignant neoplasm of colon          Recommendations:  1. EGD  2. GES  Continue Protonix. Small frequent meals. Avoid NSAID's. No spicy or greasy foods  3. CBC, CMP, Amylase, Lipase  4. Colonoscopy screening, greater than 10 years since the last    Follow up in about 6 weeks (around 8/10/2022).      Order summary:  Orders Placed This Encounter    NM Gastric Emptying    CBC Auto Differential    Comprehensive Metabolic Panel    Amylase    Lipase    EGD    Colonoscopy       Thank you for allowing me to participate in the care of Amelie Cerrato.      SHONDA Beard

## 2022-07-18 NOTE — TELEPHONE ENCOUNTER
----- Message from Mtia Johnson MA sent at 7/18/2022  9:05 AM CDT -----  Regarding: Medication Refill Request  Patient called requesting Protonix refill be sent to [facility]. States she is out of medication. Informed her that medication request could take approximately 24-48 hours.

## 2022-07-18 NOTE — TELEPHONE ENCOUNTER
This medication was written by Dr Washington on 5/16/22 # 90 with one refills, should not be due at this time?

## 2022-07-20 RX ORDER — PANTOPRAZOLE SODIUM 40 MG/1
40 TABLET, DELAYED RELEASE ORAL
Qty: 60 TABLET | Refills: 1 | Status: SHIPPED | OUTPATIENT
Start: 2022-07-20 | End: 2022-08-26 | Stop reason: SDUPTHER

## 2022-07-21 ENCOUNTER — HOSPITAL ENCOUNTER (EMERGENCY)
Facility: HOSPITAL | Age: 72
Discharge: HOME | End: 2022-07-21
Payer: MEDICARE

## 2022-07-21 VITALS
BODY MASS INDEX: 20.8 KG/M2 | WEIGHT: 113 LBS | HEIGHT: 62 IN | RESPIRATION RATE: 20 BRPM | TEMPERATURE: 98 F | OXYGEN SATURATION: 96 % | HEART RATE: 84 BPM | DIASTOLIC BLOOD PRESSURE: 79 MMHG | SYSTOLIC BLOOD PRESSURE: 142 MMHG

## 2022-07-21 DIAGNOSIS — K59.00 CONSTIPATION, UNSPECIFIED CONSTIPATION TYPE: Primary | ICD-10-CM

## 2022-07-21 LAB
ALBUMIN SERPL BCP-MCNC: 3.9 G/DL (ref 3.5–5)
ALBUMIN/GLOB SERPL: 1 {RATIO}
ALP SERPL-CCNC: 112 U/L (ref 55–142)
ALT SERPL W P-5'-P-CCNC: 26 U/L (ref 13–56)
AMYLASE SERPL-CCNC: 97 U/L (ref 25–115)
ANION GAP SERPL CALCULATED.3IONS-SCNC: 14 MMOL/L (ref 7–16)
AST SERPL W P-5'-P-CCNC: 19 U/L (ref 15–37)
BASOPHILS # BLD AUTO: 0.03 K/UL (ref 0–0.2)
BASOPHILS NFR BLD AUTO: 0.3 % (ref 0–1)
BILIRUB SERPL-MCNC: 0.6 MG/DL (ref 0–1.2)
BILIRUB UR QL STRIP: NEGATIVE
BUN SERPL-MCNC: 13 MG/DL (ref 7–18)
BUN/CREAT SERPL: 13 (ref 6–20)
CALCIUM SERPL-MCNC: 10.2 MG/DL (ref 8.5–10.1)
CHLORIDE SERPL-SCNC: 90 MMOL/L (ref 98–107)
CLARITY UR: CLEAR
CO2 SERPL-SCNC: 28 MMOL/L (ref 21–32)
COLOR UR: COLORLESS
CREAT SERPL-MCNC: 1.03 MG/DL (ref 0.55–1.02)
DIFFERENTIAL METHOD BLD: ABNORMAL
EOSINOPHIL # BLD AUTO: 0.01 K/UL (ref 0–0.5)
EOSINOPHIL NFR BLD AUTO: 0.1 % (ref 1–4)
ERYTHROCYTE [DISTWIDTH] IN BLOOD BY AUTOMATED COUNT: 16.6 % (ref 11.5–14.5)
FLUAV AG UPPER RESP QL IA.RAPID: NEGATIVE
FLUBV AG UPPER RESP QL IA.RAPID: NEGATIVE
GLOBULIN SER-MCNC: 4 G/DL (ref 2–4)
GLUCOSE SERPL-MCNC: 81 MG/DL (ref 74–106)
GLUCOSE UR STRIP-MCNC: NORMAL MG/DL
HCT VFR BLD AUTO: 47.1 % (ref 38–47)
HGB BLD-MCNC: 15.9 G/DL (ref 12–16)
IMM GRANULOCYTES # BLD AUTO: 0.17 K/UL (ref 0–0.04)
IMM GRANULOCYTES NFR BLD: 1.5 % (ref 0–0.4)
KETONES UR STRIP-SCNC: NEGATIVE MG/DL
LEUKOCYTE ESTERASE UR QL STRIP: NEGATIVE
LIPASE SERPL-CCNC: 137 U/L (ref 73–393)
LYMPHOCYTES # BLD AUTO: 2.05 K/UL (ref 1–4.8)
LYMPHOCYTES NFR BLD AUTO: 18.6 % (ref 27–41)
MCH RBC QN AUTO: 29.2 PG (ref 27–31)
MCHC RBC AUTO-ENTMCNC: 33.8 G/DL (ref 32–36)
MCV RBC AUTO: 86.4 FL (ref 80–96)
MONOCYTES # BLD AUTO: 0.6 K/UL (ref 0–0.8)
MONOCYTES NFR BLD AUTO: 5.5 % (ref 2–6)
MPC BLD CALC-MCNC: 8.8 FL (ref 9.4–12.4)
NEUTROPHILS # BLD AUTO: 8.14 K/UL (ref 1.8–7.7)
NEUTROPHILS NFR BLD AUTO: 74 % (ref 53–65)
NITRITE UR QL STRIP: NEGATIVE
NRBC # BLD AUTO: 0 X10E3/UL
NRBC, AUTO (.00): 0 %
PH UR STRIP: 7 PH UNITS
PLATELET # BLD AUTO: 526 K/UL (ref 150–400)
POTASSIUM SERPL-SCNC: 5 MMOL/L (ref 3.5–5.1)
PROT SERPL-MCNC: 7.9 G/DL (ref 6.4–8.2)
PROT UR QL STRIP: NEGATIVE
RBC # BLD AUTO: 5.45 M/UL (ref 4.2–5.4)
RBC # UR STRIP: NEGATIVE /UL
SARS-COV+SARS-COV-2 AG RESP QL IA.RAPID: NEGATIVE
SODIUM SERPL-SCNC: 127 MMOL/L (ref 136–145)
SP GR UR STRIP: 1
UROBILINOGEN UR STRIP-ACNC: NORMAL MG/DL
WBC # BLD AUTO: 11 K/UL (ref 4.5–11)

## 2022-07-21 PROCEDURE — 96374 THER/PROPH/DIAG INJ IV PUSH: CPT

## 2022-07-21 PROCEDURE — 83690 ASSAY OF LIPASE: CPT | Performed by: NURSE PRACTITIONER

## 2022-07-21 PROCEDURE — 99283 EMERGENCY DEPT VISIT LOW MDM: CPT | Mod: ,,, | Performed by: NURSE PRACTITIONER

## 2022-07-21 PROCEDURE — 63600175 PHARM REV CODE 636 W HCPCS: Performed by: NURSE PRACTITIONER

## 2022-07-21 PROCEDURE — 82150 ASSAY OF AMYLASE: CPT | Performed by: NURSE PRACTITIONER

## 2022-07-21 PROCEDURE — 96361 HYDRATE IV INFUSION ADD-ON: CPT

## 2022-07-21 PROCEDURE — 36415 COLL VENOUS BLD VENIPUNCTURE: CPT | Performed by: NURSE PRACTITIONER

## 2022-07-21 PROCEDURE — 25000003 PHARM REV CODE 250: Performed by: NURSE PRACTITIONER

## 2022-07-21 PROCEDURE — 99284 EMERGENCY DEPT VISIT MOD MDM: CPT | Mod: 25,CS

## 2022-07-21 PROCEDURE — 99283 PR EMERGENCY DEPT VISIT,LEVEL III: ICD-10-PCS | Mod: ,,, | Performed by: NURSE PRACTITIONER

## 2022-07-21 PROCEDURE — 87428 SARSCOV & INF VIR A&B AG IA: CPT | Performed by: NURSE PRACTITIONER

## 2022-07-21 PROCEDURE — 81003 URINALYSIS AUTO W/O SCOPE: CPT | Performed by: NURSE PRACTITIONER

## 2022-07-21 PROCEDURE — 85025 COMPLETE CBC W/AUTO DIFF WBC: CPT | Performed by: NURSE PRACTITIONER

## 2022-07-21 PROCEDURE — 80053 COMPREHEN METABOLIC PANEL: CPT | Performed by: NURSE PRACTITIONER

## 2022-07-21 RX ORDER — SYRING-NEEDL,DISP,INSUL,0.3 ML 29 G X1/2"
296 SYRINGE, EMPTY DISPOSABLE MISCELLANEOUS
Status: COMPLETED | OUTPATIENT
Start: 2022-07-21 | End: 2022-07-21

## 2022-07-21 RX ORDER — KETOROLAC TROMETHAMINE 30 MG/ML
30 INJECTION, SOLUTION INTRAMUSCULAR; INTRAVENOUS
Status: COMPLETED | OUTPATIENT
Start: 2022-07-21 | End: 2022-07-21

## 2022-07-21 RX ADMIN — KETOROLAC TROMETHAMINE 30 MG: 30 INJECTION, SOLUTION INTRAMUSCULAR at 07:07

## 2022-07-21 RX ADMIN — Medication 296 ML: at 07:07

## 2022-07-21 RX ADMIN — SODIUM CHLORIDE 1000 ML: 9 INJECTION, SOLUTION INTRAVENOUS at 05:07

## 2022-07-21 NOTE — ED NOTES
Pt received, pt lying in bed on back. Pt stated having abdominal pain. There is tenderness in all 4 quadrants.

## 2022-07-22 ENCOUNTER — TELEPHONE (OUTPATIENT)
Dept: EMERGENCY MEDICINE | Facility: HOSPITAL | Age: 72
End: 2022-07-22
Payer: MEDICARE

## 2022-07-22 NOTE — DISCHARGE INSTRUCTIONS
Drink one bottle of magnesium citrate today.    Follow with four 16 oz bottles of water.    Add Miralax 1 capful to daily medications x 2 weeks, then use only as needed.   Increase water and fiber intake in diet.   Follow up with PCP in 2 days if symptoms do not resolve.   Return to ER with new or worsening symptoms.

## 2022-07-22 NOTE — ED PROVIDER NOTES
Encounter Date: 7/21/2022       History     Chief Complaint   Patient presents with    Abdominal Pain     Patient presents to ER with complaint of abdominal pain.  PAtietn states pain has been ongoing x 24-48 hours.  She reports last bowel movement as this am without blood or mucus in stool.  Patient denies history of constipation, although she was seen in ER recently and given that diagnosis.  She denies constipation and states she has been going to bathroom.  She also admits to taking lactulose and dulcolax over the last 2 days.  She denies nausea or vomiting.     The history is provided by the patient. No  was used.     Review of patient's allergies indicates:   Allergen Reactions    Insect venom      Note: - Phreesia 05/07/2019    Sulfa (sulfonamide antibiotics) Nausea And Vomiting     Past Medical History:   Diagnosis Date    Acid reflux     Anxiety     Chronic pain syndrome     Coccyx sprain     broken tailbone     COPD (chronic obstructive pulmonary disease)     Depression     Fall at home 10/01/2021    Fracture of multiple pubic rami, left, closed, initial encounter 10/01/2021    HLD (hyperlipidemia)     Hypertension     Hypokalemia     Hypomagnesemia     Low back pain     Lumbar radiculopathy     Lumbar stenosis     Lumbosacral spondylosis     Neuropathy     Radiculopathy, lumbosacral region     Renal disorder     Spondylolisthesis, lumbar region     Spondylosis without myelopathy or radiculopathy, lumbosacral region     Thoracic radiculopathy      Past Surgical History:   Procedure Laterality Date    CHOLECYSTECTOMY      CYSTOSCOPY      EPIDURAL STEROID INJECTION INTO LUMBAR SPINE N/A 05/27/2020    L1-2 WILD     EPIDURAL STEROID INJECTION INTO THORACIC SPINE N/A 02/03/2021    T9-10 WILD     EPIDURAL STEROID INJECTION INTO THORACIC SPINE N/A 3/18/2021    Procedure: INJECTION, STEROID, SPINE, THORACIC, EPIDURAL;  Surgeon: Arelis Burns MD;  Location: [place]  [facility];  Service: Pain Management;  Laterality: N/A;    EPIDURAL STEROID INJECTION INTO THORACIC SPINE N/A 12/23/2021    Procedure: Injection-steroid-epidural-thoracic T9/10;  Surgeon: Arelis Burns MD;  Location: [facility];  Service: Pain Management;  Laterality: N/A;  HAD VAC  WILL BRING CARD    HYSTERECTOMY      INJECTION OF ANESTHETIC AGENT AROUND MEDIAL BRANCH NERVES INNERVATING LUMBAR FACET JOINT Bilateral 4/22/2021    Procedure: Block-nerve-medial branch-lumbar Bilateral L3-4,4-5,5-S1 MBB #2;  Surgeon: Arelis Burns MD;  Location: [facility];  Service: Pain Management;  Laterality: Bilateral;    INJECTION OF FACET JOINT Bilateral 12/04/2020    RADIOFREQUENCY ABLATION OF LUMBAR MEDIAL BRANCH NERVE AT SINGLE LEVEL Bilateral 5/20/2021    Procedure: RADIOFREQUENCY ABLATION, NERVE, SPINAL, LUMBAR, MEDIAL BRANCH, 1 LEVEL;  Surgeon: Arelis Burns MD;  Location: [facility];  Service: Pain Management;  Laterality: Bilateral;  Bilateral L3-S1 RFTC (both sides same day)     Family History   Problem Relation Age of Onset    Hypertension Mother     Heart disease Father      Social History     Tobacco Use    Smoking status: Former Smoker    Smokeless tobacco: Never Used   Substance Use Topics    Alcohol use: Never     Comment: unknown    Drug use: Never     Types: Hydrocodone     Review of Systems   Gastrointestinal: Positive for abdominal distention and abdominal pain.   All other systems reviewed and are negative.      Physical Exam     Initial Vitals [07/21/22 1650]   BP Pulse Resp Temp SpO2   (!) 142/79 84 20 97.7 °F (36.5 °C) 96 %      MAP       --         Physical Exam    Nursing note and vitals reviewed.  Constitutional: She appears well-developed and well-nourished.   HENT:   Head: Normocephalic.   Right Ear: External ear normal.   Left Ear: External ear normal.   Nose: Nose normal.   Mouth/Throat: Oropharynx is clear and moist.   Eyes: Conjunctivae and EOM are  normal. Pupils are equal, round, and reactive to light.   Neck: Neck supple.   Normal range of motion.  Cardiovascular: Normal rate, regular rhythm, normal heart sounds and intact distal pulses.   Pulmonary/Chest: Breath sounds normal.   Abdominal: Abdomen is soft. Bowel sounds are normal.   Musculoskeletal:         General: Normal range of motion.      Cervical back: Normal range of motion and neck supple.     Neurological: She is alert and oriented to person, place, and time. She has normal strength. GCS score is 15. GCS eye subscore is 4. GCS verbal subscore is 5. GCS motor subscore is 6.   Skin: Skin is warm and dry. Capillary refill takes less than 2 seconds.   Psychiatric: She has a normal mood and affect. Her behavior is normal. Judgment and thought content normal.         Medical Screening Exam   See Full Note    ED Course   Procedures  Labs Reviewed   COMPREHENSIVE METABOLIC PANEL - Abnormal; Notable for the following components:       Result Value    Sodium 127 (*)     Chloride 90 (*)     Creatinine 1.03 (*)     Calcium 10.2 (*)     eGFR 56 (*)     All other components within normal limits   CBC WITH DIFFERENTIAL - Abnormal; Notable for the following components:    RBC 5.45 (*)     Hematocrit 47.1 (*)     RDW 16.6 (*)     Platelet Count 526 (*)     MPV 8.8 (*)     Neutrophils % 74.0 (*)     Lymphocytes % 18.6 (*)     Eosinophils % 0.1 (*)     Immature Granulocytes % 1.5 (*)     Neutrophils, Abs 8.14 (*)     Immature Granulocytes, Absolute 0.17 (*)     All other components within normal limits   AMYLASE - Normal   LIPASE - Normal   SARS-COV2 (COVID) W/ FLU ANTIGEN - Normal    Narrative:     Negative SARS-CoV results should not be used as the sole basis for treatment or patient management decisions; negative results should be considered in the context of a patient's recent exposures, history and the presene of clinical signs and symptoms consistent with COVID-19.  Negative results should be treated as  presumptive and confirmed by molecular assay, if necessary for patient management.   CBC W/ AUTO DIFFERENTIAL    Narrative:     The following orders were created for panel order CBC auto differential.  Procedure                               Abnormality         Status                     ---------                               -----------         ------                     CBC with Differential[[id number]]        Abnormal            Final result                 Please view results for these tests on the individual orders.   URINALYSIS, REFLEX TO URINE CULTURE   EXTRA TUBES    Narrative:     The following orders were created for panel order EXTRA TUBES.  Procedure                               Abnormality         Status                     ---------                               -----------         ------                     Light Blue Top Hold[[id number]]                              In process                 Light Green Top Hold[[id number]]                             In process                   Please view results for these tests on the individual orders.   LIGHT BLUE TOP HOLD   LIGHT GREEN TOP HOLD          Imaging Results          XR ABDOMEN, ACUTE 2 OR MORE VIEWS WITH CHEST (Final result)  Result time 07/21/22 17:35:17    Final result by David Soni DO (07/21/22 17:35:17)                 Impression:      No bowel obstruction or renal calculi.    Mild colonic stool.      Electronically signed by: David Soni  Date:    07/21/2022  Time:    17:35             Narrative:    EXAMINATION:  XR ABDOMEN ACUTE 2 OR MORE VIEWS WITH CHEST    CLINICAL HISTORY:  abdominal pain;    TECHNIQUE:  XR ABDOMEN ACUTE 2 OR MORE VIEWS WITH CHEST    COMPARISON:  8/31/21    FINDINGS:  Lungs are clear.  Normal pleura.  Mildly enlarged heart.    There is no bowel obstruction.  No free air.  No renal calculi.    Liver and renal silhouettes are normal.    No acute bone findings.                                 Medications    sodium chloride 0.9% bolus 1,000 mL (0 mLs Intravenous Stopped 7/21/22 1855)   ketorolac injection 30 mg (30 mg Intravenous Given 7/21/22 1918)   magnesium citrate solution 296 mL (296 mLs Oral Given 7/21/22 1959)     Medical Decision Making:   ED Management:  Patient rated pain 10/10.  Patient requesting pain medication.  Xrays and lab results pending.  Discussed use of pain medication with constipation and worsening of condition.  Patient verbalizes understanding and request medication.                    Clinical Impression:   Final diagnoses:  [K59.00] Constipation, unspecified constipation type (Primary)          ED Disposition Condition    Discharge Stable        ED Prescriptions     None        Follow-up Information     Follow up With Specialties Details Why Contact Info    [facility] Schedule an appointment as soon as possible for a visit in 2 days If symptoms worsen [address] [place] [address]  [facility] [address]  [phone]             MARY Ragsdale  07/21/22 2001

## 2022-08-15 DIAGNOSIS — E78.5 HYPERLIPIDEMIA, UNSPECIFIED HYPERLIPIDEMIA TYPE: ICD-10-CM

## 2022-08-15 DIAGNOSIS — F32.A DEPRESSIVE DISORDER: Chronic | ICD-10-CM

## 2022-08-15 DIAGNOSIS — I10 ESSENTIAL (PRIMARY) HYPERTENSION: Chronic | ICD-10-CM

## 2022-08-15 RX ORDER — LOSARTAN POTASSIUM AND HYDROCHLOROTHIAZIDE 12.5; 5 MG/1; MG/1
1 TABLET ORAL DAILY
Qty: 7 TABLET | Refills: 0 | Status: SHIPPED | OUTPATIENT
Start: 2022-08-15 | End: 2022-08-26 | Stop reason: SDUPTHER

## 2022-08-15 RX ORDER — ATORVASTATIN CALCIUM 10 MG/1
10 TABLET, FILM COATED ORAL NIGHTLY
Qty: 7 TABLET | Refills: 0 | Status: SHIPPED | OUTPATIENT
Start: 2022-08-15 | End: 2022-08-26 | Stop reason: SDUPTHER

## 2022-08-15 RX ORDER — CHLORTHALIDONE 25 MG/1
TABLET ORAL
Qty: 4 TABLET | Refills: 0 | Status: SHIPPED | OUTPATIENT
Start: 2022-08-15 | End: 2022-08-26 | Stop reason: SDUPTHER

## 2022-08-15 RX ORDER — BUSPIRONE HYDROCHLORIDE 15 MG/1
15 TABLET ORAL 3 TIMES DAILY
Qty: 21 TABLET | Refills: 0 | Status: SHIPPED | OUTPATIENT
Start: 2022-08-15 | End: 2022-08-26 | Stop reason: SDUPTHER

## 2022-08-16 ENCOUNTER — OFFICE VISIT (OUTPATIENT)
Dept: PAIN MEDICINE | Facility: CLINIC | Age: 72
End: 2022-08-16
Payer: MEDICARE

## 2022-08-16 VITALS
SYSTOLIC BLOOD PRESSURE: 159 MMHG | HEIGHT: 62 IN | WEIGHT: 113 LBS | DIASTOLIC BLOOD PRESSURE: 78 MMHG | BODY MASS INDEX: 20.8 KG/M2 | HEART RATE: 94 BPM

## 2022-08-16 DIAGNOSIS — Z79.899 ENCOUNTER FOR LONG-TERM (CURRENT) USE OF OTHER MEDICATIONS: Primary | ICD-10-CM

## 2022-08-16 DIAGNOSIS — M47.817 SPONDYLOSIS WITHOUT MYELOPATHY OR RADICULOPATHY, LUMBOSACRAL REGION: Chronic | ICD-10-CM

## 2022-08-16 DIAGNOSIS — M43.16 SPONDYLOLISTHESIS, LUMBAR REGION: Chronic | ICD-10-CM

## 2022-08-16 DIAGNOSIS — M54.14 THORACIC RADICULOPATHY: Chronic | ICD-10-CM

## 2022-08-16 PROCEDURE — 80305 DRUG TEST PRSMV DIR OPT OBS: CPT | Mod: PBBFAC | Performed by: PHYSICIAN ASSISTANT

## 2022-08-16 PROCEDURE — 99214 OFFICE O/P EST MOD 30 MIN: CPT | Mod: S$PBB,,, | Performed by: PHYSICIAN ASSISTANT

## 2022-08-16 PROCEDURE — 99215 OFFICE O/P EST HI 40 MIN: CPT | Mod: PBBFAC | Performed by: PHYSICIAN ASSISTANT

## 2022-08-16 PROCEDURE — 99214 PR OFFICE/OUTPT VISIT, EST, LEVL IV, 30-39 MIN: ICD-10-PCS | Mod: S$PBB,,, | Performed by: PHYSICIAN ASSISTANT

## 2022-08-16 RX ORDER — HYDROCODONE BITARTRATE AND ACETAMINOPHEN 10; 325 MG/1; MG/1
1 TABLET ORAL EVERY 8 HOURS
Qty: 90 TABLET | Refills: 0 | Status: CANCELLED | OUTPATIENT
Start: 2022-10-01 | End: 2022-10-31

## 2022-08-16 RX ORDER — DULOXETIN HYDROCHLORIDE 60 MG/1
60 CAPSULE, DELAYED RELEASE ORAL EVERY 12 HOURS
Qty: 60 CAPSULE | Refills: 2 | Status: SHIPPED | OUTPATIENT
Start: 2022-08-16 | End: 2022-08-26 | Stop reason: SDUPTHER

## 2022-08-16 RX ORDER — HYDROCODONE BITARTRATE AND ACETAMINOPHEN 10; 325 MG/1; MG/1
1 TABLET ORAL EVERY 8 HOURS
Qty: 90 TABLET | Refills: 0 | Status: CANCELLED | OUTPATIENT
Start: 2022-10-31 | End: 2022-11-30

## 2022-08-16 RX ORDER — CYCLOBENZAPRINE HCL 10 MG
10 TABLET ORAL EVERY 8 HOURS
Qty: 30 TABLET | Refills: 2 | Status: SHIPPED | OUTPATIENT
Start: 2022-08-16 | End: 2022-10-25 | Stop reason: SDUPTHER

## 2022-08-16 RX ORDER — HYDROCODONE BITARTRATE AND ACETAMINOPHEN 10; 325 MG/1; MG/1
1 TABLET ORAL EVERY 8 HOURS
Qty: 90 TABLET | Refills: 0 | Status: SHIPPED | OUTPATIENT
Start: 2022-09-01 | End: 2022-09-16 | Stop reason: SDUPTHER

## 2022-08-16 NOTE — H&P (VIEW-ONLY)
Subjective:      Patient ID: Amelie Cerrato is a 72 y.o. female.    Chief Complaint: Mid-back Pain and Low-back Pain      Mid-back Pain  This is a chronic problem. The current episode started more than 1 year ago. The problem occurs daily. The problem has been waxing and waning since onset. Associated symptoms include leg pain. Pertinent negatives include no bladder incontinence, chest pain or dysuria.   Low-back Pain  This is a chronic problem. The current episode started more than 1 year ago. The problem occurs daily. The problem has been waxing and waning since onset. Associated symptoms include leg pain. Pertinent negatives include no bladder incontinence, chest pain or dysuria.     Review of Systems   Constitutional: Negative for activity change, appetite change, chills, fatigue and unexpected weight change.   HENT: Negative for drooling, ear discharge, ear pain, facial swelling, mouth sores, nosebleeds, sore throat, trouble swallowing, voice change and goiter.    Eyes: Negative for photophobia, pain, discharge, redness and visual disturbance.   Respiratory: Negative for apnea, cough, choking, chest tightness, shortness of breath, wheezing and stridor.    Cardiovascular: Negative for chest pain, palpitations and leg swelling.   Gastrointestinal: Negative for abdominal distention, change in bowel habit, diarrhea, rectal pain, vomiting, fecal incontinence and change in bowel habit.   Endocrine: Negative for cold intolerance, heat intolerance, polydipsia, polyphagia and polyuria.   Genitourinary: Negative for bladder incontinence, dysuria, flank pain, frequency, hematuria and hot flashes.   Musculoskeletal: Positive for arthralgias, back pain, leg pain, myalgias, neck pain and neck stiffness.   Integumentary:  Negative for color change, pallor and rash.   Allergic/Immunologic: Negative for immunocompromised state.   Neurological: Negative for dizziness, vertigo, seizures, syncope, facial asymmetry, speech  difficulty, light-headedness, disturbances in coordination, memory loss and coordination difficulties.   Hematological: Negative for adenopathy. Does not bruise/bleed easily.   Psychiatric/Behavioral: Negative for agitation, behavioral problems, confusion, decreased concentration, dysphoric mood, hallucinations, self-injury and suicidal ideas. The patient is not nervous/anxious and is not hyperactive.            Past Surgical History:   Procedure Laterality Date    CHOLECYSTECTOMY      CYSTOSCOPY      EPIDURAL STEROID INJECTION INTO LUMBAR SPINE N/A 05/27/2020    L1-2 WILD     EPIDURAL STEROID INJECTION INTO THORACIC SPINE N/A 02/03/2021    T9-10 WILD     EPIDURAL STEROID INJECTION INTO THORACIC SPINE N/A 3/18/2021    Procedure: INJECTION, STEROID, SPINE, THORACIC, EPIDURAL;  Surgeon: Arelis Burns MD;  Location: [facility];  Service: Pain Management;  Laterality: N/A;    EPIDURAL STEROID INJECTION INTO THORACIC SPINE N/A 12/23/2021    Procedure: Injection-steroid-epidural-thoracic T9/10;  Surgeon: Arelis Burns MD;  Location: [facility];  Service: Pain Management;  Laterality: N/A;  HAD VAC  WILL BRING CARD    HYSTERECTOMY      INJECTION OF ANESTHETIC AGENT AROUND MEDIAL BRANCH NERVES INNERVATING LUMBAR FACET JOINT Bilateral 4/22/2021    Procedure: Block-nerve-medial branch-lumbar Bilateral L3-4,4-5,5-S1 MBB #2;  Surgeon: Arelis Burns MD;  Location: [facility];  Service: Pain Management;  Laterality: Bilateral;    INJECTION OF FACET JOINT Bilateral 12/04/2020    RADIOFREQUENCY ABLATION OF LUMBAR MEDIAL BRANCH NERVE AT SINGLE LEVEL Bilateral 5/20/2021    Procedure: RADIOFREQUENCY ABLATION, NERVE, SPINAL, LUMBAR, MEDIAL BRANCH, 1 LEVEL;  Surgeon: Arelis Burns MD;  Location: [facility];  Service: Pain Management;  Laterality: Bilateral;  Bilateral L3-S1 RFTC (both sides same day)          Objective:  Vitals:    08/16/22 0900 08/16/22 0901   BP: (!) 159/78    Pulse:  "94    Weight: 51.3 kg (113 lb)    Height: 5' 2.4" (1.585 m)    PainSc:   9   9         Physical Exam  Vitals and nursing note reviewed. Exam conducted with a chaperone present.   Constitutional:       General: She is awake. She is not in acute distress.     Appearance: Normal appearance. She is not toxic-appearing.   HENT:      Head: Normocephalic and atraumatic.      Nose: Nose normal.      Mouth/Throat:      Mouth: Mucous membranes are moist.      Pharynx: Oropharynx is clear.   Eyes:      Conjunctiva/sclera: Conjunctivae normal.      Pupils: Pupils are equal, round, and reactive to light.   Cardiovascular:      Rate and Rhythm: Normal rate.   Pulmonary:      Effort: Pulmonary effort is normal. No respiratory distress.   Abdominal:      Palpations: Abdomen is soft.   Musculoskeletal:         General: Normal range of motion.      Right shoulder: Tenderness present.      Left shoulder: Tenderness present.      Cervical back: Normal range of motion and neck supple. Tenderness present.      Thoracic back: Tenderness present.      Lumbar back: Tenderness present.   Skin:     General: Skin is warm and dry.   Neurological:      General: No focal deficit present.      Mental Status: She is alert and oriented to person, place, and time. Mental status is at baseline.      Cranial Nerves: No cranial nerve deficit (II-XII).   Psychiatric:         Mood and Affect: Mood normal.         Behavior: Behavior normal. Behavior is cooperative.         Thought Content: Thought content normal.           XR ABDOMEN, ACUTE 2 OR MORE VIEWS WITH CHEST  Narrative: EXAMINATION:  XR ABDOMEN ACUTE 2 OR MORE VIEWS WITH CHEST    CLINICAL HISTORY:  abdominal pain;    TECHNIQUE:  XR ABDOMEN ACUTE 2 OR MORE VIEWS WITH CHEST    COMPARISON:  8/31/21    FINDINGS:  Lungs are clear.  Normal pleura.  Mildly enlarged heart.    There is no bowel obstruction.  No free air.  No renal calculi.    Liver and renal silhouettes are normal.    No acute bone " findings.  Impression: No bowel obstruction or renal calculi.    Mild colonic stool.    Electronically signed by: David Soni  Date:    07/21/2022  Time:    17:35       Admission on 07/21/2022, Discharged on 07/21/2022   Component Date Value Ref Range Status    Sodium 07/21/2022 127 (A) 136 - 145 mmol/L Final    Potassium 07/21/2022 5.0  3.5 - 5.1 mmol/L Final    Chloride 07/21/2022 90 (A) 98 - 107 mmol/L Final    CO2 07/21/2022 28  21 - 32 mmol/L Final    Anion Gap 07/21/2022 14  7 - 16 mmol/L Final    Glucose 07/21/2022 81  74 - 106 mg/dL Final    BUN 07/21/2022 13  7 - 18 mg/dL Final    Creatinine 07/21/2022 1.03 (A) 0.55 - 1.02 mg/dL Final    BUN/Creatinine Ratio 07/21/2022 13  6 - 20 Final    Calcium 07/21/2022 10.2 (A) 8.5 - 10.1 mg/dL Final    Total Protein 07/21/2022 7.9  6.4 - 8.2 g/dL Final    Albumin 07/21/2022 3.9  3.5 - 5.0 g/dL Final    Globulin 07/21/2022 4.0  2.0 - 4.0 g/dL Final    A/G Ratio 07/21/2022 1.0   Final    Bilirubin, Total 07/21/2022 0.6  0.0 - 1.2 mg/dL Final    Alk Phos 07/21/2022 112  55 - 142 U/L Final    ALT 07/21/2022 26  13 - 56 U/L Final    AST 07/21/2022 19  15 - 37 U/L Final    eGFR 07/21/2022 56 (A) >=60 mL/min/1.73m² Final    Amylase 07/21/2022 97  25 - 115 U/L Final    Lipase 07/21/2022 137  73 - 393 U/L Final    Color, UA 07/21/2022 Colorless  Colorless, Straw, Yellow, Light Yellow, Dark Yellow Final    Clarity, UA 07/21/2022 Clear  Clear Final    pH, UA 07/21/2022 7.0  5.0 to 8.0 pH Units Final    Leukocytes, UA 07/21/2022 Negative  Negative Final    Nitrites, UA 07/21/2022 Negative  Negative Final    Protein, UA 07/21/2022 Negative  Negative Final    Glucose, UA 07/21/2022 Normal  Normal mg/dL Final    Ketones, UA 07/21/2022 Negative  Negative mg/dL Final    Urobilinogen, UA 07/21/2022 Normal  0.2, 1.0, Normal mg/dL Final    Bilirubin, UA 07/21/2022 Negative  Negative Final    Blood, UA 07/21/2022 Negative  Negative Final    Specific  Gravity, UA 07/21/2022 1.003  <=1.030 Final    WBC 07/21/2022 11.00  4.50 - 11.00 K/uL Final    RBC 07/21/2022 5.45 (A) 4.20 - 5.40 M/uL Final    Hemoglobin 07/21/2022 15.9  12.0 - 16.0 g/dL Final    Hematocrit 07/21/2022 47.1 (A) 38.0 - 47.0 % Final    MCV 07/21/2022 86.4  80.0 - 96.0 fL Final    MCH 07/21/2022 29.2  27.0 - 31.0 pg Final    MCHC 07/21/2022 33.8  32.0 - 36.0 g/dL Final    RDW 07/21/2022 16.6 (A) 11.5 - 14.5 % Final    Platelet Count 07/21/2022 526 (A) 150 - 400 K/uL Final    MPV 07/21/2022 8.8 (A) 9.4 - 12.4 fL Final    Neutrophils % 07/21/2022 74.0 (A) 53.0 - 65.0 % Final    Lymphocytes % 07/21/2022 18.6 (A) 27.0 - 41.0 % Final    Monocytes % 07/21/2022 5.5  2.0 - 6.0 % Final    Eosinophils % 07/21/2022 0.1 (A) 1.0 - 4.0 % Final    Basophils % 07/21/2022 0.3  0.0 - 1.0 % Final    Immature Granulocytes % 07/21/2022 1.5 (A) 0.0 - 0.4 % Final    nRBC, Auto 07/21/2022 0.0  <=0.0 % Final    Neutrophils, Abs 07/21/2022 8.14 (A) 1.80 - 7.70 K/uL Final    Lymphocytes, Absolute 07/21/2022 2.05  1.00 - 4.80 K/uL Final    Monocytes, Absolute 07/21/2022 0.60  0.00 - 0.80 K/uL Final    Eosinophils, Absolute 07/21/2022 0.01  0.00 - 0.50 K/uL Final    Basophils, Absolute 07/21/2022 0.03  0.00 - 0.20 K/uL Final    Immature Granulocytes, Absolute 07/21/2022 0.17 (A) 0.00 - 0.04 K/uL Final    nRBC, Absolute 07/21/2022 0.00  <=0.00 x10e3/uL Final    Diff Type 07/21/2022 Auto   Final    Influenza A 07/21/2022 Negative  Negative, Invalid Final    Influenza B 07/21/2022 Negative  Negative, Invalid Final    COVID-19 Ag 07/21/2022 Negative  Negative, Invalid Final   Admission on 06/20/2022, Discharged on 06/20/2022   Component Date Value Ref Range Status    Sodium 06/20/2022 143  136 - 145 mmol/L Final    Potassium 06/20/2022 3.6  3.5 - 5.1 mmol/L Final    Chloride 06/20/2022 106  98 - 107 mmol/L Final    CO2 06/20/2022 33 (A) 21 - 32 mmol/L Final    Anion Gap 06/20/2022 8  7 - 16 mmol/L  Final    Glucose 06/20/2022 98  74 - 106 mg/dL Final    BUN 06/20/2022 11  7 - 18 mg/dL Final    Creatinine 06/20/2022 1.08 (A) 0.55 - 1.02 mg/dL Final    BUN/Creatinine Ratio 06/20/2022 10  6 - 20 Final    Calcium 06/20/2022 8.8  8.5 - 10.1 mg/dL Final    Total Protein 06/20/2022 6.3 (A) 6.4 - 8.2 g/dL Final    Albumin 06/20/2022 2.8 (A) 3.5 - 5.0 g/dL Final    Globulin 06/20/2022 3.5  2.0 - 4.0 g/dL Final    A/G Ratio 06/20/2022 0.8   Final    Bilirubin, Total 06/20/2022 0.4  0.0 - 1.2 mg/dL Final    Alk Phos 06/20/2022 74  55 - 142 U/L Final    ALT 06/20/2022 41  13 - 56 U/L Final    AST 06/20/2022 42 (A) 15 - 37 U/L Final    eGFR 06/20/2022 53 (A) >=60 mL/min/1.73m² Final    Lipase 06/20/2022 78  73 - 393 U/L Final    Influenza A 06/20/2022 Negative  Negative, Invalid Final    Influenza B 06/20/2022 Negative  Negative, Invalid Final    COVID-19 Ag 06/20/2022 Negative  Negative, Invalid Final    Troponin I High Sensitivity 06/20/2022 70.4 (A) <=60.4 pg/mL Final    WBC 06/20/2022 9.52  4.50 - 11.00 K/uL Final    RBC 06/20/2022 4.27  4.20 - 5.40 M/uL Final    Hemoglobin 06/20/2022 12.2  12.0 - 16.0 g/dL Final    Hematocrit 06/20/2022 39.3  38.0 - 47.0 % Final    MCV 06/20/2022 92.0  80.0 - 96.0 fL Final    MCH 06/20/2022 28.6  27.0 - 31.0 pg Final    MCHC 06/20/2022 31.0 (A) 32.0 - 36.0 g/dL Final    RDW 06/20/2022 18.3 (A) 11.5 - 14.5 % Final    Platelet Count 06/20/2022 340  150 - 400 K/uL Final    MPV 06/20/2022 9.2 (A) 9.4 - 12.4 fL Final    Neutrophils % 06/20/2022 52.7 (A) 53.0 - 65.0 % Final    Lymphocytes % 06/20/2022 31.9  27.0 - 41.0 % Final    Neutrophils, Abs 06/20/2022 5.02  1.80 - 7.70 K/uL Final    Lymphocytes, Absolute 06/20/2022 3.04  1.00 - 4.80 K/uL Final    Diff Type 06/20/2022 Auto   Final    Monocytes % 06/20/2022 11.8 (A) 2.0 - 6.0 % Final    Eosinophils % 06/20/2022 3.2  1.0 - 4.0 % Final    Basophils % 06/20/2022 0.4  0.0 - 1.0 % Final    Monocytes,  Absolute 06/20/2022 1.12 (A) 0.00 - 0.80 K/uL Final    Eosinophils, Absolute 06/20/2022 0.30  0.00 - 0.50 K/uL Final    Basophils, Absolute 06/20/2022 0.04  0.00 - 0.20 K/uL Final    Troponin I High Sensitivity 06/20/2022 66.1 (A) <=60.4 pg/mL Final   Office Visit on 03/22/2022   Component Date Value Ref Range Status    POC Amphetamines 03/22/2022 Negative  Negative, Inconclusive Final    POC Barbiturates 03/22/2022 Negative  Negative, Inconclusive Final    POC Benzodiazepines 03/22/2022 Negative  Negative, Inconclusive Final    POC Cocaine 03/22/2022 Negative  Negative, Inconclusive Final    POC THC 03/22/2022 Negative  Negative, Inconclusive Final    POC Methadone 03/22/2022 Negative  Negative, Inconclusive Final    POC Methamphetamine 03/22/2022 Negative  Negative, Inconclusive Final    POC Opiates 03/22/2022 Presumptive Positive (A) Negative, Inconclusive Final    POC Oxycodone 03/22/2022 Negative  Negative, Inconclusive Final    POC Phencyclidine 03/22/2022 Negative  Negative, Inconclusive Final    POC Methylenedioxymethamphetamine * 03/22/2022 Negative  Negative, Inconclusive Final    POC Tricyclic Antidepressants 03/22/2022 Negative  Negative, Inconclusive Final    POC Buprenorphine 03/22/2022 Negative   Final     Acceptable 03/22/2022 Yes   Final    POC Temperature (Urine) 03/22/2022 92   Final           Assessment:      1. Encounter for long-term (current) use of other medications    2. Spondylolisthesis, lumbar region    3. Thoracic radiculopathy    4. Spondylosis without myelopathy or radiculopathy, lumbosacral region          Orders Placed This Encounter   Procedures    POCT Urine Drug Screen Presump     Interpretive Information:     Negative:  No drug detected at the cut off level.   Positive:  This result represents presumptive positive for the   tested drug, other substances may yield a positive response other   than the analyte of interest. This result should be  utilized for   diagnostic purpose only. Confirmation testing will be performed upon physician request only.       Case Request Operating Room: Block-nerve-medial branch-lumbar, bilateral L4 through S1     Order Specific Question:   Medical Necessity:     Answer:   Medically Non-Urgent [100]     Order Specific Question:   CPT Code:     Answer:   OR INJ DX/THER AGNT PARAVERT FACET JOINT,IMG GUIDE,LUMBAR/SAC,1ST LVL [26971]     Order Specific Question:   CPT Code:     Answer:   OR INJ DX/THER AGNT PARAVERT FACET JOINT,IMG GUIDE,LUMBAR/SAC, 2ND LEVEL [50713]     Order Specific Question:   Is an on-site pathologist required for this procedure?     Answer:   N/A         A's of Opioid Risk Assessment  Activity:Patient can perform ADL.   Analgesia:Patients pain is partially controlled by current medication. Patient has tried OTC medications such as Tylenol and Ibuprofen with out relief.   Adverse Effects: Patient denies constipation or sedation.  Aberrant Behavior:  reviewed with no aberrant drug seeking/taking behavior.  Overdose reversal drug naloxone discussed    Drug screen reviewed      Requested Prescriptions     Signed Prescriptions Disp Refills    DULoxetine (CYMBALTA) 60 MG capsule 60 capsule 2     Sig: Take 1 capsule (60 mg total) by mouth every 12 (twelve) hours.    cyclobenzaprine (FLEXERIL) 10 MG tablet 30 tablet 2     Sig: Take 1 tablet (10 mg total) by mouth every 8 (eight) hours.    HYDROcodone-acetaminophen (NORCO)  mg per tablet 90 tablet 0     Sig: Take 1 tablet by mouth every 8 (eight) hours.         Plan:    Complaint of mid lower back pain worse with flexion extension rotation ongoing for more than 3 months tenderness long facet joint area her pain is facet joint in nature    She is requesting lumbar procedure she states she has had lumbar procedures in the past it has help control her discomfort    She had thoracic T9/10 WILD December 23, 2021, she had 95% relief after this procedure  today's discomfort is facet joint in nature    Continue home exercise program as directed    Continue current medication    MRI lumbar spine May 8, 2020 multiple compression fractures degenerative changes noted the T12 fracture appeared acute to subacute    X-ray lumbar spine October 21, 2021 no fracture noted multiple level degenerative changes osteopenia noted    Indications for this procedure for this specific patient include the following   - Pt has had symptoms for three months with moderate to severe pain with functional impairment rated of 7/10 pain.   - Pain non-responsive to conservative care.    - Pain predominately axial and not associated with radiculopathy or claudication.    - No non-facet pathology as source of pain.    - Clinical assessment implicates facet joint as putative pain source.    - Pain is exacerbated by extension or prolonged sitting/standing and relieved by rest.    - No unexplained neurologic deficit.    - No history of coagulopathy, infection or unstable medical conditions.    - Pain is causing significant functional limitation resulting in diminished quality of life and impaired age appropriate ADL's.   - Clinical assessment implicates facet joint as putative source of pain  - Repeat injections not done prior to 7 days   - no more than 2 levels will be done    The planned medically necessary  surgical procedure is performed in a hospital outpatient department and not in an ambulatory surgical center due to:     -there is no geographically assessable ambulatory surgery center that has the  necessary equipment and fluoroscopy needed for the procedure     -there is no geographically assessable ambulatory surgical center available at which the physician has privileges     -an ASC's  specific  guideline regarding the individuals weight or health conditions that prevent the use of an ASC    Monitor anesthesia request is medically indicated for the scheduled nerve block procedure due to:  1-  needle phobia and anxiety, placing  the patient at risk during the provided service.  2-patient has an ASA class greater than 3 and requires constant presence of an anesthesiologist during the procedure,   3-patient has severe problems hard to lie still  4-patient suffers from chronic pain and is unable to function due to  diminished ADLs    Schedule bilateral lumbar L4 through S1 medial branch block # 1, lumbosacral spondylosis    Dr. Burns,    Pill count    Bring original prescription medication bottles/container/box with labels to each visit

## 2022-08-16 NOTE — PATIENT INSTRUCTIONS
COVID SCREENING TO BE DONE 48-72 HOURS PRIOR TO PROCEDURE IF PT HAS NOT RECEIVED BOTH COVID VACCINATIONS OR HAS BEEN VACCINATED WITHIN THE LAST 2 WEEKS. VACCINATION CARD MUST BE PROVIDED IF PT HAS BEEN VACCINATED OR PT MUST HAVE COVID SCREENING IN ORDER TO HAVE PROCEDURE.  IF YOUR PROCEDURE IS ON A Tuesday, GET COVID TESTING ON THE Friday PRIOR TO PROCEDURE.  IF YOUR PROCEDURE IS ON A Thursday GET COVID TESTING ON THE Monday OR Tuesday PRIOR TO PROCEDURE.    IF YOUR PRIMARY CARE DOCTOR ORDERS YOUR COVID TESTING YOU MUST BRING YOUR RESULTS WITH YOU TO YOUR PROCEDURE.    Procedure Instructions:    Nothing to eat or drink for 8 hours or after midnight including gum, candy, mints, or tobacco products.  If you are scheduled for 1:30 or later nothing to eat or drink after 5 a.m. the morning of the procedure, including gum, candy, mints, or tobacco products.  Must have a  at least 18 yrs of age to stay present at all times  No Diabetic medications the morning of procedure, check blood sugar the morning of procedure, if it is greater than 200 call the office at [phone]  If you are started on antibiotics or have been prescribed antibiotics, have a fever, or have any other type of infection call to reschedule procedure.  If you take blood pressure medications you can take it at your regular scheduled time.        HOLD ASPIRIN AND ASPIRIN PRODUCTS  (ASPIRIN, BC POWDER ETC. ) FOR 7 DAYS  PRIOR TO PROCEDURE  HOLD NSAIDS( ibuprofen, mobic, meloxicam, advil, diclofenac, methotrexate, aleve etc....)  FOR 3 DAYS   PRIOR TO PROCEDURE

## 2022-08-16 NOTE — PROGRESS NOTES
Subjective:      Patient ID: Amelie Cerrato is a 72 y.o. female.    Chief Complaint: Mid-back Pain and Low-back Pain      Mid-back Pain  This is a chronic problem. The current episode started more than 1 year ago. The problem occurs daily. The problem has been waxing and waning since onset. Associated symptoms include leg pain. Pertinent negatives include no bladder incontinence, chest pain or dysuria.   Low-back Pain  This is a chronic problem. The current episode started more than 1 year ago. The problem occurs daily. The problem has been waxing and waning since onset. Associated symptoms include leg pain. Pertinent negatives include no bladder incontinence, chest pain or dysuria.     Review of Systems   Constitutional: Negative for activity change, appetite change, chills, fatigue and unexpected weight change.   HENT: Negative for drooling, ear discharge, ear pain, facial swelling, mouth sores, nosebleeds, sore throat, trouble swallowing, voice change and goiter.    Eyes: Negative for photophobia, pain, discharge, redness and visual disturbance.   Respiratory: Negative for apnea, cough, choking, chest tightness, shortness of breath, wheezing and stridor.    Cardiovascular: Negative for chest pain, palpitations and leg swelling.   Gastrointestinal: Negative for abdominal distention, change in bowel habit, diarrhea, rectal pain, vomiting, fecal incontinence and change in bowel habit.   Endocrine: Negative for cold intolerance, heat intolerance, polydipsia, polyphagia and polyuria.   Genitourinary: Negative for bladder incontinence, dysuria, flank pain, frequency, hematuria and hot flashes.   Musculoskeletal: Positive for arthralgias, back pain, leg pain, myalgias, neck pain and neck stiffness.   Integumentary:  Negative for color change, pallor and rash.   Allergic/Immunologic: Negative for immunocompromised state.   Neurological: Negative for dizziness, vertigo, seizures, syncope, facial asymmetry, speech  difficulty, light-headedness, disturbances in coordination, memory loss and coordination difficulties.   Hematological: Negative for adenopathy. Does not bruise/bleed easily.   Psychiatric/Behavioral: Negative for agitation, behavioral problems, confusion, decreased concentration, dysphoric mood, hallucinations, self-injury and suicidal ideas. The patient is not nervous/anxious and is not hyperactive.            Past Surgical History:   Procedure Laterality Date    CHOLECYSTECTOMY      CYSTOSCOPY      EPIDURAL STEROID INJECTION INTO LUMBAR SPINE N/A 05/27/2020    L1-2 WILD     EPIDURAL STEROID INJECTION INTO THORACIC SPINE N/A 02/03/2021    T9-10 WILD     EPIDURAL STEROID INJECTION INTO THORACIC SPINE N/A 3/18/2021    Procedure: INJECTION, STEROID, SPINE, THORACIC, EPIDURAL;  Surgeon: Arelis Burns MD;  Location: [facility];  Service: Pain Management;  Laterality: N/A;    EPIDURAL STEROID INJECTION INTO THORACIC SPINE N/A 12/23/2021    Procedure: Injection-steroid-epidural-thoracic T9/10;  Surgeon: Arelis Burns MD;  Location: [facility];  Service: Pain Management;  Laterality: N/A;  HAD VAC  WILL BRING CARD    HYSTERECTOMY      INJECTION OF ANESTHETIC AGENT AROUND MEDIAL BRANCH NERVES INNERVATING LUMBAR FACET JOINT Bilateral 4/22/2021    Procedure: Block-nerve-medial branch-lumbar Bilateral L3-4,4-5,5-S1 MBB #2;  Surgeon: Arelis Burns MD;  Location: [facility];  Service: Pain Management;  Laterality: Bilateral;    INJECTION OF FACET JOINT Bilateral 12/04/2020    RADIOFREQUENCY ABLATION OF LUMBAR MEDIAL BRANCH NERVE AT SINGLE LEVEL Bilateral 5/20/2021    Procedure: RADIOFREQUENCY ABLATION, NERVE, SPINAL, LUMBAR, MEDIAL BRANCH, 1 LEVEL;  Surgeon: Arelis Burns MD;  Location: [facility];  Service: Pain Management;  Laterality: Bilateral;  Bilateral L3-S1 RFTC (both sides same day)          Objective:  Vitals:    08/16/22 0900 08/16/22 0901   BP: (!) 159/78    Pulse:  "94    Weight: 51.3 kg (113 lb)    Height: 5' 2.4" (1.585 m)    PainSc:   9   9         Physical Exam  Vitals and nursing note reviewed. Exam conducted with a chaperone present.   Constitutional:       General: She is awake. She is not in acute distress.     Appearance: Normal appearance. She is not toxic-appearing.   HENT:      Head: Normocephalic and atraumatic.      Nose: Nose normal.      Mouth/Throat:      Mouth: Mucous membranes are moist.      Pharynx: Oropharynx is clear.   Eyes:      Conjunctiva/sclera: Conjunctivae normal.      Pupils: Pupils are equal, round, and reactive to light.   Cardiovascular:      Rate and Rhythm: Normal rate.   Pulmonary:      Effort: Pulmonary effort is normal. No respiratory distress.   Abdominal:      Palpations: Abdomen is soft.   Musculoskeletal:         General: Normal range of motion.      Right shoulder: Tenderness present.      Left shoulder: Tenderness present.      Cervical back: Normal range of motion and neck supple. Tenderness present.      Thoracic back: Tenderness present.      Lumbar back: Tenderness present.   Skin:     General: Skin is warm and dry.   Neurological:      General: No focal deficit present.      Mental Status: She is alert and oriented to person, place, and time. Mental status is at baseline.      Cranial Nerves: No cranial nerve deficit (II-XII).   Psychiatric:         Mood and Affect: Mood normal.         Behavior: Behavior normal. Behavior is cooperative.         Thought Content: Thought content normal.           XR ABDOMEN, ACUTE 2 OR MORE VIEWS WITH CHEST  Narrative: EXAMINATION:  XR ABDOMEN ACUTE 2 OR MORE VIEWS WITH CHEST    CLINICAL HISTORY:  abdominal pain;    TECHNIQUE:  XR ABDOMEN ACUTE 2 OR MORE VIEWS WITH CHEST    COMPARISON:  8/31/21    FINDINGS:  Lungs are clear.  Normal pleura.  Mildly enlarged heart.    There is no bowel obstruction.  No free air.  No renal calculi.    Liver and renal silhouettes are normal.    No acute bone " findings.  Impression: No bowel obstruction or renal calculi.    Mild colonic stool.    Electronically signed by: David Soni  Date:    07/21/2022  Time:    17:35       Admission on 07/21/2022, Discharged on 07/21/2022   Component Date Value Ref Range Status    Sodium 07/21/2022 127 (A) 136 - 145 mmol/L Final    Potassium 07/21/2022 5.0  3.5 - 5.1 mmol/L Final    Chloride 07/21/2022 90 (A) 98 - 107 mmol/L Final    CO2 07/21/2022 28  21 - 32 mmol/L Final    Anion Gap 07/21/2022 14  7 - 16 mmol/L Final    Glucose 07/21/2022 81  74 - 106 mg/dL Final    BUN 07/21/2022 13  7 - 18 mg/dL Final    Creatinine 07/21/2022 1.03 (A) 0.55 - 1.02 mg/dL Final    BUN/Creatinine Ratio 07/21/2022 13  6 - 20 Final    Calcium 07/21/2022 10.2 (A) 8.5 - 10.1 mg/dL Final    Total Protein 07/21/2022 7.9  6.4 - 8.2 g/dL Final    Albumin 07/21/2022 3.9  3.5 - 5.0 g/dL Final    Globulin 07/21/2022 4.0  2.0 - 4.0 g/dL Final    A/G Ratio 07/21/2022 1.0   Final    Bilirubin, Total 07/21/2022 0.6  0.0 - 1.2 mg/dL Final    Alk Phos 07/21/2022 112  55 - 142 U/L Final    ALT 07/21/2022 26  13 - 56 U/L Final    AST 07/21/2022 19  15 - 37 U/L Final    eGFR 07/21/2022 56 (A) >=60 mL/min/1.73m² Final    Amylase 07/21/2022 97  25 - 115 U/L Final    Lipase 07/21/2022 137  73 - 393 U/L Final    Color, UA 07/21/2022 Colorless  Colorless, Straw, Yellow, Light Yellow, Dark Yellow Final    Clarity, UA 07/21/2022 Clear  Clear Final    pH, UA 07/21/2022 7.0  5.0 to 8.0 pH Units Final    Leukocytes, UA 07/21/2022 Negative  Negative Final    Nitrites, UA 07/21/2022 Negative  Negative Final    Protein, UA 07/21/2022 Negative  Negative Final    Glucose, UA 07/21/2022 Normal  Normal mg/dL Final    Ketones, UA 07/21/2022 Negative  Negative mg/dL Final    Urobilinogen, UA 07/21/2022 Normal  0.2, 1.0, Normal mg/dL Final    Bilirubin, UA 07/21/2022 Negative  Negative Final    Blood, UA 07/21/2022 Negative  Negative Final    Specific  Gravity, UA 07/21/2022 1.003  <=1.030 Final    WBC 07/21/2022 11.00  4.50 - 11.00 K/uL Final    RBC 07/21/2022 5.45 (A) 4.20 - 5.40 M/uL Final    Hemoglobin 07/21/2022 15.9  12.0 - 16.0 g/dL Final    Hematocrit 07/21/2022 47.1 (A) 38.0 - 47.0 % Final    MCV 07/21/2022 86.4  80.0 - 96.0 fL Final    MCH 07/21/2022 29.2  27.0 - 31.0 pg Final    MCHC 07/21/2022 33.8  32.0 - 36.0 g/dL Final    RDW 07/21/2022 16.6 (A) 11.5 - 14.5 % Final    Platelet Count 07/21/2022 526 (A) 150 - 400 K/uL Final    MPV 07/21/2022 8.8 (A) 9.4 - 12.4 fL Final    Neutrophils % 07/21/2022 74.0 (A) 53.0 - 65.0 % Final    Lymphocytes % 07/21/2022 18.6 (A) 27.0 - 41.0 % Final    Monocytes % 07/21/2022 5.5  2.0 - 6.0 % Final    Eosinophils % 07/21/2022 0.1 (A) 1.0 - 4.0 % Final    Basophils % 07/21/2022 0.3  0.0 - 1.0 % Final    Immature Granulocytes % 07/21/2022 1.5 (A) 0.0 - 0.4 % Final    nRBC, Auto 07/21/2022 0.0  <=0.0 % Final    Neutrophils, Abs 07/21/2022 8.14 (A) 1.80 - 7.70 K/uL Final    Lymphocytes, Absolute 07/21/2022 2.05  1.00 - 4.80 K/uL Final    Monocytes, Absolute 07/21/2022 0.60  0.00 - 0.80 K/uL Final    Eosinophils, Absolute 07/21/2022 0.01  0.00 - 0.50 K/uL Final    Basophils, Absolute 07/21/2022 0.03  0.00 - 0.20 K/uL Final    Immature Granulocytes, Absolute 07/21/2022 0.17 (A) 0.00 - 0.04 K/uL Final    nRBC, Absolute 07/21/2022 0.00  <=0.00 x10e3/uL Final    Diff Type 07/21/2022 Auto   Final    Influenza A 07/21/2022 Negative  Negative, Invalid Final    Influenza B 07/21/2022 Negative  Negative, Invalid Final    COVID-19 Ag 07/21/2022 Negative  Negative, Invalid Final   Admission on 06/20/2022, Discharged on 06/20/2022   Component Date Value Ref Range Status    Sodium 06/20/2022 143  136 - 145 mmol/L Final    Potassium 06/20/2022 3.6  3.5 - 5.1 mmol/L Final    Chloride 06/20/2022 106  98 - 107 mmol/L Final    CO2 06/20/2022 33 (A) 21 - 32 mmol/L Final    Anion Gap 06/20/2022 8  7 - 16 mmol/L  Final    Glucose 06/20/2022 98  74 - 106 mg/dL Final    BUN 06/20/2022 11  7 - 18 mg/dL Final    Creatinine 06/20/2022 1.08 (A) 0.55 - 1.02 mg/dL Final    BUN/Creatinine Ratio 06/20/2022 10  6 - 20 Final    Calcium 06/20/2022 8.8  8.5 - 10.1 mg/dL Final    Total Protein 06/20/2022 6.3 (A) 6.4 - 8.2 g/dL Final    Albumin 06/20/2022 2.8 (A) 3.5 - 5.0 g/dL Final    Globulin 06/20/2022 3.5  2.0 - 4.0 g/dL Final    A/G Ratio 06/20/2022 0.8   Final    Bilirubin, Total 06/20/2022 0.4  0.0 - 1.2 mg/dL Final    Alk Phos 06/20/2022 74  55 - 142 U/L Final    ALT 06/20/2022 41  13 - 56 U/L Final    AST 06/20/2022 42 (A) 15 - 37 U/L Final    eGFR 06/20/2022 53 (A) >=60 mL/min/1.73m² Final    Lipase 06/20/2022 78  73 - 393 U/L Final    Influenza A 06/20/2022 Negative  Negative, Invalid Final    Influenza B 06/20/2022 Negative  Negative, Invalid Final    COVID-19 Ag 06/20/2022 Negative  Negative, Invalid Final    Troponin I High Sensitivity 06/20/2022 70.4 (A) <=60.4 pg/mL Final    WBC 06/20/2022 9.52  4.50 - 11.00 K/uL Final    RBC 06/20/2022 4.27  4.20 - 5.40 M/uL Final    Hemoglobin 06/20/2022 12.2  12.0 - 16.0 g/dL Final    Hematocrit 06/20/2022 39.3  38.0 - 47.0 % Final    MCV 06/20/2022 92.0  80.0 - 96.0 fL Final    MCH 06/20/2022 28.6  27.0 - 31.0 pg Final    MCHC 06/20/2022 31.0 (A) 32.0 - 36.0 g/dL Final    RDW 06/20/2022 18.3 (A) 11.5 - 14.5 % Final    Platelet Count 06/20/2022 340  150 - 400 K/uL Final    MPV 06/20/2022 9.2 (A) 9.4 - 12.4 fL Final    Neutrophils % 06/20/2022 52.7 (A) 53.0 - 65.0 % Final    Lymphocytes % 06/20/2022 31.9  27.0 - 41.0 % Final    Neutrophils, Abs 06/20/2022 5.02  1.80 - 7.70 K/uL Final    Lymphocytes, Absolute 06/20/2022 3.04  1.00 - 4.80 K/uL Final    Diff Type 06/20/2022 Auto   Final    Monocytes % 06/20/2022 11.8 (A) 2.0 - 6.0 % Final    Eosinophils % 06/20/2022 3.2  1.0 - 4.0 % Final    Basophils % 06/20/2022 0.4  0.0 - 1.0 % Final    Monocytes,  Absolute 06/20/2022 1.12 (A) 0.00 - 0.80 K/uL Final    Eosinophils, Absolute 06/20/2022 0.30  0.00 - 0.50 K/uL Final    Basophils, Absolute 06/20/2022 0.04  0.00 - 0.20 K/uL Final    Troponin I High Sensitivity 06/20/2022 66.1 (A) <=60.4 pg/mL Final   Office Visit on 03/22/2022   Component Date Value Ref Range Status    POC Amphetamines 03/22/2022 Negative  Negative, Inconclusive Final    POC Barbiturates 03/22/2022 Negative  Negative, Inconclusive Final    POC Benzodiazepines 03/22/2022 Negative  Negative, Inconclusive Final    POC Cocaine 03/22/2022 Negative  Negative, Inconclusive Final    POC THC 03/22/2022 Negative  Negative, Inconclusive Final    POC Methadone 03/22/2022 Negative  Negative, Inconclusive Final    POC Methamphetamine 03/22/2022 Negative  Negative, Inconclusive Final    POC Opiates 03/22/2022 Presumptive Positive (A) Negative, Inconclusive Final    POC Oxycodone 03/22/2022 Negative  Negative, Inconclusive Final    POC Phencyclidine 03/22/2022 Negative  Negative, Inconclusive Final    POC Methylenedioxymethamphetamine * 03/22/2022 Negative  Negative, Inconclusive Final    POC Tricyclic Antidepressants 03/22/2022 Negative  Negative, Inconclusive Final    POC Buprenorphine 03/22/2022 Negative   Final     Acceptable 03/22/2022 Yes   Final    POC Temperature (Urine) 03/22/2022 92   Final           Assessment:      1. Encounter for long-term (current) use of other medications    2. Spondylolisthesis, lumbar region    3. Thoracic radiculopathy    4. Spondylosis without myelopathy or radiculopathy, lumbosacral region          Orders Placed This Encounter   Procedures    POCT Urine Drug Screen Presump     Interpretive Information:     Negative:  No drug detected at the cut off level.   Positive:  This result represents presumptive positive for the   tested drug, other substances may yield a positive response other   than the analyte of interest. This result should be  utilized for   diagnostic purpose only. Confirmation testing will be performed upon physician request only.       Case Request Operating Room: Block-nerve-medial branch-lumbar, bilateral L4 through S1     Order Specific Question:   Medical Necessity:     Answer:   Medically Non-Urgent [100]     Order Specific Question:   CPT Code:     Answer:   NV INJ DX/THER AGNT PARAVERT FACET JOINT,IMG GUIDE,LUMBAR/SAC,1ST LVL [69142]     Order Specific Question:   CPT Code:     Answer:   NV INJ DX/THER AGNT PARAVERT FACET JOINT,IMG GUIDE,LUMBAR/SAC, 2ND LEVEL [48634]     Order Specific Question:   Is an on-site pathologist required for this procedure?     Answer:   N/A         A's of Opioid Risk Assessment  Activity:Patient can perform ADL.   Analgesia:Patients pain is partially controlled by current medication. Patient has tried OTC medications such as Tylenol and Ibuprofen with out relief.   Adverse Effects: Patient denies constipation or sedation.  Aberrant Behavior:  reviewed with no aberrant drug seeking/taking behavior.  Overdose reversal drug naloxone discussed    Drug screen reviewed      Requested Prescriptions     Signed Prescriptions Disp Refills    DULoxetine (CYMBALTA) 60 MG capsule 60 capsule 2     Sig: Take 1 capsule (60 mg total) by mouth every 12 (twelve) hours.    cyclobenzaprine (FLEXERIL) 10 MG tablet 30 tablet 2     Sig: Take 1 tablet (10 mg total) by mouth every 8 (eight) hours.    HYDROcodone-acetaminophen (NORCO)  mg per tablet 90 tablet 0     Sig: Take 1 tablet by mouth every 8 (eight) hours.         Plan:    Complaint of mid lower back pain worse with flexion extension rotation ongoing for more than 3 months tenderness long facet joint area her pain is facet joint in nature    She is requesting lumbar procedure she states she has had lumbar procedures in the past it has help control her discomfort    She had thoracic T9/10 WILD December 23, 2021, she had 95% relief after this procedure  today's discomfort is facet joint in nature    Continue home exercise program as directed    Continue current medication    MRI lumbar spine May 8, 2020 multiple compression fractures degenerative changes noted the T12 fracture appeared acute to subacute    X-ray lumbar spine October 21, 2021 no fracture noted multiple level degenerative changes osteopenia noted    Indications for this procedure for this specific patient include the following   - Pt has had symptoms for three months with moderate to severe pain with functional impairment rated of 7/10 pain.   - Pain non-responsive to conservative care.    - Pain predominately axial and not associated with radiculopathy or claudication.    - No non-facet pathology as source of pain.    - Clinical assessment implicates facet joint as putative pain source.    - Pain is exacerbated by extension or prolonged sitting/standing and relieved by rest.    - No unexplained neurologic deficit.    - No history of coagulopathy, infection or unstable medical conditions.    - Pain is causing significant functional limitation resulting in diminished quality of life and impaired age appropriate ADL's.   - Clinical assessment implicates facet joint as putative source of pain  - Repeat injections not done prior to 7 days   - no more than 2 levels will be done    The planned medically necessary  surgical procedure is performed in a hospital outpatient department and not in an ambulatory surgical center due to:     -there is no geographically assessable ambulatory surgery center that has the  necessary equipment and fluoroscopy needed for the procedure     -there is no geographically assessable ambulatory surgical center available at which the physician has privileges     -an ASC's  specific  guideline regarding the individuals weight or health conditions that prevent the use of an ASC    Monitor anesthesia request is medically indicated for the scheduled nerve block procedure due to:  1-  needle phobia and anxiety, placing  the patient at risk during the provided service.  2-patient has an ASA class greater than 3 and requires constant presence of an anesthesiologist during the procedure,   3-patient has severe problems hard to lie still  4-patient suffers from chronic pain and is unable to function due to  diminished ADLs    Schedule bilateral lumbar L4 through S1 medial branch block # 1, lumbosacral spondylosis    Dr. Burns,    Pill count    Bring original prescription medication bottles/container/box with labels to each visit

## 2022-08-26 ENCOUNTER — OFFICE VISIT (OUTPATIENT)
Dept: FAMILY MEDICINE | Facility: CLINIC | Age: 72
End: 2022-08-26
Payer: MEDICARE

## 2022-08-26 VITALS
SYSTOLIC BLOOD PRESSURE: 190 MMHG | HEIGHT: 62 IN | HEART RATE: 103 BPM | BODY MASS INDEX: 20.24 KG/M2 | WEIGHT: 110 LBS | DIASTOLIC BLOOD PRESSURE: 102 MMHG

## 2022-08-26 DIAGNOSIS — K59.00 CONSTIPATION, UNSPECIFIED CONSTIPATION TYPE: ICD-10-CM

## 2022-08-26 DIAGNOSIS — F32.A DEPRESSIVE DISORDER: Chronic | ICD-10-CM

## 2022-08-26 DIAGNOSIS — J45.909 ASTHMA, UNSPECIFIED ASTHMA SEVERITY, UNSPECIFIED WHETHER COMPLICATED, UNSPECIFIED WHETHER PERSISTENT: ICD-10-CM

## 2022-08-26 DIAGNOSIS — E78.5 HYPERLIPIDEMIA, UNSPECIFIED HYPERLIPIDEMIA TYPE: ICD-10-CM

## 2022-08-26 DIAGNOSIS — M54.14 THORACIC RADICULOPATHY: Chronic | ICD-10-CM

## 2022-08-26 DIAGNOSIS — I10 ESSENTIAL (PRIMARY) HYPERTENSION: Chronic | ICD-10-CM

## 2022-08-26 DIAGNOSIS — M47.817 SPONDYLOSIS WITHOUT MYELOPATHY OR RADICULOPATHY, LUMBOSACRAL REGION: Chronic | ICD-10-CM

## 2022-08-26 DIAGNOSIS — M43.16 SPONDYLOLISTHESIS, LUMBAR REGION: Chronic | ICD-10-CM

## 2022-08-26 DIAGNOSIS — R63.0 DECREASED APPETITE: Chronic | ICD-10-CM

## 2022-08-26 DIAGNOSIS — K21.9 GASTROESOPHAGEAL REFLUX DISEASE, UNSPECIFIED WHETHER ESOPHAGITIS PRESENT: Primary | ICD-10-CM

## 2022-08-26 PROCEDURE — 99214 PR OFFICE/OUTPT VISIT, EST, LEVL IV, 30-39 MIN: ICD-10-PCS | Mod: ,,, | Performed by: FAMILY MEDICINE

## 2022-08-26 PROCEDURE — 99214 OFFICE O/P EST MOD 30 MIN: CPT | Mod: ,,, | Performed by: FAMILY MEDICINE

## 2022-08-26 RX ORDER — UMECLIDINIUM 62.5 UG/1
62.5 AEROSOL, POWDER ORAL DAILY
Qty: 90 CAPSULE | Refills: 1 | Status: SHIPPED | OUTPATIENT
Start: 2022-08-26 | End: 2022-10-27 | Stop reason: SDUPTHER

## 2022-08-26 RX ORDER — DULOXETIN HYDROCHLORIDE 60 MG/1
60 CAPSULE, DELAYED RELEASE ORAL EVERY 12 HOURS
Qty: 180 CAPSULE | Refills: 1 | Status: SHIPPED | OUTPATIENT
Start: 2022-08-26 | End: 2023-02-01

## 2022-08-26 RX ORDER — CYPROHEPTADINE HYDROCHLORIDE 4 MG/1
4 TABLET ORAL 3 TIMES DAILY
Qty: 270 TABLET | Refills: 1 | Status: SHIPPED | OUTPATIENT
Start: 2022-08-26 | End: 2023-03-02

## 2022-08-26 RX ORDER — ATORVASTATIN CALCIUM 10 MG/1
10 TABLET, FILM COATED ORAL NIGHTLY
Qty: 90 TABLET | Refills: 1 | Status: SHIPPED | OUTPATIENT
Start: 2022-08-26 | End: 2023-04-05 | Stop reason: SDUPTHER

## 2022-08-26 RX ORDER — PANTOPRAZOLE SODIUM 40 MG/1
40 TABLET, DELAYED RELEASE ORAL
Qty: 180 TABLET | Refills: 1 | Status: SHIPPED | OUTPATIENT
Start: 2022-08-26 | End: 2023-01-30

## 2022-08-26 RX ORDER — BUSPIRONE HYDROCHLORIDE 15 MG/1
15 TABLET ORAL 3 TIMES DAILY
Qty: 270 TABLET | Refills: 1 | Status: SHIPPED | OUTPATIENT
Start: 2022-08-26 | End: 2023-01-30

## 2022-08-26 RX ORDER — CHLORTHALIDONE 25 MG/1
TABLET ORAL
Qty: 4 TABLET | Refills: 0 | Status: CANCELLED | OUTPATIENT
Start: 2022-08-26

## 2022-08-26 RX ORDER — CYCLOBENZAPRINE HCL 10 MG
10 TABLET ORAL EVERY 8 HOURS
Qty: 30 TABLET | Refills: 2 | Status: CANCELLED | OUTPATIENT
Start: 2022-08-26

## 2022-08-26 RX ORDER — LOSARTAN POTASSIUM AND HYDROCHLOROTHIAZIDE 12.5; 5 MG/1; MG/1
1 TABLET ORAL DAILY
Qty: 90 TABLET | Refills: 1 | Status: SHIPPED | OUTPATIENT
Start: 2022-08-26 | End: 2022-12-08

## 2022-08-26 RX ORDER — CHLORTHALIDONE 25 MG/1
25 TABLET ORAL DAILY
Qty: 90 TABLET | Refills: 1 | Status: SHIPPED | OUTPATIENT
Start: 2022-08-26 | End: 2023-01-30

## 2022-08-26 RX ORDER — POLYETHYLENE GLYCOL 3350 17 G/17G
17 POWDER, FOR SOLUTION ORAL DAILY
Qty: 1530 G | Refills: 1 | Status: SHIPPED | OUTPATIENT
Start: 2022-08-26 | End: 2023-02-22

## 2022-08-26 NOTE — PROGRESS NOTES
Danilo Cloud IV, DO  [facility]  [address] [place] [address]  Phone: [phone]      Subjective     Name: Amelie Cerrato   Sex: female  YOB: 1950 (72 y.o.)  MRN: [id number]  Visit Date: 08/26/2022   Chief Complaint: Medication Refill        HISTORY OF PRESENT ILLNESS:    Patient presents at the same time as her mother she strictly for medication refills I did a medication reconciliation in the room.  I see no changes are necessary at this time however we did discuss stopping her statin considering her age.  Hypertension was noted in the chart she is currently on losartan with 2 diuretics and her systolic blood pressure was 190 with a diastolic of 100.  We discussed possibly changing his medications depending on following guidelines by  the [employer] Association.  Patient does suffer from anorexia and is currently on Periactin has never tried Megace or marinol we discussed possibly changing but have decided stay with the same medications for now.  She suffers from near crippling anxiety for which she takes BuSpar 3 times a day.  Changing to Marinol could possibly help with this as well.  Otherwise patient is not cachectic she appears quite healthy for her age and the number of medications she is on I have decided to follow up with her in 6 months.        PAST MEDICAL HISTORY:  Significant Diagnoses - Patient  has a past medical history of Acid reflux, Anxiety, Chronic pain syndrome, Coccyx sprain, COPD (chronic obstructive pulmonary disease), Depression, Fall at home (10/01/2021), Fracture of multiple pubic rami, left, closed, initial encounter (10/01/2021), HLD (hyperlipidemia), Hypertension, Hypokalemia, Hypomagnesemia, Low back pain, Lumbar radiculopathy, Lumbar stenosis, Lumbosacral spondylosis, Neuropathy, Radiculopathy, lumbosacral region, Renal disorder, Spondylolisthesis, lumbar region, Spondylosis without myelopathy or radiculopathy,  lumbosacral region, and Thoracic radiculopathy.  Medications - Patient has a current medication list which includes the following long-term medication(s): brimonidine 0.2%, latanoprost, atorvastatin, buspirone, chlorthalidone, duloxetine, losartan-hydrochlorothiazide 50-12.5 mg, and pantoprazole.   Allergies - Patient is allergic to insect venom and sulfa (sulfonamide antibiotics).  Surgeries - Patient  has a past surgical history that includes Hysterectomy; Cholecystectomy; Epidural steroid injection into lumbar spine (N/A, 05/27/2020); Epidural steroid injection into thoracic spine (N/A, 02/03/2021); Injection of facet joint (Bilateral, 12/04/2020); Epidural steroid injection into thoracic spine (N/A, 3/18/2021); Injection of anesthetic agent around medial branch nerves innervating lumbar facet joint (Bilateral, 4/22/2021); Radiofrequency ablation of lumbar medial branch nerve at single level (Bilateral, 5/20/2021); Cystoscopy; and Epidural steroid injection into thoracic spine (N/A, 12/23/2021).  Family History - Patient family history includes Heart disease in her father; Hypertension in her mother.      SOCIAL HISTORY:  Tobacco - Patient  reports that she has quit smoking. She has never used smokeless tobacco.   Alcohol - Patient  reports no history of alcohol use.   Recreational Drugs - Patient  reports no history of drug use.       Review of Systems   Constitutional: Negative for fatigue and fever.   HENT: Negative for nasal congestion and sore throat.    Respiratory: Negative for cough and shortness of breath.    Cardiovascular: Negative for chest pain.   Gastrointestinal: Negative for abdominal pain, nausea and vomiting.   Genitourinary: Negative for dysuria.   Musculoskeletal: Negative for myalgias.   Integumentary:  Negative for rash.   Neurological: Negative for dizziness, weakness and headaches.          Past Medical History:   Diagnosis Date    Acid reflux     Anxiety     Chronic pain syndrome      Coccyx sprain     broken tailbone     COPD (chronic obstructive pulmonary disease)     Depression     Fall at home 10/01/2021    Fracture of multiple pubic rami, left, closed, initial encounter 10/01/2021    HLD (hyperlipidemia)     Hypertension     Hypokalemia     Hypomagnesemia     Low back pain     Lumbar radiculopathy     Lumbar stenosis     Lumbosacral spondylosis     Neuropathy     Radiculopathy, lumbosacral region     Renal disorder     Spondylolisthesis, lumbar region     Spondylosis without myelopathy or radiculopathy, lumbosacral region     Thoracic radiculopathy         Review of patient's allergies indicates:   Allergen Reactions    Insect venom      Note: - [employer]rees[employer] 05/07/2019    Sulfa (sulfonamide antibiotics) Nausea And Vomiting        Past Surgical History:   Procedure Laterality Date    CHOLECYSTECTOMY      CYSTOSCOPY      EPIDURAL STEROID INJECTION INTO LUMBAR SPINE N/A 05/27/2020    L1-2 WILD     EPIDURAL STEROID INJECTION INTO THORACIC SPINE N/A 02/03/2021    T9-10 WILD     EPIDURAL STEROID INJECTION INTO THORACIC SPINE N/A 3/18/2021    Procedure: INJECTION, STEROID, SPINE, THORACIC, EPIDURAL;  Surgeon: Arelis Burns MD;  Location: [facility];  Service: Pain Management;  Laterality: N/A;    EPIDURAL STEROID INJECTION INTO THORACIC SPINE N/A 12/23/2021    Procedure: Injection-steroid-epidural-thoracic T9/10;  Surgeon: Arelis Burns MD;  Location: [facility];  Service: Pain Management;  Laterality: N/A;  HAD VAC  WILL BRING CARD    HYSTERECTOMY      INJECTION OF ANESTHETIC AGENT AROUND MEDIAL BRANCH NERVES INNERVATING LUMBAR FACET JOINT Bilateral 4/22/2021    Procedure: Block-nerve-medial branch-lumbar Bilateral L3-4,4-5,5-S1 MBB #2;  Surgeon: Arelis Burns MD;  Location: [facility];  Service: Pain Management;  Laterality: Bilateral;    INJECTION OF FACET JOINT Bilateral 12/04/2020    RADIOFREQUENCY ABLATION OF LUMBAR MEDIAL BRANCH NERVE  "AT SINGLE LEVEL Bilateral 5/20/2021    Procedure: RADIOFREQUENCY ABLATION, NERVE, SPINAL, LUMBAR, MEDIAL BRANCH, 1 LEVEL;  Surgeon: Arelis Burns MD;  Location: [facility];  Service: Pain Management;  Laterality: Bilateral;  Bilateral L3-S1 RFTC (both sides same day)        Family History   Problem Relation Age of Onset    Hypertension Mother     Heart disease Father        Current Outpatient Medications   Medication Instructions    atorvastatin (LIPITOR) 10 mg, Oral, Nightly    brimonidine 0.2% (ALPHAGAN) 0.2 % Drop 1 drop, Both Eyes, 2 times daily    busPIRone (BUSPAR) 15 mg, Oral, 3 times daily    chlorthalidone (HYGROTEN) 25 mg, Oral, Daily, Take one half tablet by mouth once daily    cyclobenzaprine (FLEXERIL) 10 mg, Oral, Every 8 hours    cyproheptadine (PERIACTIN) 4 mg, Oral, 3 times daily    DULoxetine (CYMBALTA) 60 mg, Oral, Every 12 hours    [START ON 9/1/2022] HYDROcodone-acetaminophen (NORCO)  mg per tablet 1 tablet, Oral, Every 8 hours    INCRUSE ELLIPTA 62.5 mcg, Inhalation, Daily, INHALE ONE PUFF BY MOUTH DAILY AT THE SAME TIME EACH DAY    latanoprost 0.005 % ophthalmic solution 1 drop, Both Eyes, Nightly    losartan-hydrochlorothiazide 50-12.5 mg (HYZAAR) 50-12.5 mg per tablet 1 tablet, Oral, Daily    pantoprazole (PROTONIX) 40 mg, Oral, 2 times daily before meals    polyethylene glycol (GLYCOLAX) 17 g, Oral, Daily, Mix with 8 ounces of juice or water    predniSONE (DELTASONE) 10 MG tablet TAKE ONE TABLET BY MOUTH EVERY DAY OR EVERY OTHER DAY as directed        Objective     BP (!) 190/102   Pulse 103   Ht 5' 2" (1.575 m)   Wt 49.9 kg (110 lb)   LMP  (LMP Unknown)   BMI 20.12 kg/m²     Physical Exam  Constitutional:       General: She is not in acute distress.     Appearance: Normal appearance. She is not ill-appearing.   HENT:      Head: Normocephalic and atraumatic.      Right Ear: External ear normal.      Left Ear: External ear normal.   Eyes:      " Extraocular Movements: Extraocular movements intact.      Conjunctiva/sclera: Conjunctivae normal.   Cardiovascular:      Rate and Rhythm: Normal rate.      Heart sounds: No murmur heard.  Pulmonary:      Effort: Pulmonary effort is normal.   Musculoskeletal:      Cervical back: Normal range of motion.   Skin:     General: Skin is warm and dry.      Coloration: Skin is not jaundiced.      Findings: No rash.   Neurological:      Mental Status: She is alert.   Psychiatric:         Mood and Affect: Mood normal.          All recently obtained labs have been reviewed and discussed in detail with the patient.   Assessment     1. Gastroesophageal reflux disease, unspecified whether esophagitis present    2. Hyperlipidemia, unspecified hyperlipidemia type    3. Depressive disorder    4. Essential (primary) hypertension    5. Spondylolisthesis, lumbar region    6. Thoracic radiculopathy    7. Spondylosis without myelopathy or radiculopathy, lumbosacral region    8. Decreased appetite    9. Constipation, unspecified constipation type    10. Asthma, unspecified asthma severity, unspecified whether complicated, unspecified whether persistent         Plan        Problem List Items Addressed This Visit        Neuro    Thoracic radiculopathy (Chronic)    Relevant Medications    DULoxetine (CYMBALTA) 60 MG capsule    Spondylosis without myelopathy or radiculopathy, lumbosacral region    Relevant Medications    DULoxetine (CYMBALTA) 60 MG capsule       Psychiatric    Depressive disorder    Relevant Medications    busPIRone (BUSPAR) 15 MG tablet       Cardiac/Vascular    Essential (primary) hypertension    Relevant Medications    losartan-hydrochlorothiazide 50-12.5 mg (HYZAAR) 50-12.5 mg per tablet    chlorthalidone (HYGROTEN) 25 MG Tab       GI    Constipation    Relevant Medications    polyethylene glycol (GLYCOLAX) 17 gram/dose powder       Orthopedic    Spondylolisthesis, lumbar region (Chronic)    Relevant Medications     DULoxetine (CYMBALTA) 60 MG capsule       Other    Decreased appetite    Relevant Medications    cyproheptadine (PERIACTIN) 4 mg tablet      Other Visit Diagnoses     Gastroesophageal reflux disease, unspecified whether esophagitis present    -  Primary    Relevant Medications    pantoprazole (PROTONIX) 40 MG tablet    Hyperlipidemia, unspecified hyperlipidemia type        Relevant Medications    atorvastatin (LIPITOR) 10 MG tablet    Asthma, unspecified asthma severity, unspecified whether complicated, unspecified whether persistent        Relevant Medications    INCRUSE ELLIPTA 62.5 mcg/actuation inhalation capsule          Follow up in about 6 months (around 2/26/2023), or if symptoms worsen or fail to improve.    Danilo Cloud, DO  [facility]

## 2022-09-06 ENCOUNTER — HOSPITAL ENCOUNTER (OUTPATIENT)
Facility: HOSPITAL | Age: 72
Discharge: HOME | End: 2022-09-06
Attending: PAIN MEDICINE | Admitting: PAIN MEDICINE
Payer: MEDICARE

## 2022-09-06 ENCOUNTER — ANESTHESIA EVENT (OUTPATIENT)
Dept: PAIN MEDICINE | Facility: HOSPITAL | Age: 72
End: 2022-09-06
Payer: MEDICARE

## 2022-09-06 ENCOUNTER — ANESTHESIA (OUTPATIENT)
Dept: PAIN MEDICINE | Facility: HOSPITAL | Age: 72
End: 2022-09-06
Payer: MEDICARE

## 2022-09-06 VITALS
SYSTOLIC BLOOD PRESSURE: 165 MMHG | OXYGEN SATURATION: 96 % | HEIGHT: 62 IN | HEART RATE: 100 BPM | DIASTOLIC BLOOD PRESSURE: 96 MMHG | TEMPERATURE: 97 F | RESPIRATION RATE: 11 BRPM | BODY MASS INDEX: 18.58 KG/M2 | WEIGHT: 101 LBS

## 2022-09-06 DIAGNOSIS — M47.817 SPONDYLOSIS OF LUMBOSACRAL REGION WITHOUT MYELOPATHY OR RADICULOPATHY: ICD-10-CM

## 2022-09-06 PROCEDURE — 64495 INJ PARAVERT F JNT L/S 3 LEV: CPT | Mod: 50 | Performed by: PAIN MEDICINE

## 2022-09-06 PROCEDURE — 64495 INJ PARAVERT F JNT L/S 3 LEV: CPT | Mod: GZ,50,, | Performed by: PAIN MEDICINE

## 2022-09-06 PROCEDURE — 25000003 PHARM REV CODE 250: Performed by: PAIN MEDICINE

## 2022-09-06 PROCEDURE — 64494 PR INJ DX/THER AGNT PARAVERT FACET JOINT,IMG GUIDE,LUMBAR/SAC, 2ND LEVEL: ICD-10-PCS | Mod: 50,,, | Performed by: PAIN MEDICINE

## 2022-09-06 PROCEDURE — 64493 PR INJ DX/THER AGNT PARAVERT FACET JOINT,IMG GUIDE,LUMBAR/SAC,1ST LVL: ICD-10-PCS | Mod: 50,,, | Performed by: PAIN MEDICINE

## 2022-09-06 PROCEDURE — 27000284 HC CANNULA NASAL: Performed by: NURSE ANESTHETIST, CERTIFIED REGISTERED

## 2022-09-06 PROCEDURE — 37000008 HC ANESTHESIA 1ST 15 MINUTES: Performed by: PAIN MEDICINE

## 2022-09-06 PROCEDURE — D9220A PRA ANESTHESIA: Mod: ,,, | Performed by: NURSE ANESTHETIST, CERTIFIED REGISTERED

## 2022-09-06 PROCEDURE — 63600175 PHARM REV CODE 636 W HCPCS: Performed by: PAIN MEDICINE

## 2022-09-06 PROCEDURE — 64495 PR INJ DX/THER AGNT PARAVERT FACET JOINT,IMG GUIDE,LUMBAR/SAC, ADD LEVEL: ICD-10-PCS | Mod: GZ,50,, | Performed by: PAIN MEDICINE

## 2022-09-06 PROCEDURE — 64493 INJ PARAVERT F JNT L/S 1 LEV: CPT | Mod: 50 | Performed by: PAIN MEDICINE

## 2022-09-06 PROCEDURE — 64493 INJ PARAVERT F JNT L/S 1 LEV: CPT | Mod: 50,,, | Performed by: PAIN MEDICINE

## 2022-09-06 PROCEDURE — 25000003 PHARM REV CODE 250: Performed by: NURSE ANESTHETIST, CERTIFIED REGISTERED

## 2022-09-06 PROCEDURE — 64494 INJ PARAVERT F JNT L/S 2 LEV: CPT | Mod: 50,,, | Performed by: PAIN MEDICINE

## 2022-09-06 PROCEDURE — D9220A PRA ANESTHESIA: ICD-10-PCS | Mod: ,,, | Performed by: NURSE ANESTHETIST, CERTIFIED REGISTERED

## 2022-09-06 PROCEDURE — 37000009 HC ANESTHESIA EA ADD 15 MINS: Performed by: PAIN MEDICINE

## 2022-09-06 PROCEDURE — 64494 INJ PARAVERT F JNT L/S 2 LEV: CPT | Mod: 50 | Performed by: PAIN MEDICINE

## 2022-09-06 PROCEDURE — 63600175 PHARM REV CODE 636 W HCPCS: Performed by: NURSE ANESTHETIST, CERTIFIED REGISTERED

## 2022-09-06 RX ORDER — LIDOCAINE HYDROCHLORIDE 20 MG/ML
INJECTION, SOLUTION EPIDURAL; INFILTRATION; INTRACAUDAL; PERINEURAL
Status: DISCONTINUED | OUTPATIENT
Start: 2022-09-06 | End: 2022-09-06

## 2022-09-06 RX ORDER — PROPOFOL 10 MG/ML
VIAL (ML) INTRAVENOUS
Status: DISCONTINUED | OUTPATIENT
Start: 2022-09-06 | End: 2022-09-06

## 2022-09-06 RX ORDER — TRIAMCINOLONE ACETONIDE 40 MG/ML
INJECTION, SUSPENSION INTRA-ARTICULAR; INTRAMUSCULAR CODE/TRAUMA/SEDATION MEDICATION
Status: DISCONTINUED | OUTPATIENT
Start: 2022-09-06 | End: 2022-09-06 | Stop reason: HOSPADM

## 2022-09-06 RX ORDER — SODIUM CHLORIDE 9 MG/ML
INJECTION, SOLUTION INTRAVENOUS CONTINUOUS
Status: DISCONTINUED | OUTPATIENT
Start: 2022-09-06 | End: 2022-09-06 | Stop reason: HOSPADM

## 2022-09-06 RX ORDER — BUPIVACAINE HYDROCHLORIDE 2.5 MG/ML
INJECTION, SOLUTION INFILTRATION; PERINEURAL CODE/TRAUMA/SEDATION MEDICATION
Status: DISCONTINUED | OUTPATIENT
Start: 2022-09-06 | End: 2022-09-06 | Stop reason: HOSPADM

## 2022-09-06 RX ADMIN — SODIUM CHLORIDE: 9 INJECTION, SOLUTION INTRAVENOUS at 10:09

## 2022-09-06 RX ADMIN — PROPOFOL 30 MG: 10 INJECTION, EMULSION INTRAVENOUS at 10:09

## 2022-09-06 RX ADMIN — PROPOFOL 100 MG: 10 INJECTION, EMULSION INTRAVENOUS at 10:09

## 2022-09-06 RX ADMIN — LIDOCAINE HYDROCHLORIDE 100 MG: 20 INJECTION, SOLUTION INTRAVENOUS at 10:09

## 2022-09-06 NOTE — ANESTHESIA POSTPROCEDURE EVALUATION
Anesthesia Post Evaluation    Patient: Amelie Cerrato    Procedure(s) Performed: Procedure(s) (LRB):  Block-nerve-medial branch-lumbar, bilateral L4 through S1 (Bilateral)    Final Anesthesia Type: general      Patient location during evaluation: PACU  Patient participation: Yes- Able to Participate  Level of consciousness: awake and alert  Post-procedure vital signs: reviewed and stable  Pain management: adequate  Airway patency: patent    PONV status at discharge: No PONV  Anesthetic complications: no      Cardiovascular status: blood pressure returned to baseline  Respiratory status: unassisted  Hydration status: euvolemic  Follow-up not needed.          Vitals Value Taken Time   /96 09/06/22 1112   Temp 36.1 °C (97 °F) 09/06/22 1056   Pulse 95 09/06/22 1112   Resp 11 09/06/22 1111   SpO2 90 % 09/06/22 1109   Vitals shown include unvalidated device data.      No case tracking events are documented in the log.      Pain/Nichole Score: Nichole Score: 9 (9/6/2022 10:56 AM)

## 2022-09-06 NOTE — OP NOTE
Procedure Note    Procedure Date: 9/6/2022    Procedure Performed: Bilateral Lumbar Facet  Medial Branch Block @ L3-4,4-5,5-S1 with Fluoroscopic Guidance    Indications: Patient has failed conservative therapy.      Pre-op diagnosis: Lumbar Spondylosis    Post-op diagnosis: same    Physician: HEATHER Burns MD    Anesthesia: MAC    Estimated Blood Loss: Less than 1cc    IVF: Per Anesthesia    Complications: None    Technique:  The patient was interviewed in the holding area and Risks/Benefits were discussed, including but not limited to  the possibility of new or different pain, bleeding or infection.   All questions were answered.  The patient understood and accepted risks.  Consent was reviewed and signed.  A time out was taken to identify the patient, procedure and side of the procedure. The patient was placed in a prone position, then prepped and draped in the usual sterile fashion using ChloraPrep and sterile towels.  The levels were determined under fluoroscopic guidance and then marked.  1% Lidocaine was given by raising a skin wheal at the skin over each site and then infiltrated.  A 22-gauge 4 inch needle was introduced to the anatomic location of the bilateral L3-4,4-5,5-S1 medial branch nerves.  Then, after negative aspiration, 1 cc of a 1:1 mixture of  (Bupivacaine 0.25% 3cc , 1% lidocaine 2 cc and  40mg kenalog ) was injected @ each level.The patient tolerated the procedure well and was transferred to the P.A.[facility]U. in stable condition.  The patient was monitored after the procedure.  Patient was given post procedure and discharge instructions to follow at home.  The patient was discharged in a stable condition with an adult .

## 2022-09-06 NOTE — PLAN OF CARE
Plan:  D/c pt via wheelchair at 1130  Informed pt if does not void in 8 hours to go to ER. Notify if redness, drainage, from injection site or fever over next 3-4 days. Rest and drink plenty of fluids for the remainder of the day. No lifting over 5 lbs. For the remainder of the day. Continue regular medications as prescribed. May take pain medications as prescribed.     Pain improved 100%

## 2022-09-06 NOTE — BRIEF OP NOTE
Discharge Note  Short Stay    Admit Date: 9/6/2022    Discharge Date: 9/6/2022    Attending Physician: Arelis Burns     Discharge Provider: Arelis Burns    Diagnoses:  Lumbosacral spondylosis    Discharged Condition: Good    Final Diagnoses: Spondylosis without myelopathy or radiculopathy, lumbosacral region [M47.817]    Disposition: Home or Self Care    Hospital Course: No complications, uneventful    Outcome of Hospitalization, Treatment, Procedure, or Surgery:  Patient was admitted for outpatient interventional pain management procedure. The patient tolerated the procedure well with no complications.    Follow up/Patient Instructions:  Follow up as scheduled in Pain Management office in 3-4 weeks.  Patient has received instructions and follow up date and time.    Medications:  Continue previous medications

## 2022-09-06 NOTE — TRANSFER OF CARE
"Anesthesia Transfer of Care Note    Patient: Amelie Cerrato    Procedure(s) Performed: Procedure(s) (LRB):  Block-nerve-medial branch-lumbar, bilateral L4 through S1 (Bilateral)    Patient location: PACU    Anesthesia Type: general    Transport from OR: Transported from OR on room air with adequate spontaneous ventilation    Post pain: adequate analgesia    Post assessment: no apparent anesthetic complications    Post vital signs: stable    Level of consciousness: sedated    Nausea/Vomiting: no nausea/vomiting    Complications: none    Transfer of care protocol was followed      Last vitals:   Visit Vitals  /73   Pulse 89   Temp 36.1 °C (97 °F) (Skin)   Resp (!) 23   Ht 5' 2" (1.575 m)   Wt 45.8 kg (101 lb)   LMP  (LMP Unknown)   SpO2 97%   BMI 18.47 kg/m²     "

## 2022-09-06 NOTE — ANESTHESIA PREPROCEDURE EVALUATION
09/06/2022  Amelie Cerrato is a 72 y.o., female.    Pre-op Assessment    I have reviewed the Patient Summary Reports.   I have reviewed the NPO Status.   I have reviewed the Medications.     Review of Systems  Anesthesia Hx:  No problems with previous Anesthesia  Family Hx of Anesthesia complications:  Personal Hx of Anesthesia complications   Social:  Non-Smoker    Hematology/Oncology:  Hematology Normal   Oncology Normal     EENT/Dental:EENT/Dental Normal   Cardiovascular:   Exercise tolerance: good Hypertension hyperlipidemia    Pulmonary:   COPD    Renal/:   Chronic Renal Disease    Hepatic/GI:   GERD    Musculoskeletal:   Arthritis   Spine Disorders: lumbar and thoracic Degenerative disease and Chronic Pain    Neurological:   Neuromuscular Disease,   Chronic Pain Syndrome   Endocrine:  Endocrine Normal    Dermatological:  Skin Normal    Psych:   Psychiatric History depression          Physical Exam  General:  Well nourished, Cooperative and Alert      Airway/Jaw/Neck:  Airway Findings: Mouth Opening: Normal   Tongue: Normal   General Airway Assessment: Adult Mallampati: II  TM Distance: Normal, at least 6 cm       Dental:  Dental Findings: In tact     Chest/Lungs:  Chest/Lungs Findings: Clear to auscultation      Heart/Vascular:  Heart Findings: Rate: Normal  Rhythm: Regular Rhythm             Anesthesia Plan  Type of Anesthesia, risks & benefits discussed:  Anesthesia Type:  Gen Natural Airway    Patient's Preference:   Plan Factors:          Intra-op Monitoring Plan: standard ASA monitors  Intra-op Monitoring Plan Comments:   Post Op Pain Control Plan: multimodal analgesia  Post Op Pain Control Plan Comments:     Induction:   IV  Beta Blocker:  Patient is not currently on a Beta-Blocker (No further documentation required).       Informed Consent: Informed consent signed with the Patient and all parties  understand the risks and agree with anesthesia plan.  All questions answered.  Anesthesia consent signed with patient.  ASA Score: 3     Day of Surgery Review of History & Physical: I have interviewed and examined the patient. I have reviewed the patient's H&P dated:  There are no significant changes.  H&P Update referred to the surgeon/provider.          Ready For Surgery From Anesthesia Perspective.           Physical Exam  General: Well nourished, Cooperative and Alert    Airway:  Mallampati: II   Mouth Opening: Normal  TM Distance: Normal, at least 6 cm  Tongue: Normal    Dental:  In tact    Chest/Lungs:  Clear to auscultation    Heart:  Rate: Normal  Rhythm: Regular Rhythm          Anesthesia Plan  Type of Anesthesia, risks & benefits discussed:    Anesthesia Type: Gen Natural Airway  Intra-op Monitoring Plan: standard ASA monitors  Post Op Pain Control Plan: multimodal analgesia  Induction:  IV  Informed Consent: Informed consent signed with the Patient and all parties understand the risks and agree with anesthesia plan.  All questions answered.   ASA Score: 3  Day of Surgery Review of History & Physical: H&P Update referred to the surgeon/provider.I have interviewed and examined the patient. I have reviewed the patient's H&P dated: There are no significant changes.     Ready For Surgery From Anesthesia Perspective.       .

## 2022-09-16 NOTE — PROGRESS NOTES
Subjective:         Patient ID: Amelie Cerrato is a 72 y.o. female.    Chief Complaint: Neck Pain and Mid-back Pain      Pain  This is a chronic problem. The current episode started more than 1 year ago. The problem occurs daily. The problem has been gradually improving. Associated symptoms include arthralgias, myalgias and neck pain. Pertinent negatives include no abdominal pain, anorexia, change in bowel habit, chest pain, chills, coughing, diaphoresis, fatigue, rash, sore throat, swollen glands, vertigo, visual change or vomiting.   Review of Systems   Constitutional:  Negative for activity change, appetite change, chills, diaphoresis, fatigue and unexpected weight change.   HENT:  Negative for drooling, ear discharge, ear pain, facial swelling, mouth sores, nosebleeds, sore throat, trouble swallowing, voice change and goiter.    Eyes:  Negative for photophobia, pain, discharge, redness, visual disturbance and eye dryness.   Respiratory:  Negative for apnea, cough, choking, chest tightness, shortness of breath, wheezing and stridor.    Cardiovascular:  Negative for chest pain, palpitations and leg swelling.   Gastrointestinal:  Negative for abdominal distention, abdominal pain, anorexia, change in bowel habit, diarrhea, rectal pain, vomiting, fecal incontinence and change in bowel habit.   Endocrine: Negative for cold intolerance, heat intolerance, polydipsia, polyphagia and polyuria.   Genitourinary:  Negative for bladder incontinence, dysuria, flank pain, frequency, hematuria and hot flashes.   Musculoskeletal:  Positive for arthralgias, back pain, leg pain, myalgias, neck pain and neck stiffness.   Integumentary:  Negative for color change, pallor and rash.   Allergic/Immunologic: Negative for immunocompromised state.   Neurological:  Negative for dizziness, vertigo, seizures, syncope, facial asymmetry, speech difficulty, light-headedness, coordination difficulties, memory loss and coordination difficulties.    Hematological:  Negative for adenopathy. Does not bruise/bleed easily.   Psychiatric/Behavioral:  Negative for agitation, behavioral problems, confusion, decreased concentration, dysphoric mood, hallucinations, self-injury and suicidal ideas. The patient is not nervous/anxious and is not hyperactive.          Past Medical History:   Diagnosis Date    Acid reflux     Anxiety     Chronic pain syndrome     Coccyx sprain     broken tailbone     COPD (chronic obstructive pulmonary disease)     Depression     Fall at home 10/01/2021    Fracture of multiple pubic rami, left, closed, initial encounter 10/01/2021    HLD (hyperlipidemia)     Hypertension     Hypokalemia     Hypomagnesemia     Low back pain     Lumbar radiculopathy     Lumbar stenosis     Lumbosacral spondylosis     Neuropathy     Radiculopathy, lumbosacral region     Renal disorder     Spondylolisthesis, lumbar region     Spondylosis without myelopathy or radiculopathy, lumbosacral region     Thoracic radiculopathy      Past Surgical History:   Procedure Laterality Date    CHOLECYSTECTOMY      CYSTOSCOPY      EPIDURAL STEROID INJECTION INTO LUMBAR SPINE N/A 05/27/2020    L1-2 WILD     EPIDURAL STEROID INJECTION INTO THORACIC SPINE N/A 02/03/2021    T9-10 WILD     EPIDURAL STEROID INJECTION INTO THORACIC SPINE N/A 3/18/2021    Procedure: INJECTION, STEROID, SPINE, THORACIC, EPIDURAL;  Surgeon: Arelis Burns MD;  Location: [facility];  Service: Pain Management;  Laterality: N/A;    EPIDURAL STEROID INJECTION INTO THORACIC SPINE N/A 12/23/2021    Procedure: Injection-steroid-epidural-thoracic T9/10;  Surgeon: Arelis Burns MD;  Location: [facility];  Service: Pain Management;  Laterality: N/A;  HAD VAC  WILL BRING CARD    HYSTERECTOMY      INJECTION OF ANESTHETIC AGENT AROUND MEDIAL BRANCH NERVES INNERVATING LUMBAR FACET JOINT Bilateral 4/22/2021    Procedure: Block-nerve-medial branch-lumbar Bilateral L3-4,4-5,5-S1 MBB #2;  Surgeon: Arelis  "KATRINA Burns MD;  Location: [facility];  Service: Pain Management;  Laterality: Bilateral;    INJECTION OF ANESTHETIC AGENT AROUND MEDIAL BRANCH NERVES INNERVATING LUMBAR FACET JOINT Bilateral 9/6/2022    Procedure: Block-nerve-medial branch-lumbar, bilateral L4 through S1;  Surgeon: Arelis Burns MD;  Location: [facility];  Service: Pain Management;  Laterality: Bilateral;    INJECTION OF FACET JOINT Bilateral 12/04/2020    RADIOFREQUENCY ABLATION OF LUMBAR MEDIAL BRANCH NERVE AT SINGLE LEVEL Bilateral 5/20/2021    Procedure: RADIOFREQUENCY ABLATION, NERVE, SPINAL, LUMBAR, MEDIAL BRANCH, 1 LEVEL;  Surgeon: Arelis Burns MD;  Location: [facility];  Service: Pain Management;  Laterality: Bilateral;  Bilateral L3-S1 RFTC (both sides same day)     Social History     Socioeconomic History    Marital status:    Tobacco Use    Smoking status: Former    Smokeless tobacco: Never   Substance and Sexual Activity    Alcohol use: Never     Comment: unknown    Drug use: Never     Types: Hydrocodone    Sexual activity: Not Currently     Family History   Problem Relation Age of Onset    Hypertension Mother     Heart disease Father      Review of patient's allergies indicates:   Allergen Reactions    Insect venom      Note: - [employer] 05/07/2019    Sulfa (sulfonamide antibiotics) Nausea And Vomiting        Objective:  Vitals:    09/19/22 0847   BP: (!) 186/96   Pulse: 96   Resp: 18   Weight: 45.8 kg (101 lb)   Height: 5' 2" (1.575 m)   PainSc:   9         Physical Exam  Vitals and nursing note reviewed. Exam conducted with a chaperone present.   Constitutional:       General: She is awake. She is not in acute distress.     Appearance: Normal appearance. She is not ill-appearing or toxic-appearing.   HENT:      Head: Normocephalic and atraumatic.      Nose: Nose normal.      Mouth/Throat:      Mouth: Mucous membranes are moist.      Pharynx: Oropharynx is clear.   Eyes:      Conjunctiva/sclera: " Conjunctivae normal.      Pupils: Pupils are equal, round, and reactive to light.   Cardiovascular:      Rate and Rhythm: Normal rate.   Pulmonary:      Effort: Pulmonary effort is normal. No respiratory distress.   Abdominal:      Palpations: Abdomen is soft.   Musculoskeletal:         General: Normal range of motion.      Right shoulder: Tenderness present.      Left shoulder: Tenderness present.      Cervical back: Normal range of motion and neck supple. Tenderness present.      Thoracic back: Tenderness present.      Lumbar back: Tenderness present.   Skin:     General: Skin is warm and dry.   Neurological:      General: No focal deficit present.      Mental Status: She is alert and oriented to person, place, and time. Mental status is at baseline.      Cranial Nerves: No cranial nerve deficit (II-XII).   Psychiatric:         Mood and Affect: Mood normal.         Behavior: Behavior normal. Behavior is cooperative.         Thought Content: Thought content normal.         FL Fluoro for Pain Management  See OP Notes for results.     IMPRESSION: See OP Notes for results.     This procedure was auto-finalized by: Virtual [employer]iologist       Abstract on 08/19/2022   Component Date Value Ref Range Status    Fecal Occult Blood 10/04/2021 Negative   Final   Office Visit on 08/16/2022   Component Date Value Ref Range Status    POC Amphetamines 08/16/2022 Negative  Negative, Inconclusive Final    POC Barbiturates 08/16/2022 Negative  Negative, Inconclusive Final    POC Benzodiazepines 08/16/2022 Negative  Negative, Inconclusive Final    POC Cocaine 08/16/2022 Negative  Negative, Inconclusive Final    POC THC 08/16/2022 Negative  Negative, Inconclusive Final    POC Methadone 08/16/2022 Negative  Negative, Inconclusive Final    POC Methamphetamine 08/16/2022 Negative  Negative, Inconclusive Final    POC Opiates 08/16/2022 Presumptive Positive (A)  Negative, Inconclusive Final    POC Oxycodone 08/16/2022 Negative  Negative,  Inconclusive Final    POC Phencyclidine 08/16/2022 Negative  Negative, Inconclusive Final    POC Methylenedioxymethamphetamine * 08/16/2022 Negative  Negative, Inconclusive Final    POC Tricyclic Antidepressants 08/16/2022 Negative  Negative, Inconclusive Final    POC Buprenorphine 08/16/2022 Negative   Final     Acceptable 08/16/2022 Yes   Final    POC Temperature (Urine) 08/16/2022 92   Final   Admission on 07/21/2022, Discharged on 07/21/2022   Component Date Value Ref Range Status    Sodium 07/21/2022 127 (L)  136 - 145 mmol/L Final    Potassium 07/21/2022 5.0  3.5 - 5.1 mmol/L Final    Chloride 07/21/2022 90 (L)  98 - 107 mmol/L Final    CO2 07/21/2022 28  21 - 32 mmol/L Final    Anion Gap 07/21/2022 14  7 - 16 mmol/L Final    Glucose 07/21/2022 81  74 - 106 mg/dL Final    BUN 07/21/2022 13  7 - 18 mg/dL Final    Creatinine 07/21/2022 1.03 (H)  0.55 - 1.02 mg/dL Final    BUN/Creatinine Ratio 07/21/2022 13  6 - 20 Final    Calcium 07/21/2022 10.2 (H)  8.5 - 10.1 mg/dL Final    Total Protein 07/21/2022 7.9  6.4 - 8.2 g/dL Final    Albumin 07/21/2022 3.9  3.5 - 5.0 g/dL Final    Globulin 07/21/2022 4.0  2.0 - 4.0 g/dL Final    A/G Ratio 07/21/2022 1.0   Final    Bilirubin, Total 07/21/2022 0.6  0.0 - 1.2 mg/dL Final    Alk Phos 07/21/2022 112  55 - 142 U/L Final    ALT 07/21/2022 26  13 - 56 U/L Final    AST 07/21/2022 19  15 - 37 U/L Final    eGFR 07/21/2022 56 (L)  >=60 mL/min/1.73m² Final    Amylase 07/21/2022 97  25 - 115 U/L Final    Lipase 07/21/2022 137  73 - 393 U/L Final    Color, UA 07/21/2022 Colorless  Colorless, Straw, Yellow, Light Yellow, Dark Yellow Final    Clarity, UA 07/21/2022 Clear  Clear Final    pH, UA 07/21/2022 7.0  5.0 to 8.0 pH Units Final    Leukocytes, UA 07/21/2022 Negative  Negative Final    Nitrites, UA 07/21/2022 Negative  Negative Final    Protein, UA 07/21/2022 Negative  Negative Final    Glucose, UA 07/21/2022 Normal  Normal mg/dL Final    Ketones, UA 07/21/2022  Negative  Negative mg/dL Final    Urobilinogen, UA 07/21/2022 Normal  0.2, 1.0, Normal mg/dL Final    Bilirubin, UA 07/21/2022 Negative  Negative Final    Blood, UA 07/21/2022 Negative  Negative Final    Specific Gravity, UA 07/21/2022 1.003  <=1.030 Final    WBC 07/21/2022 11.00  4.50 - 11.00 K/uL Final    RBC 07/21/2022 5.45 (H)  4.20 - 5.40 M/uL Final    Hemoglobin 07/21/2022 15.9  12.0 - 16.0 g/dL Final    Hematocrit 07/21/2022 47.1 (H)  38.0 - 47.0 % Final    MCV 07/21/2022 86.4  80.0 - 96.0 fL Final    MCH 07/21/2022 29.2  27.0 - 31.0 pg Final    MCHC 07/21/2022 33.8  32.0 - 36.0 g/dL Final    RDW 07/21/2022 16.6 (H)  11.5 - 14.5 % Final    Platelet Count 07/21/2022 526 (H)  150 - 400 K/uL Final    MPV 07/21/2022 8.8 (L)  9.4 - 12.4 fL Final    Neutrophils % 07/21/2022 74.0 (H)  53.0 - 65.0 % Final    Lymphocytes % 07/21/2022 18.6 (L)  27.0 - 41.0 % Final    Monocytes % 07/21/2022 5.5  2.0 - 6.0 % Final    Eosinophils % 07/21/2022 0.1 (L)  1.0 - 4.0 % Final    Basophils % 07/21/2022 0.3  0.0 - 1.0 % Final    Immature Granulocytes % 07/21/2022 1.5 (H)  0.0 - 0.4 % Final    nRBC, Auto 07/21/2022 0.0  <=0.0 % Final    Neutrophils, Abs 07/21/2022 8.14 (H)  1.80 - 7.70 K/uL Final    Lymphocytes, Absolute 07/21/2022 2.05  1.00 - 4.80 K/uL Final    Monocytes, Absolute 07/21/2022 0.60  0.00 - 0.80 K/uL Final    Eosinophils, Absolute 07/21/2022 0.01  0.00 - 0.50 K/uL Final    Basophils, Absolute 07/21/2022 0.03  0.00 - 0.20 K/uL Final    Immature Granulocytes, Absolute 07/21/2022 0.17 (H)  0.00 - 0.04 K/uL Final    nRBC, Absolute 07/21/2022 0.00  <=0.00 x10e3/uL Final    Diff Type 07/21/2022 Auto   Final    Influenza A 07/21/2022 Negative  Negative, Invalid Final    Influenza B 07/21/2022 Negative  Negative, Invalid Final    COVID-19 Ag 07/21/2022 Negative  Negative, Invalid Final   Admission on 06/20/2022, Discharged on 06/20/2022   Component Date Value Ref Range Status    Sodium 06/20/2022 143  136 - 145 mmol/L Final     Potassium 06/20/2022 3.6  3.5 - 5.1 mmol/L Final    Chloride 06/20/2022 106  98 - 107 mmol/L Final    CO2 06/20/2022 33 (H)  21 - 32 mmol/L Final    Anion Gap 06/20/2022 8  7 - 16 mmol/L Final    Glucose 06/20/2022 98  74 - 106 mg/dL Final    BUN 06/20/2022 11  7 - 18 mg/dL Final    Creatinine 06/20/2022 1.08 (H)  0.55 - 1.02 mg/dL Final    BUN/Creatinine Ratio 06/20/2022 10  6 - 20 Final    Calcium 06/20/2022 8.8  8.5 - 10.1 mg/dL Final    Total Protein 06/20/2022 6.3 (L)  6.4 - 8.2 g/dL Final    Albumin 06/20/2022 2.8 (L)  3.5 - 5.0 g/dL Final    Globulin 06/20/2022 3.5  2.0 - 4.0 g/dL Final    A/G Ratio 06/20/2022 0.8   Final    Bilirubin, Total 06/20/2022 0.4  0.0 - 1.2 mg/dL Final    Alk Phos 06/20/2022 74  55 - 142 U/L Final    ALT 06/20/2022 41  13 - 56 U/L Final    AST 06/20/2022 42 (H)  15 - 37 U/L Final    eGFR 06/20/2022 53 (L)  >=60 mL/min/1.73m² Final    Lipase 06/20/2022 78  73 - 393 U/L Final    Influenza A 06/20/2022 Negative  Negative, Invalid Final    Influenza B 06/20/2022 Negative  Negative, Invalid Final    COVID-19 Ag 06/20/2022 Negative  Negative, Invalid Final    Troponin I High Sensitivity 06/20/2022 70.4 (HH)  <=60.4 pg/mL Final    WBC 06/20/2022 9.52  4.50 - 11.00 K/uL Final    RBC 06/20/2022 4.27  4.20 - 5.40 M/uL Final    Hemoglobin 06/20/2022 12.2  12.0 - 16.0 g/dL Final    Hematocrit 06/20/2022 39.3  38.0 - 47.0 % Final    MCV 06/20/2022 92.0  80.0 - 96.0 fL Final    MCH 06/20/2022 28.6  27.0 - 31.0 pg Final    MCHC 06/20/2022 31.0 (L)  32.0 - 36.0 g/dL Final    RDW 06/20/2022 18.3 (H)  11.5 - 14.5 % Final    Platelet Count 06/20/2022 340  150 - 400 K/uL Final    MPV 06/20/2022 9.2 (L)  9.4 - 12.4 fL Final    Neutrophils % 06/20/2022 52.7 (L)  53.0 - 65.0 % Final    Lymphocytes % 06/20/2022 31.9  27.0 - 41.0 % Final    Neutrophils, Abs 06/20/2022 5.02  1.80 - 7.70 K/uL Final    Lymphocytes, Absolute 06/20/2022 3.04  1.00 - 4.80 K/uL Final    Diff Type 06/20/2022 Auto   Final     Monocytes % 06/20/2022 11.8 (H)  2.0 - 6.0 % Final    Eosinophils % 06/20/2022 3.2  1.0 - 4.0 % Final    Basophils % 06/20/2022 0.4  0.0 - 1.0 % Final    Monocytes, Absolute 06/20/2022 1.12 (H)  0.00 - 0.80 K/uL Final    Eosinophils, Absolute 06/20/2022 0.30  0.00 - 0.50 K/uL Final    Basophils, Absolute 06/20/2022 0.04  0.00 - 0.20 K/uL Final    Troponin I High Sensitivity 06/20/2022 66.1 (HH)  <=60.4 pg/mL Final   Office Visit on 03/22/2022   Component Date Value Ref Range Status    POC Amphetamines 03/22/2022 Negative  Negative, Inconclusive Final    POC Barbiturates 03/22/2022 Negative  Negative, Inconclusive Final    POC Benzodiazepines 03/22/2022 Negative  Negative, Inconclusive Final    POC Cocaine 03/22/2022 Negative  Negative, Inconclusive Final    POC THC 03/22/2022 Negative  Negative, Inconclusive Final    POC Methadone 03/22/2022 Negative  Negative, Inconclusive Final    POC Methamphetamine 03/22/2022 Negative  Negative, Inconclusive Final    POC Opiates 03/22/2022 Presumptive Positive (A)  Negative, Inconclusive Final    POC Oxycodone 03/22/2022 Negative  Negative, Inconclusive Final    POC Phencyclidine 03/22/2022 Negative  Negative, Inconclusive Final    POC Methylenedioxymethamphetamine * 03/22/2022 Negative  Negative, Inconclusive Final    POC Tricyclic Antidepressants 03/22/2022 Negative  Negative, Inconclusive Final    POC Buprenorphine 03/22/2022 Negative   Final     Acceptable 03/22/2022 Yes   Final    POC Temperature (Urine) 03/22/2022 92   Final         Orders Placed This Encounter   Procedures    Case Request Operating Room: Block-nerve-medial branch-lumbar, bilateral L4 through S1     Order Specific Question:   Medical Necessity:     Answer:   Medically Non-Urgent [100]     Order Specific Question:   CPT Code:     Answer:   MA INJ DX/THER AGNT PARAVERT FACET JOINT,IMG GUIDE,LUMBAR/SAC,1ST LVL [16480]     Order Specific Question:   CPT Code:     Answer:   MA INJ DX/THER AGNT  PARAVERT FACET JOINT,IMG GUIDE,LUMBAR/SAC, 2ND LEVEL [11814]     Order Specific Question:   Is an on-site pathologist required for this procedure?     Answer:   N/A         Requested Prescriptions     Signed Prescriptions Disp Refills    HYDROcodone-acetaminophen (NORCO)  mg per tablet 90 tablet 0     Sig: Take 1 tablet by mouth every 8 (eight) hours.       Assessment:     1. Spondylosis without myelopathy or radiculopathy, lumbosacral region    2. Spondylolisthesis, lumbar region    3. Thoracic radiculopathy         A's of Opioid Risk Assessment  Activity:Patient can perform ADL.   Analgesia:Patients pain is partially controlled by current medication. Patient has tried OTC medications such as Tylenol and Ibuprofen with out relief.   Adverse Effects: Patient denies constipation or sedation.  Aberrant Behavior:  reviewed with no aberrant drug seeking/taking behavior.  Overdose reversal drug naloxone discussed    Drug screen reviewed      Plan:    Follow-up after bilateral lumbar L4 through S1 medial branch block # 1, September 6, 2022, she states she had 100% relief initially after procedure maintaining 80% relief with time, she states procedure did help increase her level of function    She is wished requesting to schedule next lumbar procedure for returning low back pain worse with flexion extension rotation lumbar spine tenderness long facet joint area ongoing for more than 3 months facet joint in nature    Continue home exercise program as directed    Continue current medication    MRI lumbar spine May 8, 2020 multiple compression fractures degenerative changes noted the T12 fracture appeared acute to subacute    X-ray lumbar spine October 21, 2021 no fracture noted multiple level degenerative changes osteopenia noted    Indications for this procedure for this specific patient include the following   - Pt has had symptoms for three months with moderate to severe pain with functional impairment rated of  7/10 pain.   - Pain non-responsive to conservative care.    - Pain predominately axial and not associated with radiculopathy or claudication.    - No non-facet pathology as source of pain.    - Clinical assessment implicates facet joint as putative pain source.    - Pain is exacerbated by extension or prolonged sitting/standing and relieved by rest.    - No unexplained neurologic deficit.    - No history of coagulopathy, infection or unstable medical conditions.    - Pain is causing significant functional limitation resulting in diminished quality of life and impaired age appropriate ADL's.   - Clinical assessment implicates facet joint as putative source of pain  - Repeat injections not done prior to 7 days   - no more than 2 levels will be done    The planned medically necessary  surgical procedure is performed in a hospital outpatient department and not in an ambulatory surgical center due to:     -there is no geographically assessable ambulatory surgery center that has the  necessary equipment and fluoroscopy needed for the procedure     -there is no geographically assessable ambulatory surgical center available at which the physician has privileges     -an ASC's  specific  guideline regarding the individuals weight or health conditions that prevent the use of an ASC    Monitor anesthesia request is medically indicated for the scheduled nerve block procedure due to:  1- needle phobia and anxiety, placing  the patient at risk during the provided service.  2-patient has an ASA class greater than 3 and requires constant presence of an anesthesiologist during the procedure,   3-patient has severe problems hard to lie still  4-patient suffers from chronic pain and is unable to function due to  diminished ADLs    Schedule bilateral lumbar L4 through S1 medial branch block # 2, lumbosacral spondylosis    Dr. Burns,    Bring original prescription medication bottles/container/box with labels to each visit    Pill  count    Physical therapy    Massage therapy declines

## 2022-09-16 NOTE — H&P (VIEW-ONLY)
Subjective:         Patient ID: Amelie Cerrato is a 72 y.o. female.    Chief Complaint: Neck Pain and Mid-back Pain      Pain  This is a chronic problem. The current episode started more than 1 year ago. The problem occurs daily. The problem has been gradually improving. Associated symptoms include arthralgias, myalgias and neck pain. Pertinent negatives include no abdominal pain, anorexia, change in bowel habit, chest pain, chills, coughing, diaphoresis, fatigue, rash, sore throat, swollen glands, vertigo, visual change or vomiting.   Review of Systems   Constitutional:  Negative for activity change, appetite change, chills, diaphoresis, fatigue and unexpected weight change.   HENT:  Negative for drooling, ear discharge, ear pain, facial swelling, mouth sores, nosebleeds, sore throat, trouble swallowing, voice change and goiter.    Eyes:  Negative for photophobia, pain, discharge, redness, visual disturbance and eye dryness.   Respiratory:  Negative for apnea, cough, choking, chest tightness, shortness of breath, wheezing and stridor.    Cardiovascular:  Negative for chest pain, palpitations and leg swelling.   Gastrointestinal:  Negative for abdominal distention, abdominal pain, anorexia, change in bowel habit, diarrhea, rectal pain, vomiting, fecal incontinence and change in bowel habit.   Endocrine: Negative for cold intolerance, heat intolerance, polydipsia, polyphagia and polyuria.   Genitourinary:  Negative for bladder incontinence, dysuria, flank pain, frequency, hematuria and hot flashes.   Musculoskeletal:  Positive for arthralgias, back pain, leg pain, myalgias, neck pain and neck stiffness.   Integumentary:  Negative for color change, pallor and rash.   Allergic/Immunologic: Negative for immunocompromised state.   Neurological:  Negative for dizziness, vertigo, seizures, syncope, facial asymmetry, speech difficulty, light-headedness, coordination difficulties, memory loss and coordination difficulties.    Hematological:  Negative for adenopathy. Does not bruise/bleed easily.   Psychiatric/Behavioral:  Negative for agitation, behavioral problems, confusion, decreased concentration, dysphoric mood, hallucinations, self-injury and suicidal ideas. The patient is not nervous/anxious and is not hyperactive.          Past Medical History:   Diagnosis Date    Acid reflux     Anxiety     Chronic pain syndrome     Coccyx sprain     broken tailbone     COPD (chronic obstructive pulmonary disease)     Depression     Fall at home 10/01/2021    Fracture of multiple pubic rami, left, closed, initial encounter 10/01/2021    HLD (hyperlipidemia)     Hypertension     Hypokalemia     Hypomagnesemia     Low back pain     Lumbar radiculopathy     Lumbar stenosis     Lumbosacral spondylosis     Neuropathy     Radiculopathy, lumbosacral region     Renal disorder     Spondylolisthesis, lumbar region     Spondylosis without myelopathy or radiculopathy, lumbosacral region     Thoracic radiculopathy      Past Surgical History:   Procedure Laterality Date    CHOLECYSTECTOMY      CYSTOSCOPY      EPIDURAL STEROID INJECTION INTO LUMBAR SPINE N/A 05/27/2020    L1-2 WILD     EPIDURAL STEROID INJECTION INTO THORACIC SPINE N/A 02/03/2021    T9-10 WILD     EPIDURAL STEROID INJECTION INTO THORACIC SPINE N/A 3/18/2021    Procedure: INJECTION, STEROID, SPINE, THORACIC, EPIDURAL;  Surgeon: Arelis Burns MD;  Location: [facility];  Service: Pain Management;  Laterality: N/A;    EPIDURAL STEROID INJECTION INTO THORACIC SPINE N/A 12/23/2021    Procedure: Injection-steroid-epidural-thoracic T9/10;  Surgeon: Arelis Burns MD;  Location: [facility];  Service: Pain Management;  Laterality: N/A;  HAD VAC  WILL BRING CARD    HYSTERECTOMY      INJECTION OF ANESTHETIC AGENT AROUND MEDIAL BRANCH NERVES INNERVATING LUMBAR FACET JOINT Bilateral 4/22/2021    Procedure: Block-nerve-medial branch-lumbar Bilateral L3-4,4-5,5-S1 MBB #2;  Surgeon: Arelis  "KATRINA Burns MD;  Location: [facility];  Service: Pain Management;  Laterality: Bilateral;    INJECTION OF ANESTHETIC AGENT AROUND MEDIAL BRANCH NERVES INNERVATING LUMBAR FACET JOINT Bilateral 9/6/2022    Procedure: Block-nerve-medial branch-lumbar, bilateral L4 through S1;  Surgeon: Arelis Burns MD;  Location: [facility];  Service: Pain Management;  Laterality: Bilateral;    INJECTION OF FACET JOINT Bilateral 12/04/2020    RADIOFREQUENCY ABLATION OF LUMBAR MEDIAL BRANCH NERVE AT SINGLE LEVEL Bilateral 5/20/2021    Procedure: RADIOFREQUENCY ABLATION, NERVE, SPINAL, LUMBAR, MEDIAL BRANCH, 1 LEVEL;  Surgeon: Arelis Burns MD;  Location: [facility];  Service: Pain Management;  Laterality: Bilateral;  Bilateral L3-S1 RFTC (both sides same day)     Social History     Socioeconomic History    Marital status:    Tobacco Use    Smoking status: Former    Smokeless tobacco: Never   Substance and Sexual Activity    Alcohol use: Never     Comment: unknown    Drug use: Never     Types: Hydrocodone    Sexual activity: Not Currently     Family History   Problem Relation Age of Onset    Hypertension Mother     Heart disease Father      Review of patient's allergies indicates:   Allergen Reactions    Insect venom      Note: - [employer] 05/07/2019    Sulfa (sulfonamide antibiotics) Nausea And Vomiting        Objective:  Vitals:    09/19/22 0847   BP: (!) 186/96   Pulse: 96   Resp: 18   Weight: 45.8 kg (101 lb)   Height: 5' 2" (1.575 m)   PainSc:   9         Physical Exam  Vitals and nursing note reviewed. Exam conducted with a chaperone present.   Constitutional:       General: She is awake. She is not in acute distress.     Appearance: Normal appearance. She is not ill-appearing or toxic-appearing.   HENT:      Head: Normocephalic and atraumatic.      Nose: Nose normal.      Mouth/Throat:      Mouth: Mucous membranes are moist.      Pharynx: Oropharynx is clear.   Eyes:      Conjunctiva/sclera: " Conjunctivae normal.      Pupils: Pupils are equal, round, and reactive to light.   Cardiovascular:      Rate and Rhythm: Normal rate.   Pulmonary:      Effort: Pulmonary effort is normal. No respiratory distress.   Abdominal:      Palpations: Abdomen is soft.   Musculoskeletal:         General: Normal range of motion.      Right shoulder: Tenderness present.      Left shoulder: Tenderness present.      Cervical back: Normal range of motion and neck supple. Tenderness present.      Thoracic back: Tenderness present.      Lumbar back: Tenderness present.   Skin:     General: Skin is warm and dry.   Neurological:      General: No focal deficit present.      Mental Status: She is alert and oriented to person, place, and time. Mental status is at baseline.      Cranial Nerves: No cranial nerve deficit (II-XII).   Psychiatric:         Mood and Affect: Mood normal.         Behavior: Behavior normal. Behavior is cooperative.         Thought Content: Thought content normal.         FL Fluoro for Pain Management  See OP Notes for results.     IMPRESSION: See OP Notes for results.     This procedure was auto-finalized by: Virtual Radiologist       Abstract on 08/19/2022   Component Date Value Ref Range Status    Fecal Occult Blood 10/04/2021 Negative   Final   Office Visit on 08/16/2022   Component Date Value Ref Range Status    POC Amphetamines 08/16/2022 Negative  Negative, Inconclusive Final    POC Barbiturates 08/16/2022 Negative  Negative, Inconclusive Final    POC Benzodiazepines 08/16/2022 Negative  Negative, Inconclusive Final    POC Cocaine 08/16/2022 Negative  Negative, Inconclusive Final    POC THC 08/16/2022 Negative  Negative, Inconclusive Final    POC Methadone 08/16/2022 Negative  Negative, Inconclusive Final    POC Methamphetamine 08/16/2022 Negative  Negative, Inconclusive Final    POC Opiates 08/16/2022 Presumptive Positive (A)  Negative, Inconclusive Final    POC Oxycodone 08/16/2022 Negative  Negative,  Inconclusive Final    POC Phencyclidine 08/16/2022 Negative  Negative, Inconclusive Final    POC Methylenedioxymethamphetamine * 08/16/2022 Negative  Negative, Inconclusive Final    POC Tricyclic Antidepressants 08/16/2022 Negative  Negative, Inconclusive Final    POC Buprenorphine 08/16/2022 Negative   Final     Acceptable 08/16/2022 Yes   Final    POC Temperature (Urine) 08/16/2022 92   Final   Admission on 07/21/2022, Discharged on 07/21/2022   Component Date Value Ref Range Status    Sodium 07/21/2022 127 (L)  136 - 145 mmol/L Final    Potassium 07/21/2022 5.0  3.5 - 5.1 mmol/L Final    Chloride 07/21/2022 90 (L)  98 - 107 mmol/L Final    CO2 07/21/2022 28  21 - 32 mmol/L Final    Anion Gap 07/21/2022 14  7 - 16 mmol/L Final    Glucose 07/21/2022 81  74 - 106 mg/dL Final    BUN 07/21/2022 13  7 - 18 mg/dL Final    Creatinine 07/21/2022 1.03 (H)  0.55 - 1.02 mg/dL Final    BUN/Creatinine Ratio 07/21/2022 13  6 - 20 Final    Calcium 07/21/2022 10.2 (H)  8.5 - 10.1 mg/dL Final    Total Protein 07/21/2022 7.9  6.4 - 8.2 g/dL Final    Albumin 07/21/2022 3.9  3.5 - 5.0 g/dL Final    Globulin 07/21/2022 4.0  2.0 - 4.0 g/dL Final    A/G Ratio 07/21/2022 1.0   Final    Bilirubin, Total 07/21/2022 0.6  0.0 - 1.2 mg/dL Final    Alk Phos 07/21/2022 112  55 - 142 U/L Final    ALT 07/21/2022 26  13 - 56 U/L Final    AST 07/21/2022 19  15 - 37 U/L Final    eGFR 07/21/2022 56 (L)  >=60 mL/min/1.73m² Final    Amylase 07/21/2022 97  25 - 115 U/L Final    Lipase 07/21/2022 137  73 - 393 U/L Final    Color, UA 07/21/2022 Colorless  Colorless, Straw, Yellow, Light Yellow, Dark Yellow Final    Clarity, UA 07/21/2022 Clear  Clear Final    pH, UA 07/21/2022 7.0  5.0 to 8.0 pH Units Final    Leukocytes, UA 07/21/2022 Negative  Negative Final    Nitrites, UA 07/21/2022 Negative  Negative Final    Protein, UA 07/21/2022 Negative  Negative Final    Glucose, UA 07/21/2022 Normal  Normal mg/dL Final    Ketones, UA 07/21/2022  Negative  Negative mg/dL Final    Urobilinogen, UA 07/21/2022 Normal  0.2, 1.0, Normal mg/dL Final    Bilirubin, UA 07/21/2022 Negative  Negative Final    Blood, UA 07/21/2022 Negative  Negative Final    Specific Gravity, UA 07/21/2022 1.003  <=1.030 Final    WBC 07/21/2022 11.00  4.50 - 11.00 K/uL Final    RBC 07/21/2022 5.45 (H)  4.20 - 5.40 M/uL Final    Hemoglobin 07/21/2022 15.9  12.0 - 16.0 g/dL Final    Hematocrit 07/21/2022 47.1 (H)  38.0 - 47.0 % Final    MCV 07/21/2022 86.4  80.0 - 96.0 fL Final    MCH 07/21/2022 29.2  27.0 - 31.0 pg Final    MCHC 07/21/2022 33.8  32.0 - 36.0 g/dL Final    RDW 07/21/2022 16.6 (H)  11.5 - 14.5 % Final    Platelet Count 07/21/2022 526 (H)  150 - 400 K/uL Final    MPV 07/21/2022 8.8 (L)  9.4 - 12.4 fL Final    Neutrophils % 07/21/2022 74.0 (H)  53.0 - 65.0 % Final    Lymphocytes % 07/21/2022 18.6 (L)  27.0 - 41.0 % Final    Monocytes % 07/21/2022 5.5  2.0 - 6.0 % Final    Eosinophils % 07/21/2022 0.1 (L)  1.0 - 4.0 % Final    Basophils % 07/21/2022 0.3  0.0 - 1.0 % Final    Immature Granulocytes % 07/21/2022 1.5 (H)  0.0 - 0.4 % Final    nRBC, Auto 07/21/2022 0.0  <=0.0 % Final    Neutrophils, Abs 07/21/2022 8.14 (H)  1.80 - 7.70 K/uL Final    Lymphocytes, Absolute 07/21/2022 2.05  1.00 - 4.80 K/uL Final    Monocytes, Absolute 07/21/2022 0.60  0.00 - 0.80 K/uL Final    Eosinophils, Absolute 07/21/2022 0.01  0.00 - 0.50 K/uL Final    Basophils, Absolute 07/21/2022 0.03  0.00 - 0.20 K/uL Final    Immature Granulocytes, Absolute 07/21/2022 0.17 (H)  0.00 - 0.04 K/uL Final    nRBC, Absolute 07/21/2022 0.00  <=0.00 x10e3/uL Final    Diff Type 07/21/2022 Auto   Final    Influenza A 07/21/2022 Negative  Negative, Invalid Final    Influenza B 07/21/2022 Negative  Negative, Invalid Final    COVID-19 Ag 07/21/2022 Negative  Negative, Invalid Final   Admission on 06/20/2022, Discharged on 06/20/2022   Component Date Value Ref Range Status    Sodium 06/20/2022 143  136 - 145 mmol/L Final     Potassium 06/20/2022 3.6  3.5 - 5.1 mmol/L Final    Chloride 06/20/2022 106  98 - 107 mmol/L Final    CO2 06/20/2022 33 (H)  21 - 32 mmol/L Final    Anion Gap 06/20/2022 8  7 - 16 mmol/L Final    Glucose 06/20/2022 98  74 - 106 mg/dL Final    BUN 06/20/2022 11  7 - 18 mg/dL Final    Creatinine 06/20/2022 1.08 (H)  0.55 - 1.02 mg/dL Final    BUN/Creatinine Ratio 06/20/2022 10  6 - 20 Final    Calcium 06/20/2022 8.8  8.5 - 10.1 mg/dL Final    Total Protein 06/20/2022 6.3 (L)  6.4 - 8.2 g/dL Final    Albumin 06/20/2022 2.8 (L)  3.5 - 5.0 g/dL Final    Globulin 06/20/2022 3.5  2.0 - 4.0 g/dL Final    A/G Ratio 06/20/2022 0.8   Final    Bilirubin, Total 06/20/2022 0.4  0.0 - 1.2 mg/dL Final    Alk Phos 06/20/2022 74  55 - 142 U/L Final    ALT 06/20/2022 41  13 - 56 U/L Final    AST 06/20/2022 42 (H)  15 - 37 U/L Final    eGFR 06/20/2022 53 (L)  >=60 mL/min/1.73m² Final    Lipase 06/20/2022 78  73 - 393 U/L Final    Influenza A 06/20/2022 Negative  Negative, Invalid Final    Influenza B 06/20/2022 Negative  Negative, Invalid Final    COVID-19 Ag 06/20/2022 Negative  Negative, Invalid Final    Troponin I High Sensitivity 06/20/2022 70.4 (HH)  <=60.4 pg/mL Final    WBC 06/20/2022 9.52  4.50 - 11.00 K/uL Final    RBC 06/20/2022 4.27  4.20 - 5.40 M/uL Final    Hemoglobin 06/20/2022 12.2  12.0 - 16.0 g/dL Final    Hematocrit 06/20/2022 39.3  38.0 - 47.0 % Final    MCV 06/20/2022 92.0  80.0 - 96.0 fL Final    MCH 06/20/2022 28.6  27.0 - 31.0 pg Final    MCHC 06/20/2022 31.0 (L)  32.0 - 36.0 g/dL Final    RDW 06/20/2022 18.3 (H)  11.5 - 14.5 % Final    Platelet Count 06/20/2022 340  150 - 400 K/uL Final    MPV 06/20/2022 9.2 (L)  9.4 - 12.4 fL Final    Neutrophils % 06/20/2022 52.7 (L)  53.0 - 65.0 % Final    Lymphocytes % 06/20/2022 31.9  27.0 - 41.0 % Final    Neutrophils, Abs 06/20/2022 5.02  1.80 - 7.70 K/uL Final    Lymphocytes, Absolute 06/20/2022 3.04  1.00 - 4.80 K/uL Final    Diff Type 06/20/2022 Auto   Final     Monocytes % 06/20/2022 11.8 (H)  2.0 - 6.0 % Final    Eosinophils % 06/20/2022 3.2  1.0 - 4.0 % Final    Basophils % 06/20/2022 0.4  0.0 - 1.0 % Final    Monocytes, Absolute 06/20/2022 1.12 (H)  0.00 - 0.80 K/uL Final    Eosinophils, Absolute 06/20/2022 0.30  0.00 - 0.50 K/uL Final    Basophils, Absolute 06/20/2022 0.04  0.00 - 0.20 K/uL Final    Troponin I High Sensitivity 06/20/2022 66.1 (HH)  <=60.4 pg/mL Final   Office Visit on 03/22/2022   Component Date Value Ref Range Status    POC Amphetamines 03/22/2022 Negative  Negative, Inconclusive Final    POC Barbiturates 03/22/2022 Negative  Negative, Inconclusive Final    POC Benzodiazepines 03/22/2022 Negative  Negative, Inconclusive Final    POC Cocaine 03/22/2022 Negative  Negative, Inconclusive Final    POC THC 03/22/2022 Negative  Negative, Inconclusive Final    POC Methadone 03/22/2022 Negative  Negative, Inconclusive Final    POC Methamphetamine 03/22/2022 Negative  Negative, Inconclusive Final    POC Opiates 03/22/2022 Presumptive Positive (A)  Negative, Inconclusive Final    POC Oxycodone 03/22/2022 Negative  Negative, Inconclusive Final    POC Phencyclidine 03/22/2022 Negative  Negative, Inconclusive Final    POC Methylenedioxymethamphetamine * 03/22/2022 Negative  Negative, Inconclusive Final    POC Tricyclic Antidepressants 03/22/2022 Negative  Negative, Inconclusive Final    POC Buprenorphine 03/22/2022 Negative   Final     Acceptable 03/22/2022 Yes   Final    POC Temperature (Urine) 03/22/2022 92   Final         Orders Placed This Encounter   Procedures    Case Request Operating Room: Block-nerve-medial branch-lumbar, bilateral L4 through S1     Order Specific Question:   Medical Necessity:     Answer:   Medically Non-Urgent [100]     Order Specific Question:   CPT Code:     Answer:   NM INJ DX/THER AGNT PARAVERT FACET JOINT,IMG GUIDE,LUMBAR/SAC,1ST LVL [05124]     Order Specific Question:   CPT Code:     Answer:   NM INJ DX/THER AGNT  PARAVERT FACET JOINT,IMG GUIDE,LUMBAR/SAC, 2ND LEVEL [91041]     Order Specific Question:   Is an on-site pathologist required for this procedure?     Answer:   N/A         Requested Prescriptions     Signed Prescriptions Disp Refills    HYDROcodone-acetaminophen (NORCO)  mg per tablet 90 tablet 0     Sig: Take 1 tablet by mouth every 8 (eight) hours.       Assessment:     1. Spondylosis without myelopathy or radiculopathy, lumbosacral region    2. Spondylolisthesis, lumbar region    3. Thoracic radiculopathy         A's of Opioid Risk Assessment  Activity:Patient can perform ADL.   Analgesia:Patients pain is partially controlled by current medication. Patient has tried OTC medications such as Tylenol and Ibuprofen with out relief.   Adverse Effects: Patient denies constipation or sedation.  Aberrant Behavior:  reviewed with no aberrant drug seeking/taking behavior.  Overdose reversal drug naloxone discussed    Drug screen reviewed      Plan:    Follow-up after bilateral lumbar L4 through S1 medial branch block # 1, September 6, 2022, she states she had 100% relief initially after procedure maintaining 80% relief with time, she states procedure did help increase her level of function    She is wished requesting to schedule next lumbar procedure for returning low back pain worse with flexion extension rotation lumbar spine tenderness long facet joint area ongoing for more than 3 months facet joint in nature    Continue home exercise program as directed    Continue current medication    MRI lumbar spine May 8, 2020 multiple compression fractures degenerative changes noted the T12 fracture appeared acute to subacute    X-ray lumbar spine October 21, 2021 no fracture noted multiple level degenerative changes osteopenia noted    Indications for this procedure for this specific patient include the following   - Pt has had symptoms for three months with moderate to severe pain with functional impairment rated of  7/10 pain.   - Pain non-responsive to conservative care.    - Pain predominately axial and not associated with radiculopathy or claudication.    - No non-facet pathology as source of pain.    - Clinical assessment implicates facet joint as putative pain source.    - Pain is exacerbated by extension or prolonged sitting/standing and relieved by rest.    - No unexplained neurologic deficit.    - No history of coagulopathy, infection or unstable medical conditions.    - Pain is causing significant functional limitation resulting in diminished quality of life and impaired age appropriate ADL's.   - Clinical assessment implicates facet joint as putative source of pain  - Repeat injections not done prior to 7 days   - no more than 2 levels will be done    The planned medically necessary  surgical procedure is performed in a hospital outpatient department and not in an ambulatory surgical center due to:     -there is no geographically assessable ambulatory surgery center that has the  necessary equipment and fluoroscopy needed for the procedure     -there is no geographically assessable ambulatory surgical center available at which the physician has privileges     -an ASC's  specific  guideline regarding the individuals weight or health conditions that prevent the use of an ASC    Monitor anesthesia request is medically indicated for the scheduled nerve block procedure due to:  1- needle phobia and anxiety, placing  the patient at risk during the provided service.  2-patient has an ASA class greater than 3 and requires constant presence of an anesthesiologist during the procedure,   3-patient has severe problems hard to lie still  4-patient suffers from chronic pain and is unable to function due to  diminished ADLs    Schedule bilateral lumbar L4 through S1 medial branch block # 2, lumbosacral spondylosis    Dr. Burns,    Bring original prescription medication bottles/container/box with labels to each visit    Pill  count    Physical therapy    Massage therapy declines

## 2022-09-19 ENCOUNTER — OFFICE VISIT (OUTPATIENT)
Dept: PAIN MEDICINE | Facility: CLINIC | Age: 72
End: 2022-09-19
Payer: MEDICARE

## 2022-09-19 VITALS
RESPIRATION RATE: 18 BRPM | HEART RATE: 96 BPM | SYSTOLIC BLOOD PRESSURE: 186 MMHG | WEIGHT: 101 LBS | DIASTOLIC BLOOD PRESSURE: 96 MMHG | BODY MASS INDEX: 18.58 KG/M2 | HEIGHT: 62 IN

## 2022-09-19 DIAGNOSIS — M54.14 THORACIC RADICULOPATHY: Chronic | ICD-10-CM

## 2022-09-19 DIAGNOSIS — M43.16 SPONDYLOLISTHESIS, LUMBAR REGION: Chronic | ICD-10-CM

## 2022-09-19 DIAGNOSIS — M47.817 SPONDYLOSIS WITHOUT MYELOPATHY OR RADICULOPATHY, LUMBOSACRAL REGION: Primary | Chronic | ICD-10-CM

## 2022-09-19 PROCEDURE — 99215 OFFICE O/P EST HI 40 MIN: CPT | Mod: PBBFAC | Performed by: PHYSICIAN ASSISTANT

## 2022-09-19 PROCEDURE — 99214 PR OFFICE/OUTPT VISIT, EST, LEVL IV, 30-39 MIN: ICD-10-PCS | Mod: S$PBB,,, | Performed by: PHYSICIAN ASSISTANT

## 2022-09-19 PROCEDURE — 99214 OFFICE O/P EST MOD 30 MIN: CPT | Mod: S$PBB,,, | Performed by: PHYSICIAN ASSISTANT

## 2022-09-19 RX ORDER — HYDROCODONE BITARTRATE AND ACETAMINOPHEN 10; 325 MG/1; MG/1
1 TABLET ORAL EVERY 8 HOURS
Qty: 90 TABLET | Refills: 0 | Status: SHIPPED | OUTPATIENT
Start: 2022-10-01 | End: 2022-10-31

## 2022-10-06 RX ORDER — PREDNISONE 10 MG/1
TABLET ORAL
Qty: 30 TABLET | Refills: 0 | OUTPATIENT
Start: 2022-10-06 | End: 2022-10-27

## 2022-10-09 DIAGNOSIS — Z71.89 COMPLEX CARE COORDINATION: ICD-10-CM

## 2022-10-11 ENCOUNTER — ANESTHESIA EVENT (OUTPATIENT)
Dept: PAIN MEDICINE | Facility: HOSPITAL | Age: 72
End: 2022-10-11
Payer: MEDICARE

## 2022-10-11 ENCOUNTER — ANESTHESIA (OUTPATIENT)
Dept: PAIN MEDICINE | Facility: HOSPITAL | Age: 72
End: 2022-10-11
Payer: MEDICARE

## 2022-10-11 ENCOUNTER — HOSPITAL ENCOUNTER (OUTPATIENT)
Facility: HOSPITAL | Age: 72
Discharge: HOME | End: 2022-10-11
Attending: PAIN MEDICINE | Admitting: PAIN MEDICINE
Payer: MEDICARE

## 2022-10-11 VITALS
OXYGEN SATURATION: 99 % | HEIGHT: 62 IN | WEIGHT: 99 LBS | HEART RATE: 96 BPM | DIASTOLIC BLOOD PRESSURE: 85 MMHG | SYSTOLIC BLOOD PRESSURE: 161 MMHG | BODY MASS INDEX: 18.22 KG/M2 | RESPIRATION RATE: 16 BRPM | TEMPERATURE: 98 F

## 2022-10-11 DIAGNOSIS — M47.817 SPONDYLOSIS OF LUMBOSACRAL REGION WITHOUT MYELOPATHY OR RADICULOPATHY: ICD-10-CM

## 2022-10-11 DIAGNOSIS — M47.817 SPONDYLOSIS WITHOUT MYELOPATHY OR RADICULOPATHY, LUMBOSACRAL REGION: Primary | ICD-10-CM

## 2022-10-11 PROCEDURE — 64494 INJ PARAVERT F JNT L/S 2 LEV: CPT | Mod: 50,,, | Performed by: PAIN MEDICINE

## 2022-10-11 PROCEDURE — 37000008 HC ANESTHESIA 1ST 15 MINUTES: Performed by: PAIN MEDICINE

## 2022-10-11 PROCEDURE — D9220A PRA ANESTHESIA: ICD-10-PCS | Mod: ,,, | Performed by: NURSE ANESTHETIST, CERTIFIED REGISTERED

## 2022-10-11 PROCEDURE — 64493 PR INJ DX/THER AGNT PARAVERT FACET JOINT,IMG GUIDE,LUMBAR/SAC,1ST LVL: ICD-10-PCS | Mod: 50,,, | Performed by: PAIN MEDICINE

## 2022-10-11 PROCEDURE — 64494 PR INJ DX/THER AGNT PARAVERT FACET JOINT,IMG GUIDE,LUMBAR/SAC, 2ND LEVEL: ICD-10-PCS | Mod: 50,,, | Performed by: PAIN MEDICINE

## 2022-10-11 PROCEDURE — 64493 INJ PARAVERT F JNT L/S 1 LEV: CPT | Mod: 50,,, | Performed by: PAIN MEDICINE

## 2022-10-11 PROCEDURE — 64495 PR INJ DX/THER AGNT PARAVERT FACET JOINT,IMG GUIDE,LUMBAR/SAC, ADD LEVEL: ICD-10-PCS | Mod: 50,,, | Performed by: PAIN MEDICINE

## 2022-10-11 PROCEDURE — 63600175 PHARM REV CODE 636 W HCPCS: Performed by: PAIN MEDICINE

## 2022-10-11 PROCEDURE — 63600175 PHARM REV CODE 636 W HCPCS: Performed by: NURSE ANESTHETIST, CERTIFIED REGISTERED

## 2022-10-11 PROCEDURE — 64493 INJ PARAVERT F JNT L/S 1 LEV: CPT | Mod: RT | Performed by: PAIN MEDICINE

## 2022-10-11 PROCEDURE — D9220A PRA ANESTHESIA: Mod: ,,, | Performed by: NURSE ANESTHETIST, CERTIFIED REGISTERED

## 2022-10-11 PROCEDURE — 25000003 PHARM REV CODE 250: Performed by: NURSE ANESTHETIST, CERTIFIED REGISTERED

## 2022-10-11 PROCEDURE — 64495 INJ PARAVERT F JNT L/S 3 LEV: CPT | Mod: 50,,, | Performed by: PAIN MEDICINE

## 2022-10-11 PROCEDURE — 25000003 PHARM REV CODE 250: Performed by: PAIN MEDICINE

## 2022-10-11 PROCEDURE — 64495 INJ PARAVERT F JNT L/S 3 LEV: CPT | Mod: LT | Performed by: PAIN MEDICINE

## 2022-10-11 PROCEDURE — 64494 INJ PARAVERT F JNT L/S 2 LEV: CPT | Mod: RT | Performed by: PAIN MEDICINE

## 2022-10-11 RX ORDER — LIDOCAINE HYDROCHLORIDE 20 MG/ML
INJECTION, SOLUTION EPIDURAL; INFILTRATION; INTRACAUDAL; PERINEURAL
Status: DISCONTINUED | OUTPATIENT
Start: 2022-10-11 | End: 2022-10-11

## 2022-10-11 RX ORDER — SODIUM CHLORIDE 9 MG/ML
INJECTION, SOLUTION INTRAVENOUS CONTINUOUS
Status: DISCONTINUED | OUTPATIENT
Start: 2022-10-11 | End: 2022-10-11 | Stop reason: HOSPADM

## 2022-10-11 RX ORDER — PROPOFOL 10 MG/ML
VIAL (ML) INTRAVENOUS
Status: DISCONTINUED | OUTPATIENT
Start: 2022-10-11 | End: 2022-10-11

## 2022-10-11 RX ORDER — TRIAMCINOLONE ACETONIDE 40 MG/ML
INJECTION, SUSPENSION INTRA-ARTICULAR; INTRAMUSCULAR CODE/TRAUMA/SEDATION MEDICATION
Status: DISCONTINUED | OUTPATIENT
Start: 2022-10-11 | End: 2022-10-11 | Stop reason: HOSPADM

## 2022-10-11 RX ORDER — BUPIVACAINE HYDROCHLORIDE 2.5 MG/ML
INJECTION, SOLUTION INFILTRATION; PERINEURAL CODE/TRAUMA/SEDATION MEDICATION
Status: DISCONTINUED | OUTPATIENT
Start: 2022-10-11 | End: 2022-10-11 | Stop reason: HOSPADM

## 2022-10-11 RX ADMIN — SODIUM CHLORIDE: 9 INJECTION, SOLUTION INTRAVENOUS at 10:10

## 2022-10-11 RX ADMIN — LIDOCAINE HYDROCHLORIDE 50 MG: 20 INJECTION, SOLUTION INTRAVENOUS at 10:10

## 2022-10-11 RX ADMIN — PROPOFOL 75 MG: 10 INJECTION, EMULSION INTRAVENOUS at 10:10

## 2022-10-11 NOTE — BRIEF OP NOTE
Discharge Note  Short Stay    Admit Date: 10/11/2022    Discharge Date: 10/11/2022    Attending Physician: Arelis Burns     Discharge Provider: Arelis Burns    Diagnoses:Lumbosacral spondylosis    Discharged Condition: Good    Final Diagnoses: Spondylosis without myelopathy or radiculopathy, lumbosacral region [M47.817]    Disposition: Home or Self Care    Hospital Course: No complications, uneventful    Outcome of Hospitalization, Treatment, Procedure, or Surgery:  Patient was admitted for outpatient interventional pain management procedure. The patient tolerated the procedure well with no complications.    Follow up/Patient Instructions:  Follow up as scheduled in Pain Management office in 3-4 weeks.  Patient has received instructions and follow up date and time.    Medications:  Continue previous medications

## 2022-10-11 NOTE — OP NOTE
Procedure Note    Procedure Date: 10/11/2022    Procedure Performed:  Bilateral Lumbar Facet  Medial Branch Block @ L3-4,4-5,5-S1 with Fluoroscopic Guidance    Indications: Patient has failed conservative therapy.      Pre-op diagnosis: Lumbar Spondylosis    Post-op diagnosis: same    Physician: HEATHER Burns MD    Anesthesia: MAC    Estimated Blood Loss: Less than 1cc    IVF: Per Anesthesia    Complications: None    Technique:  The patient was interviewed in the holding area and Risks/Benefits were discussed, including but not limited to  the possibility of new or different pain, bleeding or infection.   All questions were answered.  The patient understood and accepted risks.  Consent was reviewed and signed.  A time out was taken to identify the patient, procedure and side of the procedure. The patient was placed in a prone position, then prepped and draped in the usual sterile fashion using ChloraPrep and sterile towels.  The levels were determined under fluoroscopic guidance and then marked.  1% Lidocaine was given by raising a skin wheal at the skin over each site and then infiltrated.  A 22-gauge 3.5 inch needle was introduced to the anatomic location of the bilateral L3-4,4-5,5-Y1ismhcb branch nerves.  Then, after negative aspiration, 1 cc of a 1:1 mixture of  (Bupivacaine 0.25% 3cc , 1% lidocaine 2 cc and  40mg kenalog ) was injected @ each level.The patient tolerated the procedure well and was transferred to the [facility] in stable condition.  The patient was monitored after the procedure.  Patient was given post procedure and discharge instructions to follow at home.  The patient was discharged in a stable condition with an adult .

## 2022-10-11 NOTE — ANESTHESIA PREPROCEDURE EVALUATION
10/11/2022  Amelie Cerrato is a 72 y.o., female.      Pre-op Assessment    I have reviewed the Patient Summary Reports.     I have reviewed the Nursing Notes. I have reviewed the NPO Status.   I have reviewed the Medications.     Review of Systems  Anesthesia Hx:  No problems with previous Anesthesia        Physical Exam  General: Well nourished, Cooperative, Alert and Oriented    Airway:  Mallampati: II   Mouth Opening: Normal  TM Distance: Normal  Tongue: Normal  Neck ROM: Normal ROM    Dental:  Intact       Past Medical History:   Diagnosis Date    Acid reflux     Anxiety     Chronic pain syndrome     Coccyx sprain     broken tailbone     COPD (chronic obstructive pulmonary disease)     Depression     Fall at home 10/01/2021    Fracture of multiple pubic rami, left, closed, initial encounter 10/01/2021    HLD (hyperlipidemia)     Hypertension     Hypokalemia     Hypomagnesemia     Low back pain     Lumbar radiculopathy     Lumbar stenosis     Lumbosacral spondylosis     Neuropathy     Radiculopathy, lumbosacral region     Renal disorder     Spondylolisthesis, lumbar region     Spondylosis without myelopathy or radiculopathy, lumbosacral region     Thoracic radiculopathy        Past Surgical History:   Procedure Laterality Date    CHOLECYSTECTOMY      CYSTOSCOPY      EPIDURAL STEROID INJECTION INTO LUMBAR SPINE N/A 05/27/2020    L1-2 WILD     EPIDURAL STEROID INJECTION INTO THORACIC SPINE N/A 02/03/2021    T9-10 WILD     EPIDURAL STEROID INJECTION INTO THORACIC SPINE N/A 3/18/2021    Procedure: INJECTION, STEROID, SPINE, THORACIC, EPIDURAL;  Surgeon: Arelis Burns MD;  Location: [facility];  Service: Pain Management;  Laterality: N/A;    EPIDURAL STEROID INJECTION INTO THORACIC SPINE N/A 12/23/2021    Procedure: Injection-steroid-epidural-thoracic T9/10;  Surgeon: Arelis HERNDON  MD Grant;  Location: [facility];  Service: Pain Management;  Laterality: N/A;  HAD VAC  WILL BRING CARD    HYSTERECTOMY      INJECTION OF ANESTHETIC AGENT AROUND MEDIAL BRANCH NERVES INNERVATING LUMBAR FACET JOINT Bilateral 4/22/2021    Procedure: Block-nerve-medial branch-lumbar Bilateral L3-4,4-5,5-S1 MBB #2;  Surgeon: Arelis Burns MD;  Location: [facility];  Service: Pain Management;  Laterality: Bilateral;    INJECTION OF ANESTHETIC AGENT AROUND MEDIAL BRANCH NERVES INNERVATING LUMBAR FACET JOINT Bilateral 9/6/2022    Procedure: Block-nerve-medial branch-lumbar, bilateral L4 through S1;  Surgeon: Arelis Burns MD;  Location: [facility];  Service: Pain Management;  Laterality: Bilateral;    INJECTION OF FACET JOINT Bilateral 12/04/2020    RADIOFREQUENCY ABLATION OF LUMBAR MEDIAL BRANCH NERVE AT SINGLE LEVEL Bilateral 5/20/2021    Procedure: RADIOFREQUENCY ABLATION, NERVE, SPINAL, LUMBAR, MEDIAL BRANCH, 1 LEVEL;  Surgeon: Arelis Burns MD;  Location: [facility];  Service: Pain Management;  Laterality: Bilateral;  Bilateral L3-S1 RFTC (both sides same day)       Family History   Problem Relation Age of Onset    Hypertension Mother     Heart disease Father        Social History     Socioeconomic History    Marital status:    Tobacco Use    Smoking status: Former    Smokeless tobacco: Never   Substance and Sexual Activity    Alcohol use: Never     Comment: unknown    Drug use: Never     Types: Hydrocodone    Sexual activity: Not Currently       Current Facility-Administered Medications   Medication Dose Route Frequency Provider Last Rate Last Admin    0.9%  NaCl infusion   Intravenous Continuous Arelis Burns MD           Review of patient's allergies indicates:   Allergen Reactions    Insect venom      Note: - Phreesia 05/07/2019    Sulfa (sulfonamide antibiotics) Nausea And Vomiting       Anesthesia Plan  Type of Anesthesia, risks & benefits  discussed:    Anesthesia Type: Gen Natural Airway  Intra-op Monitoring Plan: Standard ASA Monitors  Post Op Pain Control Plan: multimodal analgesia  Induction:  IV  Informed Consent: Informed consent signed with the Patient and all parties understand the risks and agree with anesthesia plan.  All questions answered. Patient consented to blood products? Yes  ASA Score: 3  Day of Surgery Review of History & Physical: I have interviewed and examined the patient. I have reviewed the patient's H&P dated:     Ready For Surgery From Anesthesia Perspective.     .

## 2022-10-11 NOTE — PLAN OF CARE
Plan:  D/c pt via wheelchair at 1100  Informed pt if does not void in 8 hours to go to ER. Notify if redness, drainage, from injection site or fever over next 3-4 days. Rest and drink plenty of fluids for the remainder of the day. No lifting over 5 lbs. For the remainder of the day. Continue regular medications as prescribed. May take pain medications as prescribed.     Pain improved 44%

## 2022-10-11 NOTE — TRANSFER OF CARE
"Anesthesia Transfer of Care Note    Patient: Amelie Cerrato    Procedure(s) Performed: Procedure(s) (LRB):  Block-nerve-medial branch-lumbar, bilateral L4 through S1 (Bilateral)    Patient location: Other:    Anesthesia Type: general    Transport from OR: Transported from OR on room air with adequate spontaneous ventilation    Post pain: adequate analgesia    Post assessment: no apparent anesthetic complications    Post vital signs: stable    Level of consciousness: responds to stimulation    Nausea/Vomiting: no nausea/vomiting    Complications: none    Transfer of care protocol was followed      Last vitals:   Visit Vitals  BP (!) 222/96   Pulse 91   Temp 36.8 °C (98.2 °F)   Resp (!) 22   Ht 5' 2" (1.575 m)   Wt 44.9 kg (99 lb)   LMP  (LMP Unknown)   SpO2 100%   BMI 18.11 kg/m²     "

## 2022-10-13 NOTE — ANESTHESIA POSTPROCEDURE EVALUATION
Anesthesia Post Evaluation    Patient: Amelie Cerrato    Procedure(s) Performed: Procedure(s) (LRB):  Block-nerve-medial branch-lumbar, bilateral L4 through S1 (Bilateral)    Final Anesthesia Type: general      [facility] location: [facility].  Patient participation: Yes- Able to Participate  Level of consciousness: awake and alert  Post-procedure vital signs: reviewed and stable  Pain management: adequate  Airway patency: patent    PONV status at discharge: No PONV  Anesthetic complications: no      Cardiovascular status: blood pressure returned to baseline, hemodynamically stable and stable  Respiratory status: unassisted  Hydration status: euvolemic  Follow-up not needed.  Comments: Pt voices appreciation for care          Vitals Value Taken Time   /85 10/11/22 1055   Temp 36.8 °C (98.2 °F) 10/11/22 1032   Pulse 96 10/11/22 1055   Resp 16 10/11/22 1055   SpO2 99 % 10/11/22 1055         Event Time   Out of Recovery 10:55:56         Pain/Nichole Score: No data recorded

## 2022-10-25 ENCOUNTER — OFFICE VISIT (OUTPATIENT)
Dept: PAIN MEDICINE | Facility: CLINIC | Age: 72
End: 2022-10-25
Payer: MEDICARE

## 2022-10-25 VITALS — RESPIRATION RATE: 20 BRPM | WEIGHT: 92 LBS | HEIGHT: 62 IN | BODY MASS INDEX: 16.93 KG/M2

## 2022-10-25 DIAGNOSIS — M54.14 THORACIC RADICULOPATHY: Chronic | ICD-10-CM

## 2022-10-25 DIAGNOSIS — M47.817 SPONDYLOSIS WITHOUT MYELOPATHY OR RADICULOPATHY, LUMBOSACRAL REGION: Primary | Chronic | ICD-10-CM

## 2022-10-25 DIAGNOSIS — M43.16 SPONDYLOLISTHESIS, LUMBAR REGION: Chronic | ICD-10-CM

## 2022-10-25 PROCEDURE — 99214 OFFICE O/P EST MOD 30 MIN: CPT | Mod: PBBFAC | Performed by: PHYSICIAN ASSISTANT

## 2022-10-25 PROCEDURE — 99214 OFFICE O/P EST MOD 30 MIN: CPT | Mod: S$PBB,,, | Performed by: PHYSICIAN ASSISTANT

## 2022-10-25 PROCEDURE — 99214 PR OFFICE/OUTPT VISIT, EST, LEVL IV, 30-39 MIN: ICD-10-PCS | Mod: S$PBB,,, | Performed by: PHYSICIAN ASSISTANT

## 2022-10-25 RX ORDER — CYCLOBENZAPRINE HCL 10 MG
10 TABLET ORAL EVERY 8 HOURS
Qty: 30 TABLET | Refills: 2 | Status: SHIPPED | OUTPATIENT
Start: 2022-10-25 | End: 2022-11-07 | Stop reason: SDUPTHER

## 2022-10-25 RX ORDER — ACETAMINOPHEN AND CODEINE PHOSPHATE 300; 30 MG/1; MG/1
1 TABLET ORAL EVERY 8 HOURS
Qty: 90 TABLET | Refills: 2 | Status: SHIPPED | OUTPATIENT
Start: 2022-10-25 | End: 2022-11-07 | Stop reason: SDUPTHER

## 2022-10-25 NOTE — PROGRESS NOTES
Subjective:         Patient ID: Amelie Cerrato is a 72 y.o. female.    Chief Complaint: Low-back Pain      Pain  This is a chronic problem. The current episode started more than 1 year ago. The problem occurs daily. The problem has been gradually improving. Associated symptoms include arthralgias, myalgias and neck pain. Pertinent negatives include no anorexia, change in bowel habit, chills, coughing, diaphoresis, fever, sore throat, swollen glands, vertigo, visual change or vomiting.   Review of Systems   Constitutional:  Negative for activity change, appetite change, chills, diaphoresis, fever and unexpected weight change.   HENT:  Negative for drooling, ear discharge, ear pain, facial swelling, mouth sores, nosebleeds, sore throat, trouble swallowing, voice change and goiter.    Eyes:  Negative for photophobia, pain, discharge, redness, visual disturbance and eye dryness.   Respiratory:  Negative for apnea, cough, choking, chest tightness, shortness of breath, wheezing and stridor.    Cardiovascular:  Negative for palpitations and leg swelling.   Gastrointestinal:  Negative for abdominal distention, anorexia, change in bowel habit, diarrhea, rectal pain, vomiting, fecal incontinence and change in bowel habit.   Endocrine: Negative for cold intolerance, heat intolerance, polydipsia, polyphagia and polyuria.   Genitourinary:  Negative for flank pain, frequency, hematuria and hot flashes.   Musculoskeletal:  Positive for arthralgias, back pain, myalgias, neck pain and neck stiffness.   Integumentary:  Negative for color change and pallor.   Allergic/Immunologic: Negative for immunocompromised state.   Neurological:  Negative for dizziness, vertigo, seizures, syncope, facial asymmetry, speech difficulty, light-headedness, coordination difficulties, memory loss and coordination difficulties.   Hematological:  Negative for adenopathy. Does not bruise/bleed easily.   Psychiatric/Behavioral:  Negative for agitation,  behavioral problems, confusion, decreased concentration, dysphoric mood, hallucinations, self-injury and suicidal ideas. The patient is not nervous/anxious and is not hyperactive.          Past Medical History:   Diagnosis Date    Acid reflux     Anxiety     Chronic pain syndrome     Coccyx sprain     broken tailbone     COPD (chronic obstructive pulmonary disease)     Depression     Fall at home 10/01/2021    Fracture of multiple pubic rami, left, closed, initial encounter 10/01/2021    HLD (hyperlipidemia)     Hypertension     Hypokalemia     Hypomagnesemia     Low back pain     Lumbar radiculopathy     Lumbar stenosis     Lumbosacral spondylosis     Neuropathy     Radiculopathy, lumbosacral region     Renal disorder     Spondylolisthesis, lumbar region     Spondylosis without myelopathy or radiculopathy, lumbosacral region     Thoracic radiculopathy      Past Surgical History:   Procedure Laterality Date    CHOLECYSTECTOMY      CYSTOSCOPY      EPIDURAL STEROID INJECTION INTO LUMBAR SPINE N/A 05/27/2020    L1-2 WILD     EPIDURAL STEROID INJECTION INTO THORACIC SPINE N/A 02/03/2021    T9-10 WILD     EPIDURAL STEROID INJECTION INTO THORACIC SPINE N/A 3/18/2021    Procedure: INJECTION, STEROID, SPINE, THORACIC, EPIDURAL;  Surgeon: Arelis Burns MD;  Location: [facility];  Service: Pain Management;  Laterality: N/A;    EPIDURAL STEROID INJECTION INTO THORACIC SPINE N/A 12/23/2021    Procedure: Injection-steroid-epidural-thoracic T9/10;  Surgeon: Arelis Burns MD;  Location: [facility];  Service: Pain Management;  Laterality: N/A;  HAD VAC  WILL BRING CARD    HYSTERECTOMY      INJECTION OF ANESTHETIC AGENT AROUND MEDIAL BRANCH NERVES INNERVATING LUMBAR FACET JOINT Bilateral 4/22/2021    Procedure: Block-nerve-medial branch-lumbar Bilateral L3-4,4-5,5-S1 MBB #2;  Surgeon: Arelis Burns MD;  Location: [facility];  Service: Pain Management;  Laterality: Bilateral;    INJECTION OF ANESTHETIC  "AGENT AROUND MEDIAL BRANCH NERVES INNERVATING LUMBAR FACET JOINT Bilateral 9/6/2022    Procedure: Block-nerve-medial branch-lumbar, bilateral L4 through S1;  Surgeon: Arelis Burns MD;  Location: [facility];  Service: Pain Management;  Laterality: Bilateral;    INJECTION OF ANESTHETIC AGENT AROUND MEDIAL BRANCH NERVES INNERVATING LUMBAR FACET JOINT Bilateral 10/11/2022    Procedure: Block-nerve-medial branch-lumbar, bilateral L4 through S1;  Surgeon: Arelis Burns MD;  Location: [facility];  Service: Pain Management;  Laterality: Bilateral;  vaccinated.    INJECTION OF FACET JOINT Bilateral 12/04/2020    RADIOFREQUENCY ABLATION OF LUMBAR MEDIAL BRANCH NERVE AT SINGLE LEVEL Bilateral 5/20/2021    Procedure: RADIOFREQUENCY ABLATION, NERVE, SPINAL, LUMBAR, MEDIAL BRANCH, 1 LEVEL;  Surgeon: Arelis Burns MD;  Location: [facility];  Service: Pain Management;  Laterality: Bilateral;  Bilateral L3-S1 RFTC (both sides same day)     Social History     Socioeconomic History    Marital status:    Tobacco Use    Smoking status: Former    Smokeless tobacco: Never   Substance and Sexual Activity    Alcohol use: Never     Comment: unknown    Drug use: Never     Types: Hydrocodone    Sexual activity: Not Currently     Family History   Problem Relation Age of Onset    Hypertension Mother     Heart disease Father      Review of patient's allergies indicates:   Allergen Reactions    Insect venom      Note: - Phreesia 05/07/2019    Sulfa (sulfonamide antibiotics) Nausea And Vomiting        Objective:  Vitals:    10/25/22 0917   Resp: 20   Weight: 41.7 kg (92 lb)   Height: 5' 2" (1.575 m)   PainSc: 10-Worst pain ever         Physical Exam  Vitals and nursing note reviewed. Exam conducted with a chaperone present.   Constitutional:       General: She is awake. She is not in acute distress.     Appearance: Normal appearance. She is not ill-appearing or toxic-appearing.   HENT:      Head: " Normocephalic and atraumatic.      Nose: Nose normal.      Mouth/Throat:      Mouth: Mucous membranes are moist.      Pharynx: Oropharynx is clear.   Eyes:      Conjunctiva/sclera: Conjunctivae normal.      Pupils: Pupils are equal, round, and reactive to light.   Cardiovascular:      Rate and Rhythm: Normal rate.   Pulmonary:      Effort: Pulmonary effort is normal. No respiratory distress.   Abdominal:      Palpations: Abdomen is soft.   Musculoskeletal:         General: Normal range of motion.      Right shoulder: Tenderness present.      Left shoulder: Tenderness present.      Cervical back: Normal range of motion and neck supple. Tenderness present.      Thoracic back: Tenderness present.      Lumbar back: Tenderness present.   Skin:     General: Skin is warm and dry.   Neurological:      General: No focal deficit present.      Mental Status: She is alert and oriented to person, place, and time. Mental status is at baseline.      Cranial Nerves: No cranial nerve deficit (II-XII).   Psychiatric:         Mood and Affect: Mood normal.         Behavior: Behavior normal. Behavior is cooperative.         Thought Content: Thought content normal.         FL Fluoro for Pain Management  See OP Notes for results.     IMPRESSION: See OP Notes for results.     This procedure was auto-finalized by: Virtual Radiologist       Abstract on 08/19/2022   Component Date Value Ref Range Status    Fecal Occult Blood 10/04/2021 Negative   Final   Office Visit on 08/16/2022   Component Date Value Ref Range Status    POC Amphetamines 08/16/2022 Negative  Negative, Inconclusive Final    POC Barbiturates 08/16/2022 Negative  Negative, Inconclusive Final    POC Benzodiazepines 08/16/2022 Negative  Negative, Inconclusive Final    POC Cocaine 08/16/2022 Negative  Negative, Inconclusive Final    POC THC 08/16/2022 Negative  Negative, Inconclusive Final    POC Methadone 08/16/2022 Negative  Negative, Inconclusive Final    POC Methamphetamine  08/16/2022 Negative  Negative, Inconclusive Final    POC Opiates 08/16/2022 Presumptive Positive (A)  Negative, Inconclusive Final    POC Oxycodone 08/16/2022 Negative  Negative, Inconclusive Final    POC Phencyclidine 08/16/2022 Negative  Negative, Inconclusive Final    POC Methylenedioxymethamphetamine * 08/16/2022 Negative  Negative, Inconclusive Final    POC Tricyclic Antidepressants 08/16/2022 Negative  Negative, Inconclusive Final    POC Buprenorphine 08/16/2022 Negative   Final     Acceptable 08/16/2022 Yes   Final    POC Temperature (Urine) 08/16/2022 92   Final   Admission on 07/21/2022, Discharged on 07/21/2022   Component Date Value Ref Range Status    Sodium 07/21/2022 127 (L)  136 - 145 mmol/L Final    Potassium 07/21/2022 5.0  3.5 - 5.1 mmol/L Final    Chloride 07/21/2022 90 (L)  98 - 107 mmol/L Final    CO2 07/21/2022 28  21 - 32 mmol/L Final    Anion Gap 07/21/2022 14  7 - 16 mmol/L Final    Glucose 07/21/2022 81  74 - 106 mg/dL Final    BUN 07/21/2022 13  7 - 18 mg/dL Final    Creatinine 07/21/2022 1.03 (H)  0.55 - 1.02 mg/dL Final    BUN/Creatinine Ratio 07/21/2022 13  6 - 20 Final    Calcium 07/21/2022 10.2 (H)  8.5 - 10.1 mg/dL Final    Total Protein 07/21/2022 7.9  6.4 - 8.2 g/dL Final    Albumin 07/21/2022 3.9  3.5 - 5.0 g/dL Final    Globulin 07/21/2022 4.0  2.0 - 4.0 g/dL Final    A/G Ratio 07/21/2022 1.0   Final    Bilirubin, Total 07/21/2022 0.6  0.0 - 1.2 mg/dL Final    Alk Phos 07/21/2022 112  55 - 142 U/L Final    ALT 07/21/2022 26  13 - 56 U/L Final    AST 07/21/2022 19  15 - 37 U/L Final    eGFR 07/21/2022 56 (L)  >=60 mL/min/1.73m² Final    Amylase 07/21/2022 97  25 - 115 U/L Final    Lipase 07/21/2022 137  73 - 393 U/L Final    Color, UA 07/21/2022 Colorless  Colorless, Straw, Yellow, Light Yellow, Dark Yellow Final    Clarity,  07/21/2022 Clear  Clear Final    pH,  07/21/2022 7.0  5.0 to 8.0 pH Units Final    Leukocytes,  07/21/2022 Negative  Negative Final     Nitrites, UA 07/21/2022 Negative  Negative Final    Protein, UA 07/21/2022 Negative  Negative Final    Glucose, UA 07/21/2022 Normal  Normal mg/dL Final    Ketones, UA 07/21/2022 Negative  Negative mg/dL Final    Urobilinogen, UA 07/21/2022 Normal  0.2, 1.0, Normal mg/dL Final    Bilirubin, UA 07/21/2022 Negative  Negative Final    Blood, UA 07/21/2022 Negative  Negative Final    Specific Gravity, UA 07/21/2022 1.003  <=1.030 Final    WBC 07/21/2022 11.00  4.50 - 11.00 K/uL Final    RBC 07/21/2022 5.45 (H)  4.20 - 5.40 M/uL Final    Hemoglobin 07/21/2022 15.9  12.0 - 16.0 g/dL Final    Hematocrit 07/21/2022 47.1 (H)  38.0 - 47.0 % Final    MCV 07/21/2022 86.4  80.0 - 96.0 fL Final    MCH 07/21/2022 29.2  27.0 - 31.0 pg Final    MCHC 07/21/2022 33.8  32.0 - 36.0 g/dL Final    RDW 07/21/2022 16.6 (H)  11.5 - 14.5 % Final    Platelet Count 07/21/2022 526 (H)  150 - 400 K/uL Final    MPV 07/21/2022 8.8 (L)  9.4 - 12.4 fL Final    Neutrophils % 07/21/2022 74.0 (H)  53.0 - 65.0 % Final    Lymphocytes % 07/21/2022 18.6 (L)  27.0 - 41.0 % Final    Monocytes % 07/21/2022 5.5  2.0 - 6.0 % Final    Eosinophils % 07/21/2022 0.1 (L)  1.0 - 4.0 % Final    Basophils % 07/21/2022 0.3  0.0 - 1.0 % Final    Immature Granulocytes % 07/21/2022 1.5 (H)  0.0 - 0.4 % Final    nRBC, Auto 07/21/2022 0.0  <=0.0 % Final    Neutrophils, Abs 07/21/2022 8.14 (H)  1.80 - 7.70 K/uL Final    Lymphocytes, Absolute 07/21/2022 2.05  1.00 - 4.80 K/uL Final    Monocytes, Absolute 07/21/2022 0.60  0.00 - 0.80 K/uL Final    Eosinophils, Absolute 07/21/2022 0.01  0.00 - 0.50 K/uL Final    Basophils, Absolute 07/21/2022 0.03  0.00 - 0.20 K/uL Final    Immature Granulocytes, Absolute 07/21/2022 0.17 (H)  0.00 - 0.04 K/uL Final    nRBC, Absolute 07/21/2022 0.00  <=0.00 x10e3/uL Final    Diff Type 07/21/2022 Auto   Final    Influenza A 07/21/2022 Negative  Negative, Invalid Final    Influenza B 07/21/2022 Negative  Negative, Invalid Final    COVID-19 Ag  07/21/2022 Negative  Negative, Invalid Final   Admission on 06/20/2022, Discharged on 06/20/2022   Component Date Value Ref Range Status    Sodium 06/20/2022 143  136 - 145 mmol/L Final    Potassium 06/20/2022 3.6  3.5 - 5.1 mmol/L Final    Chloride 06/20/2022 106  98 - 107 mmol/L Final    CO2 06/20/2022 33 (H)  21 - 32 mmol/L Final    Anion Gap 06/20/2022 8  7 - 16 mmol/L Final    Glucose 06/20/2022 98  74 - 106 mg/dL Final    BUN 06/20/2022 11  7 - 18 mg/dL Final    Creatinine 06/20/2022 1.08 (H)  0.55 - 1.02 mg/dL Final    BUN/Creatinine Ratio 06/20/2022 10  6 - 20 Final    Calcium 06/20/2022 8.8  8.5 - 10.1 mg/dL Final    Total Protein 06/20/2022 6.3 (L)  6.4 - 8.2 g/dL Final    Albumin 06/20/2022 2.8 (L)  3.5 - 5.0 g/dL Final    Globulin 06/20/2022 3.5  2.0 - 4.0 g/dL Final    A/G Ratio 06/20/2022 0.8   Final    Bilirubin, Total 06/20/2022 0.4  0.0 - 1.2 mg/dL Final    Alk Phos 06/20/2022 74  55 - 142 U/L Final    ALT 06/20/2022 41  13 - 56 U/L Final    AST 06/20/2022 42 (H)  15 - 37 U/L Final    eGFR 06/20/2022 53 (L)  >=60 mL/min/1.73m² Final    Lipase 06/20/2022 78  73 - 393 U/L Final    Influenza A 06/20/2022 Negative  Negative, Invalid Final    Influenza B 06/20/2022 Negative  Negative, Invalid Final    COVID-19 Ag 06/20/2022 Negative  Negative, Invalid Final    Troponin I High Sensitivity 06/20/2022 70.4 (HH)  <=60.4 pg/mL Final    WBC 06/20/2022 9.52  4.50 - 11.00 K/uL Final    RBC 06/20/2022 4.27  4.20 - 5.40 M/uL Final    Hemoglobin 06/20/2022 12.2  12.0 - 16.0 g/dL Final    Hematocrit 06/20/2022 39.3  38.0 - 47.0 % Final    MCV 06/20/2022 92.0  80.0 - 96.0 fL Final    MCH 06/20/2022 28.6  27.0 - 31.0 pg Final    MCHC 06/20/2022 31.0 (L)  32.0 - 36.0 g/dL Final    RDW 06/20/2022 18.3 (H)  11.5 - 14.5 % Final    Platelet Count 06/20/2022 340  150 - 400 K/uL Final    MPV 06/20/2022 9.2 (L)  9.4 - 12.4 fL Final    Neutrophils % 06/20/2022 52.7 (L)  53.0 - 65.0 % Final    Lymphocytes % 06/20/2022 31.9   27.0 - 41.0 % Final    Neutrophils, Abs 06/20/2022 5.02  1.80 - 7.70 K/uL Final    Lymphocytes, Absolute 06/20/2022 3.04  1.00 - 4.80 K/uL Final    Diff Type 06/20/2022 Auto   Final    Monocytes % 06/20/2022 11.8 (H)  2.0 - 6.0 % Final    Eosinophils % 06/20/2022 3.2  1.0 - 4.0 % Final    Basophils % 06/20/2022 0.4  0.0 - 1.0 % Final    Monocytes, Absolute 06/20/2022 1.12 (H)  0.00 - 0.80 K/uL Final    Eosinophils, Absolute 06/20/2022 0.30  0.00 - 0.50 K/uL Final    Basophils, Absolute 06/20/2022 0.04  0.00 - 0.20 K/uL Final    Troponin I High Sensitivity 06/20/2022 66.1 (HH)  <=60.4 pg/mL Final         No orders of the defined types were placed in this encounter.        Requested Prescriptions      No prescriptions requested or ordered in this encounter       Assessment:     No diagnosis found.       A's of Opioid Risk Assessment  Activity:Patient can perform ADL.   Analgesia:Patients pain is partially controlled by current medication. Patient has tried OTC medications such as Tylenol and Ibuprofen with out relief.   Adverse Effects: Patient denies constipation or sedation.  Aberrant Behavior:  reviewed with no aberrant drug seeking/taking behavior.  Overdose reversal drug naloxone discussed    Drug screen reviewed      Plan:    Follow-up after bilateral lumbar L4 through S1 medial branch block # 2, October 11 2022, she states she had no improvement after this procedure    She states she will discuss surgical evaluation regarding her lumbar pain with her primary care provider    Requesting to discontinue Norco is no longer helping    After discussing options Will try Tylenol No. 3     Tylenol No. 3 1 p.o. q.8 hours number 90 tablets    Continue home exercise program as directed    Continue current medication    MRI lumbar spine May 8, 2020 multiple compression fractures degenerative changes noted the T12 fracture appeared acute to subacute    X-ray lumbar spine October 21, 2021 no fracture noted multiple level  degenerative changes osteopenia noted    Follow-up 3 months    Dr. Burns, October 2023    Bring original prescription medication bottles/container/box with labels to each visit    Pill count    Physical therapy    Massage therapy declines

## 2022-10-27 ENCOUNTER — OFFICE VISIT (OUTPATIENT)
Dept: FAMILY MEDICINE | Facility: CLINIC | Age: 72
End: 2022-10-27
Payer: MEDICARE

## 2022-10-27 VITALS
BODY MASS INDEX: 16.56 KG/M2 | HEIGHT: 62 IN | SYSTOLIC BLOOD PRESSURE: 136 MMHG | HEART RATE: 95 BPM | DIASTOLIC BLOOD PRESSURE: 80 MMHG | WEIGHT: 90 LBS

## 2022-10-27 DIAGNOSIS — H61.23 EXCESSIVE CERUMEN IN BOTH EAR CANALS: Primary | ICD-10-CM

## 2022-10-27 DIAGNOSIS — J45.909 ASTHMA, UNSPECIFIED ASTHMA SEVERITY, UNSPECIFIED WHETHER COMPLICATED, UNSPECIFIED WHETHER PERSISTENT: ICD-10-CM

## 2022-10-27 PROCEDURE — 99213 PR OFFICE/OUTPT VISIT, EST, LEVL III, 20-29 MIN: ICD-10-PCS | Mod: ,,, | Performed by: FAMILY MEDICINE

## 2022-10-27 PROCEDURE — 99213 OFFICE O/P EST LOW 20 MIN: CPT | Mod: ,,, | Performed by: FAMILY MEDICINE

## 2022-10-27 RX ORDER — UMECLIDINIUM 62.5 UG/1
62.5 AEROSOL, POWDER ORAL DAILY
Qty: 90 CAPSULE | Refills: 1 | Status: SHIPPED | OUTPATIENT
Start: 2022-10-27 | End: 2022-11-21 | Stop reason: SDUPTHER

## 2022-10-27 RX ORDER — METHYLPREDNISOLONE 4 MG/1
TABLET ORAL
Qty: 21 EACH | Refills: 0 | Status: SHIPPED | OUTPATIENT
Start: 2022-10-27 | End: 2022-11-17

## 2022-10-27 NOTE — PROGRESS NOTES
Danilo Cloud IV, DO  [facility]  [address] [place] [address]  Phone: [phone]      Subjective     Name: Amelie Cerrato   Sex: female  YOB: 1950 (72 y.o.)  MRN: [id number]  Visit Date: 10/27/2022   Chief Complaint: Nausea and Fatigue (Pt states she is weak and no appitite)        HISTORY OF PRESENT ILLNESS:    Patient presents clinic today with chief complaint of decreased hearing.  She states this been going on for some time and I have speak very loudly daughter.  Inspection of the ear canals show copious amount cerumen on both sides.  Suggest Debrox solution to be used in both ears.  Patient also states that she has been experiencing more wheezing than usual on like her inhaler as well as steroid supplementation.  I will be renewing her prescription for her increased ellipse the and I will be prescribing a Medrol Dosepak.  Patient is advised to follow up with the clinic if symptoms worsen or fail to improve.        This document was dictated using mmodal fluency direct.  It is subject to dictation errors.    PAST MEDICAL HISTORY:  Significant Diagnoses - Patient  has a past medical history of Acid reflux, Anxiety, Chronic pain syndrome, Coccyx sprain, COPD (chronic obstructive pulmonary disease), Depression, Fall at home (10/01/2021), Fracture of multiple pubic rami, left, closed, initial encounter (10/01/2021), HLD (hyperlipidemia), Hypertension, Hypokalemia, Hypomagnesemia, Low back pain, Lumbar radiculopathy, Lumbar stenosis, Lumbosacral spondylosis, Neuropathy, Radiculopathy, lumbosacral region, Renal disorder, Spondylolisthesis, lumbar region, Spondylosis without myelopathy or radiculopathy, lumbosacral region, and Thoracic radiculopathy.  Medications - Patient has a current medication list which includes the following long-term medication(s): atorvastatin, brimonidine 0.2%, buspirone, chlorthalidone, duloxetine, latanoprost,  losartan-hydrochlorothiazide 50-12.5 mg, and pantoprazole.   Allergies - Patient is allergic to insect venom and sulfa (sulfonamide antibiotics).  Surgeries - Patient  has a past surgical history that includes Hysterectomy; Cholecystectomy; Epidural steroid injection into lumbar spine (N/A, 05/27/2020); Epidural steroid injection into thoracic spine (N/A, 02/03/2021); Injection of facet joint (Bilateral, 12/04/2020); Epidural steroid injection into thoracic spine (N/A, 3/18/2021); Injection of anesthetic agent around medial branch nerves innervating lumbar facet joint (Bilateral, 4/22/2021); Radiofrequency ablation of lumbar medial branch nerve at single level (Bilateral, 5/20/2021); Cystoscopy; Epidural steroid injection into thoracic spine (N/A, 12/23/2021); Injection of anesthetic agent around medial branch nerves innervating lumbar facet joint (Bilateral, 9/6/2022); and Injection of anesthetic agent around medial branch nerves innervating lumbar facet joint (Bilateral, 10/11/2022).  Family History - Patient family history includes Heart disease in her father; Hypertension in her mother.      SOCIAL HISTORY:  Tobacco - Patient  reports that she has quit smoking. She has never used smokeless tobacco.   Alcohol - Patient  reports no history of alcohol use.   Recreational Drugs - Patient  reports no history of drug use.       Review of Systems   Constitutional:  Negative for fatigue and fever.   HENT:  Positive for hearing loss. Negative for nasal congestion and sore throat.    Respiratory:  Positive for wheezing. Negative for cough and shortness of breath.    Cardiovascular:  Negative for chest pain.   Gastrointestinal:  Negative for abdominal pain, nausea and vomiting.   Genitourinary:  Negative for dysuria.   Musculoskeletal:  Negative for myalgias.   Integumentary:  Negative for rash.   Neurological:  Negative for dizziness, weakness and headaches.        Past Medical History:   Diagnosis Date    Acid reflux      Anxiety     Chronic pain syndrome     Coccyx sprain     broken tailbone     COPD (chronic obstructive pulmonary disease)     Depression     Fall at home 10/01/2021    Fracture of multiple pubic rami, left, closed, initial encounter 10/01/2021    HLD (hyperlipidemia)     Hypertension     Hypokalemia     Hypomagnesemia     Low back pain     Lumbar radiculopathy     Lumbar stenosis     Lumbosacral spondylosis     Neuropathy     Radiculopathy, lumbosacral region     Renal disorder     Spondylolisthesis, lumbar region     Spondylosis without myelopathy or radiculopathy, lumbosacral region     Thoracic radiculopathy         Review of patient's allergies indicates:   Allergen Reactions    Insect venom      Note: - Phreesia 05/07/2019    Sulfa (sulfonamide antibiotics) Nausea And Vomiting        Past Surgical History:   Procedure Laterality Date    CHOLECYSTECTOMY      CYSTOSCOPY      EPIDURAL STEROID INJECTION INTO LUMBAR SPINE N/A 05/27/2020    L1-2 WILD     EPIDURAL STEROID INJECTION INTO THORACIC SPINE N/A 02/03/2021    T9-10 WILD     EPIDURAL STEROID INJECTION INTO THORACIC SPINE N/A 3/18/2021    Procedure: INJECTION, STEROID, SPINE, THORACIC, EPIDURAL;  Surgeon: Arelis Burns MD;  Location: [facility];  Service: Pain Management;  Laterality: N/A;    EPIDURAL STEROID INJECTION INTO THORACIC SPINE N/A 12/23/2021    Procedure: Injection-steroid-epidural-thoracic T9/10;  Surgeon: Arelis Burns MD;  Location: [facility];  Service: Pain Management;  Laterality: N/A;  HAD VAC  WILL BRING CARD    HYSTERECTOMY      INJECTION OF ANESTHETIC AGENT AROUND MEDIAL BRANCH NERVES INNERVATING LUMBAR FACET JOINT Bilateral 4/22/2021    Procedure: Block-nerve-medial branch-lumbar Bilateral L3-4,4-5,5-S1 MBB #2;  Surgeon: Arelis Burns MD;  Location: [facility];  Service: Pain Management;  Laterality: Bilateral;    INJECTION OF ANESTHETIC AGENT AROUND MEDIAL BRANCH NERVES  INNERVATING LUMBAR FACET JOINT Bilateral 9/6/2022    Procedure: Block-nerve-medial branch-lumbar, bilateral L4 through S1;  Surgeon: Arelis Burns MD;  Location: [facility];  Service: Pain Management;  Laterality: Bilateral;    INJECTION OF ANESTHETIC AGENT AROUND MEDIAL BRANCH NERVES INNERVATING LUMBAR FACET JOINT Bilateral 10/11/2022    Procedure: Block-nerve-medial branch-lumbar, bilateral L4 through S1;  Surgeon: Arelis Burns MD;  Location: [facility];  Service: Pain Management;  Laterality: Bilateral;  vaccinated.    INJECTION OF FACET JOINT Bilateral 12/04/2020    RADIOFREQUENCY ABLATION OF LUMBAR MEDIAL BRANCH NERVE AT SINGLE LEVEL Bilateral 5/20/2021    Procedure: RADIOFREQUENCY ABLATION, NERVE, SPINAL, LUMBAR, MEDIAL BRANCH, 1 LEVEL;  Surgeon: Arelis Burns MD;  Location: [facility];  Service: Pain Management;  Laterality: Bilateral;  Bilateral L3-S1 RFTC (both sides same day)        Family History   Problem Relation Age of Onset    Hypertension Mother     Heart disease Father        Current Outpatient Medications   Medication Instructions    acetaminophen-codeine 300-30mg (TYLENOL #3) 300-30 mg Tab 1 tablet, Oral, Every 8 hours    atorvastatin (LIPITOR) 10 mg, Oral, Nightly    brimonidine 0.2% (ALPHAGAN) 0.2 % Drop 1 drop, Both Eyes, 2 times daily    busPIRone (BUSPAR) 15 mg, Oral, 3 times daily    carbamide peroxide (DEBROX) 6.5 % otic solution 5 drops, Both Ears, 2 times daily    chlorthalidone (HYGROTEN) 25 mg, Oral, Daily, Take one half tablet by mouth once daily    cyclobenzaprine (FLEXERIL) 10 mg, Oral, Every 8 hours    cyproheptadine (PERIACTIN) 4 mg, Oral, 3 times daily    DULoxetine (CYMBALTA) 60 mg, Oral, Every 12 hours    HYDROcodone-acetaminophen (NORCO)  mg per tablet 1 tablet, Oral, Every 8 hours    INCRUSE ELLIPTA 62.5 mcg, Inhalation, Daily, INHALE ONE PUFF BY MOUTH DAILY AT THE SAME TIME EACH DAY    latanoprost 0.005 % ophthalmic  "solution 1 drop, Both Eyes, Nightly    losartan-hydrochlorothiazide 50-12.5 mg (HYZAAR) 50-12.5 mg per tablet 1 tablet, Oral, Daily    methylPREDNISolone (MEDROL DOSEPACK) 4 mg tablet use as directed    pantoprazole (PROTONIX) 40 mg, Oral, 2 times daily before meals    polyethylene glycol (GLYCOLAX) 17 g, Oral, Daily, Mix with 8 ounces of juice or water        Objective     /80   Pulse 95   Ht 5' 2" (1.575 m)   Wt 40.8 kg (90 lb)   LMP  (LMP Unknown)   BMI 16.46 kg/m²     Physical Exam  Constitutional:       General: She is not in acute distress.     Appearance: Normal appearance. She is not ill-appearing.   HENT:      Head: Normocephalic and atraumatic.      Right Ear: External ear normal.      Left Ear: External ear normal.   Eyes:      Extraocular Movements: Extraocular movements intact.      Conjunctiva/sclera: Conjunctivae normal.   Cardiovascular:      Rate and Rhythm: Normal rate.      Heart sounds: No murmur heard.  Pulmonary:      Effort: Pulmonary effort is normal.   Musculoskeletal:      Cervical back: Normal range of motion.   Skin:     General: Skin is warm and dry.      Coloration: Skin is not jaundiced.      Findings: No rash.   Neurological:      Mental Status: She is alert.   Psychiatric:         Mood and Affect: Mood normal.        All recently obtained labs have been reviewed and discussed in detail with the patient.   Assessment     1. Excessive cerumen in both ear canals    2. Asthma, unspecified asthma severity, unspecified whether complicated, unspecified whether persistent         Plan        Problem List Items Addressed This Visit    None  Visit Diagnoses       Excessive cerumen in both ear canals    -  Primary    Relevant Medications    carbamide peroxide (DEBROX) 6.5 % otic solution    Asthma, unspecified asthma severity, unspecified whether complicated, unspecified whether persistent        Relevant Medications    INCRUSE ELLIPTA 62.5 mcg/actuation inhalation capsule    " methylPREDNISolone (MEDROL DOSEPACK) 4 mg tablet            No follow-ups on file.    Patient advised that is symptoms worsen, they should call or report directly to local emergency department.    Danilo Cloud DO  [facility]

## 2022-10-31 ENCOUNTER — HOSPITAL ENCOUNTER (EMERGENCY)
Facility: HOSPITAL | Age: 72
Discharge: HOME | End: 2022-10-31
Payer: MEDICARE

## 2022-10-31 VITALS
HEIGHT: 62 IN | HEART RATE: 88 BPM | BODY MASS INDEX: 18.22 KG/M2 | OXYGEN SATURATION: 99 % | TEMPERATURE: 98 F | RESPIRATION RATE: 18 BRPM | DIASTOLIC BLOOD PRESSURE: 79 MMHG | SYSTOLIC BLOOD PRESSURE: 149 MMHG | WEIGHT: 99 LBS

## 2022-10-31 DIAGNOSIS — J06.9 UPPER RESPIRATORY INFECTION, ACUTE: Primary | ICD-10-CM

## 2022-10-31 LAB
FLUAV AG UPPER RESP QL IA.RAPID: NEGATIVE
FLUBV AG UPPER RESP QL IA.RAPID: NEGATIVE
SARS-COV+SARS-COV-2 AG RESP QL IA.RAPID: NEGATIVE

## 2022-10-31 PROCEDURE — 87428 SARSCOV & INF VIR A&B AG IA: CPT | Performed by: NURSE PRACTITIONER

## 2022-10-31 PROCEDURE — 99284 PR EMERGENCY DEPT VISIT,LEVEL IV: ICD-10-PCS | Mod: ,,, | Performed by: NURSE PRACTITIONER

## 2022-10-31 PROCEDURE — 99284 EMERGENCY DEPT VISIT MOD MDM: CPT | Mod: ,,, | Performed by: NURSE PRACTITIONER

## 2022-10-31 PROCEDURE — 99283 EMERGENCY DEPT VISIT LOW MDM: CPT

## 2022-10-31 RX ORDER — METHYLPREDNISOLONE 4 MG/1
TABLET ORAL
Qty: 1 EACH | Refills: 0 | Status: SHIPPED | OUTPATIENT
Start: 2022-10-31 | End: 2022-11-21

## 2022-10-31 RX ORDER — CEFDINIR 300 MG/1
300 CAPSULE ORAL 2 TIMES DAILY
Qty: 20 CAPSULE | Refills: 0 | Status: SHIPPED | OUTPATIENT
Start: 2022-10-31 | End: 2022-11-10

## 2022-10-31 RX ORDER — ONDANSETRON 4 MG/1
4 TABLET, FILM COATED ORAL EVERY 6 HOURS
Qty: 12 TABLET | Refills: 0 | Status: SHIPPED | OUTPATIENT
Start: 2022-10-31

## 2022-10-31 NOTE — ED TRIAGE NOTES
Patient present to ED for complaints of dizziness, nausea and weakness for 4 days.  Patient reports taking phenergan for nausea on yesterday and it did not help.

## 2022-10-31 NOTE — DISCHARGE INSTRUCTIONS
Take medication as prescribed.   Follow up with PCP in 2 days if symptoms do not improve.   Encourage fluid intake to keep hydrated.   Return to ER with new or worsening symptoms.

## 2022-10-31 NOTE — ED PROVIDER NOTES
Encounter Date: 10/31/2022       History     Chief Complaint   Patient presents with    Dizziness    Nausea    Weakness     Patient presents to ER with complaint of congestion, fatigue, body aches, and nausea.  Patient states her symptoms have been ongoing x 1 week.  She states she does not have appetite and if she makes herself eat she gets nauseated.  She denies vomiting or diarrhea.  Reports normal bowel movements without constipation.  Denies fever.  Patient eating plate lunch in lobby upon being called for exam.      The history is provided by the patient. No  was used.   Review of patient's allergies indicates:   Allergen Reactions    Insect venom      Note: - Phreesia 05/07/2019    Sulfa (sulfonamide antibiotics) Nausea And Vomiting     Past Medical History:   Diagnosis Date    Acid reflux     Anxiety     Chronic pain syndrome     Coccyx sprain     broken tailbone     COPD (chronic obstructive pulmonary disease)     Depression     Fall at home 10/01/2021    Fracture of multiple pubic rami, left, closed, initial encounter 10/01/2021    HLD (hyperlipidemia)     Hypertension     Hypokalemia     Hypomagnesemia     Low back pain     Lumbar radiculopathy     Lumbar stenosis     Lumbosacral spondylosis     Neuropathy     Radiculopathy, lumbosacral region     Renal disorder     Spondylolisthesis, lumbar region     Spondylosis without myelopathy or radiculopathy, lumbosacral region     Thoracic radiculopathy      Past Surgical History:   Procedure Laterality Date    CHOLECYSTECTOMY      CYSTOSCOPY      EPIDURAL STEROID INJECTION INTO LUMBAR SPINE N/A 05/27/2020    L1-2 WILD     EPIDURAL STEROID INJECTION INTO THORACIC SPINE N/A 02/03/2021    T9-10 WILD     EPIDURAL STEROID INJECTION INTO THORACIC SPINE N/A 3/18/2021    Procedure: INJECTION, STEROID, SPINE, THORACIC, EPIDURAL;  Surgeon: Arelis Burns MD;  Location: [facility];  Service: Pain Management;  Laterality: N/A;    EPIDURAL STEROID  INJECTION INTO THORACIC SPINE N/A 12/23/2021    Procedure: Injection-steroid-epidural-thoracic T9/10;  Surgeon: Arelis Burns MD;  Location: [facility];  Service: Pain Management;  Laterality: N/A;  HAD VAC  WILL BRING CARD    HYSTERECTOMY      INJECTION OF ANESTHETIC AGENT AROUND MEDIAL BRANCH NERVES INNERVATING LUMBAR FACET JOINT Bilateral 4/22/2021    Procedure: Block-nerve-medial branch-lumbar Bilateral L3-4,4-5,5-S1 MBB #2;  Surgeon: Arelis Burns MD;  Location: [facility];  Service: Pain Management;  Laterality: Bilateral;    INJECTION OF ANESTHETIC AGENT AROUND MEDIAL BRANCH NERVES INNERVATING LUMBAR FACET JOINT Bilateral 9/6/2022    Procedure: Block-nerve-medial branch-lumbar, bilateral L4 through S1;  Surgeon: Arelis Burns MD;  Location: [facility];  Service: Pain Management;  Laterality: Bilateral;    INJECTION OF ANESTHETIC AGENT AROUND MEDIAL BRANCH NERVES INNERVATING LUMBAR FACET JOINT Bilateral 10/11/2022    Procedure: Block-nerve-medial branch-lumbar, bilateral L4 through S1;  Surgeon: Arelis Burns MD;  Location: [facility];  Service: Pain Management;  Laterality: Bilateral;  vaccinated.    INJECTION OF FACET JOINT Bilateral 12/04/2020    RADIOFREQUENCY ABLATION OF LUMBAR MEDIAL BRANCH NERVE AT SINGLE LEVEL Bilateral 5/20/2021    Procedure: RADIOFREQUENCY ABLATION, NERVE, SPINAL, LUMBAR, MEDIAL BRANCH, 1 LEVEL;  Surgeon: Arelis Burns MD;  Location: [facility];  Service: Pain Management;  Laterality: Bilateral;  Bilateral L3-S1 RFTC (both sides same day)     Family History   Problem Relation Age of Onset    Hypertension Mother     Heart disease Father      Social History     Tobacco Use    Smoking status: Former    Smokeless tobacco: Never   Substance Use Topics    Alcohol use: Never     Comment: unknown    Drug use: Never     Types: Hydrocodone     Review of Systems   Constitutional:  Positive for activity change, appetite change, chills and  fatigue.   HENT:  Positive for congestion and sore throat.    Gastrointestinal:  Positive for nausea.   Musculoskeletal:  Positive for myalgias.   Neurological:  Positive for weakness and headaches.   All other systems reviewed and are negative.    Physical Exam     Initial Vitals [10/31/22 1338]   BP Pulse Resp Temp SpO2   (!) 149/79 88 18 98.1 °F (36.7 °C) 99 %      MAP       --         Physical Exam    Nursing note and vitals reviewed.  Constitutional: Vital signs are normal. She appears well-developed and well-nourished. She is cooperative. She has a sickly appearance.   HENT:   Head: Normocephalic.   Right Ear: Hearing, tympanic membrane, external ear and ear canal normal.   Left Ear: Hearing, tympanic membrane, external ear and ear canal normal.   Nose: Mucosal edema and rhinorrhea present.   Mouth/Throat: Uvula is midline and mucous membranes are normal. Oropharyngeal exudate and posterior oropharyngeal erythema present.   Eyes: Conjunctivae and EOM are normal. Pupils are equal, round, and reactive to light.   Neck: Neck supple.   Normal range of motion.  Cardiovascular:  Normal rate, regular rhythm, normal heart sounds and intact distal pulses.           Pulmonary/Chest: Breath sounds normal.   Abdominal: Abdomen is soft. Bowel sounds are normal.   Musculoskeletal:         General: Normal range of motion.      Cervical back: Normal range of motion and neck supple.     Neurological: She is alert and oriented to person, place, and time. She has normal strength. GCS score is 15. GCS eye subscore is 4. GCS verbal subscore is 5. GCS motor subscore is 6.   Skin: Skin is warm and dry. Capillary refill takes less than 2 seconds.   Psychiatric: She has a normal mood and affect. Her behavior is normal. Judgment and thought content normal.       Medical Screening Exam   See Full Note    ED Course   Procedures  Labs Reviewed   SARS-COV2 (COVID) W/ FLU ANTIGEN - Normal    Narrative:     Negative SARS-CoV results should  not be used as the sole basis for treatment or patient management decisions; negative results should be considered in the context of a patient's recent exposures, history and the presene of clinical signs and symptoms consistent with COVID-19.  Negative results should be treated as presumptive and confirmed by molecular assay, if necessary for patient management.          Imaging Results    None          Medications - No data to display                    Clinical Impression:   Final diagnoses:  [J06.9] Upper respiratory infection, acute (Primary)        ED Disposition Condition    Discharge Stable          ED Prescriptions       Medication Sig Dispense Start Date End Date Auth. Provider    cefdinir (OMNICEF) 300 MG capsule Take 1 capsule (300 mg total) by mouth 2 (two) times daily. for 10 days 20 capsule 10/31/2022 11/10/2022 MARY Ragsdale    methylPREDNISolone (MEDROL DOSEPACK) 4 mg tablet Take as directed. 1 each 10/31/2022 11/21/2022 MARY Ragsdale    ondansetron (ZOFRAN) 4 MG tablet Take 1 tablet (4 mg total) by mouth every 6 (six) hours. 12 tablet 10/31/2022 -- MARY Ragsdale          Follow-up Information    None          MARY Ragsdale  10/31/22 4030

## 2022-11-02 ENCOUNTER — TELEPHONE (OUTPATIENT)
Dept: EMERGENCY MEDICINE | Facility: HOSPITAL | Age: 72
End: 2022-11-02
Payer: MEDICARE

## 2022-11-07 ENCOUNTER — OFFICE VISIT (OUTPATIENT)
Dept: PAIN MEDICINE | Facility: CLINIC | Age: 72
End: 2022-11-07
Payer: MEDICARE

## 2022-11-07 VITALS
HEIGHT: 62 IN | SYSTOLIC BLOOD PRESSURE: 152 MMHG | WEIGHT: 94 LBS | BODY MASS INDEX: 17.3 KG/M2 | HEART RATE: 95 BPM | DIASTOLIC BLOOD PRESSURE: 84 MMHG

## 2022-11-07 DIAGNOSIS — M54.14 THORACIC RADICULOPATHY: Chronic | ICD-10-CM

## 2022-11-07 DIAGNOSIS — Z79.899 ENCOUNTER FOR LONG-TERM (CURRENT) USE OF OTHER MEDICATIONS: ICD-10-CM

## 2022-11-07 DIAGNOSIS — M47.817 SPONDYLOSIS WITHOUT MYELOPATHY OR RADICULOPATHY, LUMBOSACRAL REGION: Chronic | ICD-10-CM

## 2022-11-07 DIAGNOSIS — M43.16 SPONDYLOLISTHESIS, LUMBAR REGION: Primary | Chronic | ICD-10-CM

## 2022-11-07 LAB
CTP QC/QA: YES
POC (AMP) AMPHETAMINE: NEGATIVE
POC (BAR) BARBITURATES: NEGATIVE
POC (BUP) BUPRENORPHINE: NEGATIVE
POC (BZO) BENZODIAZEPINES: NEGATIVE
POC (COC) COCAINE: NEGATIVE
POC (MDMA) METHYLENEDIOXYMETHAMPHETAMINE 3,4: NEGATIVE
POC (MET) METHAMPHETAMINE: NEGATIVE
POC (MOP) OPIATES: ABNORMAL
POC (MTD) METHADONE: NEGATIVE
POC (OXY) OXYCODONE: NEGATIVE
POC (PCP) PHENCYCLIDINE: NEGATIVE
POC (TCA) TRICYCLIC ANTIDEPRESSANTS: NEGATIVE
POC TEMPERATURE (URINE): 90
POC THC: NEGATIVE

## 2022-11-07 PROCEDURE — 99214 PR OFFICE/OUTPT VISIT, EST, LEVL IV, 30-39 MIN: ICD-10-PCS | Mod: S$PBB,,, | Performed by: PHYSICIAN ASSISTANT

## 2022-11-07 PROCEDURE — 99214 OFFICE O/P EST MOD 30 MIN: CPT | Mod: S$PBB,,, | Performed by: PHYSICIAN ASSISTANT

## 2022-11-07 PROCEDURE — 99214 OFFICE O/P EST MOD 30 MIN: CPT | Mod: PBBFAC | Performed by: PHYSICIAN ASSISTANT

## 2022-11-07 PROCEDURE — 80305 DRUG TEST PRSMV DIR OPT OBS: CPT | Mod: PBBFAC | Performed by: PHYSICIAN ASSISTANT

## 2022-11-07 RX ORDER — CYCLOBENZAPRINE HCL 10 MG
10 TABLET ORAL EVERY 8 HOURS
Qty: 30 TABLET | Refills: 2 | Status: SHIPPED | OUTPATIENT
Start: 2022-11-07 | End: 2023-01-24 | Stop reason: SDUPTHER

## 2022-11-07 RX ORDER — ACETAMINOPHEN AND CODEINE PHOSPHATE 300; 30 MG/1; MG/1
1 TABLET ORAL EVERY 8 HOURS
Qty: 90 TABLET | Refills: 2 | Status: SHIPPED | OUTPATIENT
Start: 2022-11-24 | End: 2022-11-16

## 2022-11-07 NOTE — PROGRESS NOTES
Subjective:         Patient ID: Amelie Cerrato is a 72 y.o. female.    Chief Complaint: Back Pain and Mid-back Pain      Pain  This is a chronic problem. The current episode started more than 1 year ago. The problem occurs daily. The problem has been waxing and waning. Associated symptoms include arthralgias, myalgias and neck pain. Pertinent negatives include no anorexia, change in bowel habit, chills, coughing, diaphoresis, fever, sore throat, swollen glands, vertigo, visual change or vomiting.   Review of Systems   Constitutional:  Negative for activity change, appetite change, chills, diaphoresis, fever and unexpected weight change.   HENT:  Negative for drooling, ear discharge, ear pain, facial swelling, mouth sores, nosebleeds, sore throat, trouble swallowing, voice change and goiter.    Eyes:  Negative for photophobia, pain, discharge, redness, visual disturbance and eye dryness.   Respiratory:  Negative for apnea, cough, choking, chest tightness, shortness of breath, wheezing and stridor.    Cardiovascular:  Negative for palpitations and leg swelling.   Gastrointestinal:  Negative for abdominal distention, anorexia, change in bowel habit, diarrhea, rectal pain, vomiting, fecal incontinence and change in bowel habit.   Endocrine: Negative for cold intolerance, heat intolerance, polydipsia, polyphagia and polyuria.   Genitourinary:  Negative for flank pain, frequency, hematuria and hot flashes.   Musculoskeletal:  Positive for arthralgias, back pain, myalgias, neck pain and neck stiffness.   Integumentary:  Negative for color change and pallor.   Allergic/Immunologic: Negative for immunocompromised state.   Neurological:  Negative for dizziness, vertigo, seizures, syncope, facial asymmetry, speech difficulty, light-headedness, coordination difficulties, memory loss and coordination difficulties.   Hematological:  Negative for adenopathy. Does not bruise/bleed easily.   Psychiatric/Behavioral:  Negative for  agitation, behavioral problems, confusion, decreased concentration, dysphoric mood, hallucinations, self-injury and suicidal ideas. The patient is not nervous/anxious and is not hyperactive.          Past Medical History:   Diagnosis Date    Acid reflux     Anxiety     Chronic pain syndrome     Coccyx sprain     broken tailbone     COPD (chronic obstructive pulmonary disease)     Depression     Fall at home 10/01/2021    Fracture of multiple pubic rami, left, closed, initial encounter 10/01/2021    HLD (hyperlipidemia)     Hypertension     Hypokalemia     Hypomagnesemia     Low back pain     Lumbar radiculopathy     Lumbar stenosis     Lumbosacral spondylosis     Neuropathy     Radiculopathy, lumbosacral region     Renal disorder     Spondylolisthesis, lumbar region     Spondylosis without myelopathy or radiculopathy, lumbosacral region     Thoracic radiculopathy      Past Surgical History:   Procedure Laterality Date    CHOLECYSTECTOMY      CYSTOSCOPY      EPIDURAL STEROID INJECTION INTO LUMBAR SPINE N/A 05/27/2020    L1-2 WILD     EPIDURAL STEROID INJECTION INTO THORACIC SPINE N/A 02/03/2021    T9-10 WILD     EPIDURAL STEROID INJECTION INTO THORACIC SPINE N/A 3/18/2021    Procedure: INJECTION, STEROID, SPINE, THORACIC, EPIDURAL;  Surgeon: Arelis Burns MD;  Location: [facility];  Service: Pain Management;  Laterality: N/A;    EPIDURAL STEROID INJECTION INTO THORACIC SPINE N/A 12/23/2021    Procedure: Injection-steroid-epidural-thoracic T9/10;  Surgeon: Arelis Burns MD;  Location: [facility];  Service: Pain Management;  Laterality: N/A;  HAD VAC  WILL BRING CARD    HYSTERECTOMY      INJECTION OF ANESTHETIC AGENT AROUND MEDIAL BRANCH NERVES INNERVATING LUMBAR FACET JOINT Bilateral 4/22/2021    Procedure: Block-nerve-medial branch-lumbar Bilateral L3-4,4-5,5-S1 MBB #2;  Surgeon: Arelis Burns MD;  Location: [facility];  Service: Pain Management;  Laterality: Bilateral;    INJECTION OF  "ANESTHETIC AGENT AROUND MEDIAL BRANCH NERVES INNERVATING LUMBAR FACET JOINT Bilateral 9/6/2022    Procedure: Block-nerve-medial branch-lumbar, bilateral L4 through S1;  Surgeon: Arelis Burns MD;  Location: [facility];  Service: Pain Management;  Laterality: Bilateral;    INJECTION OF ANESTHETIC AGENT AROUND MEDIAL BRANCH NERVES INNERVATING LUMBAR FACET JOINT Bilateral 10/11/2022    Procedure: Block-nerve-medial branch-lumbar, bilateral L4 through S1;  Surgeon: Arelis Burns MD;  Location: [facility];  Service: Pain Management;  Laterality: Bilateral;  vaccinated.    INJECTION OF FACET JOINT Bilateral 12/04/2020    RADIOFREQUENCY ABLATION OF LUMBAR MEDIAL BRANCH NERVE AT SINGLE LEVEL Bilateral 5/20/2021    Procedure: RADIOFREQUENCY ABLATION, NERVE, SPINAL, LUMBAR, MEDIAL BRANCH, 1 LEVEL;  Surgeon: Arelis Burns MD;  Location: [facility];  Service: Pain Management;  Laterality: Bilateral;  Bilateral L3-S1 RFTC (both sides same day)     Social History     Socioeconomic History    Marital status:    Tobacco Use    Smoking status: Former    Smokeless tobacco: Never   Substance and Sexual Activity    Alcohol use: Never     Comment: unknown    Drug use: Never     Types: Hydrocodone    Sexual activity: Not Currently     Family History   Problem Relation Age of Onset    Hypertension Mother     Heart disease Father      Review of patient's allergies indicates:   Allergen Reactions    Insect venom      Note: - [employer] 05/07/2019    Sulfa (sulfonamide antibiotics) Nausea And Vomiting        Objective:  Vitals:    11/07/22 1351 11/07/22 1352   BP: (!) 152/84    Pulse: 95    Weight: 42.6 kg (94 lb)    Height: 5' 2" (1.575 m)    PainSc:   7   7         Physical Exam  Vitals and nursing note reviewed. Exam conducted with a chaperone present.   Constitutional:       General: She is awake. She is not in acute distress.     Appearance: Normal appearance. She is not ill-appearing or " toxic-appearing.   HENT:      Head: Normocephalic and atraumatic.      Nose: Nose normal.      Mouth/Throat:      Mouth: Mucous membranes are moist.      Pharynx: Oropharynx is clear.   Eyes:      Conjunctiva/sclera: Conjunctivae normal.      Pupils: Pupils are equal, round, and reactive to light.   Cardiovascular:      Rate and Rhythm: Normal rate.   Pulmonary:      Effort: Pulmonary effort is normal. No respiratory distress.   Abdominal:      Palpations: Abdomen is soft.   Musculoskeletal:         General: Normal range of motion.      Right shoulder: Tenderness present.      Left shoulder: Tenderness present.      Cervical back: Normal range of motion and neck supple. Tenderness present.      Thoracic back: Tenderness present.      Lumbar back: Tenderness present.   Skin:     General: Skin is warm and dry.   Neurological:      General: No focal deficit present.      Mental Status: She is alert and oriented to person, place, and time. Mental status is at baseline.      Cranial Nerves: No cranial nerve deficit (II-XII).   Psychiatric:         Mood and Affect: Mood normal.         Behavior: Behavior normal. Behavior is cooperative.         Thought Content: Thought content normal.         FL Fluoro for Pain Management  See OP Notes for results.     IMPRESSION: See OP Notes for results.     This procedure was auto-finalized by: Virtual [employer]       Admission on 10/31/2022, Discharged on 10/31/2022   Component Date Value Ref Range Status    Influenza A 10/31/2022 Negative  Negative, Invalid Final    Influenza B 10/31/2022 Negative  Negative, Invalid Final    COVID-19 Ag 10/31/2022 Negative  Negative, Invalid Final   Abstract on 08/19/2022   Component Date Value Ref Range Status    Fecal Occult Blood 10/04/2021 Negative   Final   Office Visit on 08/16/2022   Component Date Value Ref Range Status    POC Amphetamines 08/16/2022 Negative  Negative, Inconclusive Final    POC Barbiturates 08/16/2022 Negative  Negative,  Inconclusive Final    POC Benzodiazepines 08/16/2022 Negative  Negative, Inconclusive Final    POC Cocaine 08/16/2022 Negative  Negative, Inconclusive Final    POC THC 08/16/2022 Negative  Negative, Inconclusive Final    POC Methadone 08/16/2022 Negative  Negative, Inconclusive Final    POC Methamphetamine 08/16/2022 Negative  Negative, Inconclusive Final    POC Opiates 08/16/2022 Presumptive Positive (A)  Negative, Inconclusive Final    POC Oxycodone 08/16/2022 Negative  Negative, Inconclusive Final    POC Phencyclidine 08/16/2022 Negative  Negative, Inconclusive Final    POC Methylenedioxymethamphetamine * 08/16/2022 Negative  Negative, Inconclusive Final    POC Tricyclic Antidepressants 08/16/2022 Negative  Negative, Inconclusive Final    POC Buprenorphine 08/16/2022 Negative   Final     Acceptable 08/16/2022 Yes   Final    POC Temperature (Urine) 08/16/2022 92   Final   Admission on 07/21/2022, Discharged on 07/21/2022   Component Date Value Ref Range Status    Sodium 07/21/2022 127 (L)  136 - 145 mmol/L Final    Potassium 07/21/2022 5.0  3.5 - 5.1 mmol/L Final    Chloride 07/21/2022 90 (L)  98 - 107 mmol/L Final    CO2 07/21/2022 28  21 - 32 mmol/L Final    Anion Gap 07/21/2022 14  7 - 16 mmol/L Final    Glucose 07/21/2022 81  74 - 106 mg/dL Final    BUN 07/21/2022 13  7 - 18 mg/dL Final    Creatinine 07/21/2022 1.03 (H)  0.55 - 1.02 mg/dL Final    BUN/Creatinine Ratio 07/21/2022 13  6 - 20 Final    Calcium 07/21/2022 10.2 (H)  8.5 - 10.1 mg/dL Final    Total Protein 07/21/2022 7.9  6.4 - 8.2 g/dL Final    Albumin 07/21/2022 3.9  3.5 - 5.0 g/dL Final    Globulin 07/21/2022 4.0  2.0 - 4.0 g/dL Final    A/G Ratio 07/21/2022 1.0   Final    Bilirubin, Total 07/21/2022 0.6  0.0 - 1.2 mg/dL Final    Alk Phos 07/21/2022 112  55 - 142 U/L Final    ALT 07/21/2022 26  13 - 56 U/L Final    AST 07/21/2022 19  15 - 37 U/L Final    eGFR 07/21/2022 56 (L)  >=60 mL/min/1.73m² Final    Amylase 07/21/2022 97  25 -  115 U/L Final    Lipase 07/21/2022 137  73 - 393 U/L Final    Color, UA 07/21/2022 Colorless  Colorless, Straw, Yellow, Light Yellow, Dark Yellow Final    Clarity, UA 07/21/2022 Clear  Clear Final    pH, UA 07/21/2022 7.0  5.0 to 8.0 pH Units Final    Leukocytes, UA 07/21/2022 Negative  Negative Final    Nitrites, UA 07/21/2022 Negative  Negative Final    Protein, UA 07/21/2022 Negative  Negative Final    Glucose, UA 07/21/2022 Normal  Normal mg/dL Final    Ketones, UA 07/21/2022 Negative  Negative mg/dL Final    Urobilinogen, UA 07/21/2022 Normal  0.2, 1.0, Normal mg/dL Final    Bilirubin, UA 07/21/2022 Negative  Negative Final    Blood, UA 07/21/2022 Negative  Negative Final    Specific Gravity, UA 07/21/2022 1.003  <=1.030 Final    WBC 07/21/2022 11.00  4.50 - 11.00 K/uL Final    RBC 07/21/2022 5.45 (H)  4.20 - 5.40 M/uL Final    Hemoglobin 07/21/2022 15.9  12.0 - 16.0 g/dL Final    Hematocrit 07/21/2022 47.1 (H)  38.0 - 47.0 % Final    MCV 07/21/2022 86.4  80.0 - 96.0 fL Final    MCH 07/21/2022 29.2  27.0 - 31.0 pg Final    MCHC 07/21/2022 33.8  32.0 - 36.0 g/dL Final    RDW 07/21/2022 16.6 (H)  11.5 - 14.5 % Final    Platelet Count 07/21/2022 526 (H)  150 - 400 K/uL Final    MPV 07/21/2022 8.8 (L)  9.4 - 12.4 fL Final    Neutrophils % 07/21/2022 74.0 (H)  53.0 - 65.0 % Final    Lymphocytes % 07/21/2022 18.6 (L)  27.0 - 41.0 % Final    Monocytes % 07/21/2022 5.5  2.0 - 6.0 % Final    Eosinophils % 07/21/2022 0.1 (L)  1.0 - 4.0 % Final    Basophils % 07/21/2022 0.3  0.0 - 1.0 % Final    Immature Granulocytes % 07/21/2022 1.5 (H)  0.0 - 0.4 % Final    nRBC, Auto 07/21/2022 0.0  <=0.0 % Final    Neutrophils, Abs 07/21/2022 8.14 (H)  1.80 - 7.70 K/uL Final    Lymphocytes, Absolute 07/21/2022 2.05  1.00 - 4.80 K/uL Final    Monocytes, Absolute 07/21/2022 0.60  0.00 - 0.80 K/uL Final    Eosinophils, Absolute 07/21/2022 0.01  0.00 - 0.50 K/uL Final    Basophils, Absolute 07/21/2022 0.03  0.00 - 0.20 K/uL Final     Immature Granulocytes, Absolute 07/21/2022 0.17 (H)  0.00 - 0.04 K/uL Final    nRBC, Absolute 07/21/2022 0.00  <=0.00 x10e3/uL Final    Diff Type 07/21/2022 Auto   Final    Influenza A 07/21/2022 Negative  Negative, Invalid Final    Influenza B 07/21/2022 Negative  Negative, Invalid Final    COVID-19 Ag 07/21/2022 Negative  Negative, Invalid Final   Admission on 06/20/2022, Discharged on 06/20/2022   Component Date Value Ref Range Status    Sodium 06/20/2022 143  136 - 145 mmol/L Final    Potassium 06/20/2022 3.6  3.5 - 5.1 mmol/L Final    Chloride 06/20/2022 106  98 - 107 mmol/L Final    CO2 06/20/2022 33 (H)  21 - 32 mmol/L Final    Anion Gap 06/20/2022 8  7 - 16 mmol/L Final    Glucose 06/20/2022 98  74 - 106 mg/dL Final    BUN 06/20/2022 11  7 - 18 mg/dL Final    Creatinine 06/20/2022 1.08 (H)  0.55 - 1.02 mg/dL Final    BUN/Creatinine Ratio 06/20/2022 10  6 - 20 Final    Calcium 06/20/2022 8.8  8.5 - 10.1 mg/dL Final    Total Protein 06/20/2022 6.3 (L)  6.4 - 8.2 g/dL Final    Albumin 06/20/2022 2.8 (L)  3.5 - 5.0 g/dL Final    Globulin 06/20/2022 3.5  2.0 - 4.0 g/dL Final    A/G Ratio 06/20/2022 0.8   Final    Bilirubin, Total 06/20/2022 0.4  0.0 - 1.2 mg/dL Final    Alk Phos 06/20/2022 74  55 - 142 U/L Final    ALT 06/20/2022 41  13 - 56 U/L Final    AST 06/20/2022 42 (H)  15 - 37 U/L Final    eGFR 06/20/2022 53 (L)  >=60 mL/min/1.73m² Final    Lipase 06/20/2022 78  73 - 393 U/L Final    Influenza A 06/20/2022 Negative  Negative, Invalid Final    Influenza B 06/20/2022 Negative  Negative, Invalid Final    COVID-19 Ag 06/20/2022 Negative  Negative, Invalid Final    Troponin I High Sensitivity 06/20/2022 70.4 (HH)  <=60.4 pg/mL Final    WBC 06/20/2022 9.52  4.50 - 11.00 K/uL Final    RBC 06/20/2022 4.27  4.20 - 5.40 M/uL Final    Hemoglobin 06/20/2022 12.2  12.0 - 16.0 g/dL Final    Hematocrit 06/20/2022 39.3  38.0 - 47.0 % Final    MCV 06/20/2022 92.0  80.0 - 96.0 fL Final    MCH 06/20/2022 28.6  27.0 - 31.0  pg Final    MCHC 06/20/2022 31.0 (L)  32.0 - 36.0 g/dL Final    RDW 06/20/2022 18.3 (H)  11.5 - 14.5 % Final    Platelet Count 06/20/2022 340  150 - 400 K/uL Final    MPV 06/20/2022 9.2 (L)  9.4 - 12.4 fL Final    Neutrophils % 06/20/2022 52.7 (L)  53.0 - 65.0 % Final    Lymphocytes % 06/20/2022 31.9  27.0 - 41.0 % Final    Neutrophils, Abs 06/20/2022 5.02  1.80 - 7.70 K/uL Final    Lymphocytes, Absolute 06/20/2022 3.04  1.00 - 4.80 K/uL Final    Diff Type 06/20/2022 Auto   Final    Monocytes % 06/20/2022 11.8 (H)  2.0 - 6.0 % Final    Eosinophils % 06/20/2022 3.2  1.0 - 4.0 % Final    Basophils % 06/20/2022 0.4  0.0 - 1.0 % Final    Monocytes, Absolute 06/20/2022 1.12 (H)  0.00 - 0.80 K/uL Final    Eosinophils, Absolute 06/20/2022 0.30  0.00 - 0.50 K/uL Final    Basophils, Absolute 06/20/2022 0.04  0.00 - 0.20 K/uL Final    Troponin I High Sensitivity 06/20/2022 66.1 (HH)  <=60.4 pg/mL Final         Orders Placed This Encounter   Procedures    POCT Urine Drug Screen Presump     Interpretive Information:     Negative:  No drug detected at the cut off level.   Positive:  This result represents presumptive positive for the   tested drug, other substances may yield a positive response other   than the analyte of interest. This result should be utilized for   diagnostic purpose only. Confirmation testing will be performed upon physician request only.            Requested Prescriptions     Pending Prescriptions Disp Refills    cyclobenzaprine (FLEXERIL) 10 MG tablet 30 tablet 2     Sig: Take 1 tablet (10 mg total) by mouth every 8 (eight) hours.       Assessment:     1. Spondylolisthesis, lumbar region    2. Thoracic radiculopathy    3. Spondylosis without myelopathy or radiculopathy, lumbosacral region    4. Encounter for long-term (current) use of other medications         A's of Opioid Risk Assessment  Activity:Patient can perform ADL.   Analgesia:Patients pain is partially controlled by current medication. Patient  has tried OTC medications such as Tylenol and Ibuprofen with out relief.   Adverse Effects: Patient denies constipation or sedation.  Aberrant Behavior:  reviewed with no aberrant drug seeking/taking behavior.  Overdose reversal drug naloxone discussed    Drug screen reviewed      Plan:    She states Tylenol No. 3 is helping control her discomfort    She would like to continue with this medication conservative management this time    Continue home exercise program as directed    Continue current medication    MRI lumbar spine May 8, 2020 multiple compression fractures degenerative changes noted the T12 fracture appeared acute to subacute    X-ray lumbar spine October 21, 2021 no fracture noted multiple level degenerative changes osteopenia noted    Follow-up 3 months    Dr. Burns, October 2023    Bring original prescription medication bottles/container/box with labels to each visit    Pill count    Physical therapy    Massage therapy declines

## 2022-11-16 RX ORDER — HYDROCODONE BITARTRATE AND ACETAMINOPHEN 10; 325 MG/1; MG/1
1 TABLET ORAL EVERY 8 HOURS
Qty: 90 TABLET | Refills: 0 | Status: SHIPPED | OUTPATIENT
Start: 2022-12-23 | End: 2023-01-24 | Stop reason: SDUPTHER

## 2022-11-16 RX ORDER — HYDROCODONE BITARTRATE AND ACETAMINOPHEN 10; 325 MG/1; MG/1
1 TABLET ORAL EVERY 8 HOURS
Qty: 90 TABLET | Refills: 0 | Status: SHIPPED | OUTPATIENT
Start: 2022-11-23 | End: 2023-01-24 | Stop reason: SDUPTHER

## 2022-11-16 RX ORDER — HYDROCODONE BITARTRATE AND ACETAMINOPHEN 10; 325 MG/1; MG/1
1 TABLET ORAL
COMMUNITY
End: 2022-11-16 | Stop reason: SDUPTHER

## 2022-11-21 DIAGNOSIS — E78.5 HYPERLIPIDEMIA, UNSPECIFIED HYPERLIPIDEMIA TYPE: ICD-10-CM

## 2022-11-21 DIAGNOSIS — J45.909 ASTHMA, UNSPECIFIED ASTHMA SEVERITY, UNSPECIFIED WHETHER COMPLICATED, UNSPECIFIED WHETHER PERSISTENT: ICD-10-CM

## 2022-11-21 RX ORDER — UMECLIDINIUM 62.5 UG/1
62.5 AEROSOL, POWDER ORAL DAILY
Qty: 90 CAPSULE | Refills: 1 | Status: SHIPPED | OUTPATIENT
Start: 2022-11-21 | End: 2023-05-20

## 2022-12-08 ENCOUNTER — TELEPHONE (OUTPATIENT)
Dept: FAMILY MEDICINE | Facility: CLINIC | Age: 72
End: 2022-12-08
Payer: MEDICARE

## 2022-12-08 DIAGNOSIS — R06.2 WHEEZING: Primary | ICD-10-CM

## 2022-12-08 DIAGNOSIS — I10 ESSENTIAL (PRIMARY) HYPERTENSION: Primary | ICD-10-CM

## 2022-12-08 RX ORDER — LOSARTAN POTASSIUM 50 MG/1
50 TABLET ORAL DAILY
Qty: 90 TABLET | Refills: 3 | Status: ON HOLD | OUTPATIENT
Start: 2022-12-08 | End: 2023-03-20 | Stop reason: HOSPADM

## 2022-12-08 RX ORDER — METHYLPREDNISOLONE 4 MG/1
TABLET ORAL
Qty: 21 EACH | Refills: 0 | Status: SHIPPED | OUTPATIENT
Start: 2022-12-08 | End: 2022-12-29

## 2022-12-08 NOTE — TELEPHONE ENCOUNTER
Patient called the clinic requesting a refill on her steroids.  She still has excessive wheezing she is currently only on 1 inhaled medication day and we will need to re-evaluate this at her next appointment.  In the meantime, I will be sending in a Medrol Dosepak for this patient to the pharmacy of [place]

## 2023-01-24 ENCOUNTER — OFFICE VISIT (OUTPATIENT)
Dept: PAIN MEDICINE | Facility: CLINIC | Age: 73
End: 2023-01-24
Payer: MEDICARE

## 2023-01-24 VITALS
WEIGHT: 97 LBS | HEART RATE: 97 BPM | OXYGEN SATURATION: 98 % | RESPIRATION RATE: 18 BRPM | HEIGHT: 62 IN | SYSTOLIC BLOOD PRESSURE: 195 MMHG | BODY MASS INDEX: 17.85 KG/M2 | DIASTOLIC BLOOD PRESSURE: 85 MMHG

## 2023-01-24 DIAGNOSIS — M43.16 SPONDYLOLISTHESIS, LUMBAR REGION: Primary | Chronic | ICD-10-CM

## 2023-01-24 DIAGNOSIS — M54.14 THORACIC RADICULOPATHY: Chronic | ICD-10-CM

## 2023-01-24 DIAGNOSIS — M47.817 SPONDYLOSIS WITHOUT MYELOPATHY OR RADICULOPATHY, LUMBOSACRAL REGION: Chronic | ICD-10-CM

## 2023-01-24 PROCEDURE — 99214 OFFICE O/P EST MOD 30 MIN: CPT | Mod: S$PBB,,, | Performed by: PHYSICIAN ASSISTANT

## 2023-01-24 PROCEDURE — 99214 PR OFFICE/OUTPT VISIT, EST, LEVL IV, 30-39 MIN: ICD-10-PCS | Mod: S$PBB,,, | Performed by: PHYSICIAN ASSISTANT

## 2023-01-24 PROCEDURE — 99214 OFFICE O/P EST MOD 30 MIN: CPT | Mod: PBBFAC | Performed by: PHYSICIAN ASSISTANT

## 2023-01-24 RX ORDER — HYDROCODONE BITARTRATE AND ACETAMINOPHEN 10; 325 MG/1; MG/1
1 TABLET ORAL EVERY 8 HOURS
Qty: 90 TABLET | Refills: 0 | Status: SHIPPED | OUTPATIENT
Start: 2023-03-27 | End: 2023-04-24 | Stop reason: SDUPTHER

## 2023-01-24 RX ORDER — HYDROCODONE BITARTRATE AND ACETAMINOPHEN 10; 325 MG/1; MG/1
1 TABLET ORAL EVERY 8 HOURS
Qty: 90 TABLET | Refills: 0 | Status: ON HOLD | OUTPATIENT
Start: 2023-01-26 | End: 2023-03-20 | Stop reason: HOSPADM

## 2023-01-24 RX ORDER — CYCLOBENZAPRINE HCL 10 MG
10 TABLET ORAL EVERY 8 HOURS
Qty: 30 TABLET | Refills: 2 | Status: SHIPPED | OUTPATIENT
Start: 2023-01-24 | End: 2023-03-12 | Stop reason: SINTOL

## 2023-01-24 RX ORDER — NALOXONE HYDROCHLORIDE 4 MG/.1ML
2 SPRAY NASAL ONCE
Qty: 2 EACH | Refills: 0 | Status: SHIPPED | OUTPATIENT
Start: 2023-01-24 | End: 2023-01-24

## 2023-01-24 RX ORDER — HYDROCODONE BITARTRATE AND ACETAMINOPHEN 10; 325 MG/1; MG/1
1 TABLET ORAL EVERY 8 HOURS
Qty: 90 TABLET | Refills: 0 | Status: ON HOLD | OUTPATIENT
Start: 2023-02-24 | End: 2023-03-20 | Stop reason: HOSPADM

## 2023-01-24 NOTE — PROGRESS NOTES
Subjective:         Patient ID: Amelie Cerrato is a 72 y.o. female.    Chief Complaint: Back Pain        Pain  This is a chronic problem. The current episode started more than 1 year ago. The problem occurs daily. The problem has been waxing and waning. Associated symptoms include arthralgias, myalgias and neck pain. Pertinent negatives include no anorexia, change in bowel habit, chills, coughing, diaphoresis, fever, sore throat, swollen glands, vertigo, visual change or vomiting.   Review of Systems   Constitutional:  Negative for activity change, appetite change, chills, diaphoresis, fever and unexpected weight change.   HENT:  Negative for drooling, ear discharge, ear pain, facial swelling, mouth sores, nosebleeds, sore throat, trouble swallowing, voice change and goiter.    Eyes:  Negative for photophobia, pain, discharge, redness, visual disturbance and eye dryness.   Respiratory:  Negative for apnea, cough, choking, chest tightness, shortness of breath, wheezing and stridor.    Cardiovascular:  Negative for palpitations and leg swelling.   Gastrointestinal:  Negative for abdominal distention, anorexia, change in bowel habit, diarrhea, rectal pain, vomiting, fecal incontinence and change in bowel habit.   Endocrine: Negative for cold intolerance, heat intolerance, polydipsia, polyphagia and polyuria.   Genitourinary:  Negative for flank pain, frequency, hematuria and hot flashes.   Musculoskeletal:  Positive for arthralgias, back pain, myalgias, neck pain and neck stiffness.   Integumentary:  Negative for color change and pallor.   Allergic/Immunologic: Negative for immunocompromised state.   Neurological:  Negative for dizziness, vertigo, seizures, syncope, facial asymmetry, speech difficulty, light-headedness, coordination difficulties, memory loss and coordination difficulties.   Hematological:  Negative for adenopathy. Does not bruise/bleed easily.   Psychiatric/Behavioral:  Negative for agitation,  behavioral problems, confusion, decreased concentration, dysphoric mood, hallucinations, self-injury and suicidal ideas. The patient is not nervous/anxious and is not hyperactive.          Past Medical History:   Diagnosis Date    Acid reflux     Anxiety     Chronic pain syndrome     Coccyx sprain     broken tailbone     COPD (chronic obstructive pulmonary disease)     Depression     Fall at home 10/01/2021    Fracture of multiple pubic rami, left, closed, initial encounter 10/01/2021    HLD (hyperlipidemia)     Hypertension     Hypokalemia     Hypomagnesemia     Low back pain     Lumbar radiculopathy     Lumbar stenosis     Lumbosacral spondylosis     Neuropathy     Radiculopathy, lumbosacral region     Renal disorder     Spondylolisthesis, lumbar region     Spondylosis without myelopathy or radiculopathy, lumbosacral region     Thoracic radiculopathy      Past Surgical History:   Procedure Laterality Date    CHOLECYSTECTOMY      CYSTOSCOPY      EPIDURAL STEROID INJECTION INTO LUMBAR SPINE N/A 05/27/2020    L1-2 WILD     EPIDURAL STEROID INJECTION INTO THORACIC SPINE N/A 02/03/2021    T9-10 WILD     EPIDURAL STEROID INJECTION INTO THORACIC SPINE N/A 3/18/2021    Procedure: INJECTION, STEROID, SPINE, THORACIC, EPIDURAL;  Surgeon: Arelis Burns MD;  Location: [facility];  Service: Pain Management;  Laterality: N/A;    EPIDURAL STEROID INJECTION INTO THORACIC SPINE N/A 12/23/2021    Procedure: Injection-steroid-epidural-thoracic T9/10;  Surgeon: Arelis Burns MD;  Location: [facility];  Service: Pain Management;  Laterality: N/A;  HAD VAC  WILL BRING CARD    HYSTERECTOMY      INJECTION OF ANESTHETIC AGENT AROUND MEDIAL BRANCH NERVES INNERVATING LUMBAR FACET JOINT Bilateral 4/22/2021    Procedure: Block-nerve-medial branch-lumbar Bilateral L3-4,4-5,5-S1 MBB #2;  Surgeon: Arelis Burns MD;  Location: [facility];  Service: Pain Management;  Laterality: Bilateral;    INJECTION OF ANESTHETIC  "AGENT AROUND MEDIAL BRANCH NERVES INNERVATING LUMBAR FACET JOINT Bilateral 9/6/2022    Procedure: Block-nerve-medial branch-lumbar, bilateral L4 through S1;  Surgeon: Arelis Burns MD;  Location: [facility];  Service: Pain Management;  Laterality: Bilateral;    INJECTION OF ANESTHETIC AGENT AROUND MEDIAL BRANCH NERVES INNERVATING LUMBAR FACET JOINT Bilateral 10/11/2022    Procedure: Block-nerve-medial branch-lumbar, bilateral L4 through S1;  Surgeon: Arelis Burns MD;  Location: [facility];  Service: Pain Management;  Laterality: Bilateral;  vaccinated.    INJECTION OF FACET JOINT Bilateral 12/04/2020    RADIOFREQUENCY ABLATION OF LUMBAR MEDIAL BRANCH NERVE AT SINGLE LEVEL Bilateral 5/20/2021    Procedure: RADIOFREQUENCY ABLATION, NERVE, SPINAL, LUMBAR, MEDIAL BRANCH, 1 LEVEL;  Surgeon: Arelis Burns MD;  Location: [facility];  Service: Pain Management;  Laterality: Bilateral;  Bilateral L3-S1 RFTC (both sides same day)     Social History     Socioeconomic History    Marital status:    Tobacco Use    Smoking status: Former    Smokeless tobacco: Never   Substance and Sexual Activity    Alcohol use: Never     Comment: unknown    Drug use: Never     Types: Hydrocodone    Sexual activity: Not Currently     Family History   Problem Relation Age of Onset    Hypertension Mother     Heart disease Father      Review of patient's allergies indicates:   Allergen Reactions    Insect venom      Note: - [employer] 05/07/2019    Sulfa (sulfonamide antibiotics) Nausea And Vomiting        Objective:  Vitals:    01/24/23 0939 01/24/23 0940   BP:  (!) 195/85   Pulse:  97   Resp: 18    SpO2: 98%    Weight: 44 kg (97 lb)    Height: 5' 2" (1.575 m)    PainSc:   7   7         Physical Exam  Vitals and nursing note reviewed. Exam conducted with a chaperone present.   Constitutional:       General: She is awake. She is not in acute distress.     Appearance: Normal appearance. She is not ill-appearing or " toxic-appearing.   HENT:      Head: Normocephalic and atraumatic.      Nose: Nose normal.      Mouth/Throat:      Mouth: Mucous membranes are moist.      Pharynx: Oropharynx is clear.   Eyes:      Conjunctiva/sclera: Conjunctivae normal.      Pupils: Pupils are equal, round, and reactive to light.   Cardiovascular:      Rate and Rhythm: Normal rate.   Pulmonary:      Effort: Pulmonary effort is normal. No respiratory distress.   Abdominal:      Palpations: Abdomen is soft.   Musculoskeletal:         General: Normal range of motion.      Right shoulder: Tenderness present.      Left shoulder: Tenderness present.      Cervical back: Normal range of motion and neck supple. Tenderness present.      Thoracic back: Tenderness present.      Lumbar back: Tenderness present.   Skin:     General: Skin is warm and dry.   Neurological:      General: No focal deficit present.      Mental Status: She is alert and oriented to person, place, and time. Mental status is at baseline.      Cranial Nerves: No cranial nerve deficit (II-XII).   Psychiatric:         Mood and Affect: Mood normal.         Behavior: Behavior normal. Behavior is cooperative.         Thought Content: Thought content normal.         FL Fluoro for Pain Management  See OP Notes for results.     IMPRESSION: See OP Notes for results.     This procedure was auto-finalized by: Virtual Radiologist         Office Visit on 11/07/2022   Component Date Value Ref Range Status    POC Amphetamines 11/07/2022 Negative  Negative, Inconclusive Final    POC Barbiturates 11/07/2022 Negative  Negative, Inconclusive Final    POC Benzodiazepines 11/07/2022 Negative  Negative, Inconclusive Final    POC Cocaine 11/07/2022 Negative  Negative, Inconclusive Final    POC THC 11/07/2022 Negative  Negative, Inconclusive Final    POC Methadone 11/07/2022 Negative  Negative, Inconclusive Final    POC Methamphetamine 11/07/2022 Negative  Negative, Inconclusive Final    POC Opiates 11/07/2022  Presumptive Positive (A)  Negative, Inconclusive Final    POC Oxycodone 11/07/2022 Negative  Negative, Inconclusive Final    POC Phencyclidine 11/07/2022 Negative  Negative, Inconclusive Final    POC Methylenedioxymethamphetamine * 11/07/2022 Negative  Negative, Inconclusive Final    POC Tricyclic Antidepressants 11/07/2022 Negative  Negative, Inconclusive Final    POC Buprenorphine 11/07/2022 Negative   Final     Acceptable 11/07/2022 Yes   Final    POC Temperature (Urine) 11/07/2022 90   Final   Admission on 10/31/2022, Discharged on 10/31/2022   Component Date Value Ref Range Status    Influenza A 10/31/2022 Negative  Negative, Invalid Final    Influenza B 10/31/2022 Negative  Negative, Invalid Final    COVID-19 Ag 10/31/2022 Negative  Negative, Invalid Final   Abstract on 08/19/2022   Component Date Value Ref Range Status    Fecal Occult Blood 10/04/2021 Negative   Final   Office Visit on 08/16/2022   Component Date Value Ref Range Status    POC Amphetamines 08/16/2022 Negative  Negative, Inconclusive Final    POC Barbiturates 08/16/2022 Negative  Negative, Inconclusive Final    POC Benzodiazepines 08/16/2022 Negative  Negative, Inconclusive Final    POC Cocaine 08/16/2022 Negative  Negative, Inconclusive Final    POC THC 08/16/2022 Negative  Negative, Inconclusive Final    POC Methadone 08/16/2022 Negative  Negative, Inconclusive Final    POC Methamphetamine 08/16/2022 Negative  Negative, Inconclusive Final    POC Opiates 08/16/2022 Presumptive Positive (A)  Negative, Inconclusive Final    POC Oxycodone 08/16/2022 Negative  Negative, Inconclusive Final    POC Phencyclidine 08/16/2022 Negative  Negative, Inconclusive Final    POC Methylenedioxymethamphetamine * 08/16/2022 Negative  Negative, Inconclusive Final    POC Tricyclic Antidepressants 08/16/2022 Negative  Negative, Inconclusive Final    POC Buprenorphine 08/16/2022 Negative   Final     Acceptable 08/16/2022 Yes   Final     POC Temperature (Urine) 2022 92   Final         No orders of the defined types were placed in this encounter.        Requested Prescriptions     Signed Prescriptions Disp Refills    cyclobenzaprine (FLEXERIL) 10 MG tablet 30 tablet 2     Sig: Take 1 tablet (10 mg total) by mouth every 8 (eight) hours.    HYDROcodone-acetaminophen (NORCO)  mg per tablet 90 tablet 0     Sig: Take 1 tablet by mouth every 8 (eight) hours.    HYDROcodone-acetaminophen (NORCO)  mg per tablet 90 tablet 0     Sig: Take 1 tablet by mouth every 8 (eight) hours.    naloxone (NARCAN) 4 mg/actuation Spry 2 each 0     Si sprays (8 mg total) by Nasal route once. Call 911, Two sprays alternate nostril, may repeat 2-3 minutes as needed for 1 dose    HYDROcodone-acetaminophen (NORCO)  mg per tablet 90 tablet 0     Sig: Take 1 tablet by mouth every 8 (eight) hours.       Assessment:     1. Spondylolisthesis, lumbar region    2. Thoracic radiculopathy    3. Spondylosis without myelopathy or radiculopathy, lumbosacral region         A's of Opioid Risk Assessment  Activity:Patient can perform ADL.   Analgesia:Patients pain is partially controlled by current medication. Patient has tried OTC medications such as Tylenol and Ibuprofen with out relief.   Adverse Effects: Patient denies constipation or sedation.  Aberrant Behavior:  reviewed with no aberrant drug seeking/taking behavior.  Overdose reversal drug naloxone discussed    Drug screen reviewed      Plan:    Narcan 2023    Following [facility]     She states she doing quite well current medication is helping control her discomfort    She would like to continue with current medication conservative management    Continue home exercise program as directed    Continue current medication    Follow-up 3 months    Dr. Burns, 2023    Bring original prescription medication bottles/container/box with labels to each visit    Pill count    Physical  therapy    Massage therapy declines

## 2023-03-11 ENCOUNTER — HOSPITAL ENCOUNTER (INPATIENT)
Facility: HOSPITAL | Age: 73
LOS: 8 days | Discharge: HOME HEALTH | DRG: 871 | End: 2023-03-20
Attending: EMERGENCY MEDICINE | Admitting: FAMILY MEDICINE
Payer: MEDICARE

## 2023-03-11 DIAGNOSIS — J44.1 COPD EXACERBATION: ICD-10-CM

## 2023-03-11 DIAGNOSIS — J81.0 ACUTE PULMONARY EDEMA: ICD-10-CM

## 2023-03-11 DIAGNOSIS — R06.02 SOB (SHORTNESS OF BREATH): ICD-10-CM

## 2023-03-11 DIAGNOSIS — R79.89 ELEVATED TROPONIN I LEVEL: ICD-10-CM

## 2023-03-11 DIAGNOSIS — J96.01 ACUTE HYPOXEMIC RESPIRATORY FAILURE: ICD-10-CM

## 2023-03-11 DIAGNOSIS — R79.89 ELEVATED BRAIN NATRIURETIC PEPTIDE (BNP) LEVEL: ICD-10-CM

## 2023-03-11 DIAGNOSIS — J18.9 COMMUNITY ACQUIRED PNEUMONIA, UNSPECIFIED LATERALITY: ICD-10-CM

## 2023-03-11 DIAGNOSIS — I21.4 NSTEMI (NON-ST ELEVATED MYOCARDIAL INFARCTION): Primary | ICD-10-CM

## 2023-03-11 DIAGNOSIS — J96.01 ACUTE RESPIRATORY FAILURE WITH HYPOXIA: ICD-10-CM

## 2023-03-11 DIAGNOSIS — A41.9 SEVERE SEPSIS: ICD-10-CM

## 2023-03-11 DIAGNOSIS — R65.20 SEVERE SEPSIS: ICD-10-CM

## 2023-03-11 DIAGNOSIS — R06.02 SHORTNESS OF BREATH: ICD-10-CM

## 2023-03-11 DIAGNOSIS — I21.A1 MYOCARDIAL INFARCTION DUE TO DEMAND ISCHEMIA: ICD-10-CM

## 2023-03-11 LAB — GLUCOSE SERPL-MCNC: 67 MG/DL (ref 70–105)

## 2023-03-11 PROCEDURE — 85025 COMPLETE CBC W/AUTO DIFF WBC: CPT | Performed by: EMERGENCY MEDICINE

## 2023-03-11 PROCEDURE — 25000242 PHARM REV CODE 250 ALT 637 W/ HCPCS: Performed by: EMERGENCY MEDICINE

## 2023-03-11 PROCEDURE — 80053 COMPREHEN METABOLIC PANEL: CPT | Performed by: EMERGENCY MEDICINE

## 2023-03-11 PROCEDURE — 99285 EMERGENCY DEPT VISIT HI MDM: CPT | Mod: 25

## 2023-03-11 PROCEDURE — 96367 TX/PROPH/DG ADDL SEQ IV INF: CPT

## 2023-03-11 PROCEDURE — 82962 GLUCOSE BLOOD TEST: CPT

## 2023-03-11 PROCEDURE — 99285 PR EMERGENCY DEPT VISIT,LEVEL V: ICD-10-PCS | Mod: ,,, | Performed by: EMERGENCY MEDICINE

## 2023-03-11 PROCEDURE — 96366 THER/PROPH/DIAG IV INF ADDON: CPT

## 2023-03-11 PROCEDURE — 93010 EKG 12-LEAD: ICD-10-PCS | Mod: ,,, | Performed by: HOSPITALIST

## 2023-03-11 PROCEDURE — 96375 TX/PRO/DX INJ NEW DRUG ADDON: CPT

## 2023-03-11 PROCEDURE — 96365 THER/PROPH/DIAG IV INF INIT: CPT

## 2023-03-11 PROCEDURE — 93010 ELECTROCARDIOGRAM REPORT: CPT | Mod: ,,, | Performed by: HOSPITALIST

## 2023-03-11 PROCEDURE — 63600175 PHARM REV CODE 636 W HCPCS: Performed by: EMERGENCY MEDICINE

## 2023-03-11 PROCEDURE — 93005 ELECTROCARDIOGRAM TRACING: CPT

## 2023-03-11 PROCEDURE — 99285 EMERGENCY DEPT VISIT HI MDM: CPT | Mod: ,,, | Performed by: EMERGENCY MEDICINE

## 2023-03-11 PROCEDURE — 83880 ASSAY OF NATRIURETIC PEPTIDE: CPT | Performed by: EMERGENCY MEDICINE

## 2023-03-11 RX ORDER — IPRATROPIUM BROMIDE AND ALBUTEROL SULFATE 2.5; .5 MG/3ML; MG/3ML
3 SOLUTION RESPIRATORY (INHALATION)
Status: COMPLETED | OUTPATIENT
Start: 2023-03-11 | End: 2023-03-11

## 2023-03-11 RX ORDER — BUDESONIDE 0.5 MG/2ML
0.5 INHALANT ORAL ONCE
Status: COMPLETED | OUTPATIENT
Start: 2023-03-12 | End: 2023-03-12

## 2023-03-11 RX ORDER — METHYLPREDNISOLONE SOD SUCC 125 MG
125 VIAL (EA) INJECTION
Status: COMPLETED | OUTPATIENT
Start: 2023-03-11 | End: 2023-03-11

## 2023-03-11 RX ADMIN — METHYLPREDNISOLONE SODIUM SUCCINATE 125 MG: 125 INJECTION, POWDER, FOR SOLUTION INTRAMUSCULAR; INTRAVENOUS at 11:03

## 2023-03-11 RX ADMIN — IPRATROPIUM BROMIDE AND ALBUTEROL SULFATE 3 ML: 2.5; .5 SOLUTION RESPIRATORY (INHALATION) at 11:03

## 2023-03-11 NOTE — Clinical Note
Diagnosis: Acute respiratory failure with hypoxia [291194]   Admitting Provider:: MAGO SIMONS [[id number]]   Future Attending Provider: MAGO SIMONS [[id number]]   Reason for IP Medical Treatment  (Clinical interventions that can only be accomplished in the IP setting? ) :: life threatening illness   I certify that Inpatient services for greater than or equal to 2 midnights are medically necessary:: Yes   Plans for Post-Acute care--if anticipated (pick the single best option):: A. No post acute care anticipated at this time

## 2023-03-12 PROBLEM — J44.1 COPD EXACERBATION: Status: ACTIVE | Noted: 2023-03-12

## 2023-03-12 PROBLEM — I21.4 NSTEMI (NON-ST ELEVATED MYOCARDIAL INFARCTION): Status: ACTIVE | Noted: 2023-03-12

## 2023-03-12 PROBLEM — A41.9 SEPSIS: Status: ACTIVE | Noted: 2023-03-12

## 2023-03-12 PROBLEM — J81.0 ACUTE PULMONARY EDEMA: Status: ACTIVE | Noted: 2023-03-12

## 2023-03-12 PROBLEM — J18.9 COMMUNITY ACQUIRED PNEUMONIA: Status: ACTIVE | Noted: 2023-03-12

## 2023-03-12 PROBLEM — I21.A1 MYOCARDIAL INFARCTION DUE TO DEMAND ISCHEMIA: Status: ACTIVE | Noted: 2023-03-12

## 2023-03-12 PROBLEM — J96.01 ACUTE HYPOXEMIC RESPIRATORY FAILURE: Status: ACTIVE | Noted: 2023-03-12

## 2023-03-12 PROBLEM — R79.89 ELEVATED BRAIN NATRIURETIC PEPTIDE (BNP) LEVEL: Status: ACTIVE | Noted: 2023-03-12

## 2023-03-12 PROBLEM — R65.20 SEVERE SEPSIS: Status: ACTIVE | Noted: 2023-03-12

## 2023-03-12 LAB
ALBUMIN SERPL BCP-MCNC: 2.8 G/DL (ref 3.5–5)
ALBUMIN/GLOB SERPL: 0.7 {RATIO}
ALP SERPL-CCNC: 134 U/L (ref 55–142)
ALT SERPL W P-5'-P-CCNC: 21 U/L (ref 13–56)
ANION GAP SERPL CALCULATED.3IONS-SCNC: 21 MMOL/L (ref 7–16)
ANISOCYTOSIS BLD QL SMEAR: ABNORMAL
ANISOCYTOSIS BLD QL SMEAR: ABNORMAL
AORTIC VALVE CUSP SEPERATION: 1.52 CM
APTT PPP: 30.5 SECONDS (ref 25.2–37.3)
APTT PPP: 34 SECONDS (ref 25.2–37.3)
APTT PPP: 37.7 SECONDS (ref 25.2–37.3)
APTT PPP: >200 SECONDS (ref 25.2–37.3)
AST SERPL W P-5'-P-CCNC: 43 U/L (ref 15–37)
AV PEAK GRADIENT: 5 MMHG
BASOPHILS # BLD AUTO: 0.02 K/UL (ref 0–0.2)
BASOPHILS # BLD AUTO: 0.1 K/UL (ref 0–0.2)
BASOPHILS NFR BLD AUTO: 0.1 % (ref 0–1)
BASOPHILS NFR BLD AUTO: 0.4 % (ref 0–1)
BILIRUB SERPL-MCNC: 0.3 MG/DL (ref ?–1.2)
BSA FOR ECHO PROCEDURE: 1.33 M2
BUN SERPL-MCNC: 25 MG/DL (ref 7–18)
BUN/CREAT SERPL: 18 (ref 6–20)
CALCIUM SERPL-MCNC: 9.3 MG/DL (ref 8.5–10.1)
CHLORIDE SERPL-SCNC: 106 MMOL/L (ref 98–107)
CHOLEST SERPL-MCNC: 166 MG/DL (ref 0–200)
CHOLEST/HDLC SERPL: 2 {RATIO}
CO2 SERPL-SCNC: 21 MMOL/L (ref 21–32)
CREAT SERPL-MCNC: 1.36 MG/DL (ref 0.55–1.02)
CRENATED CELLS: ABNORMAL
CRENATED CELLS: ABNORMAL
CV ECHO LV RWT: 0.73 CM
DIFFERENTIAL METHOD BLD: ABNORMAL
DIFFERENTIAL METHOD BLD: ABNORMAL
DOP CALC AO PEAK VEL: 1.1 M/S
E WAVE DECELERATION TIME: 163 MSEC
ECHO LV POSTERIOR WALL: 1.1 CM (ref 0.6–1.1)
EGFR (NO RACE VARIABLE) (RUSH/TITUS): 41 ML/MIN/1.73M²
EJECTION FRACTION: 45 %
EOSINOPHIL # BLD AUTO: 0 K/UL (ref 0–0.5)
EOSINOPHIL # BLD AUTO: 0.03 K/UL (ref 0–0.5)
EOSINOPHIL NFR BLD AUTO: 0 % (ref 1–4)
EOSINOPHIL NFR BLD AUTO: 0.1 % (ref 1–4)
ERYTHROCYTE [DISTWIDTH] IN BLOOD BY AUTOMATED COUNT: 19.2 % (ref 11.5–14.5)
ERYTHROCYTE [DISTWIDTH] IN BLOOD BY AUTOMATED COUNT: 19.6 % (ref 11.5–14.5)
FLUAV AG UPPER RESP QL IA.RAPID: NEGATIVE
FLUBV AG UPPER RESP QL IA.RAPID: NEGATIVE
FRACTIONAL SHORTENING: 19 % (ref 28–44)
GLOBULIN SER-MCNC: 4.2 G/DL (ref 2–4)
GLUCOSE SERPL-MCNC: 143 MG/DL (ref 70–105)
GLUCOSE SERPL-MCNC: 146 MG/DL (ref 70–105)
GLUCOSE SERPL-MCNC: 230 MG/DL (ref 70–105)
GLUCOSE SERPL-MCNC: 86 MG/DL (ref 74–106)
HCO3 UR-SCNC: 16.8 MMOL/L (ref 18–23)
HCO3 UR-SCNC: 17.4 MMOL/L (ref 21–28)
HCT VFR BLD AUTO: 36.5 % (ref 38–47)
HCT VFR BLD AUTO: 42.6 % (ref 38–47)
HCT VFR BLD CALC: 38 % (ref 35–51)
HDLC SERPL-MCNC: 83 MG/DL (ref 40–60)
HGB BLD-MCNC: 12.1 G/DL (ref 12–16)
HGB BLD-MCNC: 14.2 G/DL (ref 12–16)
IMM GRANULOCYTES # BLD AUTO: 0.21 K/UL (ref 0–0.04)
IMM GRANULOCYTES # BLD AUTO: 0.28 K/UL (ref 0–0.04)
IMM GRANULOCYTES NFR BLD: 0.9 % (ref 0–0.4)
IMM GRANULOCYTES NFR BLD: 1.1 % (ref 0–0.4)
INR BLD: 1.21
INR BLD: 1.22
INTERVENTRICULAR SEPTUM: 1.23 CM (ref 0.6–1.1)
LACTATE SERPL-SCNC: 1.9 MMOL/L (ref 0.4–2)
LACTATE SERPL-SCNC: 3 MMOL/L (ref 0.4–2)
LACTATE SERPL-SCNC: 3.3 MMOL/L (ref 0.4–2)
LACTATE SERPL-SCNC: 4.5 MMOL/L (ref 0.4–2)
LDH SERPL L TO P-CCNC: 1.9 MMOL/L (ref 0.3–1.2)
LDLC SERPL CALC-MCNC: 44 MG/DL
LEFT ATRIUM SIZE: 2.7 CM
LEFT INTERNAL DIMENSION IN SYSTOLE: 2.46 CM (ref 2.1–4)
LEFT VENTRICLE DIASTOLIC VOLUME INDEX: 26.56 ML/M2
LEFT VENTRICLE DIASTOLIC VOLUME: 35.86 ML
LEFT VENTRICLE MASS INDEX: 78 G/M2
LEFT VENTRICLE SYSTOLIC VOLUME INDEX: 15.9 ML/M2
LEFT VENTRICLE SYSTOLIC VOLUME: 21.44 ML
LEFT VENTRICULAR INTERNAL DIMENSION IN DIASTOLE: 3.03 CM (ref 3.5–6)
LEFT VENTRICULAR MASS: 105.58 G
LYMPHOCYTES # BLD AUTO: 1.28 K/UL (ref 1–4.8)
LYMPHOCYTES # BLD AUTO: 1.44 K/UL (ref 1–4.8)
LYMPHOCYTES NFR BLD AUTO: 5.4 % (ref 27–41)
LYMPHOCYTES NFR BLD AUTO: 5.7 % (ref 27–41)
LYMPHOCYTES NFR BLD MANUAL: 4 % (ref 27–41)
LYMPHOCYTES NFR BLD MANUAL: 4 % (ref 27–41)
MAGNESIUM SERPL-MCNC: 2.1 MG/DL (ref 1.7–2.3)
MCH RBC QN AUTO: 28.9 PG (ref 27–31)
MCH RBC QN AUTO: 28.9 PG (ref 27–31)
MCHC RBC AUTO-ENTMCNC: 33.2 G/DL (ref 32–36)
MCHC RBC AUTO-ENTMCNC: 33.3 G/DL (ref 32–36)
MCV RBC AUTO: 86.6 FL (ref 80–96)
MCV RBC AUTO: 87.3 FL (ref 80–96)
MONOCYTES # BLD AUTO: 0.8 K/UL (ref 0–0.8)
MONOCYTES # BLD AUTO: 1.58 K/UL (ref 0–0.8)
MONOCYTES NFR BLD AUTO: 3.6 % (ref 2–6)
MONOCYTES NFR BLD AUTO: 5.9 % (ref 2–6)
MONOCYTES NFR BLD MANUAL: 3 % (ref 2–6)
MONOCYTES NFR BLD MANUAL: 6 % (ref 2–6)
MPC BLD CALC-MCNC: 9 FL (ref 9.4–12.4)
MPC BLD CALC-MCNC: 9.3 FL (ref 9.4–12.4)
MV PEAK E VEL: 0.7 M/S
NEUTROPHILS # BLD AUTO: 20.07 K/UL (ref 1.8–7.7)
NEUTROPHILS # BLD AUTO: 23.16 K/UL (ref 1.8–7.7)
NEUTROPHILS NFR BLD AUTO: 87.1 % (ref 53–65)
NEUTROPHILS NFR BLD AUTO: 89.7 % (ref 53–65)
NEUTS BAND NFR BLD MANUAL: 14 % (ref 1–5)
NEUTS BAND NFR BLD MANUAL: 6 % (ref 1–5)
NEUTS SEG NFR BLD MANUAL: 76 % (ref 50–62)
NEUTS SEG NFR BLD MANUAL: 87 % (ref 50–62)
NONHDLC SERPL-MCNC: 83 MG/DL
NRBC # BLD AUTO: 0 X10E3/UL
NRBC # BLD AUTO: 0.04 X10E3/UL
NRBC, AUTO (.00): 0 %
NRBC, AUTO (.00): 0.2 %
NT-PROBNP SERPL-MCNC: 3180 PG/ML (ref 1–125)
OVALOCYTES BLD QL SMEAR: ABNORMAL
PCO2 BLDA: 25 MMHG (ref 35–48)
PCO2 BLDA: 29 MMHG (ref 35–48)
PH SMN: 7.37 [PH]
PH SMN: 7.45 [PH] (ref 7.35–7.45)
PISA TR MAX VEL: 3.62 M/S
PLATELET # BLD AUTO: 362 K/UL (ref 150–400)
PLATELET # BLD AUTO: 377 K/UL (ref 150–400)
PLATELET MORPHOLOGY: ABNORMAL
PLATELET MORPHOLOGY: ABNORMAL
PO2 BLDA: 53 MMHG (ref 83–108)
PO2 BLDA: 71 MMHG (ref 83–108)
POC A-ADO2: 60 MMHG
POC BASE EXCESS: -5.2 MMOL/L (ref -2–3)
POC BASE EXCESS: -7.1 MMOL/L (ref -2–3)
POC CO2: 18.2 MMOL/L
POC IONIZED CALCIUM: 1.1 MMOL/L (ref 1.15–1.35)
POC SATURATED O2: 85.2 % (ref 95–98)
POC SATURATED O2: 95 % (ref 95–98)
POCT GLUCOSE: 123 MG/DL (ref 60–95)
POLYCHROMASIA BLD QL SMEAR: ABNORMAL
POTASSIUM BLD-SCNC: 3.2 MMOL/L (ref 3.4–4.5)
POTASSIUM SERPL-SCNC: 3.6 MMOL/L (ref 3.5–5.1)
PROT SERPL-MCNC: 7 G/DL (ref 6.4–8.2)
PROTHROMBIN TIME: 14.8 SECONDS (ref 11.7–14.7)
PROTHROMBIN TIME: 14.9 SECONDS (ref 11.7–14.7)
RBC # BLD AUTO: 4.18 M/UL (ref 4.2–5.4)
RBC # BLD AUTO: 4.92 M/UL (ref 4.2–5.4)
SARS-COV+SARS-COV-2 AG RESP QL IA.RAPID: NEGATIVE
SODIUM BLD-SCNC: 134 MMOL/L (ref 136–145)
SODIUM SERPL-SCNC: 144 MMOL/L (ref 136–145)
TR MAX PG: 52 MMHG
TRIGL SERPL-MCNC: 196 MG/DL (ref 35–150)
TROPONIN I SERPL HS-MCNC: 1081.9 PG/ML
TROPONIN I SERPL HS-MCNC: 613.4 PG/ML
TROPONIN I SERPL HS-MCNC: 826.7 PG/ML
TSH SERPL DL<=0.005 MIU/L-ACNC: 0.92 UIU/ML (ref 0.36–3.74)
VLDLC SERPL-MCNC: 39 MG/DL
WBC # BLD AUTO: 22.38 K/UL (ref 4.5–11)
WBC # BLD AUTO: 26.59 K/UL (ref 4.5–11)

## 2023-03-12 PROCEDURE — 82962 GLUCOSE BLOOD TEST: CPT

## 2023-03-12 PROCEDURE — 25000003 PHARM REV CODE 250: Performed by: EMERGENCY MEDICINE

## 2023-03-12 PROCEDURE — 84484 ASSAY OF TROPONIN QUANT: CPT | Performed by: EMERGENCY MEDICINE

## 2023-03-12 PROCEDURE — 85730 THROMBOPLASTIN TIME PARTIAL: CPT | Performed by: NURSE PRACTITIONER

## 2023-03-12 PROCEDURE — 27000221 HC OXYGEN, UP TO 24 HOURS

## 2023-03-12 PROCEDURE — 63600175 PHARM REV CODE 636 W HCPCS: Performed by: HOSPITALIST

## 2023-03-12 PROCEDURE — 99900035 HC TECH TIME PER 15 MIN (STAT)

## 2023-03-12 PROCEDURE — 93010 EKG 12-LEAD: ICD-10-PCS | Mod: ,,, | Performed by: INTERNAL MEDICINE

## 2023-03-12 PROCEDURE — 20000000 HC ICU ROOM

## 2023-03-12 PROCEDURE — 85730 THROMBOPLASTIN TIME PARTIAL: CPT | Performed by: HOSPITALIST

## 2023-03-12 PROCEDURE — 94640 AIRWAY INHALATION TREATMENT: CPT

## 2023-03-12 PROCEDURE — 63600175 PHARM REV CODE 636 W HCPCS: Performed by: NURSE PRACTITIONER

## 2023-03-12 PROCEDURE — 99223 PR INITIAL HOSPITAL CARE,LEVL III: ICD-10-PCS | Mod: ,,, | Performed by: HOSPITALIST

## 2023-03-12 PROCEDURE — 99223 PR INITIAL HOSPITAL CARE,LEVL III: ICD-10-PCS | Mod: ,,, | Performed by: INTERNAL MEDICINE

## 2023-03-12 PROCEDURE — 63600175 PHARM REV CODE 636 W HCPCS: Performed by: FAMILY MEDICINE

## 2023-03-12 PROCEDURE — 25000003 PHARM REV CODE 250: Performed by: FAMILY MEDICINE

## 2023-03-12 PROCEDURE — 93005 ELECTROCARDIOGRAM TRACING: CPT

## 2023-03-12 PROCEDURE — 63600175 PHARM REV CODE 636 W HCPCS: Performed by: EMERGENCY MEDICINE

## 2023-03-12 PROCEDURE — 51798 US URINE CAPACITY MEASURE: CPT

## 2023-03-12 PROCEDURE — 25000003 PHARM REV CODE 250: Performed by: HOSPITALIST

## 2023-03-12 PROCEDURE — 83605 ASSAY OF LACTIC ACID: CPT

## 2023-03-12 PROCEDURE — 27000190 HC CPAP FULL FACE MASK W/VALVE

## 2023-03-12 PROCEDURE — 84443 ASSAY THYROID STIM HORMONE: CPT | Performed by: HOSPITALIST

## 2023-03-12 PROCEDURE — 25000242 PHARM REV CODE 250 ALT 637 W/ HCPCS: Performed by: EMERGENCY MEDICINE

## 2023-03-12 PROCEDURE — 94761 N-INVAS EAR/PLS OXIMETRY MLT: CPT

## 2023-03-12 PROCEDURE — 87040 BLOOD CULTURE FOR BACTERIA: CPT | Performed by: EMERGENCY MEDICINE

## 2023-03-12 PROCEDURE — 84484 ASSAY OF TROPONIN QUANT: CPT | Performed by: HOSPITALIST

## 2023-03-12 PROCEDURE — 99291 CRITICAL CARE FIRST HOUR: CPT | Mod: ,,, | Performed by: NURSE PRACTITIONER

## 2023-03-12 PROCEDURE — 84145 PROCALCITONIN (PCT): CPT | Mod: 90 | Performed by: FAMILY MEDICINE

## 2023-03-12 PROCEDURE — 80061 LIPID PANEL: CPT | Performed by: HOSPITALIST

## 2023-03-12 PROCEDURE — 84484 ASSAY OF TROPONIN QUANT: CPT | Performed by: FAMILY MEDICINE

## 2023-03-12 PROCEDURE — 87428 SARSCOV & INF VIR A&B AG IA: CPT | Performed by: EMERGENCY MEDICINE

## 2023-03-12 PROCEDURE — 25000242 PHARM REV CODE 250 ALT 637 W/ HCPCS: Performed by: FAMILY MEDICINE

## 2023-03-12 PROCEDURE — 82947 ASSAY GLUCOSE BLOOD QUANT: CPT

## 2023-03-12 PROCEDURE — 99223 1ST HOSP IP/OBS HIGH 75: CPT | Mod: ,,, | Performed by: HOSPITALIST

## 2023-03-12 PROCEDURE — 99291 PR CRITICAL CARE, E/M 30-74 MINUTES: ICD-10-PCS | Mod: ,,, | Performed by: NURSE PRACTITIONER

## 2023-03-12 PROCEDURE — 99900031 HC PATIENT EDUCATION (STAT)

## 2023-03-12 PROCEDURE — 84132 ASSAY OF SERUM POTASSIUM: CPT

## 2023-03-12 PROCEDURE — 84295 ASSAY OF SERUM SODIUM: CPT

## 2023-03-12 PROCEDURE — 85610 PROTHROMBIN TIME: CPT | Performed by: NURSE PRACTITIONER

## 2023-03-12 PROCEDURE — 85025 COMPLETE CBC W/AUTO DIFF WBC: CPT | Performed by: NURSE PRACTITIONER

## 2023-03-12 PROCEDURE — 83735 ASSAY OF MAGNESIUM: CPT | Performed by: HOSPITALIST

## 2023-03-12 PROCEDURE — 83605 ASSAY OF LACTIC ACID: CPT | Performed by: HOSPITALIST

## 2023-03-12 PROCEDURE — 85730 THROMBOPLASTIN TIME PARTIAL: CPT | Performed by: INTERNAL MEDICINE

## 2023-03-12 PROCEDURE — 25000003 PHARM REV CODE 250: Performed by: NURSE PRACTITIONER

## 2023-03-12 PROCEDURE — 94660 CPAP INITIATION&MGMT: CPT

## 2023-03-12 PROCEDURE — 85014 HEMATOCRIT: CPT

## 2023-03-12 PROCEDURE — 82330 ASSAY OF CALCIUM: CPT

## 2023-03-12 PROCEDURE — 93010 ELECTROCARDIOGRAM REPORT: CPT | Mod: ,,, | Performed by: INTERNAL MEDICINE

## 2023-03-12 PROCEDURE — 99223 1ST HOSP IP/OBS HIGH 75: CPT | Mod: ,,, | Performed by: INTERNAL MEDICINE

## 2023-03-12 PROCEDURE — 82803 BLOOD GASES ANY COMBINATION: CPT

## 2023-03-12 PROCEDURE — 36600 WITHDRAWAL OF ARTERIAL BLOOD: CPT

## 2023-03-12 PROCEDURE — 25000003 PHARM REV CODE 250: Performed by: INTERNAL MEDICINE

## 2023-03-12 RX ORDER — ACETAMINOPHEN 500 MG
1000 TABLET ORAL EVERY 6 HOURS PRN
Status: DISCONTINUED | OUTPATIENT
Start: 2023-03-12 | End: 2023-03-20 | Stop reason: HOSPADM

## 2023-03-12 RX ORDER — MUPIROCIN 20 MG/G
OINTMENT TOPICAL 2 TIMES DAILY
Status: COMPLETED | OUTPATIENT
Start: 2023-03-12 | End: 2023-03-16

## 2023-03-12 RX ORDER — TRAZODONE HYDROCHLORIDE 50 MG/1
50 TABLET ORAL NIGHTLY PRN
Status: DISCONTINUED | OUTPATIENT
Start: 2023-03-12 | End: 2023-03-20 | Stop reason: HOSPADM

## 2023-03-12 RX ORDER — CHLORTHALIDONE 25 MG/1
25 TABLET ORAL DAILY
Status: DISCONTINUED | OUTPATIENT
Start: 2023-03-12 | End: 2023-03-13

## 2023-03-12 RX ORDER — IPRATROPIUM BROMIDE AND ALBUTEROL SULFATE 2.5; .5 MG/3ML; MG/3ML
3 SOLUTION RESPIRATORY (INHALATION) EVERY 6 HOURS
Status: DISCONTINUED | OUTPATIENT
Start: 2023-03-12 | End: 2023-03-12 | Stop reason: SDUPTHER

## 2023-03-12 RX ORDER — LORAZEPAM 2 MG/ML
1 INJECTION INTRAMUSCULAR EVERY 6 HOURS PRN
Status: DISCONTINUED | OUTPATIENT
Start: 2023-03-12 | End: 2023-03-20 | Stop reason: HOSPADM

## 2023-03-12 RX ORDER — ASPIRIN 325 MG
325 TABLET, DELAYED RELEASE (ENTERIC COATED) ORAL DAILY
Status: DISCONTINUED | OUTPATIENT
Start: 2023-03-12 | End: 2023-03-12

## 2023-03-12 RX ORDER — IPRATROPIUM BROMIDE AND ALBUTEROL SULFATE 2.5; .5 MG/3ML; MG/3ML
3 SOLUTION RESPIRATORY (INHALATION) EVERY 6 HOURS
Status: DISCONTINUED | OUTPATIENT
Start: 2023-03-12 | End: 2023-03-20 | Stop reason: HOSPADM

## 2023-03-12 RX ORDER — ASPIRIN 81 MG/1
81 TABLET ORAL DAILY
Status: DISCONTINUED | OUTPATIENT
Start: 2023-03-13 | End: 2023-03-20 | Stop reason: HOSPADM

## 2023-03-12 RX ORDER — FUROSEMIDE 10 MG/ML
80 INJECTION INTRAMUSCULAR; INTRAVENOUS ONCE
Status: COMPLETED | OUTPATIENT
Start: 2023-03-12 | End: 2023-03-12

## 2023-03-12 RX ORDER — LOSARTAN POTASSIUM 25 MG/1
25 TABLET ORAL DAILY
Status: DISCONTINUED | OUTPATIENT
Start: 2023-03-13 | End: 2023-03-15

## 2023-03-12 RX ORDER — HALOPERIDOL 5 MG/ML
5 INJECTION INTRAMUSCULAR EVERY 6 HOURS PRN
Status: DISCONTINUED | OUTPATIENT
Start: 2023-03-12 | End: 2023-03-20 | Stop reason: HOSPADM

## 2023-03-12 RX ORDER — HEPARIN SODIUM,PORCINE/D5W 25000/250
0-40 INTRAVENOUS SOLUTION INTRAVENOUS CONTINUOUS
Status: DISCONTINUED | OUTPATIENT
Start: 2023-03-12 | End: 2023-03-14

## 2023-03-12 RX ORDER — LORAZEPAM 2 MG/ML
1 INJECTION INTRAMUSCULAR ONCE
Status: COMPLETED | OUTPATIENT
Start: 2023-03-12 | End: 2023-03-12

## 2023-03-12 RX ORDER — BISACODYL 5 MG
10 TABLET, DELAYED RELEASE (ENTERIC COATED) ORAL DAILY PRN
Status: DISCONTINUED | OUTPATIENT
Start: 2023-03-12 | End: 2023-03-20 | Stop reason: HOSPADM

## 2023-03-12 RX ORDER — SODIUM CHLORIDE 9 MG/ML
INJECTION, SOLUTION INTRAVENOUS CONTINUOUS
Status: DISCONTINUED | OUTPATIENT
Start: 2023-03-12 | End: 2023-03-12

## 2023-03-12 RX ORDER — GUAIFENESIN/DEXTROMETHORPHAN 100-10MG/5
10 SYRUP ORAL EVERY 6 HOURS PRN
Status: DISCONTINUED | OUTPATIENT
Start: 2023-03-12 | End: 2023-03-20 | Stop reason: HOSPADM

## 2023-03-12 RX ORDER — SIMETHICONE 80 MG
1 TABLET,CHEWABLE ORAL 3 TIMES DAILY PRN
Status: DISCONTINUED | OUTPATIENT
Start: 2023-03-12 | End: 2023-03-20 | Stop reason: HOSPADM

## 2023-03-12 RX ORDER — ASPIRIN 300 MG/1
300 SUPPOSITORY RECTAL ONCE
Status: COMPLETED | OUTPATIENT
Start: 2023-03-12 | End: 2023-03-12

## 2023-03-12 RX ORDER — LOSARTAN POTASSIUM 50 MG/1
50 TABLET ORAL DAILY
Status: DISCONTINUED | OUTPATIENT
Start: 2023-03-12 | End: 2023-03-12

## 2023-03-12 RX ORDER — PANTOPRAZOLE SODIUM 40 MG/1
40 TABLET, DELAYED RELEASE ORAL
Status: DISCONTINUED | OUTPATIENT
Start: 2023-03-12 | End: 2023-03-20 | Stop reason: HOSPADM

## 2023-03-12 RX ORDER — ONDANSETRON 2 MG/ML
8 INJECTION INTRAMUSCULAR; INTRAVENOUS EVERY 6 HOURS PRN
Status: DISCONTINUED | OUTPATIENT
Start: 2023-03-12 | End: 2023-03-20 | Stop reason: HOSPADM

## 2023-03-12 RX ORDER — ENOXAPARIN SODIUM 100 MG/ML
40 INJECTION SUBCUTANEOUS
Status: DISCONTINUED | OUTPATIENT
Start: 2023-03-12 | End: 2023-03-12

## 2023-03-12 RX ADMIN — BUDESONIDE 0.5 MG: 0.5 SUSPENSION RESPIRATORY (INHALATION) at 12:03

## 2023-03-12 RX ADMIN — ASPIRIN 300 MG: 300 SUPPOSITORY RECTAL at 10:03

## 2023-03-12 RX ADMIN — AZITHROMYCIN MONOHYDRATE 500 MG: 500 INJECTION, POWDER, LYOPHILIZED, FOR SOLUTION INTRAVENOUS at 09:03

## 2023-03-12 RX ADMIN — HEPARIN SODIUM 12 UNITS/KG/HR: 10000 INJECTION, SOLUTION INTRAVENOUS at 01:03

## 2023-03-12 RX ADMIN — HEPARIN SODIUM 12 UNITS/KG/HR: 10000 INJECTION, SOLUTION INTRAVENOUS at 04:03

## 2023-03-12 RX ADMIN — IPRATROPIUM BROMIDE AND ALBUTEROL SULFATE 3 ML: 2.5; .5 SOLUTION RESPIRATORY (INHALATION) at 06:03

## 2023-03-12 RX ADMIN — LORAZEPAM 1 MG: 2 INJECTION INTRAMUSCULAR; INTRAVENOUS at 12:03

## 2023-03-12 RX ADMIN — ENOXAPARIN SODIUM 40 MG: 100 INJECTION SUBCUTANEOUS at 04:03

## 2023-03-12 RX ADMIN — SODIUM CHLORIDE 1000 ML: 9 INJECTION, SOLUTION INTRAVENOUS at 04:03

## 2023-03-12 RX ADMIN — AZITHROMYCIN MONOHYDRATE 500 MG: 500 INJECTION, POWDER, LYOPHILIZED, FOR SOLUTION INTRAVENOUS at 01:03

## 2023-03-12 RX ADMIN — METHYLPREDNISOLONE SODIUM SUCCINATE 80 MG: 40 INJECTION, POWDER, FOR SOLUTION INTRAMUSCULAR; INTRAVENOUS at 05:03

## 2023-03-12 RX ADMIN — IPRATROPIUM BROMIDE AND ALBUTEROL SULFATE 3 ML: 2.5; .5 SOLUTION RESPIRATORY (INHALATION) at 01:03

## 2023-03-12 RX ADMIN — MUPIROCIN: 20 OINTMENT TOPICAL at 08:03

## 2023-03-12 RX ADMIN — HALOPERIDOL LACTATE 5 MG: 5 INJECTION, SOLUTION INTRAMUSCULAR at 12:03

## 2023-03-12 RX ADMIN — MUPIROCIN: 20 OINTMENT TOPICAL at 10:03

## 2023-03-12 RX ADMIN — IPRATROPIUM BROMIDE AND ALBUTEROL SULFATE 3 ML: 2.5; .5 SOLUTION RESPIRATORY (INHALATION) at 07:03

## 2023-03-12 RX ADMIN — FUROSEMIDE 80 MG: 10 INJECTION, SOLUTION INTRAMUSCULAR; INTRAVENOUS at 10:03

## 2023-03-12 RX ADMIN — DEXTROSE MONOHYDRATE 1 G: 5 INJECTION INTRAVENOUS at 09:03

## 2023-03-12 RX ADMIN — METHYLPREDNISOLONE SODIUM SUCCINATE 80 MG: 40 INJECTION, POWDER, FOR SOLUTION INTRAMUSCULAR; INTRAVENOUS at 06:03

## 2023-03-12 NOTE — HPI
71 yo F with PMH of COPD, CKD and HTN who presented to ED with c/o SOB and generalized weakness. She reports symptoms have progressively worsened over last two weeks. Upon arrival SpO2 of 79% on room air with pt placed on BiPAP.     In ED, labs significant for leukocytosis of 26.59, Lactate of 1.9, proBNP of 3K, Troponin of 613 and BUN/Cr of 25/1.36. CXR with bilateral infiltrates suspicious of atypical pneumonia. EKG with sinus tachycardia at 106 bpm. COVID/Flu negative. Blood cultures x2 obtained and patient received 1500mL NS, Duonebs, Budesonide, Solumedrol 125mg, Rocephin and Azithromycin.     Upon examination, patient with non re-breather and bilateral lung crackles. She refuses BiPAP at this time. She reports currently smoking 4 cigarettes daily, using home oxygen occasionally, and only taking home HTN medication. At this time, patient admitted inpatient with telemetry at [facility] for further evaluation and management.

## 2023-03-12 NOTE — NURSING
[id number]-Pt transferred to ICU-13 via bed with nonbreather mask @100%, respiratory therapy at bedside with BIPAP setup. Report given to JONATHON Horton.  [id number]-Attempted to call daughter Karly listed on pt contact list, but rec'd voicemail. Left message for daughter to call nurse station for updates.

## 2023-03-12 NOTE — ASSESSMENT & PLAN NOTE
This patient does have evidence of infective focus  My overall impression is sepsis.  Source: Respiratory  Antibiotics given-   Antibiotics (72h ago, onward)    Start     Stop Route Frequency Ordered    03/12/23 0900  cefTRIAXone (ROCEPHIN) 1 g in dextrose 5 % in water (D5W) 5 % 50 mL IVPB (MB+)         -- IV Every 24 hours (non-standard times) 03/12/23 0457    03/12/23 0900  azithromycin (ZITHROMAX) 500 mg in dextrose 5 % (D5W) 250 mL IVPB         -- IV Every 24 hours (non-standard times) 03/12/23 0457    03/12/23 0030  cefTRIAXone (ROCEPHIN) 1 g in dextrose 5 % in water (D5W) 5 % 50 mL IVPB (MB+)         03/12 1329 IV ED 1 Time 03/12/23 0019        Latest lactate reviewed-  Recent Labs   Lab 03/12/23  0303   LACTATE 3.3*     Organ dysfunction indicated by Acute respiratory failure    Fluid challenge Actual Body weight- Patient will receive 30ml/kg actual body weight to calculate fluid bolus for treatment of septic shock.     Post- resuscitation assessment No - Post resuscitation assessment not needed       Will Not start Pressors- Levophed for MAP of 65  Source control achieved by: Antibiotics

## 2023-03-12 NOTE — ASSESSMENT & PLAN NOTE
Patient with Hypoxic Respiratory failure which is Acute on chronic.  she is on home oxygen at 2 LPM PRN. Supplemental oxygen was provided and noted- Oxygen Concentration (%):  [65] 65.   Signs/symptoms of respiratory failure include- increased work of breathing and use of accessory muscles. Contributing diagnoses includes - COPD Labs and images were reviewed. Patient Has recent ABG, which has been reviewed. Will treat underlying causes and adjust management of respiratory failure.     - Patient currently on non re-breather due to refusal of BiPAP with SpO2 of 95%. Will order ABG to monitor for CO2 retention. Respiratory Therapy consulted for assistance. Continue to monitor.

## 2023-03-12 NOTE — ASSESSMENT & PLAN NOTE
CXR with bilateral infiltrates, suspicious of atypical pneumonia with Leukocytosis of 26.59. COVID/Flu negative. Continue oxygen supplementation, current antibiotics, steroids and breathing treatments. Will obtain procalcitonin.     - Procalcitonin pending  - Rocephin 1g IV q24h (Initiated on 3/11/23)  - Azithromycin 500mg IV q24h  (Initiated on 3/11/23)  - Solumedrol 80mg IV q12h  - Duonebs q6h   - Continue oxygen supplementation

## 2023-03-12 NOTE — SUBJECTIVE & OBJECTIVE
Past Medical History:   Diagnosis Date    Anxiety and depression     Chronic kidney disease, stage 3a     Chronic pain syndrome     COPD (chronic obstructive pulmonary disease)     Fracture of multiple pubic rami, left, closed, initial encounter 10/01/2021    HLD (hyperlipidemia)     Hypertension     Lumbar compression fracture, sequela L1, L2, L4, L5, kyphoplasty 12/8/2021    Lumbar radiculopathy     Lumbar stenosis     Lumbosacral spondylosis     Neuropathy        Past Surgical History:   Procedure Laterality Date    CHOLECYSTECTOMY      CYSTOSCOPY      EPIDURAL STEROID INJECTION INTO LUMBAR SPINE N/A 05/27/2020    L1-2 WILD     EPIDURAL STEROID INJECTION INTO THORACIC SPINE N/A 02/03/2021    T9-10 WILD     EPIDURAL STEROID INJECTION INTO THORACIC SPINE N/A 3/18/2021    Procedure: INJECTION, STEROID, SPINE, THORACIC, EPIDURAL;  Surgeon: Arelis Burns MD;  Location: [facility];  Service: Pain Management;  Laterality: N/A;    EPIDURAL STEROID INJECTION INTO THORACIC SPINE N/A 12/23/2021    Procedure: Injection-steroid-epidural-thoracic T9/10;  Surgeon: Arelis Burns MD;  Location: [facility];  Service: Pain Management;  Laterality: N/A;  HAD VAC  WILL BRING CARD    HYSTERECTOMY      INJECTION OF ANESTHETIC AGENT AROUND MEDIAL BRANCH NERVES INNERVATING LUMBAR FACET JOINT Bilateral 4/22/2021    Procedure: Block-nerve-medial branch-lumbar Bilateral L3-4,4-5,5-S1 MBB #2;  Surgeon: Arelis Burns MD;  Location: [facility];  Service: Pain Management;  Laterality: Bilateral;    INJECTION OF ANESTHETIC AGENT AROUND MEDIAL BRANCH NERVES INNERVATING LUMBAR FACET JOINT Bilateral 9/6/2022    Procedure: Block-nerve-medial branch-lumbar, bilateral L4 through S1;  Surgeon: Arelis Burns MD;  Location: [facility];  Service: Pain Management;  Laterality: Bilateral;    INJECTION OF ANESTHETIC AGENT AROUND MEDIAL BRANCH NERVES INNERVATING LUMBAR FACET JOINT Bilateral 10/11/2022    Procedure:  Block-nerve-medial branch-lumbar, bilateral L4 through S1;  Surgeon: Arelis Burns MD;  Location: [facility];  Service: Pain Management;  Laterality: Bilateral;  vaccinated.    INJECTION OF FACET JOINT Bilateral 12/04/2020    RADIOFREQUENCY ABLATION OF LUMBAR MEDIAL BRANCH NERVE AT SINGLE LEVEL Bilateral 5/20/2021    Procedure: RADIOFREQUENCY ABLATION, NERVE, SPINAL, LUMBAR, MEDIAL BRANCH, 1 LEVEL;  Surgeon: Arelis Burns MD;  Location: [facility];  Service: Pain Management;  Laterality: Bilateral;  Bilateral L3-S1 RFTC (both sides same day)       Review of patient's allergies indicates:   Allergen Reactions    Insect venom      Note: - [employer] 05/07/2019    Sulfa (sulfonamide antibiotics) Nausea And Vomiting       No current facility-administered medications on file prior to encounter.     Current Outpatient Medications on File Prior to Encounter   Medication Sig    atorvastatin (LIPITOR) 10 MG tablet Take 1 tablet (10 mg total) by mouth every evening.    brimonidine 0.2% (ALPHAGAN) 0.2 % Drop Place 1 drop into both eyes 2 (two) times daily.    busPIRone (BUSPAR) 15 MG tablet TAKE ONE TABLET BY MOUTH THREE TIMES DAILY    chlorthalidone (HYGROTEN) 25 MG Tab take 1/2 to 1 tablet by mouth once daily    cyproheptadine (PERIACTIN) 4 mg tablet TAKE ONE TABLET BY MOUTH THREE TIMES DAILY    DULoxetine (CYMBALTA) 60 MG capsule TAKE ONE CAPSULE BY MOUTH EVERY TWELVE HOURS    HYDROcodone-acetaminophen (NORCO)  mg per tablet Take 1 tablet by mouth every 8 (eight) hours.    HYDROcodone-acetaminophen (NORCO)  mg per tablet Take 1 tablet by mouth every 8 (eight) hours.    [START ON 3/27/2023] HYDROcodone-acetaminophen (NORCO)  mg per tablet Take 1 tablet by mouth every 8 (eight) hours.    INCRUSE ELLIPTA 62.5 mcg/actuation inhalation capsule Inhale 62.5 mcg into the lungs once daily. INHALE ONE PUFF BY MOUTH DAILY AT THE SAME TIME EACH DAY    latanoprost 0.005 % ophthalmic solution Place 1  drop into both eyes every evening.    losartan (COZAAR) 50 MG tablet Take 1 tablet (50 mg total) by mouth once daily.    ondansetron (ZOFRAN) 4 MG tablet Take 1 tablet (4 mg total) by mouth every 6 (six) hours.    pantoprazole (PROTONIX) 40 MG tablet TAKE ONE TABLET BY MOUTH TWICE DAILY BEFORE meals    [DISCONTINUED] cyclobenzaprine (FLEXERIL) 10 MG tablet Take 1 tablet (10 mg total) by mouth every 8 (eight) hours.     Family History       Problem Relation (Age of Onset)    Heart disease Father    Hypertension Mother          Tobacco Use    Smoking status: Former    Smokeless tobacco: Never   Substance and Sexual Activity    Alcohol use: Never     Comment: unknown    Drug use: Never     Types: Hydrocodone    Sexual activity: Not Currently     Review of Systems   Unable to perform ROS: Acuity of condition   Objective:     Vital Signs (Most Recent):  Temp: 98.3 °F (36.8 °C) (03/12/23 1630)  Pulse: 103 (03/12/23 1545)  Resp: (!) 32 (03/12/23 1545)  BP: (!) 90/55 (03/12/23 1545)  SpO2: 97 % (03/12/23 1545)   Vital Signs (24h Range):  Temp:  [97.6 °F (36.4 °C)-100.8 °F (38.2 °C)] 98.3 °F (36.8 °C)  Pulse:  [] 103  Resp:  [21-46] 32  SpO2:  [64 %-100 %] 97 %  BP: ()/(29-87) 90/55     Weight: 42.6 kg (93 lb 14.7 oz)  Body mass index is 17.18 kg/m².    SpO2: 97 %         Intake/Output Summary (Last 24 hours) at 3/12/2023 1641  Last data filed at 3/12/2023 1236  Gross per 24 hour   Intake 50 ml   Output 600 ml   Net -550 ml       Lines/Drains/Airways       Peripheral Intravenous Line  Duration                  Peripheral IV - Single Lumen 03/12/23 0600 22 G Anterior;Proximal;Right Forearm <1 day         Peripheral IV - Single Lumen 03/12/23 1256 18 G Anterior;Left <1 day                    Physical Exam  Constitutional:       Appearance: She is ill-appearing.      Comments: Resting comfortably, sedated, in no apparent distress   HENT:      Right Ear: External ear normal.      Left Ear: External ear normal.    Eyes:      Extraocular Movements: Extraocular movements intact.      Conjunctiva/sclera: Conjunctivae normal.      Pupils: Pupils are equal, round, and reactive to light.   Neck:      Vascular: No carotid bruit.   Cardiovascular:      Rate and Rhythm: Regular rhythm. Tachycardia present.      Heart sounds: Normal heart sounds. No murmur heard.    No friction rub. No gallop.   Pulmonary:      Comments: Decreased breath sounds bilaterally, poor inspiratory effort, pt not cooperative with deep breath.   Abdominal:      General: Abdomen is flat.      Palpations: Abdomen is soft.   Musculoskeletal:      Cervical back: Neck supple.     Significant Labs:     Significant Imaging:

## 2023-03-12 NOTE — PLAN OF CARE
Problem: Adult Inpatient Plan of Care  Goal: Plan of Care Review  Outcome: Ongoing, Progressing  Goal: Patient-Specific Goal (Individualized)  Outcome: Ongoing, Progressing  Goal: Absence of Hospital-Acquired Illness or Injury  Outcome: Ongoing, Progressing  Goal: Optimal Comfort and Wellbeing  Outcome: Ongoing, Progressing  Goal: Readiness for Transition of Care  Outcome: Ongoing, Progressing     Problem: Adjustment to Illness (Sepsis/Septic Shock)  Goal: Optimal Coping  Outcome: Ongoing, Progressing     Problem: Bleeding (Sepsis/Septic Shock)  Goal: Absence of Bleeding  Outcome: Ongoing, Progressing     Problem: Glycemic Control Impaired (Sepsis/Septic Shock)  Goal: Blood Glucose Level Within Desired Range  Outcome: Ongoing, Progressing     Problem: Infection Progression (Sepsis/Septic Shock)  Goal: Absence of Infection Signs and Symptoms  Outcome: Ongoing, Progressing     Problem: Nutrition Impaired (Sepsis/Septic Shock)  Goal: Optimal Nutrition Intake  Outcome: Ongoing, Progressing     Problem: Fluid Imbalance (Pneumonia)  Goal: Fluid Balance  Outcome: Ongoing, Progressing     Problem: Infection (Pneumonia)  Goal: Resolution of Infection Signs and Symptoms  Outcome: Ongoing, Progressing     Problem: Respiratory Compromise (Pneumonia)  Goal: Effective Oxygenation and Ventilation  Outcome: Ongoing, Progressing     Problem: Skin Injury Risk Increased  Goal: Skin Health and Integrity  Outcome: Ongoing, Progressing

## 2023-03-12 NOTE — NURSING
0710-Pt restless in bed thrashing about,attempting to pull off BIPAP and pulling at IV line. Oxygen sat's low 80's with Bipap, Respiratory therapy paged to check BIPAP function. Pt repositioned in bed, HOB elevated, Dr. Cloud is attending and has been paged. No family present at bedside at this time.    Respiratory therapy at bedside and recommends pt to be transferred to higher level of care for closer monitoring.

## 2023-03-12 NOTE — ASSESSMENT & PLAN NOTE
Secondary to pneumonia. ABG results of 7.45/25/71/17.4 with pt refusing BiPAP. Respiratory currently has pt on non re-breather. Repeat ABG ordered to monitor for CO2 retention. Antibiotics, steroid and duonebs as above.

## 2023-03-12 NOTE — PROGRESS NOTES
72-year-old female who was admitted after midnight for pneumonia.  She was felt to be septic so she was treated with a total of 2 L IV fluid.  She went into respiratory distress on the floor and was transferred to the intensive care unit placed on BiPAP.  Point of care ultrasound revealed B-lines and dilated IVC.  Her troponin also continues to rise.  Repeat EKG done and Cardiology consulted.  We will give her a dose of aspirin and start heparin infusion for now.  Given her agitation is hard to obtain a history and she is not really able to tell me if she is having active chest pain.  ABGs reveal metabolic acidosis.  She is currently on BiPAP and FiO2 has been weaned to 85%.  We will continue to wean FiO2 to maintain an O2 sat of 88 or greater.  She has a history of COPD and continues to smoke.  States she is on oxygen occasionally at home.  We will repeat troponin and lactic acid.  Give a 1 time dose of IV Lasix and see how she responds.  Appreciate cardiology's assistance.  Continue to treat pneumonia.  We will attempt to try high-flow nasal cannula since she is continuously trying to take her BiPAP off.    This includes 40 minutes of critical care time spent evaluating and managing patient. This includes time spent at bedside, reviewing labs, data, xrays and monitoring for acute decompensation.

## 2023-03-12 NOTE — ED NOTES
Pt provided warm blanket. Multiple O2 sat probes have been placed on pt. Unsuccessful SpO2 obtained. Will continue to try other sources.

## 2023-03-12 NOTE — ED NOTES
"Pt was asked how long pt had been feeling bad. Pts response, "I have been sick a couple weeks now, but I was trying to fight it off the best I could."   "

## 2023-03-12 NOTE — ASSESSMENT & PLAN NOTE
Troponin of 613.4 with repeat Troponin of 826. EKG exhibits sinus tachycardia, No STEMI. Continue Telemetry with repeat Troponin and EKG pending. Consider Cardiology consult.

## 2023-03-12 NOTE — ED PROVIDER NOTES
Encounter Date: 3/11/2023    SCRIBE #1 NOTE: I, Rosa Campos, am scribing for, and in the presence of,  Yazan Bragg MD. I have scribed the entire note.     History     Chief Complaint   Patient presents with    Shortness of Breath     Patient is a 72 y.o. female who presents to the emergency department via EMS with complaints of shortness of breath. EMS reports that the patient's family saw the patient become weak as she was walking to the bathroom. Patient reports shortness of breath. Patient has a medical history of COPD. No other symptoms were reported.    The history is provided by the patient and the EMS personnel. No  was used.   Review of patient's allergies indicates:   Allergen Reactions    Insect venom      Note: - [employer] 05/07/2019    Sulfa (sulfonamide antibiotics) Nausea And Vomiting     Past Medical History:   Diagnosis Date    Anxiety and depression     Chronic kidney disease, stage 3a     Chronic pain syndrome     COPD (chronic obstructive pulmonary disease)     Fracture of multiple pubic rami, left, closed, initial encounter 10/01/2021    HLD (hyperlipidemia)     Hypertension     Lumbar compression fracture, sequela L1, L2, L4, L5, kyphoplasty 12/8/2021    Lumbar radiculopathy     Lumbar stenosis     Lumbosacral spondylosis     Neuropathy      Past Surgical History:   Procedure Laterality Date    CHOLECYSTECTOMY      CYSTOSCOPY      EPIDURAL STEROID INJECTION INTO LUMBAR SPINE N/A 05/27/2020    L1-2 WILD     EPIDURAL STEROID INJECTION INTO THORACIC SPINE N/A 02/03/2021    T9-10 WILD     EPIDURAL STEROID INJECTION INTO THORACIC SPINE N/A 3/18/2021    Procedure: INJECTION, STEROID, SPINE, THORACIC, EPIDURAL;  Surgeon: Arelis Burns MD;  Location: [facility];  Service: Pain Management;  Laterality: N/A;    EPIDURAL STEROID INJECTION INTO THORACIC SPINE N/A 12/23/2021    Procedure: Injection-steroid-epidural-thoracic T9/10;  Surgeon: Arelis Burns MD;  Location:  [facility];  Service: Pain Management;  Laterality: N/A;  HAD VAC  WILL BRING CARD    HYSTERECTOMY      INJECTION OF ANESTHETIC AGENT AROUND MEDIAL BRANCH NERVES INNERVATING LUMBAR FACET JOINT Bilateral 4/22/2021    Procedure: Block-nerve-medial branch-lumbar Bilateral L3-4,4-5,5-S1 MBB #2;  Surgeon: Arelis Burns MD;  Location: [facility];  Service: Pain Management;  Laterality: Bilateral;    INJECTION OF ANESTHETIC AGENT AROUND MEDIAL BRANCH NERVES INNERVATING LUMBAR FACET JOINT Bilateral 9/6/2022    Procedure: Block-nerve-medial branch-lumbar, bilateral L4 through S1;  Surgeon: Arelis Burns MD;  Location: [facility];  Service: Pain Management;  Laterality: Bilateral;    INJECTION OF ANESTHETIC AGENT AROUND MEDIAL BRANCH NERVES INNERVATING LUMBAR FACET JOINT Bilateral 10/11/2022    Procedure: Block-nerve-medial branch-lumbar, bilateral L4 through S1;  Surgeon: Arelis Burns MD;  Location: [facility];  Service: Pain Management;  Laterality: Bilateral;  vaccinated.    INJECTION OF FACET JOINT Bilateral 12/04/2020    RADIOFREQUENCY ABLATION OF LUMBAR MEDIAL BRANCH NERVE AT SINGLE LEVEL Bilateral 5/20/2021    Procedure: RADIOFREQUENCY ABLATION, NERVE, SPINAL, LUMBAR, MEDIAL BRANCH, 1 LEVEL;  Surgeon: Arelis Burns MD;  Location: [facility];  Service: Pain Management;  Laterality: Bilateral;  Bilateral L3-S1 RFTC (both sides same day)     Family History   Problem Relation Age of Onset    Hypertension Mother     Heart disease Father      Social History     Tobacco Use    Smoking status: Former    Smokeless tobacco: Never   Substance Use Topics    Alcohol use: Never     Comment: unknown    Drug use: Never     Types: Hydrocodone     Review of Systems   Eyes: Negative.    Respiratory:  Positive for shortness of breath.    Endocrine: Negative.    Skin: Negative.    Allergic/Immunologic: Negative.    Neurological:  Positive for weakness.   Hematological: Negative.     Psychiatric/Behavioral: Negative.     All other systems reviewed and are negative.    Physical Exam     Initial Vitals   BP Pulse Resp Temp SpO2   03/11/23 2316 03/11/23 2316 03/11/23 2316 03/11/23 2316 03/11/23 2326   107/63 110 (!) 32 99.2 °F (37.3 °C) 97 %      MAP       --                Physical Exam    Nursing note and vitals reviewed.  Constitutional: She appears well-developed and well-nourished.   HENT:   Head: Normocephalic and atraumatic.   Cardiovascular:  Normal rate, regular rhythm and normal heart sounds.           Pulmonary/Chest: She has no wheezes.   Expiration is prolonged.   Abdominal: Abdomen is soft. Bowel sounds are normal.     Neurological: She is alert and oriented to person, place, and time.   Skin: Skin is warm and dry.   Psychiatric: She has a normal mood and affect. Thought content normal.       ED Course   Procedures  Labs Reviewed   COMPREHENSIVE METABOLIC PANEL - Abnormal; Notable for the following components:       Result Value    Anion Gap 21 (*)     BUN 25 (*)     Creatinine 1.36 (*)     Albumin 2.8 (*)     Globulin 4.2 (*)     AST 43 (*)     eGFR 41 (*)     All other components within normal limits   NT-PRO NATRIURETIC PEPTIDE - Abnormal; Notable for the following components:    ProBNP 3,180 (*)     All other components within normal limits   CBC WITH DIFFERENTIAL - Abnormal; Notable for the following components:    WBC 26.59 (*)     RDW 19.6 (*)     MPV 9.0 (*)     Neutrophils % 87.1 (*)     Lymphocytes % 5.4 (*)     Eosinophils % 0.1 (*)     Immature Granulocytes % 1.1 (*)     Neutrophils, Abs 23.16 (*)     Monocytes, Absolute 1.58 (*)     Immature Granulocytes, Absolute 0.28 (*)     All other components within normal limits   MANUAL DIFFERENTIAL - Abnormal; Notable for the following components:    Segmented Neutrophils, Man % 76 (*)     Bands, Man % 14 (*)     Lymphocytes, Man % 4 (*)     Platelet Morphology Platelet Clumping (*)     All other components within normal limits    TROPONIN I - Abnormal; Notable for the following components:    Troponin I High Sensitivity 613.4 (*)     All other components within normal limits   POCT GLUCOSE MONITORING CONTINUOUS - Abnormal; Notable for the following components:    POC Glucose 67 (*)     All other components within normal limits   SARS-COV2 (COVID) W/ FLU ANTIGEN - Normal    Narrative:     Negative SARS-CoV results should not be used as the sole basis for treatment or patient management decisions; negative results should be considered in the context of a patient's recent exposures, history and the presene of clinical signs and symptoms consistent with COVID-19.  Negative results should be treated as presumptive and confirmed by molecular assay, if necessary for patient management.   CULTURE, BLOOD   CULTURE, BLOOD   CBC W/ AUTO DIFFERENTIAL    Narrative:     The following orders were created for panel order CBC Auto Differential.  Procedure                               Abnormality         Status                     ---------                               -----------         ------                     CBC with Differential[[id number]]        Abnormal            Final result               Manual Differential[[id number]]          Abnormal            Final result                 Please view results for these tests on the individual orders.   SARS-COV2 (COVID) W/ FLU ANTIGEN   LACTIC ACID, PLASMA   TROPONIN I   LIPID PANEL   MAGNESIUM   PT AND PTT   TSH     EKG Readings: (Independently Interpreted)   Rhythm: Sinus Tachycardia. Heart Rate: 106.   Interpreted by Dr. Bragg at 00:20.     Imaging Results              X-Ray Chest 1 View (In process)                      Medications   cefTRIAXone (ROCEPHIN) 1 g in dextrose 5 % in water (D5W) 5 % 50 mL IVPB (MB+) (0 g Intravenous Stopped 3/12/23 0108)   azithromycin (ZITHROMAX) 500 mg in dextrose 5 % (D5W) 250 mL IVPB (500 mg Intravenous New Bag 3/12/23 0108)   acetaminophen tablet 1,000 mg (has no  administration in time range)   bisacodyL EC tablet 10 mg (has no administration in time range)   dextromethorphan-guaiFENesin  mg/5 ml liquid 10 mL (has no administration in time range)   ondansetron injection 8 mg (has no administration in time range)   simethicone chewable tablet 80 mg (has no administration in time range)   traZODone tablet 50 mg (has no administration in time range)   enoxaparin injection 40 mg (has no administration in time range)   sodium chloride 0.9% bolus 1,500 mL 1,500 mL (has no administration in time range)   albuterol-ipratropium 2.5 mg-0.5 mg/3 mL nebulizer solution 3 mL (3 mLs Nebulization Given 3/11/23 2348)   budesonide nebulizer solution 0.5 mg (0.5 mg Nebulization Given 3/12/23 0038)   methylPREDNISolone sodium succinate injection 125 mg (125 mg Intravenous Given 3/11/23 2348)     Medical Decision Making:   Initial Assessment:   A 72-year-old female patient came to the emergency department complaining of shortness of breath.  Her oxygen saturation is 79 on room air and physical examination revealed female patient in distress because of shortness of breath with a prolonged expiratory phase.  Differential Diagnosis:   CHF  Pneumonia  COPD exacerbation  Sepsis  ACS    Clinical Tests:   Lab Tests: Ordered and Reviewed       <> Summary of Lab: Labs Reviewed  COMPREHENSIVE METABOLIC PANEL - Abnormal; Notable for the following components:      Result Value   Anion Gap 21 (*)    BUN 25 (*)    Creatinine 1.36 (*)    Albumin 2.8 (*)    Globulin 4.2 (*)    AST 43 (*)    eGFR 41 (*)    All other components within normal limits  NT-PRO NATRIURETIC PEPTIDE - Abnormal; Notable for the following components:   ProBNP 3,180 (*)    All other components within normal limits  CBC WITH DIFFERENTIAL - Abnormal; Notable for the following components:   WBC 26.59 (*)    RDW 19.6 (*)    MPV 9.0 (*)    Neutrophils % 87.1 (*)    Lymphocytes % 5.4 (*)    Eosinophils % 0.1 (*)    Immature Granulocytes  % 1.1 (*)    Neutrophils, Abs 23.16 (*)    Monocytes, Absolute 1.58 (*)    Immature Granulocytes, Absolute 0.28 (*)    All other components within normal limits  MANUAL DIFFERENTIAL - Abnormal; Notable for the following components:   Segmented Neutrophils, Man % 76 (*)    Bands, Man % 14 (*)    Lymphocytes, Man % 4 (*)    Platelet Morphology Platelet Clumping (*)    All other components within normal limits  TROPONIN I - Abnormal; Notable for the following components:   Troponin I High Sensitivity 613.4 (*)    All other components within normal limits  POCT GLUCOSE MONITORING CONTINUOUS - Abnormal; Notable for the following components:   POC Glucose 67 (*)    All other components within normal limits  SARS-COV2 (COVID) W/ FLU ANTIGEN - Normal   Narrative:    Negative SARS-CoV results should not be used as the sole basis for treatment or patient management decisions; negative results should be considered in the context of a patient's recent exposures, history and the presene of clinical signs and symptoms consistent with COVID-19.  Negative results should be treated as presumptive and confirmed by molecular assay, if necessary for patient management.  CULTURE, BLOOD  CULTURE, BLOOD                       Radiological Study: Ordered and Reviewed  Medical Tests: Ordered and Reviewed  ED Management:  Patient workup showed that she is in acute respiratory failure and she has elevated troponin in addition to elevated proBNP with abnormal chest x-ray.  I will admit the patient to medicine for further evaluation and treatment  Other:   I have discussed this case with another health care provider.       <> Summary of the Discussion: Discussed with Dr. Aguilar.  She will admit the patient to the hospital          Attending Attestation:           Physician Attestation for Scribe:  Physician Attestation Statement for Scribe #1: I, Yazan Bragg MD, reviewed documentation, as scribed by Rosa Campos in my presence, and it  is both accurate and complete.                        Clinical Impression:   Final diagnoses:  [R06.02] Shortness of breath  [I21.4] NSTEMI (non-ST elevated myocardial infarction) (Primary)  [J81.0] Acute pulmonary edema  [J96.01] Acute respiratory failure with hypoxia  [R77.8] Elevated troponin I level        ED Disposition Condition    Admit                 Yazan Bragg MD  03/12/23 0155

## 2023-03-12 NOTE — RESPIRATORY THERAPY
PLACED ON HIGH FLOW @ 40 LPM AND  75% O2 FROM BIPAP 10 / 5 , RATE 18 AND 75% @  13:55 WITH NO COMPLICATIONS .

## 2023-03-12 NOTE — SUBJECTIVE & OBJECTIVE
Past Medical History:   Diagnosis Date    Anxiety and depression     Chronic kidney disease, stage 3a     Chronic pain syndrome     COPD (chronic obstructive pulmonary disease)     Fracture of multiple pubic rami, left, closed, initial encounter 10/01/2021    HLD (hyperlipidemia)     Hypertension     Lumbar compression fracture, sequela L1, L2, L4, L5, kyphoplasty 12/8/2021    Lumbar radiculopathy     Lumbar stenosis     Lumbosacral spondylosis     Neuropathy        Past Surgical History:   Procedure Laterality Date    CHOLECYSTECTOMY      CYSTOSCOPY      EPIDURAL STEROID INJECTION INTO LUMBAR SPINE N/A 05/27/2020    L1-2 WILD     EPIDURAL STEROID INJECTION INTO THORACIC SPINE N/A 02/03/2021    T9-10 WILD     EPIDURAL STEROID INJECTION INTO THORACIC SPINE N/A 3/18/2021    Procedure: INJECTION, STEROID, SPINE, THORACIC, EPIDURAL;  Surgeon: Arelis Burns MD;  Location: [facility]IN [facility];  Service: Pain Management;  Laterality: N/A;    EPIDURAL STEROID INJECTION INTO THORACIC SPINE N/A 12/23/2021    Procedure: Injection-steroid-epidural-thoracic T9/10;  Surgeon: Arelis Burns MD;  Location: formerly [facility]IN [facility];  Service: Pain Management;  Laterality: N/A;  HAD VAC  WILL BRING CARD    HYSTERECTOMY      INJECTION OF ANESTHETIC AGENT AROUND MEDIAL BRANCH NERVES INNERVATING LUMBAR FACET JOINT Bilateral 4/22/2021    Procedure: Block-nerve-medial branch-lumbar Bilateral L3-4,4-5,5-S1 MBB #2;  Surgeon: Arelis Burns MD;  Location: formerly [facility]IN [facility];  Service: Pain Management;  Laterality: Bilateral;    INJECTION OF ANESTHETIC AGENT AROUND MEDIAL BRANCH NERVES INNERVATING LUMBAR FACET JOINT Bilateral 9/6/2022    Procedure: Block-nerve-medial branch-lumbar, bilateral L4 through S1;  Surgeon: Arelis Burns MD;  Location: formerly [facility];  Service: Pain Management;  Laterality: Bilateral;    INJECTION OF ANESTHETIC AGENT AROUND MEDIAL BRANCH NERVES INNERVATING LUMBAR FACET JOINT Bilateral 10/11/2022    Procedure:  Block-nerve-medial branch-lumbar, bilateral L4 through S1;  Surgeon: Arelis Burns MD;  Location: [facility] PAIN [facility];  Service: Pain Management;  Laterality: Bilateral;  vaccinated.    INJECTION OF FACET JOINT Bilateral 12/04/2020    RADIOFREQUENCY ABLATION OF LUMBAR MEDIAL BRANCH NERVE AT SINGLE LEVEL Bilateral 5/20/2021    Procedure: RADIOFREQUENCY ABLATION, NERVE, SPINAL, LUMBAR, MEDIAL BRANCH, 1 LEVEL;  Surgeon: Arelis Burns MD;  Location: [facility]IN [facility];  Service: Pain Management;  Laterality: Bilateral;  Bilateral L3-S1 RFTC (both sides same day)       Review of patient's allergies indicates:   Allergen Reactions    Insect venom      Note: - [employer] 05/07/2019    Sulfa (sulfonamide antibiotics) Nausea And Vomiting       No current facility-administered medications on file prior to encounter.     Current Outpatient Medications on File Prior to Encounter   Medication Sig    atorvastatin (LIPITOR) 10 MG tablet Take 1 tablet (10 mg total) by mouth every evening.    brimonidine 0.2% (ALPHAGAN) 0.2 % Drop Place 1 drop into both eyes 2 (two) times daily.    busPIRone (BUSPAR) 15 MG tablet TAKE ONE TABLET BY MOUTH THREE TIMES DAILY    chlorthalidone (HYGROTEN) 25 MG Tab take 1/2 to 1 tablet by mouth once daily    cyproheptadine (PERIACTIN) 4 mg tablet TAKE ONE TABLET BY MOUTH THREE TIMES DAILY    DULoxetine (CYMBALTA) 60 MG capsule TAKE ONE CAPSULE BY MOUTH EVERY TWELVE HOURS    HYDROcodone-acetaminophen (NORCO)  mg per tablet Take 1 tablet by mouth every 8 (eight) hours.    HYDROcodone-acetaminophen (NORCO)  mg per tablet Take 1 tablet by mouth every 8 (eight) hours.    [START ON 3/27/2023] HYDROcodone-acetaminophen (NORCO)  mg per tablet Take 1 tablet by mouth every 8 (eight) hours.    INCRUSE ELLIPTA 62.5 mcg/actuation inhalation capsule Inhale 62.5 mcg into the lungs once daily. INHALE ONE PUFF BY MOUTH DAILY AT THE SAME TIME EACH DAY    latanoprost 0.005 % ophthalmic solution Place 1  drop into both eyes every evening.    losartan (COZAAR) 50 MG tablet Take 1 tablet (50 mg total) by mouth once daily.    ondansetron (ZOFRAN) 4 MG tablet Take 1 tablet (4 mg total) by mouth every 6 (six) hours.    pantoprazole (PROTONIX) 40 MG tablet TAKE ONE TABLET BY MOUTH TWICE DAILY BEFORE meals    [DISCONTINUED] cyclobenzaprine (FLEXERIL) 10 MG tablet Take 1 tablet (10 mg total) by mouth every 8 (eight) hours.     Family History       Problem Relation (Age of Onset)    Heart disease Father    Hypertension Mother          Tobacco Use    Smoking status: Former    Smokeless tobacco: Never   Substance and Sexual Activity    Alcohol use: Never     Comment: unknown    Drug use: Never     Types: Hydrocodone    Sexual activity: Not Currently     Review of Systems   Constitutional:  Negative for appetite change, chills, fatigue and fever.   HENT:  Negative for congestion, ear pain, hearing loss, sore throat and tinnitus.    Eyes:  Negative for pain and visual disturbance.   Respiratory:  Positive for chest tightness and shortness of breath. Negative for cough and wheezing.    Cardiovascular:  Negative for chest pain, palpitations and leg swelling.   Gastrointestinal:  Negative for abdominal pain, constipation, diarrhea, nausea and vomiting.   Endocrine: Negative for cold intolerance and heat intolerance.   Genitourinary:  Negative for dysuria, frequency and hematuria.   Musculoskeletal:  Negative for arthralgias, back pain, joint swelling, myalgias and neck pain.   Skin:  Negative for rash and wound.   Neurological:  Positive for weakness. Negative for tremors, light-headedness, numbness and headaches.   Hematological:  Negative for adenopathy.   Psychiatric/Behavioral:  Negative for confusion and sleep disturbance.    Objective:     Vital Signs (Most Recent):  Temp: 98 °F (36.7 °C) (03/12/23 0320)  Pulse: (!) 114 (03/12/23 0320)  Resp: (!) 22 (03/12/23 0320)  BP: 122/81 (03/12/23 0320)  SpO2: (!) 90 % (03/12/23 0320)    Vital Signs (24h Range):  Temp:  [98 °F (36.7 °C)-99.2 °F (37.3 °C)] 98 °F (36.7 °C)  Pulse:  [101-171] 114  Resp:  [21-33] 22  SpO2:  [82 %-100 %] 90 %  BP: (101-122)/(46-81) 122/81     Weight: 40.8 kg (89 lb 15.2 oz)  Body mass index is 16.45 kg/m².    Physical Exam  Vitals and nursing note reviewed.   Constitutional:       General: She is not in acute distress.     Appearance: Normal appearance. She is normal weight. She is ill-appearing.   HENT:      Head: Normocephalic.      Right Ear: External ear normal.      Left Ear: External ear normal.      Nose: Nose normal. No congestion or rhinorrhea.      Mouth/Throat:      Mouth: Mucous membranes are moist.      Pharynx: No oropharyngeal exudate or posterior oropharyngeal erythema.   Eyes:      General: No scleral icterus.        Right eye: No discharge.         Left eye: No discharge.      Conjunctiva/sclera: Conjunctivae normal.      Pupils: Pupils are equal, round, and reactive to light.   Cardiovascular:      Rate and Rhythm: Regular rhythm. Tachycardia present.      Pulses: Normal pulses.      Heart sounds: Normal heart sounds. No murmur heard.  Pulmonary:      Effort: Pulmonary effort is normal. No respiratory distress.      Breath sounds: Rales present. No wheezing or rhonchi.   Abdominal:      General: Bowel sounds are normal. There is no distension.      Palpations: Abdomen is soft. There is no mass.      Tenderness: There is no abdominal tenderness. There is no guarding.   Musculoskeletal:         General: No swelling or tenderness.      Cervical back: Normal range of motion and neck supple.      Right lower leg: No edema.      Left lower leg: No edema.   Skin:     General: Skin is warm and dry.      Findings: No lesion or rash.   Neurological:      General: No focal deficit present.      Mental Status: She is alert and oriented to person, place, and time.      Cranial Nerves: No cranial nerve deficit.      Sensory: No sensory deficit.      Motor: No  weakness.   Psychiatric:         Mood and Affect: Mood normal.         Behavior: Behavior normal.         Thought Content: Thought content normal.         Judgment: Judgment normal.         CRANIAL NERVES     CN III, IV, VI   Pupils are equal, round, and reactive to light.     Significant Labs: All pertinent labs within the past 24 hours have been reviewed.    Significant Imaging: I have reviewed all pertinent imaging results/findings within the past 24 hours.

## 2023-03-12 NOTE — PLAN OF CARE
Problem: Adult Inpatient Plan of Care  Goal: Plan of Care Review  Outcome: Ongoing, Progressing  Goal: Patient-Specific Goal (Individualized)  Outcome: Ongoing, Progressing  Goal: Absence of Hospital-Acquired Illness or Injury  Outcome: Ongoing, Progressing  Goal: Optimal Comfort and Wellbeing  Outcome: Ongoing, Progressing  Goal: Readiness for Transition of Care  Outcome: Ongoing, Progressing     Problem: Bleeding (Sepsis/Septic Shock)  Goal: Absence of Bleeding  Outcome: Ongoing, Progressing     Problem: Infection Progression (Sepsis/Septic Shock)  Goal: Absence of Infection Signs and Symptoms  Outcome: Ongoing, Progressing     Problem: Fluid Imbalance (Pneumonia)  Goal: Fluid Balance  Outcome: Ongoing, Progressing     Problem: Respiratory Compromise (Pneumonia)  Goal: Effective Oxygenation and Ventilation  Outcome: Ongoing, Progressing

## 2023-03-12 NOTE — HPI
71 y/o with PMH of COPD, CKD and HTN who presented to ED with c/o SOB and generalized weakness. She reports symptoms have progressively worsened over last two weeks. Upon arrival SpO2 of 79% on room air with pt placed on BiPAP.      In ED, labs significant for leukocytosis of 26.59, Lactate of 1.9, proBNP of 3K, Troponin of 613 and BUN/Cr of 25/1.36. CXR with bilateral infiltrates suspicious of atypical pneumonia. EKG with sinus tachycardia at 106 bpm. COVID/Flu negative. Blood cultures x2 obtained and patient received 1500mL NS, Duonebs, Budesonide, Solumedrol 125mg, Rocephin and Azithromycin.

## 2023-03-12 NOTE — H&P
[facility] - [address] [place] [address] Medicine  History & Physical    Patient Name: Amelie Cerrato  MRN: [id number]  Patient Class: IP- Inpatient  Admission Date: 3/11/2023  Attending Physician: Danilo Cloud IV, DO   Primary Care Provider: Danilo Cloud IV, DO         Patient information was obtained from patient, past medical records and ER records.     Subjective:     Principal Problem:Acute hypoxemic respiratory failure    Chief Complaint:   Chief Complaint   Patient presents with    Shortness of Breath        HPI: 73 yo F with PMH of COPD, CKD and HTN who presented to ED with c/o SOB and generalized weakness. She reports symptoms have progressively worsened over last two weeks. Upon arrival SpO2 of 79% on room air with pt placed on BiPAP.     In ED, labs significant for leukocytosis of 26.59, Lactate of 1.9, proBNP of 3K, Troponin of 613 and BUN/Cr of 25/1.36. CXR with bilateral infiltrates suspicious of atypical pneumonia. EKG with sinus tachycardia at 106 bpm. COVID/Flu negative. Blood cultures x2 obtained and patient received 1500mL NS, Duonebs, Budesonide, Solumedrol 125mg, Rocephin and Azithromycin.     Upon examination, patient with non re-breather and bilateral lung crackles. She refuses BiPAP at this time. She reports currently smoking 4 cigarettes daily, using home oxygen occasionally, and only taking home HTN medication. At this time, patient admitted inpatient with telemetry at [facility] for further evaluation and management.         Past Medical History:   Diagnosis Date    Anxiety and depression     Chronic kidney disease, stage 3a     Chronic pain syndrome     COPD (chronic obstructive pulmonary disease)     Fracture of multiple pubic rami, left, closed, initial encounter 10/01/2021    HLD (hyperlipidemia)     Hypertension     Lumbar compression fracture, sequela L1, L2, L4, L5, kyphoplasty 12/8/2021    Lumbar radiculopathy     Lumbar stenosis     Lumbosacral spondylosis      Neuropathy        Past Surgical History:   Procedure Laterality Date    CHOLECYSTECTOMY      CYSTOSCOPY      EPIDURAL STEROID INJECTION INTO LUMBAR SPINE N/A 05/27/2020    L1-2 WILD     EPIDURAL STEROID INJECTION INTO THORACIC SPINE N/A 02/03/2021    T9-10 WILD     EPIDURAL STEROID INJECTION INTO THORACIC SPINE N/A 3/18/2021    Procedure: INJECTION, STEROID, SPINE, THORACIC, EPIDURAL;  Surgeon: Arelis Burns MD;  Location: [facility] PAIN [facility];  Service: Pain Management;  Laterality: N/A;    EPIDURAL STEROID INJECTION INTO THORACIC SPINE N/A 12/23/2021    Procedure: Injection-steroid-epidural-thoracic T9/10;  Surgeon: Arelis Burns MD;  Location: [facility] PAIN [facility];  Service: Pain Management;  Laterality: N/A;  HAD VAC  WILL BRING CARD    HYSTERECTOMY      INJECTION OF ANESTHETIC AGENT AROUND MEDIAL BRANCH NERVES INNERVATING LUMBAR FACET JOINT Bilateral 4/22/2021    Procedure: Block-nerve-medial branch-lumbar Bilateral L3-4,4-5,5-S1 MBB #2;  Surgeon: Arelis Burns MD;  Location: [facility] PAIN [facility];  Service: Pain Management;  Laterality: Bilateral;    INJECTION OF ANESTHETIC AGENT AROUND MEDIAL BRANCH NERVES INNERVATING LUMBAR FACET JOINT Bilateral 9/6/2022    Procedure: Block-nerve-medial branch-lumbar, bilateral L4 through S1;  Surgeon: Arelis Burns MD;  Location: [facility] PAIN [facility];  Service: Pain Management;  Laterality: Bilateral;    INJECTION OF ANESTHETIC AGENT AROUND MEDIAL BRANCH NERVES INNERVATING LUMBAR FACET JOINT Bilateral 10/11/2022    Procedure: Block-nerve-medial branch-lumbar, bilateral L4 through S1;  Surgeon: Arelis Burns MD;  Location: [facility] PAIN [facility];  Service: Pain Management;  Laterality: Bilateral;  vaccinated.    INJECTION OF FACET JOINT Bilateral 12/04/2020    RADIOFREQUENCY ABLATION OF LUMBAR MEDIAL BRANCH NERVE AT SINGLE LEVEL Bilateral 5/20/2021    Procedure: RADIOFREQUENCY ABLATION, NERVE, SPINAL, LUMBAR, MEDIAL BRANCH, 1 LEVEL;  Surgeon: Arelis Burns MD;   Location: [facility] PAIN [facility];  Service: Pain Management;  Laterality: Bilateral;  Bilateral L3-S1 RFTC (both sides same day)       Review of patient's allergies indicates:   Allergen Reactions    Insect venom      Note: - [employer] 05/07/2019    Sulfa (sulfonamide antibiotics) Nausea And Vomiting       No current facility-administered medications on file prior to encounter.     Current Outpatient Medications on File Prior to Encounter   Medication Sig    atorvastatin (LIPITOR) 10 MG tablet Take 1 tablet (10 mg total) by mouth every evening.    brimonidine 0.2% (ALPHAGAN) 0.2 % Drop Place 1 drop into both eyes 2 (two) times daily.    busPIRone (BUSPAR) 15 MG tablet TAKE ONE TABLET BY MOUTH THREE TIMES DAILY    chlorthalidone (HYGROTEN) 25 MG Tab take 1/2 to 1 tablet by mouth once daily    cyproheptadine (PERIACTIN) 4 mg tablet TAKE ONE TABLET BY MOUTH THREE TIMES DAILY    DULoxetine (CYMBALTA) 60 MG capsule TAKE ONE CAPSULE BY MOUTH EVERY TWELVE HOURS    HYDROcodone-acetaminophen (NORCO)  mg per tablet Take 1 tablet by mouth every 8 (eight) hours.    HYDROcodone-acetaminophen (NORCO)  mg per tablet Take 1 tablet by mouth every 8 (eight) hours.    [START ON 3/27/2023] HYDROcodone-acetaminophen (NORCO)  mg per tablet Take 1 tablet by mouth every 8 (eight) hours.    INCRUSE ELLIPTA 62.5 mcg/actuation inhalation capsule Inhale 62.5 mcg into the lungs once daily. INHALE ONE PUFF BY MOUTH DAILY AT THE SAME TIME EACH DAY    latanoprost 0.005 % ophthalmic solution Place 1 drop into both eyes every evening.    losartan (COZAAR) 50 MG tablet Take 1 tablet (50 mg total) by mouth once daily.    ondansetron (ZOFRAN) 4 MG tablet Take 1 tablet (4 mg total) by mouth every 6 (six) hours.    pantoprazole (PROTONIX) 40 MG tablet TAKE ONE TABLET BY MOUTH TWICE DAILY BEFORE meals    [DISCONTINUED] cyclobenzaprine (FLEXERIL) 10 MG tablet Take 1 tablet (10 mg total) by mouth every 8 (eight) hours.     Family History        Problem Relation (Age of Onset)    Heart disease Father    Hypertension Mother          Tobacco Use    Smoking status: Former    Smokeless tobacco: Never   Substance and Sexual Activity    Alcohol use: Never     Comment: unknown    Drug use: Never     Types: Hydrocodone    Sexual activity: Not Currently     Review of Systems   Constitutional:  Negative for appetite change, chills, fatigue and fever.   HENT:  Negative for congestion, ear pain, hearing loss, sore throat and tinnitus.    Eyes:  Negative for pain and visual disturbance.   Respiratory:  Positive for chest tightness and shortness of breath. Negative for cough and wheezing.    Cardiovascular:  Negative for chest pain, palpitations and leg swelling.   Gastrointestinal:  Negative for abdominal pain, constipation, diarrhea, nausea and vomiting.   Endocrine: Negative for cold intolerance and heat intolerance.   Genitourinary:  Negative for dysuria, frequency and hematuria.   Musculoskeletal:  Negative for arthralgias, back pain, joint swelling, myalgias and neck pain.   Skin:  Negative for rash and wound.   Neurological:  Positive for weakness. Negative for tremors, light-headedness, numbness and headaches.   Hematological:  Negative for adenopathy.   Psychiatric/Behavioral:  Negative for confusion and sleep disturbance.    Objective:     Vital Signs (Most Recent):  Temp: 98 °F (36.7 °C) (03/12/23 0320)  Pulse: (!) 114 (03/12/23 0320)  Resp: (!) 22 (03/12/23 0320)  BP: 122/81 (03/12/23 0320)  SpO2: (!) 90 % (03/12/23 0320)   Vital Signs (24h Range):  Temp:  [98 °F (36.7 °C)-99.2 °F (37.3 °C)] 98 °F (36.7 °C)  Pulse:  [101-171] 114  Resp:  [21-33] 22  SpO2:  [82 %-100 %] 90 %  BP: (101-122)/(46-81) 122/81     Weight: 40.8 kg (89 lb 15.2 oz)  Body mass index is 16.45 kg/m².    Physical Exam  Vitals and nursing note reviewed.   Constitutional:       General: She is not in acute distress.     Appearance: Normal appearance. She is normal weight. She is  ill-appearing.   HENT:      Head: Normocephalic.      Right Ear: External ear normal.      Left Ear: External ear normal.      Nose: Nose normal. No congestion or rhinorrhea.      Mouth/Throat:      Mouth: Mucous membranes are moist.      Pharynx: No oropharyngeal exudate or posterior oropharyngeal erythema.   Eyes:      General: No scleral icterus.        Right eye: No discharge.         Left eye: No discharge.      Conjunctiva/sclera: Conjunctivae normal.      Pupils: Pupils are equal, round, and reactive to light.   Cardiovascular:      Rate and Rhythm: Regular rhythm. Tachycardia present.      Pulses: Normal pulses.      Heart sounds: Normal heart sounds. No murmur heard.  Pulmonary:      Effort: Pulmonary effort is normal. No respiratory distress.      Breath sounds: Rales present. No wheezing or rhonchi.   Abdominal:      General: Bowel sounds are normal. There is no distension.      Palpations: Abdomen is soft. There is no mass.      Tenderness: There is no abdominal tenderness. There is no guarding.   Musculoskeletal:         General: No swelling or tenderness.      Cervical back: Normal range of motion and neck supple.      Right lower leg: No edema.      Left lower leg: No edema.   Skin:     General: Skin is warm and dry.      Findings: No lesion or rash.   Neurological:      General: No focal deficit present.      Mental Status: She is alert and oriented to person, place, and time.      Cranial Nerves: No cranial nerve deficit.      Sensory: No sensory deficit.      Motor: No weakness.   Psychiatric:         Mood and Affect: Mood normal.         Behavior: Behavior normal.         Thought Content: Thought content normal.         Judgment: Judgment normal.         CRANIAL NERVES     CN III, IV, VI   Pupils are equal, round, and reactive to light.     Significant Labs: All pertinent labs within the past 24 hours have been reviewed.    Significant Imaging: I have reviewed all pertinent imaging  results/findings within the past 24 hours.    Assessment/Plan:     * Acute hypoxemic respiratory failure  Patient with Hypoxic Respiratory failure which is Acute on chronic.  she is on home oxygen at 2 LPM PRN. Supplemental oxygen was provided and noted- Oxygen Concentration (%):  [65] 65.   Signs/symptoms of respiratory failure include- increased work of breathing and use of accessory muscles. Contributing diagnoses includes - COPD Labs and images were reviewed. Patient Has recent ABG, which has been reviewed. Will treat underlying causes and adjust management of respiratory failure.     - Patient currently on non re-breather due to refusal of BiPAP with SpO2 of 95%. Will order ABG to monitor for CO2 retention. Respiratory Therapy consulted for assistance. Continue to monitor.      Severe sepsis  This patient does have evidence of infective focus  My overall impression is sepsis.  Source: Respiratory  Antibiotics given-   Antibiotics (72h ago, onward)      Start     Stop Route Frequency Ordered    03/12/23 0900  cefTRIAXone (ROCEPHIN) 1 g in dextrose 5 % in water (D5W) 5 % 50 mL IVPB (MB+)         -- IV Every 24 hours (non-standard times) 03/12/23 0457    03/12/23 0900  azithromycin (ZITHROMAX) 500 mg in dextrose 5 % (D5W) 250 mL IVPB         -- IV Every 24 hours (non-standard times) 03/12/23 0457    03/12/23 0030  cefTRIAXone (ROCEPHIN) 1 g in dextrose 5 % in water (D5W) 5 % 50 mL IVPB (MB+)         03/12 1329 IV ED 1 Time 03/12/23 0019          Latest lactate reviewed-  Recent Labs   Lab 03/12/23  0303   LACTATE 3.3*     Organ dysfunction indicated by Acute respiratory failure    Fluid challenge Actual Body weight- Patient will receive 30ml/kg actual body weight to calculate fluid bolus for treatment of septic shock.     Post- resuscitation assessment No - Post resuscitation assessment not needed       Will Not start Pressors- Levophed for MAP of 65  Source control achieved by: Antibiotics     Community acquired  pneumonia  CXR with bilateral infiltrates, suspicious of atypical pneumonia with Leukocytosis of 26.59. COVID/Flu negative. Continue oxygen supplementation, current antibiotics, steroids and breathing treatments. Will obtain procalcitonin.     - Procalcitonin pending  - Rocephin 1g IV q24h (Initiated on 3/11/23)  - Azithromycin 500mg IV q24h  (Initiated on 3/11/23)  - Solumedrol 80mg IV q12h  - Duonebs q6h   - Continue oxygen supplementation     Myocardial infarction due to demand ischemia  Troponin of 613.4 with repeat Troponin of 826. EKG exhibits sinus tachycardia, No STEMI. Continue Telemetry with repeat Troponin and EKG pending. Consider Cardiology consult.        Elevated brain natriuretic peptide (BNP) level  Patient with SOB and no known PMH of CHF with proBNP of 3,180. ECHO pending.      COPD exacerbation  Secondary to pneumonia. ABG results of 7.45/25/71/17.4 with pt refusing BiPAP. Respiratory currently has pt on non re-breather. Repeat ABG ordered to monitor for CO2 retention. Antibiotics, steroid and duonebs as above.       VTE Risk Mitigation (From admission, onward)           Ordered     enoxaparin injection 40 mg  Every 12 hours (non-standard times)         03/12/23 0140                       Tana Valderrama DO  Department of Hospital Medicine   [facility] - [address] [place] [address]

## 2023-03-12 NOTE — NURSING
Received pt. From ED in resp. Distress. Resp 22. O2 sats 90% nonrebreather. Refusing to wear BIPAP at this time. Pt. Is anxious. Safety measures ongoing.

## 2023-03-13 PROBLEM — I50.810 RVF (RIGHT VENTRICULAR FAILURE): Status: ACTIVE | Noted: 2023-03-13

## 2023-03-13 LAB
ALBUMIN SERPL BCP-MCNC: 2.3 G/DL (ref 3.5–5)
ALBUMIN/GLOB SERPL: 0.6 {RATIO}
ALP SERPL-CCNC: 169 U/L (ref 55–142)
ALT SERPL W P-5'-P-CCNC: 28 U/L (ref 13–56)
ANION GAP SERPL CALCULATED.3IONS-SCNC: 19 MMOL/L (ref 7–16)
ANISOCYTOSIS BLD QL SMEAR: ABNORMAL
APTT PPP: 101.3 SECONDS (ref 25.2–37.3)
APTT PPP: 50.1 SECONDS (ref 25.2–37.3)
APTT PPP: 96.3 SECONDS (ref 25.2–37.3)
AST SERPL W P-5'-P-CCNC: 43 U/L (ref 15–37)
BASOPHILS # BLD AUTO: 0.08 K/UL (ref 0–0.2)
BASOPHILS NFR BLD AUTO: 0.3 % (ref 0–1)
BILIRUB SERPL-MCNC: 0.2 MG/DL (ref ?–1.2)
BUN SERPL-MCNC: 29 MG/DL (ref 7–18)
BUN/CREAT SERPL: 23 (ref 6–20)
CALCIUM SERPL-MCNC: 9.4 MG/DL (ref 8.5–10.1)
CHLORIDE SERPL-SCNC: 107 MMOL/L (ref 98–107)
CHOLEST SERPL-MCNC: 154 MG/DL (ref 0–200)
CHOLEST/HDLC SERPL: 2.7 {RATIO}
CO2 SERPL-SCNC: 22 MMOL/L (ref 21–32)
CREAT SERPL-MCNC: 1.27 MG/DL (ref 0.55–1.02)
CRENATED CELLS: ABNORMAL
DIFFERENTIAL METHOD BLD: ABNORMAL
EGFR (NO RACE VARIABLE) (RUSH/TITUS): 45 ML/MIN/1.73M²
EOSINOPHIL # BLD AUTO: 0 K/UL (ref 0–0.5)
EOSINOPHIL NFR BLD AUTO: 0 % (ref 1–4)
ERYTHROCYTE [DISTWIDTH] IN BLOOD BY AUTOMATED COUNT: 19.7 % (ref 11.5–14.5)
GLOBULIN SER-MCNC: 4.1 G/DL (ref 2–4)
GLUCOSE SERPL-MCNC: 127 MG/DL (ref 74–106)
HCT VFR BLD AUTO: 37.2 % (ref 38–47)
HDLC SERPL-MCNC: 58 MG/DL (ref 40–60)
HGB BLD-MCNC: 12 G/DL (ref 12–16)
IMM GRANULOCYTES # BLD AUTO: 0.45 K/UL (ref 0–0.04)
IMM GRANULOCYTES NFR BLD: 1.6 % (ref 0–0.4)
LDLC SERPL CALC-MCNC: 57 MG/DL
LDLC/HDLC SERPL: 1 {RATIO}
LYMPHOCYTES # BLD AUTO: 1.02 K/UL (ref 1–4.8)
LYMPHOCYTES NFR BLD AUTO: 3.6 % (ref 27–41)
LYMPHOCYTES NFR BLD MANUAL: 4 % (ref 27–41)
MCH RBC QN AUTO: 29.5 PG (ref 27–31)
MCHC RBC AUTO-ENTMCNC: 32.3 G/DL (ref 32–36)
MCV RBC AUTO: 91.4 FL (ref 80–96)
MONOCYTES # BLD AUTO: 1.17 K/UL (ref 0–0.8)
MONOCYTES NFR BLD AUTO: 4.2 % (ref 2–6)
MONOCYTES NFR BLD MANUAL: 4 % (ref 2–6)
MPC BLD CALC-MCNC: 10.8 FL (ref 9.4–12.4)
NEUTROPHILS # BLD AUTO: 25.4 K/UL (ref 1.8–7.7)
NEUTROPHILS NFR BLD AUTO: 90.3 % (ref 53–65)
NEUTS BAND NFR BLD MANUAL: 7 % (ref 1–5)
NEUTS SEG NFR BLD MANUAL: 85 % (ref 50–62)
NONHDLC SERPL-MCNC: 96 MG/DL
NRBC # BLD AUTO: 0.05 X10E3/UL
NRBC BLD MANUAL-RTO: 1 /100 WBC
NRBC, AUTO (.00): 0.2 %
OVALOCYTES BLD QL SMEAR: ABNORMAL
PLATELET # BLD AUTO: 284 K/UL (ref 150–400)
PLATELET MORPHOLOGY: NORMAL
POLYCHROMASIA BLD QL SMEAR: ABNORMAL
POTASSIUM SERPL-SCNC: 3.2 MMOL/L (ref 3.5–5.1)
PROT SERPL-MCNC: 6.4 G/DL (ref 6.4–8.2)
RBC # BLD AUTO: 4.07 M/UL (ref 4.2–5.4)
SODIUM SERPL-SCNC: 145 MMOL/L (ref 136–145)
TARGETS BLD QL SMEAR: ABNORMAL
TRIGL SERPL-MCNC: 195 MG/DL (ref 35–150)
TSH SERPL DL<=0.005 MIU/L-ACNC: 0.49 UIU/ML (ref 0.36–3.74)
VLDLC SERPL-MCNC: 39 MG/DL
WBC # BLD AUTO: 28.12 K/UL (ref 4.5–11)

## 2023-03-13 PROCEDURE — 84443 ASSAY THYROID STIM HORMONE: CPT | Performed by: NURSE PRACTITIONER

## 2023-03-13 PROCEDURE — 85730 THROMBOPLASTIN TIME PARTIAL: CPT | Performed by: NURSE PRACTITIONER

## 2023-03-13 PROCEDURE — 99233 SBSQ HOSP IP/OBS HIGH 50: CPT | Mod: ,,, | Performed by: INTERNAL MEDICINE

## 2023-03-13 PROCEDURE — 99233 PR SUBSEQUENT HOSPITAL CARE,LEVL III: ICD-10-PCS | Mod: ,,, | Performed by: INTERNAL MEDICINE

## 2023-03-13 PROCEDURE — 85025 COMPLETE CBC W/AUTO DIFF WBC: CPT | Performed by: NURSE PRACTITIONER

## 2023-03-13 PROCEDURE — 63600175 PHARM REV CODE 636 W HCPCS: Performed by: FAMILY MEDICINE

## 2023-03-13 PROCEDURE — 63600175 PHARM REV CODE 636 W HCPCS: Performed by: NURSE PRACTITIONER

## 2023-03-13 PROCEDURE — 25000003 PHARM REV CODE 250: Performed by: INTERNAL MEDICINE

## 2023-03-13 PROCEDURE — 25000003 PHARM REV CODE 250: Performed by: FAMILY MEDICINE

## 2023-03-13 PROCEDURE — 99900035 HC TECH TIME PER 15 MIN (STAT)

## 2023-03-13 PROCEDURE — 27000221 HC OXYGEN, UP TO 24 HOURS

## 2023-03-13 PROCEDURE — 80053 COMPREHEN METABOLIC PANEL: CPT | Performed by: NURSE PRACTITIONER

## 2023-03-13 PROCEDURE — 80061 LIPID PANEL: CPT | Performed by: NURSE PRACTITIONER

## 2023-03-13 PROCEDURE — 94640 AIRWAY INHALATION TREATMENT: CPT

## 2023-03-13 PROCEDURE — 94660 CPAP INITIATION&MGMT: CPT

## 2023-03-13 PROCEDURE — 85730 THROMBOPLASTIN TIME PARTIAL: CPT | Performed by: INTERNAL MEDICINE

## 2023-03-13 PROCEDURE — 20000000 HC ICU ROOM

## 2023-03-13 PROCEDURE — 25000003 PHARM REV CODE 250: Performed by: NURSE PRACTITIONER

## 2023-03-13 PROCEDURE — 94761 N-INVAS EAR/PLS OXIMETRY MLT: CPT

## 2023-03-13 PROCEDURE — 25000242 PHARM REV CODE 250 ALT 637 W/ HCPCS: Performed by: FAMILY MEDICINE

## 2023-03-13 RX ORDER — POTASSIUM CHLORIDE 20 MEQ/1
40 TABLET, EXTENDED RELEASE ORAL ONCE
Status: COMPLETED | OUTPATIENT
Start: 2023-03-13 | End: 2023-03-13

## 2023-03-13 RX ORDER — FUROSEMIDE 10 MG/ML
40 INJECTION INTRAMUSCULAR; INTRAVENOUS 2 TIMES DAILY WITH MEALS
Status: DISCONTINUED | OUTPATIENT
Start: 2023-03-13 | End: 2023-03-13

## 2023-03-13 RX ORDER — FUROSEMIDE 10 MG/ML
80 INJECTION INTRAMUSCULAR; INTRAVENOUS 2 TIMES DAILY WITH MEALS
Status: DISCONTINUED | OUTPATIENT
Start: 2023-03-13 | End: 2023-03-18

## 2023-03-13 RX ORDER — HYDROCODONE BITARTRATE AND ACETAMINOPHEN 7.5; 325 MG/1; MG/1
1 TABLET ORAL EVERY 8 HOURS PRN
Status: DISCONTINUED | OUTPATIENT
Start: 2023-03-13 | End: 2023-03-15

## 2023-03-13 RX ADMIN — HEPARIN SODIUM 16 UNITS/KG/HR: 10000 INJECTION, SOLUTION INTRAVENOUS at 10:03

## 2023-03-13 RX ADMIN — MUPIROCIN: 20 OINTMENT TOPICAL at 09:03

## 2023-03-13 RX ADMIN — POTASSIUM CHLORIDE 40 MEQ: 1500 TABLET, EXTENDED RELEASE ORAL at 01:03

## 2023-03-13 RX ADMIN — HYDROCODONE BITARTRATE AND ACETAMINOPHEN 1 TABLET: 7.5; 325 TABLET ORAL at 10:03

## 2023-03-13 RX ADMIN — PANTOPRAZOLE SODIUM 40 MG: 40 TABLET, DELAYED RELEASE ORAL at 06:03

## 2023-03-13 RX ADMIN — LOSARTAN POTASSIUM 25 MG: 25 TABLET, FILM COATED ORAL at 09:03

## 2023-03-13 RX ADMIN — AZITHROMYCIN MONOHYDRATE 500 MG: 500 INJECTION, POWDER, LYOPHILIZED, FOR SOLUTION INTRAVENOUS at 09:03

## 2023-03-13 RX ADMIN — HYDROCODONE BITARTRATE AND ACETAMINOPHEN 1 TABLET: 7.5; 325 TABLET ORAL at 05:03

## 2023-03-13 RX ADMIN — ASPIRIN 81 MG: 81 TABLET, DELAYED RELEASE ORAL at 09:03

## 2023-03-13 RX ADMIN — METHYLPREDNISOLONE SODIUM SUCCINATE 80 MG: 40 INJECTION, POWDER, FOR SOLUTION INTRAMUSCULAR; INTRAVENOUS at 06:03

## 2023-03-13 RX ADMIN — IPRATROPIUM BROMIDE AND ALBUTEROL SULFATE 3 ML: 2.5; .5 SOLUTION RESPIRATORY (INHALATION) at 07:03

## 2023-03-13 RX ADMIN — DEXTROSE MONOHYDRATE 1 G: 5 INJECTION INTRAVENOUS at 09:03

## 2023-03-13 RX ADMIN — CHLORTHALIDONE 25 MG: 25 TABLET ORAL at 09:03

## 2023-03-13 RX ADMIN — POTASSIUM CHLORIDE 40 MEQ: 1500 TABLET, EXTENDED RELEASE ORAL at 05:03

## 2023-03-13 RX ADMIN — FUROSEMIDE 80 MG: 10 INJECTION, SOLUTION INTRAMUSCULAR; INTRAVENOUS at 01:03

## 2023-03-13 RX ADMIN — MUPIROCIN: 20 OINTMENT TOPICAL at 08:03

## 2023-03-13 RX ADMIN — IPRATROPIUM BROMIDE AND ALBUTEROL SULFATE 3 ML: 2.5; .5 SOLUTION RESPIRATORY (INHALATION) at 12:03

## 2023-03-13 RX ADMIN — HALOPERIDOL LACTATE 5 MG: 5 INJECTION, SOLUTION INTRAMUSCULAR at 02:03

## 2023-03-13 RX ADMIN — METHYLPREDNISOLONE SODIUM SUCCINATE 80 MG: 40 INJECTION, POWDER, FOR SOLUTION INTRAMUSCULAR; INTRAVENOUS at 05:03

## 2023-03-13 RX ADMIN — PANTOPRAZOLE SODIUM 40 MG: 40 TABLET, DELAYED RELEASE ORAL at 05:03

## 2023-03-13 NOTE — PLAN OF CARE
Problem: Fluid Imbalance (Pneumonia)  Goal: Fluid Balance  Outcome: Ongoing, Progressing     Problem: Infection (Pneumonia)  Goal: Resolution of Infection Signs and Symptoms  Outcome: Ongoing, Progressing     Problem: Respiratory Compromise (Pneumonia)  Goal: Effective Oxygenation and Ventilation  Outcome: Ongoing, Progressing     Problem: Noninvasive Ventilation Acute  Goal: Effective Unassisted Ventilation and Oxygenation  Outcome: Ongoing, Progressing

## 2023-03-13 NOTE — PLAN OF CARE
[facility] ICU  Initial Discharge Assessment       Primary Care Provider: Danilo Cloud IV, DO    Admission Diagnosis: Shortness of breath [R06.02]  Acute pulmonary edema [J81.0]  NSTEMI (non-ST elevated myocardial infarction) [I21.4]  Acute respiratory failure with hypoxia [J96.01]  Elevated troponin I level [R77.8]  Acute on chronic respiratory failure [J96.20]    Admission Date: 3/11/2023  Expected Discharge Date:          Payor: MEDICARE / Plan: MEDICARE PART A & B / Product Type: Government /     Extended Emergency Contact Information  Primary Emergency Contact: DANIELE ALICIA  Home Phone: [phone]  Relation: Mother  Preferred language: English   needed? No  Secondary Emergency Contact: Karly Sanon  Mobile Phone: [phone]  Relation: Daughter  Preferred language: English   needed? No    Discharge Plan A: Home with family  Discharge Plan B: Home with family      [facility] [address] [address]  Phone: [phone] Fax: [phone]      Initial Assessment (most recent)       Adult Discharge Assessment - 03/13/23 1025          Discharge Assessment    Assessment Type Discharge Planning Assessment     Source of Information family     If unable to respond/provide information was family/caregiver contacted? Yes   son at bedside/daughter via telephone    People in Home child(velvet), adult     Do you expect to return to your current living situation? Yes     Do you have help at home or someone to help you manage your care at home? Yes     Who are your caregiver(s) and their phone number(s)? Jose Luis Alicia (son) [phone]     Equipment Currently Used at Home cane, straight;walker, standard     Readmission within 30 days? No     Patient currently being followed by outpatient case management? No     Do you currently have service(s) that help you manage your care at home? No     Do you take prescription medications? Yes      Do you have prescription coverage? Yes     Coverage medicare     Do you have any problems affording any of your prescribed medications? No     Is the patient taking medications as prescribed? yes     Who is going to help you get home at discharge? son     Are you on dialysis? No     Do you take coumadin? No     Discharge Plan A Home with family     Discharge Plan B Home with family        Physical Activity    On average, how many days per week do you engage in moderate to strenuous exercise (like a brisk walk)? 0 days     On average, how many minutes do you engage in exercise at this level? 0 min        Financial Resource Strain    How hard is it for you to pay for the very basics like food, housing, medical care, and heating? Not very hard        Housing Stability    In the last 12 months, was there a time when you were not able to pay the mortgage or rent on time? No     In the last 12 months, was there a time when you did not have a steady place to sleep or slept in a shelter (including now)? No        Transportation Needs    In the past 12 months, has lack of transportation kept you from medical appointments or from getting medications? No     In the past 12 months, has lack of transportation kept you from meetings, work, or from getting things needed for daily living? No        Food Insecurity    Within the past 12 months, you worried that your food would run out before you got the money to buy more. Never true     Within the past 12 months, the food you bought just didn't last and you didn't have money to get more. Never true        Stress    Do you feel stress - tense, restless, nervous, or anxious, or unable to sleep at night because your mind is troubled all the time - these days? Rather much        Social Connections    In a typical week, how many times do you talk on the phone with family, friends, or neighbors? Three times a week     How often do you get together with friends or relatives? Three times a  week     How often do you attend Adventism or Anabaptism services? More than 4 times per year     Do you belong to any clubs or organizations such as Adventism groups, unions, fraternal or athletic groups, or school groups? No     How often do you attend meetings of the clubs or organizations you belong to? Never     Are you , , , , never , or living with a partner?         Alcohol Use    Q1: How often do you have a drink containing alcohol? Never     Q2: How many drinks containing alcohol do you have on a typical day when you are drinking? Patient does not drink     Q3: How often do you have six or more drinks on one occasion? Never        OTHER    Name(s) of People in Home Felicity Alicia (mother), Jose Luis Alicia (son)                   Pt unable to answer questions at this time. Spoke with patient's son (Jose Luis Alicia) at bedside. Pt lives at home with mother and son. DC plans are for pt to return home with family. Pt currently uses straight cane and standard walker. No other equipment needed at this time for dc. Informed pt and family of Medicare Rights. SDOH completed. SS will continue to follow for dc.

## 2023-03-13 NOTE — PLAN OF CARE
Problem: Adult Inpatient Plan of Care  Goal: Plan of Care Review  Outcome: Ongoing, Progressing  Goal: Patient-Specific Goal (Individualized)  Outcome: Ongoing, Progressing  Goal: Absence of Hospital-Acquired Illness or Injury  Outcome: Ongoing, Progressing  Goal: Optimal Comfort and Wellbeing  Outcome: Ongoing, Progressing  Goal: Readiness for Transition of Care  Outcome: Ongoing, Progressing     Problem: Adjustment to Illness (Sepsis/Septic Shock)  Goal: Optimal Coping  Outcome: Ongoing, Progressing     Problem: Bleeding (Sepsis/Septic Shock)  Goal: Absence of Bleeding  Outcome: Ongoing, Progressing     Problem: Glycemic Control Impaired (Sepsis/Septic Shock)  Goal: Blood Glucose Level Within Desired Range  Outcome: Ongoing, Progressing     Problem: Infection Progression (Sepsis/Septic Shock)  Goal: Absence of Infection Signs and Symptoms  Outcome: Ongoing, Progressing     Problem: Nutrition Impaired (Sepsis/Septic Shock)  Goal: Optimal Nutrition Intake  Outcome: Ongoing, Progressing     Problem: Fluid Imbalance (Pneumonia)  Goal: Fluid Balance  Outcome: Ongoing, Progressing     Problem: Infection (Pneumonia)  Goal: Resolution of Infection Signs and Symptoms  Outcome: Ongoing, Progressing     Problem: Respiratory Compromise (Pneumonia)  Goal: Effective Oxygenation and Ventilation  Outcome: Ongoing, Progressing     Problem: Skin Injury Risk Increased  Goal: Skin Health and Integrity  Outcome: Ongoing, Progressing     Problem: Fall Injury Risk  Goal: Absence of Fall and Fall-Related Injury  Outcome: Ongoing, Progressing     Problem: Restraint, Nonbehavioral (Nonviolent)  Goal: Absence of Harm or Injury  Outcome: Ongoing, Progressing     Problem: Infection  Goal: Absence of Infection Signs and Symptoms  Outcome: Ongoing, Progressing     Problem: Noninvasive Ventilation Acute  Goal: Effective Unassisted Ventilation and Oxygenation  Outcome: Ongoing, Progressing

## 2023-03-13 NOTE — SUBJECTIVE & OBJECTIVE
Interval History:  Patient without complaints more alert      Objective:     Vital Signs (Most Recent):  Temp: 99.4 °F (37.4 °C) (03/13/23 0315)  Pulse: 98 (03/13/23 0615)  Resp: (!) 26 (03/13/23 0615)  BP: (!) 140/64 (03/13/23 0400)  SpO2: 100 % (03/13/23 0615)   Vital Signs (24h Range):  Temp:  [97.6 °F (36.4 °C)-100 °F (37.8 °C)] 99.4 °F (37.4 °C)  Pulse:  [] 98  Resp:  [20-46] 26  SpO2:  [64 %-100 %] 100 %  BP: ()/(29-87) 140/64     Weight: 43.1 kg (95 lb 0.3 oz)  Body mass index is 17.38 kg/m².      Intake/Output Summary (Last 24 hours) at 3/13/2023 0637  Last data filed at 3/13/2023 0601  Gross per 24 hour   Intake 407.19 ml   Output 1400 ml   Net -992.81 ml       Physical Exam  Vitals reviewed.   Constitutional:       Appearance: Normal appearance.      Interventions: She is not intubated.  HENT:      Head: Normocephalic and atraumatic.      Nose: Nose normal.      Mouth/Throat:      Mouth: Mucous membranes are dry.      Pharynx: Oropharynx is clear.   Eyes:      Extraocular Movements: Extraocular movements intact.      Conjunctiva/sclera: Conjunctivae normal.      Pupils: Pupils are equal, round, and reactive to light.   Cardiovascular:      Rate and Rhythm: Normal rate.      Heart sounds: Normal heart sounds. No murmur heard.  Pulmonary:      Effort: Pulmonary effort is normal. She is not intubated.      Breath sounds: Normal breath sounds.   Abdominal:      General: Abdomen is flat. Bowel sounds are normal.      Palpations: Abdomen is soft.   Musculoskeletal:         General: Normal range of motion.      Cervical back: Normal range of motion and neck supple.      Right lower leg: No edema.      Left lower leg: No edema.   Skin:     General: Skin is warm and dry.      Capillary Refill: Capillary refill takes less than 2 seconds.   Neurological:      General: No focal deficit present.      Mental Status: She is alert and oriented to person, place, and time.   Psychiatric:         Mood and Affect:  Mood normal.         Behavior: Behavior normal.     Review of Systems    Vents:  Oxygen Concentration (%): 75 (03/13/23 0547)    Lines/Drains/Airways       Drain  Duration                  Urethral Catheter 03/12/23 2050 16 Fr. <1 day              Peripheral Intravenous Line  Duration                  Peripheral IV - Single Lumen 03/12/23 0600 22 G Anterior;Proximal;Right Forearm 1 day         Peripheral IV - Single Lumen 03/12/23 1256 18 G Anterior;Left <1 day                    Significant Labs:    CBC/Anemia Profile:  Recent Labs   Lab 03/11/23  2330 03/12/23  0048 03/12/23  1302   WBC 26.59*  --  22.38*   HGB 14.2  --  12.1   HCT 42.6 38 36.5*     --  362   MCV 86.6  --  87.3   RDW 19.6*  --  19.2*        Chemistries:  Recent Labs   Lab 03/11/23  2330 03/12/23  0303 03/13/23  0531     --  145   K 3.6  --  3.2*     --  107   CO2 21  --  22   BUN 25*  --  29*   CREATININE 1.36*  --  1.27*   CALCIUM 9.3  --  9.4   ALBUMIN 2.8*  --  2.3*   PROT 7.0  --  6.4   BILITOT 0.3  --  0.2   ALKPHOS 134  --  169*   ALT 21  --  28   AST 43*  --  43*   MG  --  2.1  --        Recent Lab Results  (Last 5 results in the past 24 hours)        03/13/23  0531   03/12/23  2254   03/12/23  2105   03/12/23  1633   03/12/23  1456        Albumin/Globulin Ratio 0.6               Albumin 2.3               Alkaline Phosphatase 169               ALT 28               Anion Gap 19               aPTT 96.3   34.0       37.7       AST 43               BILIRUBIN TOTAL 0.2               BUN 29               BUN/CREAT RATIO 23               Calcium 9.4               Chloride 107               CO2 22               Creatinine 1.27               eGFR 45               Globulin, Total 4.1               Glucose 127               Lactate, Alexander     1.9           POC Glucose       143         Potassium 3.2               PROTEIN TOTAL 6.4               Sodium 145                                      Significant Imaging:  I have reviewed all  pertinent imaging results/findings within the past 24 hours.

## 2023-03-13 NOTE — ASSESSMENT & PLAN NOTE
Echo shows moderate to severe right ventricular dilatation, elevated PA pressures consistent with pulmonary hypertension.  Unclear if etiology is new pneumonia, however would consider PE, recommend CT chest when pt can tolerate.  She is on systemic anticoagulation with heparin, would continue at least until able to undergo definitive studies to rule out PE.

## 2023-03-13 NOTE — PROGRESS NOTES
[facility] ICU  Pulmonology  Progress Note    Patient Name: Amelie Cerrato  MRN: [id number]  Admission Date: 3/11/2023  Hospital Length of Stay: 1 days  Code Status: Full Code  Attending Provider: Dustin Byrne DO  Primary Care Provider: Danilo Cloud IV, DO   Principal Problem: Acute respiratory failure with hypoxia    Subjective:     Interval History:  Patient without complaints more alert      Objective:     Vital Signs (Most Recent):  Temp: 99.4 °F (37.4 °C) (03/13/23 0315)  Pulse: 98 (03/13/23 0615)  Resp: (!) 26 (03/13/23 0615)  BP: (!) 140/64 (03/13/23 0400)  SpO2: 100 % (03/13/23 0615)   Vital Signs (24h Range):  Temp:  [97.6 °F (36.4 °C)-100 °F (37.8 °C)] 99.4 °F (37.4 °C)  Pulse:  [] 98  Resp:  [20-46] 26  SpO2:  [64 %-100 %] 100 %  BP: ()/(29-87) 140/64     Weight: 43.1 kg (95 lb 0.3 oz)  Body mass index is 17.38 kg/m².      Intake/Output Summary (Last 24 hours) at 3/13/2023 0637  Last data filed at 3/13/2023 0601  Gross per 24 hour   Intake 407.19 ml   Output 1400 ml   Net -992.81 ml       Physical Exam  Vitals reviewed.   Constitutional:       Appearance: Normal appearance.      Interventions: She is not intubated.  HENT:      Head: Normocephalic and atraumatic.      Nose: Nose normal.      Mouth/Throat:      Mouth: Mucous membranes are dry.      Pharynx: Oropharynx is clear.   Eyes:      Extraocular Movements: Extraocular movements intact.      Conjunctiva/sclera: Conjunctivae normal.      Pupils: Pupils are equal, round, and reactive to light.   Cardiovascular:      Rate and Rhythm: Normal rate.      Heart sounds: Normal heart sounds. No murmur heard.  Pulmonary:      Effort: Pulmonary effort is normal. She is not intubated.      Breath sounds: Normal breath sounds.   Abdominal:      General: Abdomen is flat. Bowel sounds are normal.      Palpations: Abdomen is soft.   Musculoskeletal:         General: Normal range of motion.      Cervical back: Normal range of  motion and neck supple.      Right lower leg: No edema.      Left lower leg: No edema.   Skin:     General: Skin is warm and dry.      Capillary Refill: Capillary refill takes less than 2 seconds.   Neurological:      General: No focal deficit present.      Mental Status: She is alert and oriented to person, place, and time.   Psychiatric:         Mood and Affect: Mood normal.         Behavior: Behavior normal.     Review of Systems    Vents:  Oxygen Concentration (%): 75 (03/13/23 0547)    Lines/Drains/Airways       Drain  Duration                  Urethral Catheter 03/12/23 2050 16 Fr. <1 day              Peripheral Intravenous Line  Duration                  Peripheral IV - Single Lumen 03/12/23 0600 22 G Anterior;Proximal;Right Forearm 1 day         Peripheral IV - Single Lumen 03/12/23 1256 18 G Anterior;Left <1 day                    Significant Labs:    CBC/Anemia Profile:  Recent Labs   Lab 03/11/23  2330 03/12/23  0048 03/12/23  1302   WBC 26.59*  --  22.38*   HGB 14.2  --  12.1   HCT 42.6 38 36.5*     --  362   MCV 86.6  --  87.3   RDW 19.6*  --  19.2*        Chemistries:  Recent Labs   Lab 03/11/23  2330 03/12/23  0303 03/13/23  0531     --  145   K 3.6  --  3.2*     --  107   CO2 21  --  22   BUN 25*  --  29*   CREATININE 1.36*  --  1.27*   CALCIUM 9.3  --  9.4   ALBUMIN 2.8*  --  2.3*   PROT 7.0  --  6.4   BILITOT 0.3  --  0.2   ALKPHOS 134  --  169*   ALT 21  --  28   AST 43*  --  43*   MG  --  2.1  --        Recent Lab Results  (Last 5 results in the past 24 hours)        03/13/23  0531   03/12/23  2254   03/12/23  2105   03/12/23  1633   03/12/23  1456        Albumin/Globulin Ratio 0.6               Albumin 2.3               Alkaline Phosphatase 169               ALT 28               Anion Gap 19               aPTT 96.3   34.0       37.7       AST 43               BILIRUBIN TOTAL 0.2               BUN 29               BUN/CREAT RATIO 23               Calcium 9.4                Chloride 107               CO2 22               Creatinine 1.27               eGFR 45               Globulin, Total 4.1               Glucose 127               Lactate, Alexander     1.9           POC Glucose       143         Potassium 3.2               PROTEIN TOTAL 6.4               Sodium 145                                      Significant Imaging:  I have reviewed all pertinent imaging results/findings within the past 24 hours.    Assessment/Plan:     Pulmonary  * Acute respiratory failure with hypoxia  Continue current oxygen support and BiPAP    COPD exacerbation  Continue treat    Community acquired pneumonia  Seems to be improving    Cardiac/Vascular  NSTEMI (non-ST elevated myocardial infarction)  Troponin goes up a little higher will consult Cardiology    ID  Severe sepsis  Treat with antibiotics for pneumonia this seems to be improving                 Pool Amador MD  Pulmonology  [facility] ICU

## 2023-03-13 NOTE — ASSESSMENT & PLAN NOTE
Elevated troponin, No acute EKG changes, most consistent with type 2 MI, due to demand/perfusion mismatch rather than ACS, continue supportive care.

## 2023-03-13 NOTE — CONSULTS
[facility]  Cardiology  Consult Note    Patient Name: Amelie Cerrato  MRN: [id number]  Admission Date: 3/11/2023  Hospital Length of Stay: 1 days  Code Status: Full Code   Attending Provider: Dustin Byrne DO   Consulting Provider: Manoj Alicia DO  Primary Care Physician: Danilo Cloud IV, DO  Principal Problem:Acute respiratory failure with hypoxia    Patient information was obtained from past medical records.     Consults  Subjective:     Chief Complaint:  Fatique, shortness of breath    HPI:   PT is sedated, barely arousable, hx obtained from medical history, ICU team      Past Medical History:   Diagnosis Date    Anxiety and depression     Chronic kidney disease, stage 3a     Chronic pain syndrome     COPD (chronic obstructive pulmonary disease)     Fracture of multiple pubic rami, left, closed, initial encounter 10/01/2021    HLD (hyperlipidemia)     Hypertension     Lumbar compression fracture, sequela L1, L2, L4, L5, kyphoplasty 12/8/2021    Lumbar radiculopathy     Lumbar stenosis     Lumbosacral spondylosis     Neuropathy        Past Surgical History:   Procedure Laterality Date    CHOLECYSTECTOMY      CYSTOSCOPY      EPIDURAL STEROID INJECTION INTO LUMBAR SPINE N/A 05/27/2020    L1-2 WILD     EPIDURAL STEROID INJECTION INTO THORACIC SPINE N/A 02/03/2021    T9-10 WILD     EPIDURAL STEROID INJECTION INTO THORACIC SPINE N/A 3/18/2021    Procedure: INJECTION, STEROID, SPINE, THORACIC, EPIDURAL;  Surgeon: Arelis Burns MD;  Location: [facility];  Service: Pain Management;  Laterality: N/A;    EPIDURAL STEROID INJECTION INTO THORACIC SPINE N/A 12/23/2021    Procedure: Injection-steroid-epidural-thoracic T9/10;  Surgeon: Arelis Burns MD;  Location: [facility];  Service: Pain Management;  Laterality: N/A;  HAD VAC  WILL BRING CARD    HYSTERECTOMY      INJECTION OF ANESTHETIC AGENT AROUND MEDIAL BRANCH NERVES INNERVATING LUMBAR FACET JOINT  Bilateral 4/22/2021    Procedure: Block-nerve-medial branch-lumbar Bilateral L3-4,4-5,5-S1 MBB #2;  Surgeon: Arelis Burns MD;  Location: [facility];  Service: Pain Management;  Laterality: Bilateral;    INJECTION OF ANESTHETIC AGENT AROUND MEDIAL BRANCH NERVES INNERVATING LUMBAR FACET JOINT Bilateral 9/6/2022    Procedure: Block-nerve-medial branch-lumbar, bilateral L4 through S1;  Surgeon: Arelis Burns MD;  Location: [facility];  Service: Pain Management;  Laterality: Bilateral;    INJECTION OF ANESTHETIC AGENT AROUND MEDIAL BRANCH NERVES INNERVATING LUMBAR FACET JOINT Bilateral 10/11/2022    Procedure: Block-nerve-medial branch-lumbar, bilateral L4 through S1;  Surgeon: Arelis Burns MD;  Location: [facility];  Service: Pain Management;  Laterality: Bilateral;  vaccinated.    INJECTION OF FACET JOINT Bilateral 12/04/2020    RADIOFREQUENCY ABLATION OF LUMBAR MEDIAL BRANCH NERVE AT SINGLE LEVEL Bilateral 5/20/2021    Procedure: RADIOFREQUENCY ABLATION, NERVE, SPINAL, LUMBAR, MEDIAL BRANCH, 1 LEVEL;  Surgeon: Arelis Burns MD;  Location: [facility];  Service: Pain Management;  Laterality: Bilateral;  Bilateral L3-S1 RFTC (both sides same day)       Review of patient's allergies indicates:   Allergen Reactions    Insect venom      Note: - [employer] 05/07/2019    Sulfa (sulfonamide antibiotics) Nausea And Vomiting       No current facility-administered medications on file prior to encounter.     Current Outpatient Medications on File Prior to Encounter   Medication Sig    atorvastatin (LIPITOR) 10 MG tablet Take 1 tablet (10 mg total) by mouth every evening.    brimonidine 0.2% (ALPHAGAN) 0.2 % Drop Place 1 drop into both eyes 2 (two) times daily.    busPIRone (BUSPAR) 15 MG tablet TAKE ONE TABLET BY MOUTH THREE TIMES DAILY    chlorthalidone (HYGROTEN) 25 MG Tab take 1/2 to 1 tablet by mouth once daily    cyproheptadine (PERIACTIN) 4 mg tablet TAKE ONE TABLET BY  MOUTH THREE TIMES DAILY    DULoxetine (CYMBALTA) 60 MG capsule TAKE ONE CAPSULE BY MOUTH EVERY TWELVE HOURS    HYDROcodone-acetaminophen (NORCO)  mg per tablet Take 1 tablet by mouth every 8 (eight) hours.    HYDROcodone-acetaminophen (NORCO)  mg per tablet Take 1 tablet by mouth every 8 (eight) hours.    [START ON 3/27/2023] HYDROcodone-acetaminophen (NORCO)  mg per tablet Take 1 tablet by mouth every 8 (eight) hours.    INCRUSE ELLIPTA 62.5 mcg/actuation inhalation capsule Inhale 62.5 mcg into the lungs once daily. INHALE ONE PUFF BY MOUTH DAILY AT THE SAME TIME EACH DAY    latanoprost 0.005 % ophthalmic solution Place 1 drop into both eyes every evening.    losartan (COZAAR) 50 MG tablet Take 1 tablet (50 mg total) by mouth once daily.    ondansetron (ZOFRAN) 4 MG tablet Take 1 tablet (4 mg total) by mouth every 6 (six) hours.    pantoprazole (PROTONIX) 40 MG tablet TAKE ONE TABLET BY MOUTH TWICE DAILY BEFORE meals    [DISCONTINUED] cyclobenzaprine (FLEXERIL) 10 MG tablet Take 1 tablet (10 mg total) by mouth every 8 (eight) hours.     Family History       Problem Relation (Age of Onset)    Heart disease Father    Hypertension Mother          Tobacco Use    Smoking status: Former    Smokeless tobacco: Never   Substance and Sexual Activity    Alcohol use: Never     Comment: unknown    Drug use: Never     Types: Hydrocodone    Sexual activity: Not Currently     Review of Systems   Unable to perform ROS: Acuity of condition   Objective:     Vital Signs (Most Recent):  Temp: 98.3 °F (36.8 °C) (03/12/23 1630)  Pulse: 103 (03/12/23 1545)  Resp: (!) 32 (03/12/23 1545)  BP: (!) 90/55 (03/12/23 1545)  SpO2: 97 % (03/12/23 1545)   Vital Signs (24h Range):  Temp:  [97.6 °F (36.4 °C)-100.8 °F (38.2 °C)] 98.3 °F (36.8 °C)  Pulse:  [] 103  Resp:  [21-46] 32  SpO2:  [64 %-100 %] 97 %  BP: ()/(29-87) 90/55     Weight: 42.6 kg (93 lb 14.7 oz)  Body mass index is 17.18 kg/m².    SpO2: 97  %         Intake/Output Summary (Last 24 hours) at 3/12/2023 1641  Last data filed at 3/12/2023 1236  Gross per 24 hour   Intake 50 ml   Output 600 ml   Net -550 ml       Lines/Drains/Airways       Peripheral Intravenous Line  Duration                  Peripheral IV - Single Lumen 03/12/23 0600 22 G Anterior;Proximal;Right Forearm <1 day         Peripheral IV - Single Lumen 03/12/23 1256 18 G Anterior;Left <1 day                    Physical Exam  Constitutional:       Appearance: She is ill-appearing.      Comments: Resting comfortably, sedated, in no apparent distress   HENT:      Right Ear: External ear normal.      Left Ear: External ear normal.   Eyes:      Extraocular Movements: Extraocular movements intact.      Conjunctiva/sclera: Conjunctivae normal.      Pupils: Pupils are equal, round, and reactive to light.   Neck:      Vascular: No carotid bruit.   Cardiovascular:      Rate and Rhythm: Regular rhythm. Tachycardia present.      Heart sounds: Normal heart sounds. No murmur heard.    No friction rub. No gallop.   Pulmonary:      Comments: Decreased breath sounds bilaterally, poor inspiratory effort, pt not cooperative with deep breath.   Abdominal:      General: Abdomen is flat.      Palpations: Abdomen is soft.   Musculoskeletal:      Cervical back: Neck supple.     Significant Labs:     Significant Imaging:     Assessment and Plan:     Elevated brain natriuretic peptide (BNP) level  Echo shows moderate to severe right ventricular dilatation, elevated PA pressures consistent with pulmonary hypertension.  Unclear if etiology is new pneumonia, however would consider PE, recommend CT chest when pt can tolerate.  She is on systemic anticoagulation with heparin, would continue at least until able to undergo definitive studies to rule out PE.    NSTEMI (non-ST elevated myocardial infarction)  Elevated troponin, No acute EKG changes, most consistent with type 2 MI, due to demand/perfusion mismatch rather than  ACS, continue supportive care.    Community acquired pneumonia  Pt on antibiotics with some improvement in respiratory status.        VTE Risk Mitigation (From admission, onward)         Ordered     heparin 25,000 units in dextrose 5% 250 mL (100 units/mL) infusion LOW INTENSITY nomogram - [facility]  Continuous        Question:  Begin at (in units/kg/hr)  Answer:  12    03/12/23 1224     heparin 25,000 units in dextrose 5% (100 units/ml) IV bolus from bag ADDITIONAL PRN BOLUS - 60 units/kg (max bolus 4000 units)  As needed (PRN)        Question:  Heparin Infusion Adjustment (DO NOT MODIFY ANSWER)  Answer:  [email or web address]\epic\Images\Pharmacy\HeparinInfusions\heparin LOW INTENSITY nomogram for [place] [id number].pdf    03/12/23 1224     heparin 25,000 units in dextrose 5% (100 units/ml) IV bolus from bag - ADDITIONAL PRN BOLUS - 30 units/kg (max bolus 4000 units)  As needed (PRN)        Question:  Heparin Infusion Adjustment (DO NOT MODIFY ANSWER)  Answer:  [email or web address]\epic\Images\Pharmacy\HeparinInfusions\heparin LOW INTENSITY nomogram for [place] [id number].pdf    03/12/23 1224                Thank you for your consult. I will follow-up with patient. Please contact us if you have any additional questions.    Manoj Alicia, DO  Cardiology   [facility]

## 2023-03-13 NOTE — PROGRESS NOTES
[facility]  Adult Nutrition  First Assessment Note         Reason for Assessment  Reason For Assessment: identified at risk by screening criteria (MST)   Nutrition Risk Screen: no indicators present     Patient seen for MST. Review of weight history reveals no significant weight changes over the past 6 months. She is on a clear liquid diet. Advance as tolerated. Recommend addition of Ensure Clear TID with meals.     Last BM 3/11 per flow sheets.     Per MD note on 3/12  HPI: 71 yo F with PMH of COPD, CKD and HTN who presented to ED with c/o SOB and generalized weakness. She reports symptoms have progressively worsened over last two weeks. Upon arrival SpO2 of 79% on room air with pt placed on BiPAP.      In ED, labs significant for leukocytosis of 26.59, Lactate of 1.9, proBNP of 3K, Troponin of 613 and BUN/Cr of 25/1.36. CXR with bilateral infiltrates suspicious of atypical pneumonia. EKG with sinus tachycardia at 106 bpm. COVID/Flu negative. Blood cultures x2 obtained and patient received 1500mL NS, Duonebs, Budesonide, Solumedrol 125mg, Rocephin and Azithromycin.      Upon examination, patient with non re-breather and bilateral lung crackles. She refuses BiPAP at this time. She reports currently smoking 4 cigarettes daily, using home oxygen occasionally, and only taking home HTN medication. At this time, patient admitted inpatient with telemetry at [facility] for further evaluation and management.          Malnutrition  Is Patient Malnourished: No  Skin Integrity  Vahid Risk Assessment  Sensory Perception: 2-->very limited  Moisture: 3-->occasionally moist  Activity: 1-->bedfast  Mobility: 2-->very limited  Nutrition: 2-->probably inadequate  Friction and Shear: 3-->no apparent problem  Vahid Score: 13  Comments on skin integrity: no issues  Nutrition Diagnosis  Increased nutrient needs of calories   related to Chronic illness as evidenced by COPD exacerbation    Nutrition Diagnosis  Status: Chronic/ continues      Nutrition Risk  Level of Risk/Frequency of Follow-up: high  Comments on nutrition risk: clear liquids   Recent Labs   Lab 03/12/23  1633 03/13/23  0531   GLU  --  127*   POCGLU 143*  --      Comments on Glucose: elevated due to sepsis  Nutrition Prescription / Recommendations  Recommendation/Intervention: Recommend advance diet as medically appropriate. Recommend addition of Ensure CLear with meals.  Goals: weight maintenance/weight gain, intake 50-75% + supplements  Nutrition Goal Status: new  Current Diet Order: clear liquid  Chewing or Swallowing Difficulty?: No Chewing or swallowing difficulty  Recommended Diet: Clear Liquid / advance as medically appropriate  Recommended Oral Supplement: Ensure Clear [240 kcals, 8g Protein, 52g Carbs(0g Fiber, 30g Sugar), 0g Fat] three times daily  Is Nutrition Support Recommended: No  Is Education Recommended: No  Monitor and Evaluation  % current Intake: P.O. intake of 0 - 10%  % intake to meet estimated needs: P.O. + Supplements  Food and Nutrient Intake: energy intake  Food and Nutrient Adminstration: diet order  Anthropometric Measurements: weight, weight change, body mass index, growth pattern indices/percentile ranks  Biochemical Data, Medical Tests and Procedures: electrolyte and renal panel, gastrointestinal profile, glucose/endocrine profile, inflammatory profile, lipid profile  Nutrition-Focused Physical Findings: overall appearance, extremities, muscles and bones, head and eyes, skin     Current Medical Diagnosis and Past Medical History     Past Medical History:   Diagnosis Date    Anxiety and depression     Chronic kidney disease, stage 3a     Chronic pain syndrome     COPD (chronic obstructive pulmonary disease)     Fracture of multiple pubic rami, left, closed, initial encounter 10/01/2021    HLD (hyperlipidemia)     Hypertension     Lumbar compression fracture, sequela L1, L2, L4, L5, kyphoplasty 12/8/2021    Lumbar  "radiculopathy     Lumbar stenosis     Lumbosacral spondylosis     Neuropathy      Nutrition/Diet History  Food Allergies: NKFA  Factors Affecting Nutritional Intake: clear liquid diet  Lab/Procedures/Meds  Recent Labs   Lab 03/13/23  0531      K 3.2*   BUN 29*   CREATININE 1.27*   CALCIUM 9.4   ALBUMIN 2.3*      ALT 28   AST 43*   BUN, crea elevated, AST elevated, will monitor altered labs, Albumin low- possibly r/t inflammatory response  Last A1c:   Lab Results   Component Value Date    HGBA1C 5.9 04/19/2021     Lab Results   Component Value Date    RBC 4.07 (L) 03/13/2023    HGB 12.0 03/13/2023    HCT 37.2 (L) 03/13/2023    MCV 91.4 03/13/2023    MCH 29.5 03/13/2023    MCHC 32.3 03/13/2023     Pertinent Labs Reviewed: reviewed  Pertinent Labs Comments: Sodium: 145  Potassium: 3.2 (L)  Chloride: 107  CO2: 22  Anion Gap: 19 (H)  BUN: 29 (H)  Creatinine: 1.27 (H)  BUN/CREAT RATIO: 23 (H)  eGFR: 45 (L)  Glucose: 127 (H)  Calcium: 9.4  Alkaline Phosphatase: 169 (H)  PROTEIN TOTAL: 6.4  Albumin: 2.3 (L)  Albumin/Globulin Ratio: 0.6  BILIRUBIN TOTAL: 0.2  AST: 43 (H)  ALT: 28  Pertinent Medications Reviewed: reviewed  Pertinent Medications Comments: losartin, rocephin, zithromax, methylprednisolone  Anthropometrics  Temp: 96.7 °F (35.9 °C)  Height Method: Estimated  Height: 5' 2" (157.5 cm)  Height (inches): 62 in  Weight Method: Bed Scale  Weight: 43.1 kg (95 lb 0.3 oz)  Weight (lb): 95.02 lb  Ideal Body Weight (IBW), Female: 110 lb  % Ideal Body Weight, Female (lb): 80.91 %  BMI (Calculated): 17.4  BMI Grade: 17 - 18.4 protein-energy malnutrition grade I     Estimated/Assessed Needs      Temp: 96.7 °F (35.9 °C)Axillary  Weight Used For Calorie Calculations: 43.1 kg (95 lb 0.3 oz)   Energy Need Method: Kcal/kg Energy Calorie Requirements (kcal): 3182-0022  Weight Used For Protein Calculations: 43.1 kg (95 lb 0.3 oz)  Protein Requirements: 43-51  Estimated Fluid Requirement Method: RDA Method    RDA Method " (mL): 1077     Nutrition by Nursing  Diet/Nutrition Received: clear liquid           Last Bowel Movement: 03/11/23              Nutrition Follow-Up  RD Follow-up?: Yes

## 2023-03-14 LAB
ANION GAP SERPL CALCULATED.3IONS-SCNC: 12 MMOL/L (ref 7–16)
ANISOCYTOSIS BLD QL SMEAR: ABNORMAL
APTT PPP: 56.9 SECONDS (ref 25.2–37.3)
APTT PPP: 60.6 SECONDS (ref 25.2–37.3)
BASOPHILS # BLD AUTO: 0.04 K/UL (ref 0–0.2)
BASOPHILS NFR BLD AUTO: 0.2 % (ref 0–1)
BUN SERPL-MCNC: 35 MG/DL (ref 7–18)
BUN/CREAT SERPL: 33 (ref 6–20)
CALCIUM SERPL-MCNC: 9.6 MG/DL (ref 8.5–10.1)
CHLORIDE SERPL-SCNC: 107 MMOL/L (ref 98–107)
CO2 SERPL-SCNC: 26 MMOL/L (ref 21–32)
CREAT SERPL-MCNC: 1.06 MG/DL (ref 0.55–1.02)
CRENATED CELLS: ABNORMAL
DIFFERENTIAL METHOD BLD: ABNORMAL
EGFR (NO RACE VARIABLE) (RUSH/TITUS): 56 ML/MIN/1.73M²
EOSINOPHIL # BLD AUTO: 0 K/UL (ref 0–0.5)
EOSINOPHIL NFR BLD AUTO: 0 % (ref 1–4)
ERYTHROCYTE [DISTWIDTH] IN BLOOD BY AUTOMATED COUNT: 18.7 % (ref 11.5–14.5)
GLUCOSE SERPL-MCNC: 138 MG/DL (ref 74–106)
HCT VFR BLD AUTO: 37.9 % (ref 38–47)
HGB BLD-MCNC: 12.6 G/DL (ref 12–16)
IMM GRANULOCYTES # BLD AUTO: 0.37 K/UL (ref 0–0.04)
IMM GRANULOCYTES NFR BLD: 1.6 % (ref 0–0.4)
LYMPHOCYTES # BLD AUTO: 0.96 K/UL (ref 1–4.8)
LYMPHOCYTES NFR BLD AUTO: 4.2 % (ref 27–41)
LYMPHOCYTES NFR BLD MANUAL: 6 % (ref 27–41)
MCH RBC QN AUTO: 29.1 PG (ref 27–31)
MCHC RBC AUTO-ENTMCNC: 33.2 G/DL (ref 32–36)
MCV RBC AUTO: 87.5 FL (ref 80–96)
MONOCYTES # BLD AUTO: 0.79 K/UL (ref 0–0.8)
MONOCYTES NFR BLD AUTO: 3.4 % (ref 2–6)
MONOCYTES NFR BLD MANUAL: 5 % (ref 2–6)
MPC BLD CALC-MCNC: 9.5 FL (ref 9.4–12.4)
NEUTROPHILS # BLD AUTO: 20.94 K/UL (ref 1.8–7.7)
NEUTROPHILS NFR BLD AUTO: 90.6 % (ref 53–65)
NEUTS SEG NFR BLD MANUAL: 89 % (ref 50–62)
NRBC # BLD AUTO: 0.09 X10E3/UL
NRBC BLD MANUAL-RTO: 1 /100 WBC
NRBC, AUTO (.00): 0.4 %
OVALOCYTES BLD QL SMEAR: ABNORMAL
PLATELET # BLD AUTO: 359 K/UL (ref 150–400)
PLATELET MORPHOLOGY: NORMAL
POLYCHROMASIA BLD QL SMEAR: ABNORMAL
POTASSIUM SERPL-SCNC: 3.9 MMOL/L (ref 3.5–5.1)
PROCALCITONIN SERPL-MCNC: 2.3 NG/ML
RBC # BLD AUTO: 4.33 M/UL (ref 4.2–5.4)
SODIUM SERPL-SCNC: 141 MMOL/L (ref 136–145)
TARGETS BLD QL SMEAR: ABNORMAL
WBC # BLD AUTO: 23.1 K/UL (ref 4.5–11)

## 2023-03-14 PROCEDURE — 80048 BASIC METABOLIC PNL TOTAL CA: CPT | Performed by: NURSE PRACTITIONER

## 2023-03-14 PROCEDURE — 20000000 HC ICU ROOM

## 2023-03-14 PROCEDURE — 85730 THROMBOPLASTIN TIME PARTIAL: CPT | Performed by: INTERNAL MEDICINE

## 2023-03-14 PROCEDURE — 94640 AIRWAY INHALATION TREATMENT: CPT

## 2023-03-14 PROCEDURE — 25500020 PHARM REV CODE 255: Performed by: INTERNAL MEDICINE

## 2023-03-14 PROCEDURE — 27000221 HC OXYGEN, UP TO 24 HOURS

## 2023-03-14 PROCEDURE — 25000003 PHARM REV CODE 250: Performed by: NURSE PRACTITIONER

## 2023-03-14 PROCEDURE — 63600175 PHARM REV CODE 636 W HCPCS: Performed by: NURSE PRACTITIONER

## 2023-03-14 PROCEDURE — 94761 N-INVAS EAR/PLS OXIMETRY MLT: CPT

## 2023-03-14 PROCEDURE — 85730 THROMBOPLASTIN TIME PARTIAL: CPT | Performed by: NURSE PRACTITIONER

## 2023-03-14 PROCEDURE — 25000003 PHARM REV CODE 250: Performed by: INTERNAL MEDICINE

## 2023-03-14 PROCEDURE — 99233 PR SUBSEQUENT HOSPITAL CARE,LEVL III: ICD-10-PCS | Mod: ,,, | Performed by: NURSE PRACTITIONER

## 2023-03-14 PROCEDURE — 25000003 PHARM REV CODE 250: Performed by: FAMILY MEDICINE

## 2023-03-14 PROCEDURE — 85025 COMPLETE CBC W/AUTO DIFF WBC: CPT | Performed by: NURSE PRACTITIONER

## 2023-03-14 PROCEDURE — 63600175 PHARM REV CODE 636 W HCPCS: Performed by: FAMILY MEDICINE

## 2023-03-14 PROCEDURE — 99900035 HC TECH TIME PER 15 MIN (STAT)

## 2023-03-14 PROCEDURE — 99233 SBSQ HOSP IP/OBS HIGH 50: CPT | Mod: ,,, | Performed by: NURSE PRACTITIONER

## 2023-03-14 PROCEDURE — 25000242 PHARM REV CODE 250 ALT 637 W/ HCPCS: Performed by: FAMILY MEDICINE

## 2023-03-14 RX ORDER — HEPARIN SODIUM 5000 [USP'U]/ML
5000 INJECTION, SOLUTION INTRAVENOUS; SUBCUTANEOUS EVERY 8 HOURS
Status: DISCONTINUED | OUTPATIENT
Start: 2023-03-14 | End: 2023-03-20 | Stop reason: HOSPADM

## 2023-03-14 RX ORDER — HYDRALAZINE HYDROCHLORIDE 20 MG/ML
10 INJECTION INTRAMUSCULAR; INTRAVENOUS EVERY 6 HOURS PRN
Status: DISCONTINUED | OUTPATIENT
Start: 2023-03-14 | End: 2023-03-20 | Stop reason: HOSPADM

## 2023-03-14 RX ORDER — AMLODIPINE BESYLATE 5 MG/1
5 TABLET ORAL DAILY
Status: DISCONTINUED | OUTPATIENT
Start: 2023-03-15 | End: 2023-03-20 | Stop reason: HOSPADM

## 2023-03-14 RX ADMIN — METHYLPREDNISOLONE SODIUM SUCCINATE 80 MG: 40 INJECTION, POWDER, FOR SOLUTION INTRAMUSCULAR; INTRAVENOUS at 06:03

## 2023-03-14 RX ADMIN — IPRATROPIUM BROMIDE AND ALBUTEROL SULFATE 3 ML: 2.5; .5 SOLUTION RESPIRATORY (INHALATION) at 07:03

## 2023-03-14 RX ADMIN — ASPIRIN 81 MG: 81 TABLET, DELAYED RELEASE ORAL at 08:03

## 2023-03-14 RX ADMIN — HEPARIN SODIUM 5000 UNITS: 5000 INJECTION, SOLUTION INTRAVENOUS; SUBCUTANEOUS at 09:03

## 2023-03-14 RX ADMIN — LOSARTAN POTASSIUM 25 MG: 25 TABLET, FILM COATED ORAL at 08:03

## 2023-03-14 RX ADMIN — MUPIROCIN: 20 OINTMENT TOPICAL at 08:03

## 2023-03-14 RX ADMIN — HEPARIN SODIUM 18 UNITS/KG/HR: 10000 INJECTION, SOLUTION INTRAVENOUS at 05:03

## 2023-03-14 RX ADMIN — DEXTROSE MONOHYDRATE 1 G: 5 INJECTION INTRAVENOUS at 08:03

## 2023-03-14 RX ADMIN — AZITHROMYCIN MONOHYDRATE 500 MG: 500 INJECTION, POWDER, LYOPHILIZED, FOR SOLUTION INTRAVENOUS at 08:03

## 2023-03-14 RX ADMIN — PANTOPRAZOLE SODIUM 40 MG: 40 TABLET, DELAYED RELEASE ORAL at 04:03

## 2023-03-14 RX ADMIN — IOPAMIDOL 100 ML: 755 INJECTION, SOLUTION INTRAVENOUS at 02:03

## 2023-03-14 RX ADMIN — IPRATROPIUM BROMIDE AND ALBUTEROL SULFATE 3 ML: 2.5; .5 SOLUTION RESPIRATORY (INHALATION) at 12:03

## 2023-03-14 RX ADMIN — METHYLPREDNISOLONE SODIUM SUCCINATE 80 MG: 40 INJECTION, POWDER, FOR SOLUTION INTRAMUSCULAR; INTRAVENOUS at 05:03

## 2023-03-14 RX ADMIN — HYDRALAZINE HYDROCHLORIDE 10 MG: 20 INJECTION, SOLUTION INTRAMUSCULAR; INTRAVENOUS at 09:03

## 2023-03-14 RX ADMIN — HYDROCODONE BITARTRATE AND ACETAMINOPHEN 1 TABLET: 7.5; 325 TABLET ORAL at 05:03

## 2023-03-14 RX ADMIN — HYDROCODONE BITARTRATE AND ACETAMINOPHEN 1 TABLET: 7.5; 325 TABLET ORAL at 09:03

## 2023-03-14 RX ADMIN — PANTOPRAZOLE SODIUM 40 MG: 40 TABLET, DELAYED RELEASE ORAL at 06:03

## 2023-03-14 RX ADMIN — HALOPERIDOL LACTATE 5 MG: 5 INJECTION, SOLUTION INTRAMUSCULAR at 10:03

## 2023-03-14 RX ADMIN — FUROSEMIDE 80 MG: 10 INJECTION, SOLUTION INTRAMUSCULAR; INTRAVENOUS at 04:03

## 2023-03-14 RX ADMIN — FUROSEMIDE 80 MG: 10 INJECTION, SOLUTION INTRAMUSCULAR; INTRAVENOUS at 06:03

## 2023-03-14 RX ADMIN — HYDROCODONE BITARTRATE AND ACETAMINOPHEN 1 TABLET: 7.5; 325 TABLET ORAL at 02:03

## 2023-03-14 NOTE — PROGRESS NOTES
[facility]  Pulmonology  Progress Note    Patient Name: Amelie Cerrato  MRN: [id number]  Admission Date: 3/11/2023  Hospital Length of Stay: 2 days  Code Status: Full Code  Attending Provider: Dustin Byrne DO  Primary Care Provider: Danilo Cloud IV, DO   Principal Problem: Acute respiratory failure with hypoxia    Subjective:     Interval History: Pt resting in bed. On bipap- will wean off as able.       Objective:     Vital Signs (Most Recent):  Temp: 99.3 °F (37.4 °C) (03/14/23 0715)  Pulse: (!) 124 (03/14/23 1045)  Resp: (!) 25 (03/14/23 1015)  BP: (!) 158/89 (03/14/23 1030)  SpO2: (!) 88 % (03/14/23 0945)   Vital Signs (24h Range):  Temp:  [98.5 °F (36.9 °C)-99.3 °F (37.4 °C)] 99.3 °F (37.4 °C)  Pulse:  [] 124  Resp:  [14-42] 25  SpO2:  [71 %-100 %] 88 %  BP: (106-188)/() 158/89     Weight: 42.1 kg (92 lb 13 oz)  Body mass index is 16.98 kg/m².      Intake/Output Summary (Last 24 hours) at 3/14/2023 1116  Last data filed at 3/14/2023 1030  Gross per 24 hour   Intake 1577.99 ml   Output 2475 ml   Net -897.01 ml       Physical Exam  Vitals and nursing note reviewed.   Constitutional:       Interventions: She is not intubated.  HENT:      Head: Normocephalic.      Nose: Nose normal.      Mouth/Throat:      Mouth: Mucous membranes are dry.      Pharynx: Oropharynx is clear.   Eyes:      Pupils: Pupils are equal, round, and reactive to light.   Cardiovascular:      Rate and Rhythm: Tachycardia present.      Heart sounds: Normal heart sounds. No murmur heard.  Pulmonary:      Effort: Pulmonary effort is normal. She is not intubated.      Breath sounds: Rales present.   Abdominal:      General: Abdomen is flat. Bowel sounds are normal. There is no distension.      Palpations: Abdomen is soft.      Tenderness: There is no abdominal tenderness.   Musculoskeletal:         General: Normal range of motion.      Cervical back: Normal range of motion and neck supple.      Right lower  leg: No edema.      Left lower leg: No edema.   Skin:     General: Skin is warm and dry.      Capillary Refill: Capillary refill takes 2 to 3 seconds.   Neurological:      Mental Status: She is alert and oriented to person, place, and time.   Psychiatric:         Mood and Affect: Mood normal.         Behavior: Behavior normal.     Review of Systems    Vents:  Oxygen Concentration (%): 50 (03/14/23 0750)    Lines/Drains/Airways       Drain  Duration                  Urethral Catheter 03/12/23 2050 16 Fr. 1 day              Peripheral Intravenous Line  Duration                  Peripheral IV - Single Lumen 03/12/23 1256 18 G Anterior;Left 1 day                    Significant Labs:    CBC/Anemia Profile:  Recent Labs   Lab 03/12/23  1302 03/13/23  0531 03/14/23  0502   WBC 22.38* 28.12* 23.10*   HGB 12.1 12.0 12.6   HCT 36.5* 37.2* 37.9*    284 359   MCV 87.3 91.4 87.5   RDW 19.2* 19.7* 18.7*        Chemistries:  Recent Labs   Lab 03/13/23  0531 03/14/23  0812    141   K 3.2* 3.9    107   CO2 22 26   BUN 29* 35*   CREATININE 1.27* 1.06*   CALCIUM 9.4 9.6   ALBUMIN 2.3*  --    PROT 6.4  --    BILITOT 0.2  --    ALKPHOS 169*  --    ALT 28  --    AST 43*  --        All pertinent labs within the past 24 hours have been reviewed.    Significant Imaging:  I have reviewed all pertinent imaging results/findings within the past 24 hours.    Assessment/Plan:     Pulmonary  * Acute respiratory failure with hypoxia  Continue current oxygen support and BiPAP  Concern for PE vs pulm edema     COPD exacerbation  Continue treatment     Community acquired pneumonia  Seems to be improving  Continue current treatment plans     Cardiac/Vascular  RVF (right ventricular failure)  - concern for PE vs pulmonary edema   - CT PE when resp status more stable     Type 2 MI (myocardial infarction)  Troponin goes up a little higher will consult Cardiology  - cardiology following; does not feel that it is ACS more demand/perfusion  mismatch     ID  Severe sepsis  Treat with antibiotics for pneumonia this seems to be improving  Improving daily           MARY Chang-ARLENEP  Pulmonology  [facility]

## 2023-03-14 NOTE — CONSULTS
Please see cardiology consult performed by Dr. Alicia.   Reason For Visit  Reason For Visit: follow up  F/u, patient was last seen in the office September 2018 for pneumonitis, COPD, and hypoxia, was seen in [facility] ER 10/2/2018.   Chaperone: GURWINDER BRAY is accompanied by her spouse and accompanied by her family member.     :  services not used.     Referred By: Dr Giordano PCP      Quality    COPD CI no tobacco use, did not provide intervention and counseling in regards to tobacco use, preventive medicine therapy for influenza, preventive medicine therapy for pneumococcal, preventive medicine services Beta Agonist/Anticholinergic, does not have feelings of hopelessness (PHQ-2) and no anhedonia (PHQ-2).      History of Present Illness  HPI Free Text: Mrs. Bray comes in today for follow-up of her COPD, hypoxemia, possible pneumonitis from [facility] for esophageal cancer. Since she was last seen by ALAYNA Toth she has been on 30 mg of prednisone twice daily. She is not sleeping well at night and unfortunately she has been taking her prednisone at bedtime. She is overall less short of breath but does have some days that she is still very weak and fatigued. She denies any significant cough or production of sputum and no fever or chills. She was sent to the emergency room 10 days ago per our office with complaints of a severe sore throat and tightening in her throat. She was given a nebulizer treatment with albuterol and ipratropium and her symptoms improved therefore she did not want to be admitted. Her chest x-ray showed no significant change from previous with ongoing bilateral atelectasis/infiltrates. Since that visit she has been doing albuterol and Atrovent nebulizers twice daily and she feels like those have been helping her significantly. She states her oxygen saturations have been better and she is using 3 L with exertion and at night but at rest she can take it off and her sats will stay in the 90s.      Review of Systems    Constitution:  fatigue, but no fever and no chills.   Cardiovascular: chest pain and lower extremity edema, but no lightheadedness, no palpitations and the heart is not racing.   Pulmo: during exertion, but no chronic cough, no PND and not expressed as feeling short of breath.   Neurological: no dizziness and no headache.   Psychiatric: no feelings of hopelessness, no anhedonia, no anxiety and no depression.      Active Problems  Chronic obstructive pulmonary disease (J44.9)  Esophageal cancer (C15.9)  Hypoxemia (R09.02)  Obstructive sleep apnea (G47.33)  Pneumonitis (J18.9)    Past Medical History  Chronic obstructive pulmonary disease (J44.9)  History of chest pain (Z87.898)  History of Sinusitis (J32.9)    Surgical History  History of Appendectomy  History of Ovarian Cystectomy  History of Pilonidal Cyst Resection  History of Tonsillectomy With Adenoidectomy    Family History  Family history of Acute Myocardial Infarction : Father  Family history of Breast Cancer : Mother  Family history of Congestive Heart Failure : Mother    Social History  Denied: History of Alcohol Use (History)  Caffeine Use  Educational Level Grade  Former smoker (Z87.891)  Living Independently With Spouse  Marital History - Currently   Occupation: Retired    Review  Review: past medical history problem list family medical history surgical history social history      Allergies  Niacin TABS    Current Meds  Albuterol Sulfate (2.5 MG/3ML) 0.083% Inhalation Nebulization Solution; USE 1 UNIT  DOSE IN NEBULIZER EVERY 4 HOURS AS NEEDED;  Therapy: 98Nac1861 to (Evaluate:22Llz4506)  Requested for: 95Msq0644; Last  Rx:[id number] Ordered  Aspirin 325 MG Oral Tablet; TAKE 1 TABLET DAILY;  Therapy: 67Hlb6307 to Recorded  Atorvastatin Calcium 80 MG Oral Tablet; TAKE 1 TABLET BEDTIME;  Therapy: 00Uwj6109 to Recorded  Breo Ellipta 200-25 MCG/INH Inhalation Aerosol Powder Breath Activated; one puff daily;  Therapy: 69Ptl6294 to (Last Rx:56Jzp3015)  Requested for:  94Wwc8129 Ordered  Centrum Silver Oral Tablet Chewable; 1 by mouth daily;  Therapy: 04Jan2010 to  Requested for:  Recorded  CoQ-10 100 MG Oral Capsule; TAKE AS DIRECTED;  Therapy: 35Hso8467 to Recorded  Cozaar 100 MG Oral Tablet; TAKE 1 TABLET DAILY;  Therapy: 28Mar2014 to Recorded  Fluticasone Propionate 50 MCG/ACT Nasal Suspension; USE AS DIRECTED;  Therapy: 35Roz5216 to Recorded  Isosorbide Mononitrate ER 30 MG Oral Tablet Extended Release 24 Hour; TAKE 1  TABLET DAILY;  Therapy: 26May2017 to Recorded  Magnesium 400 MG Oral Tablet; TAKE 2 TABLET DAILY;  Therapy: 26May2017 to Recorded  MetFORMIN HCl - 500 MG Oral Tablet; TAKE 1 TABLET DAILY PRN;  Therapy: 03Feb2012 to Recorded  Nebulizer Device; USE AS DIRECTED;  Therapy: 16Aug2018 to (Last Rx:26Izl8111)  Requested for: 26Npm2663 Ordered  Oxygen; 3 L at hs bled into bipap, 3L during the day;  Therapy: 04Sep2018 to Recorded  Positive Pressure Admin Set MISC; 12cm H20, HH and interface of choice;  Therapy: 28Aug2006 to  Requested for:  Recorded  PredniSONE 10 MG Oral Tablet; 30 mg BID for 30 days;  Therapy: 13Sep2018 to (Evaluate:13Oct2018)  Requested for: 86Jae6857; Last  Rx:[id number] Ordered  PriLOSEC OTC 20 MG Oral Tablet Delayed Release; TAKE 1 TABLET TWICE DAILY;  Therapy: 04Jan2010 to  Requested for: 94Qjx2087 Recorded  [place] Oil-1000 200 MG Oral Capsule;  Therapy: (Recorded:12Sze4031) to Recorded  Synthroid 100 MCG Oral Tablet; one PO daily;  Therapy: 09Jan2011 to  Requested for: 76Qlx6509 Recorded  Ventolin  (90 Base) MCG/ACT Inhalation Aerosol Solution; INHALE 1 PUFF  EVERY 4 HOURS AS NEEDED;  Therapy: 23Aug2018 to Recorded  Vitamin D-3 1000 UNIT Oral Capsule; TAKE 1 CAPSULE DAILY;  Therapy: 26May2017 to Recorded    Vitals  Signs   Recorded: 12Oct2018 09:14AM   Height: 5 ft 6 in  Weight: 238 lb   BMI Calculated: 38.41  BSA Calculated: 2.15  O2 Saturation: 95, Nasal Cannula  FiO2: 3L/min, Nasal Cannula  Heart Rate: 92  Blood Pressure: 128 / 60, RUE,  Sitting    Physical Exam  Constitutional: The patient appears to be their stated age and in no acute distress.   HEENT: atraumatic, no deformities, normocephalic and normal facies. Moon face. clear conjunctiva, clear sclera and PERRL. no lesions, no masses, normal dentition and moist mucous membranes.   Neck: Supple, full range of motion, no JVD, trachea midline   Pulmonary: Inspection of chest is normal. Palpation of chest is normal. Clear to auscultation. Respiratory effort normal, no use of accessory breathing muscles.   Cardiovascular: regular rate and rhythm. Trace bilateral lower extremity edema.   Abdomen: Soft, not tender and bowel sounds present.   Extremities: no clubbing and no cyanosis.   Neurological: alert and oriented x3 and no obvious muscle weakness noted. coordination is normal.   Psychiatric: Mood and affect normal. judgment is not impaired.      Immunizations  Influenza --- Series1: 01-Oct-2014; Series2: 18-Oct-2015; Series3: 11-Oct-2016; Series4:  02-Nov-2017   PPSV --- Series1: 18-Jul-2011     Assessment  Chronic obstructive pulmonary disease (J44.9)  Obstructive sleep apnea (G47.33)    Orders  Ipratropium-Albuterol 0.5-2.5 (3) MG/3ML Inhalation Solution; USE 1 UNIT DOSE IN  NEBULIZER 4 TIMES DAILY  Albuterol Sulfate (2.5 MG/3ML) 0.083% Inhalation Nebulization Solution; USE 1  UNIT DOSE IN NEBULIZER EVERY 4 HOURS AS NEEDED  PredniSONE 10 MG Oral Tablet; TAKE 2 TABLET TWICE DAILY  Follow-up visit in 3 weeks Follow Up  Follow-up with yudelka  Status: Active  Requested  for: 02Nov2018    Discussion/Summary  1. COPD-I have discussed with her Spiriva versus ipratropium and she would prefer to stay on the ipratropium nebulized 4 times per day with albuterol. She feels like it is helped her more and also it is cheaper per insurance at this time. She will continue Brio Elipta and albuterol HFA or nebulizer as needed.  2. Pneumonitis likely from Keytruda-we will begin slowly tapering her prednisone  down to 40 mg daily. I have asked her that if she was going to take it twice daily that she take her second dose at noon to see if this helps her consolidate her sleep better.  3. Chronic hypoxic respiratory failure-overall her oxygenation seems to be improving. She will continue 3 L during the day and at night.  4. Esophageal cancer-continue follow-up with oncology.      Signatures   Electronically signed by : ROSA AJ CNP; Oct 12 2018 12:26PM CST

## 2023-03-14 NOTE — ASSESSMENT & PLAN NOTE
Echo shows moderate to severe right ventricular dilatation, elevated PA pressures consistent with pulmonary hypertension. Unclear if etiology is new pneumonia, however would consider PE, recommend CT chest when pt can tolerate.  She is on systemic anticoagulation with heparin, would continue at least until able to undergo definitive studies to rule out PE.    3/13/2023:  - Patient seen and evaluated by Dr. Reid  - US negative for DVT  - BNP 3000; Troponin 600, 800, 1000; EKG reviewed  - Start IV lasix 80mg BID; potassium 40meq now and repeat at 4pm  - BMP in am

## 2023-03-14 NOTE — PROGRESS NOTES
[facility]  Cardiology  Progress Note    Patient Name: Amelie Cerrato  MRN: [id number]  Admission Date: 3/11/2023  Hospital Length of Stay: 1 days  Code Status: Full Code   Attending Physician: Dustin Byrne DO   Primary Care Physician: Danilo Cloud IV, DO  Expected Discharge Date:   Principal Problem:Acute respiratory failure with hypoxia    Subjective:     Hospital Course:   No notes on file    Interval History: Patient seen and evaluated by Dr. Reid.    ALMA DELIA  Objective:     Vital Signs (Most Recent):  Temp: 98.6 °F (37 °C) (03/13/23 1905)  Pulse: 101 (03/13/23 2200)  Resp: (!) 27 (03/13/23 2200)  BP: (!) 158/79 (03/13/23 2200)  SpO2: (!) 94 % (03/13/23 2200)   Vital Signs (24h Range):  Temp:  [96.7 °F (35.9 °C)-100 °F (37.8 °C)] 98.6 °F (37 °C)  Pulse:  [] 101  Resp:  [18-45] 27  SpO2:  [71 %-100 %] 94 %  BP: (106-183)/() 158/79     Weight: 43.1 kg (95 lb 0.3 oz)  Body mass index is 17.38 kg/m².     SpO2: (!) 94 %         Intake/Output Summary (Last 24 hours) at 3/13/2023 2207  Last data filed at 3/13/2023 2201  Gross per 24 hour   Intake 1085.28 ml   Output 1650 ml   Net -564.72 ml       Lines/Drains/Airways       Drain  Duration                  Urethral Catheter 03/12/23 2050 16 Fr. 1 day              Peripheral Intravenous Line  Duration                  Peripheral IV - Single Lumen 03/12/23 0600 22 G Anterior;Proximal;Right Forearm 1 day         Peripheral IV - Single Lumen 03/12/23 1256 18 G Anterior;Left 1 day                    Physical Exam  Vitals reviewed.   Constitutional:       Appearance: She is ill-appearing.   Neck:      Vascular: JVD present.   Cardiovascular:      Rate and Rhythm: Regular rhythm. Tachycardia present.   Pulmonary:      Effort: Tachypnea present.      Breath sounds: Rales present.   Abdominal:      General: Bowel sounds are normal.      Palpations: Abdomen is soft.   Skin:     General: Skin is warm and dry.       Significant Labs: ABG:    Recent Labs   Lab 03/12/23  0048 03/12/23  0646   PH 7.45 7.37   PCO2 25* 29*   HCO3 17.4* 16.8*   POCSATURATED 95 85.2*   , Blood Culture: No results for input(s): LABBLOO in the last 48 hours., BMP:   Recent Labs   Lab 03/11/23  2330 03/12/23  0303 03/13/23  0531   GLU 86  --  127*     --  145   K 3.6  --  3.2*     --  107   CO2 21  --  22   BUN 25*  --  29*   CREATININE 1.36*  --  1.27*   CALCIUM 9.3  --  9.4   MG  --  2.1  --    , CMP   Recent Labs   Lab 03/11/23 2330 03/13/23  0531    145   K 3.6 3.2*    107   CO2 21 22   GLU 86 127*   BUN 25* 29*   CREATININE 1.36* 1.27*   CALCIUM 9.3 9.4   PROT 7.0 6.4   ALBUMIN 2.8* 2.3*   BILITOT 0.3 0.2   ALKPHOS 134 169*   AST 43* 43*   ALT 21 28   ANIONGAP 21* 19*   , CBC   Recent Labs   Lab 03/11/23 2330 03/12/23 0048 03/12/23  1302 03/13/23  0531   WBC 26.59*  --  22.38* 28.12*   HGB 14.2  --  12.1 12.0   HCT 42.6   < > 36.5* 37.2*     --  362 284    < > = values in this interval not displayed.   , INR   Recent Labs   Lab 03/12/23 0303 03/12/23  1302   INR 1.22 1.21   , Lipid Panel   Recent Labs   Lab 03/12/23  0303 03/13/23  0531   CHOL 166 154   HDL 83* 58   LDLCALC 44 57   TRIG 196* 195*   CHOLHDL 2.0 2.7   , and Troponin No results for input(s): TROPONINI in the last 48 hours.    Significant Imaging: Echocardiogram: Transthoracic echo (TTE) complete (Cupid Only):   Results for orders placed or performed during the hospital encounter of 03/11/23   Echo   Result Value Ref Range    BSA 1.33 m2    EF 45 %    AORTIC VALVE CUSP SEPERATION 1.52 cm    LVIDd 3.03 (A) 3.5 - 6.0 cm    IVS 1.23 (A) 0.6 - 1.1 cm    Posterior Wall 1.10 0.6 - 1.1 cm    LVIDs 2.46 2.1 - 4.0 cm    FS 19 28 - 44 %    LV mass 105.58 g    LA size 2.70 cm    Left Ventricle Relative Wall Thickness 0.73 cm    E wave deceleration time 163.00 msec    Ao peak herb 1.1 m/s    AV peak gradient 5 mmHg    MV Peak E Herb 0.70 m/s    TR Max Herb 3.62 m/s    LV Systolic Volume  21.44 mL    LV Systolic Volume Index 15.9 mL/m2    LV Diastolic Volume 35.86 mL    LV Diastolic Volume Index 26.56 mL/m2    LV Mass Index 78 g/m2    Triscuspid Valve Regurgitation Peak Gradient 52 mmHg    Narrative    · The left ventricle is normal in size with mild concentric hypertrophy   and mildly decreased systolic function.  · The estimated ejection fraction is 45%.  · Left ventricular diastolic dysfunction.  · Severe tricuspid regurgitation.  · There is moderate pulmonary hypertension.  · Moderate right ventricular enlargement with low normal right ventricular   systolic function.  · Mild right atrial enlargement.       and X-Ray: CXR: X-Ray Chest 1 View (CXR):   Results for orders placed or performed during the hospital encounter of 03/11/23   X-Ray Chest 1 View    Narrative    EXAMINATION:  XR CHEST 1 VIEW    CLINICAL HISTORY:  ARDS;    COMPARISON:  Chest x-ray March 11, 2023    TECHNIQUE:  Frontal view/views of the chest.    FINDINGS:  The cardiomediastinal silhouette is stable in configuration.  Similar ground-glass and reticular opacification of the bilateral lungs.  Visualized osseous and surrounding soft tissue structures appear grossly unchanged.  Multilevel vertebroplasty.      Impression    Insert in    Point of Service: [facility]      Electronically signed by: Vasquez Khan  Date:    03/13/2023  Time:    07:47     Assessment and Plan:     Brief HPI: 73 y/o with PMH of COPD, CKD and HTN who presented to ED with c/o SOB and generalized weakness. She reports symptoms have progressively worsened over last two weeks. Upon arrival SpO2 of 79% on room air with pt placed on BiPAP.      In ED, labs significant for leukocytosis of 26.59, Lactate of 1.9, proBNP of 3K, Troponin of 613 and BUN/Cr of 25/1.36. CXR with bilateral infiltrates suspicious of atypical pneumonia. EKG with sinus tachycardia at 106 bpm. COVID/Flu negative. Blood cultures x2 obtained and patient received 1500mL NS, Duonebs,  Budesonide, Solumedrol 125mg, Rocephin and Azithromycin.    RVF (right ventricular failure)  Echo shows moderate to severe right ventricular dilatation, elevated PA pressures consistent with pulmonary hypertension. Unclear if etiology is new pneumonia, however would consider PE, recommend CT chest when pt can tolerate.  She is on systemic anticoagulation with heparin, would continue at least until able to undergo definitive studies to rule out PE.    3/13/2023:  - Patient seen and evaluated by Dr. Reid  - US negative for DVT  - BNP 3000; Troponin 600, 800, 1000; EKG reviewed  - Start IV lasix 80mg BID; potassium 40meq now and repeat at 4pm  - BMP in am      Elevated brain natriuretic peptide (BNP) level  Echo shows moderate to severe right ventricular dilatation, elevated PA pressures consistent with pulmonary hypertension.  Unclear if etiology is new pneumonia, however would consider PE, recommend CT chest when pt can tolerate.  She is on systemic anticoagulation with heparin, would continue at least until able to undergo definitive studies to rule out PE.    NSTEMI (non-ST elevated myocardial infarction)  Elevated troponin, No acute EKG changes, most consistent with type 2 MI, due to demand/perfusion mismatch rather than ACS, continue supportive care.    Community acquired pneumonia  Pt on antibiotics with some improvement in respiratory status.        VTE Risk Mitigation (From admission, onward)         Ordered     heparin 25,000 units in dextrose 5% 250 mL (100 units/mL) infusion LOW INTENSITY nomogram - [facility]  Continuous        Question:  Begin at (in units/kg/hr)  Answer:  12 03/12/23 1224     heparin 25,000 units in dextrose 5% (100 units/ml) IV bolus from bag ADDITIONAL PRN BOLUS - 60 units/kg (max bolus 4000 units)  As needed (PRN)        Question:  Heparin Infusion Adjustment (DO NOT MODIFY ANSWER)  Answer:  [email or web address]\epic\Images\Pharmacy\HeparinInfusions\heparin LOW INTENSITY nomogram for [facility]  [id number]    03/12/23 1224     heparin 25,000 units in dextrose 5% (100 units/ml) IV bolus from bag - ADDITIONAL PRN BOLUS - 30 units/kg (max bolus 4000 units)  As needed (PRN)        Question:  Heparin Infusion Adjustment (DO NOT MODIFY ANSWER)  Answer:  \\[email or web address]\epic\Images\Pharmacy\HeparinInfusions\heparin LOW INTENSITY nomogram for [place] [id number].pdf    03/12/23 1224                MARY Pearl  Cardiology  [facility]   28-Feb-2021

## 2023-03-14 NOTE — ASSESSMENT & PLAN NOTE
Troponin goes up a little higher will consult Cardiology  - cardiology following; does not feel that it is ACS more demand/perfusion mismatch

## 2023-03-14 NOTE — HPI
73 yo F with PMH of COPD, CKD and HTN who presented to ED with c/o SOB and generalized weakness. She reports symptoms have progressively worsened over last two weeks. Upon arrival SpO2 of 79% on room air with pt placed on BiPAP.

## 2023-03-14 NOTE — SUBJECTIVE & OBJECTIVE
Interval History: Patient seen and evaluated by Dr. Reid.    ALMA DELIA  Objective:     Vital Signs (Most Recent):  Temp: 98.6 °F (37 °C) (03/13/23 1905)  Pulse: 101 (03/13/23 2200)  Resp: (!) 27 (03/13/23 2200)  BP: (!) 158/79 (03/13/23 2200)  SpO2: (!) 94 % (03/13/23 2200)   Vital Signs (24h Range):  Temp:  [96.7 °F (35.9 °C)-100 °F (37.8 °C)] 98.6 °F (37 °C)  Pulse:  [] 101  Resp:  [18-45] 27  SpO2:  [71 %-100 %] 94 %  BP: (106-183)/() 158/79     Weight: 43.1 kg (95 lb 0.3 oz)  Body mass index is 17.38 kg/m².     SpO2: (!) 94 %         Intake/Output Summary (Last 24 hours) at 3/13/2023 2207  Last data filed at 3/13/2023 2201  Gross per 24 hour   Intake 1085.28 ml   Output 1650 ml   Net -564.72 ml       Lines/Drains/Airways       Drain  Duration                  Urethral Catheter 03/12/23 2050 16 Fr. 1 day              Peripheral Intravenous Line  Duration                  Peripheral IV - Single Lumen 03/12/23 0600 22 G Anterior;Proximal;Right Forearm 1 day         Peripheral IV - Single Lumen 03/12/23 1256 18 G Anterior;Left 1 day                    Physical Exam  Vitals reviewed.   Constitutional:       Appearance: She is ill-appearing.   Neck:      Vascular: JVD present.   Cardiovascular:      Rate and Rhythm: Regular rhythm. Tachycardia present.   Pulmonary:      Effort: Tachypnea present.      Breath sounds: Rales present.   Abdominal:      General: Bowel sounds are normal.      Palpations: Abdomen is soft.   Skin:     General: Skin is warm and dry.       Significant Labs: ABG:   Recent Labs   Lab 03/12/23  0048 03/12/23  0646   PH 7.45 7.37   PCO2 25* 29*   HCO3 17.4* 16.8*   POCSATURATED 95 85.2*   , Blood Culture: No results for input(s): LABBLOO in the last 48 hours., BMP:   Recent Labs   Lab 03/11/23  2330 03/12/23  0303 03/13/23  0531   GLU 86  --  127*     --  145   K 3.6  --  3.2*     --  107   CO2 21  --  22   BUN 25*  --  29*   CREATININE 1.36*  --  1.27*   CALCIUM 9.3  --  9.4   MG   --  2.1  --    , CMP   Recent Labs   Lab 03/11/23  2330 03/13/23  0531    145   K 3.6 3.2*    107   CO2 21 22   GLU 86 127*   BUN 25* 29*   CREATININE 1.36* 1.27*   CALCIUM 9.3 9.4   PROT 7.0 6.4   ALBUMIN 2.8* 2.3*   BILITOT 0.3 0.2   ALKPHOS 134 169*   AST 43* 43*   ALT 21 28   ANIONGAP 21* 19*   , CBC   Recent Labs   Lab 03/11/23  2330 03/12/23  0048 03/12/23  1302 03/13/23  0531   WBC 26.59*  --  22.38* 28.12*   HGB 14.2  --  12.1 12.0   HCT 42.6   < > 36.5* 37.2*     --  362 284    < > = values in this interval not displayed.   , INR   Recent Labs   Lab 03/12/23  0303 03/12/23  1302   INR 1.22 1.21   , Lipid Panel   Recent Labs   Lab 03/12/23  0303 03/13/23  0531   CHOL 166 154   HDL 83* 58   LDLCALC 44 57   TRIG 196* 195*   CHOLHDL 2.0 2.7   , and Troponin No results for input(s): TROPONINI in the last 48 hours.    Significant Imaging: Echocardiogram: Transthoracic echo (TTE) complete (Cupid Only):   Results for orders placed or performed during the hospital encounter of 03/11/23   Echo   Result Value Ref Range    BSA 1.33 m2    EF 45 %    AORTIC VALVE CUSP SEPERATION 1.52 cm    LVIDd 3.03 (A) 3.5 - 6.0 cm    IVS 1.23 (A) 0.6 - 1.1 cm    Posterior Wall 1.10 0.6 - 1.1 cm    LVIDs 2.46 2.1 - 4.0 cm    FS 19 28 - 44 %    LV mass 105.58 g    LA size 2.70 cm    Left Ventricle Relative Wall Thickness 0.73 cm    E wave deceleration time 163.00 msec    Ao peak herb 1.1 m/s    AV peak gradient 5 mmHg    MV Peak E Herb 0.70 m/s    TR Max Herb 3.62 m/s    LV Systolic Volume 21.44 mL    LV Systolic Volume Index 15.9 mL/m2    LV Diastolic Volume 35.86 mL    LV Diastolic Volume Index 26.56 mL/m2    LV Mass Index 78 g/m2    Triscuspid Valve Regurgitation Peak Gradient 52 mmHg    Narrative    · The left ventricle is normal in size with mild concentric hypertrophy   and mildly decreased systolic function.  · The estimated ejection fraction is 45%.  · Left ventricular diastolic dysfunction.  · Severe tricuspid  regurgitation.  · There is moderate pulmonary hypertension.  · Moderate right ventricular enlargement with low normal right ventricular   systolic function.  · Mild right atrial enlargement.       and X-Ray: CXR: X-Ray Chest 1 View (CXR):   Results for orders placed or performed during the hospital encounter of 03/11/23   X-Ray Chest 1 View    Narrative    EXAMINATION:  XR CHEST 1 VIEW    CLINICAL HISTORY:  ARDS;    COMPARISON:  Chest x-ray March 11, 2023    TECHNIQUE:  Frontal view/views of the chest.    FINDINGS:  The cardiomediastinal silhouette is stable in configuration.  Similar ground-glass and reticular opacification of the bilateral lungs.  Visualized osseous and surrounding soft tissue structures appear grossly unchanged.  Multilevel vertebroplasty.      Impression    Insert in    Point of Service: [facility]      Electronically signed by: Vasquez Khan  Date:    03/13/2023  Time:    07:47

## 2023-03-14 NOTE — SUBJECTIVE & OBJECTIVE
Interval History: Pt resting in bed. On bipap- will wean off as able.       Objective:     Vital Signs (Most Recent):  Temp: 99.3 °F (37.4 °C) (03/14/23 0715)  Pulse: (!) 124 (03/14/23 1045)  Resp: (!) 25 (03/14/23 1015)  BP: (!) 158/89 (03/14/23 1030)  SpO2: (!) 88 % (03/14/23 0945)   Vital Signs (24h Range):  Temp:  [98.5 °F (36.9 °C)-99.3 °F (37.4 °C)] 99.3 °F (37.4 °C)  Pulse:  [] 124  Resp:  [14-42] 25  SpO2:  [71 %-100 %] 88 %  BP: (106-188)/() 158/89     Weight: 42.1 kg (92 lb 13 oz)  Body mass index is 16.98 kg/m².      Intake/Output Summary (Last 24 hours) at 3/14/2023 1116  Last data filed at 3/14/2023 1030  Gross per 24 hour   Intake 1577.99 ml   Output 2475 ml   Net -897.01 ml       Physical Exam  Vitals and nursing note reviewed.   Constitutional:       Interventions: She is not intubated.  HENT:      Head: Normocephalic.      Nose: Nose normal.      Mouth/Throat:      Mouth: Mucous membranes are dry.      Pharynx: Oropharynx is clear.   Eyes:      Pupils: Pupils are equal, round, and reactive to light.   Cardiovascular:      Rate and Rhythm: Tachycardia present.      Heart sounds: Normal heart sounds. No murmur heard.  Pulmonary:      Effort: Pulmonary effort is normal. She is not intubated.      Breath sounds: Rales present.   Abdominal:      General: Abdomen is flat. Bowel sounds are normal. There is no distension.      Palpations: Abdomen is soft.      Tenderness: There is no abdominal tenderness.   Musculoskeletal:         General: Normal range of motion.      Cervical back: Normal range of motion and neck supple.      Right lower leg: No edema.      Left lower leg: No edema.   Skin:     General: Skin is warm and dry.      Capillary Refill: Capillary refill takes 2 to 3 seconds.   Neurological:      Mental Status: She is alert and oriented to person, place, and time.   Psychiatric:         Mood and Affect: Mood normal.         Behavior: Behavior normal.     Review of  Systems    Vents:  Oxygen Concentration (%): 50 (03/14/23 0750)    Lines/Drains/Airways       Drain  Duration                  Urethral Catheter 03/12/23 2050 16 Fr. 1 day              Peripheral Intravenous Line  Duration                  Peripheral IV - Single Lumen 03/12/23 1256 18 G Anterior;Left 1 day                    Significant Labs:    CBC/Anemia Profile:  Recent Labs   Lab 03/12/23  1302 03/13/23  0531 03/14/23  0502   WBC 22.38* 28.12* 23.10*   HGB 12.1 12.0 12.6   HCT 36.5* 37.2* 37.9*    284 359   MCV 87.3 91.4 87.5   RDW 19.2* 19.7* 18.7*        Chemistries:  Recent Labs   Lab 03/13/23  0531 03/14/23  0812    141   K 3.2* 3.9    107   CO2 22 26   BUN 29* 35*   CREATININE 1.27* 1.06*   CALCIUM 9.4 9.6   ALBUMIN 2.3*  --    PROT 6.4  --    BILITOT 0.2  --    ALKPHOS 169*  --    ALT 28  --    AST 43*  --        All pertinent labs within the past 24 hours have been reviewed.    Significant Imaging:  I have reviewed all pertinent imaging results/findings within the past 24 hours.

## 2023-03-15 PROBLEM — J96.01 ACUTE RESPIRATORY FAILURE WITH HYPOXIA: Status: RESOLVED | Noted: 2023-03-12 | Resolved: 2023-03-15

## 2023-03-15 PROBLEM — A41.9 SEVERE SEPSIS: Status: RESOLVED | Noted: 2023-03-12 | Resolved: 2023-03-15

## 2023-03-15 PROBLEM — R65.20 SEVERE SEPSIS: Status: RESOLVED | Noted: 2023-03-12 | Resolved: 2023-03-15

## 2023-03-15 LAB
ANION GAP SERPL CALCULATED.3IONS-SCNC: 13 MMOL/L (ref 7–16)
ANISOCYTOSIS BLD QL SMEAR: ABNORMAL
BASOPHILS # BLD AUTO: 0.03 K/UL (ref 0–0.2)
BASOPHILS NFR BLD AUTO: 0.2 % (ref 0–1)
BUN SERPL-MCNC: 36 MG/DL (ref 7–18)
BUN/CREAT SERPL: 32 (ref 6–20)
CALCIUM SERPL-MCNC: 9.5 MG/DL (ref 8.5–10.1)
CHLORIDE SERPL-SCNC: 95 MMOL/L (ref 98–107)
CO2 SERPL-SCNC: 36 MMOL/L (ref 21–32)
CREAT SERPL-MCNC: 1.11 MG/DL (ref 0.55–1.02)
DIFFERENTIAL METHOD BLD: ABNORMAL
EGFR (NO RACE VARIABLE) (RUSH/TITUS): 53 ML/MIN/1.73M²
EOSINOPHIL # BLD AUTO: 0 K/UL (ref 0–0.5)
EOSINOPHIL NFR BLD AUTO: 0 % (ref 1–4)
ERYTHROCYTE [DISTWIDTH] IN BLOOD BY AUTOMATED COUNT: 18.2 % (ref 11.5–14.5)
GLUCOSE SERPL-MCNC: 126 MG/DL (ref 74–106)
HCT VFR BLD AUTO: 37.4 % (ref 38–47)
HGB BLD-MCNC: 12.3 G/DL (ref 12–16)
HYPOCHROMIA BLD QL SMEAR: ABNORMAL
IMM GRANULOCYTES # BLD AUTO: 0.16 K/UL (ref 0–0.04)
IMM GRANULOCYTES NFR BLD: 0.8 % (ref 0–0.4)
LYMPHOCYTES # BLD AUTO: 0.97 K/UL (ref 1–4.8)
LYMPHOCYTES NFR BLD AUTO: 5 % (ref 27–41)
LYMPHOCYTES NFR BLD MANUAL: 3 % (ref 27–41)
MCH RBC QN AUTO: 28.7 PG (ref 27–31)
MCHC RBC AUTO-ENTMCNC: 32.9 G/DL (ref 32–36)
MCV RBC AUTO: 87.2 FL (ref 80–96)
MONOCYTES # BLD AUTO: 0.62 K/UL (ref 0–0.8)
MONOCYTES NFR BLD AUTO: 3.2 % (ref 2–6)
MONOCYTES NFR BLD MANUAL: 2 % (ref 2–6)
MPC BLD CALC-MCNC: 10.2 FL (ref 9.4–12.4)
NEUTROPHILS # BLD AUTO: 17.5 K/UL (ref 1.8–7.7)
NEUTROPHILS NFR BLD AUTO: 90.8 % (ref 53–65)
NEUTS BAND NFR BLD MANUAL: 3 % (ref 1–5)
NEUTS SEG NFR BLD MANUAL: 92 % (ref 50–62)
NRBC # BLD AUTO: 0.11 X10E3/UL
NRBC BLD MANUAL-RTO: 1 /100 WBC
NRBC, AUTO (.00): 0.6 %
OVALOCYTES BLD QL SMEAR: ABNORMAL
PLATELET # BLD AUTO: 311 K/UL (ref 150–400)
PLATELET MORPHOLOGY: ABNORMAL
POLYCHROMASIA BLD QL SMEAR: ABNORMAL
POTASSIUM SERPL-SCNC: 3.5 MMOL/L (ref 3.5–5.1)
RBC # BLD AUTO: 4.29 M/UL (ref 4.2–5.4)
SODIUM SERPL-SCNC: 140 MMOL/L (ref 136–145)
TARGETS BLD QL SMEAR: ABNORMAL
WBC # BLD AUTO: 19.28 K/UL (ref 4.5–11)

## 2023-03-15 PROCEDURE — 63600175 PHARM REV CODE 636 W HCPCS: Performed by: NURSE PRACTITIONER

## 2023-03-15 PROCEDURE — 25000003 PHARM REV CODE 250: Performed by: NURSE PRACTITIONER

## 2023-03-15 PROCEDURE — 25000003 PHARM REV CODE 250: Performed by: INTERNAL MEDICINE

## 2023-03-15 PROCEDURE — 80048 BASIC METABOLIC PNL TOTAL CA: CPT | Performed by: NURSE PRACTITIONER

## 2023-03-15 PROCEDURE — 11000001 HC ACUTE MED/SURG PRIVATE ROOM

## 2023-03-15 PROCEDURE — 85025 COMPLETE CBC W/AUTO DIFF WBC: CPT | Performed by: NURSE PRACTITIONER

## 2023-03-15 PROCEDURE — 27000221 HC OXYGEN, UP TO 24 HOURS

## 2023-03-15 PROCEDURE — 25000003 PHARM REV CODE 250: Performed by: FAMILY MEDICINE

## 2023-03-15 PROCEDURE — 63600175 PHARM REV CODE 636 W HCPCS: Performed by: FAMILY MEDICINE

## 2023-03-15 PROCEDURE — 63600175 PHARM REV CODE 636 W HCPCS: Performed by: INTERNAL MEDICINE

## 2023-03-15 PROCEDURE — 99233 PR SUBSEQUENT HOSPITAL CARE,LEVL III: ICD-10-PCS | Mod: ,,, | Performed by: INTERNAL MEDICINE

## 2023-03-15 PROCEDURE — 99900035 HC TECH TIME PER 15 MIN (STAT)

## 2023-03-15 PROCEDURE — 25000242 PHARM REV CODE 250 ALT 637 W/ HCPCS: Performed by: FAMILY MEDICINE

## 2023-03-15 PROCEDURE — 94640 AIRWAY INHALATION TREATMENT: CPT

## 2023-03-15 PROCEDURE — 99233 SBSQ HOSP IP/OBS HIGH 50: CPT | Mod: ,,, | Performed by: INTERNAL MEDICINE

## 2023-03-15 PROCEDURE — 94761 N-INVAS EAR/PLS OXIMETRY MLT: CPT

## 2023-03-15 RX ORDER — HYDROCODONE BITARTRATE AND ACETAMINOPHEN 7.5; 325 MG/1; MG/1
1 TABLET ORAL EVERY 6 HOURS PRN
Status: DISCONTINUED | OUTPATIENT
Start: 2023-03-15 | End: 2023-03-20 | Stop reason: HOSPADM

## 2023-03-15 RX ORDER — LOSARTAN POTASSIUM 50 MG/1
50 TABLET ORAL DAILY
Status: DISCONTINUED | OUTPATIENT
Start: 2023-03-15 | End: 2023-03-18

## 2023-03-15 RX ADMIN — METHYLPREDNISOLONE SODIUM SUCCINATE 40 MG: 40 INJECTION, POWDER, FOR SOLUTION INTRAMUSCULAR; INTRAVENOUS at 05:03

## 2023-03-15 RX ADMIN — HYDROCODONE BITARTRATE AND ACETAMINOPHEN 1 TABLET: 7.5; 325 TABLET ORAL at 05:03

## 2023-03-15 RX ADMIN — HEPARIN SODIUM 5000 UNITS: 5000 INJECTION, SOLUTION INTRAVENOUS; SUBCUTANEOUS at 06:03

## 2023-03-15 RX ADMIN — MUPIROCIN: 20 OINTMENT TOPICAL at 10:03

## 2023-03-15 RX ADMIN — LORAZEPAM 1 MG: 2 INJECTION INTRAMUSCULAR; INTRAVENOUS at 11:03

## 2023-03-15 RX ADMIN — IPRATROPIUM BROMIDE AND ALBUTEROL SULFATE 3 ML: 2.5; .5 SOLUTION RESPIRATORY (INHALATION) at 07:03

## 2023-03-15 RX ADMIN — LORAZEPAM 1 MG: 2 INJECTION INTRAMUSCULAR; INTRAVENOUS at 12:03

## 2023-03-15 RX ADMIN — DEXTROSE MONOHYDRATE 1 G: 5 INJECTION INTRAVENOUS at 08:03

## 2023-03-15 RX ADMIN — AMLODIPINE BESYLATE 5 MG: 5 TABLET ORAL at 08:03

## 2023-03-15 RX ADMIN — METHYLPREDNISOLONE SODIUM SUCCINATE 80 MG: 40 INJECTION, POWDER, FOR SOLUTION INTRAMUSCULAR; INTRAVENOUS at 06:03

## 2023-03-15 RX ADMIN — MUPIROCIN: 20 OINTMENT TOPICAL at 08:03

## 2023-03-15 RX ADMIN — HEPARIN SODIUM 5000 UNITS: 5000 INJECTION, SOLUTION INTRAVENOUS; SUBCUTANEOUS at 10:03

## 2023-03-15 RX ADMIN — HYDROCODONE BITARTRATE AND ACETAMINOPHEN 1 TABLET: 7.5; 325 TABLET ORAL at 11:03

## 2023-03-15 RX ADMIN — IPRATROPIUM BROMIDE AND ALBUTEROL SULFATE 3 ML: 2.5; .5 SOLUTION RESPIRATORY (INHALATION) at 01:03

## 2023-03-15 RX ADMIN — AZITHROMYCIN MONOHYDRATE 500 MG: 500 INJECTION, POWDER, LYOPHILIZED, FOR SOLUTION INTRAVENOUS at 08:03

## 2023-03-15 RX ADMIN — PANTOPRAZOLE SODIUM 40 MG: 40 TABLET, DELAYED RELEASE ORAL at 04:03

## 2023-03-15 RX ADMIN — FUROSEMIDE 80 MG: 10 INJECTION, SOLUTION INTRAMUSCULAR; INTRAVENOUS at 06:03

## 2023-03-15 RX ADMIN — FUROSEMIDE 80 MG: 10 INJECTION, SOLUTION INTRAMUSCULAR; INTRAVENOUS at 04:03

## 2023-03-15 RX ADMIN — HEPARIN SODIUM 5000 UNITS: 5000 INJECTION, SOLUTION INTRAVENOUS; SUBCUTANEOUS at 02:03

## 2023-03-15 RX ADMIN — LOSARTAN POTASSIUM 50 MG: 50 TABLET, FILM COATED ORAL at 08:03

## 2023-03-15 RX ADMIN — PANTOPRAZOLE SODIUM 40 MG: 40 TABLET, DELAYED RELEASE ORAL at 06:03

## 2023-03-15 RX ADMIN — ASPIRIN 81 MG: 81 TABLET, DELAYED RELEASE ORAL at 08:03

## 2023-03-15 NOTE — PROGRESS NOTES
[facility]  Cardiology  Progress Note    Patient Name: Amelie Cerrato  MRN: [id number]  Admission Date: 3/11/2023  Hospital Length of Stay: 3 days  Code Status: Full Code   Attending Physician: Dustin Byrne DO   Primary Care Physician: Danilo Cloud IV, DO  Expected Discharge Date:   Principal Problem:Acute respiratory failure with hypoxia    Subjective:     Hospital Course:   No notes on file    Interval History: Patient more responsive today, however not answering questions. Appears to be improving with IV diuresis. Creatinine stable, 1.1 today.    Review of Systems   Reason unable to perform ROS: pt more responsive, not answering questions appropriately.   Objective:     Vital Signs (Most Recent):  Temp: 98.2 °F (36.8 °C) (03/15/23 1515)  Pulse: 79 (03/15/23 1515)  Resp: (!) 22 (03/15/23 1515)  BP: 126/67 (03/15/23 1515)  SpO2: (!) 94 % (03/15/23 1515)   Vital Signs (24h Range):  Temp:  [98.2 °F (36.8 °C)-99 °F (37.2 °C)] 98.2 °F (36.8 °C)  Pulse:  [] 79  Resp:  [14-33] 22  SpO2:  [74 %-100 %] 94 %  BP: ()/() 126/67     Weight: 40.8 kg (89 lb 15.2 oz)  Body mass index is 16.45 kg/m².     SpO2: (!) 94 %         Intake/Output Summary (Last 24 hours) at 3/15/2023 1549  Last data filed at 3/15/2023 1243  Gross per 24 hour   Intake 480 ml   Output 785 ml   Net -305 ml       Lines/Drains/Airways       Drain  Duration                  Urethral Catheter 03/12/23 2050 16 Fr. 2 days              Peripheral Intravenous Line  Duration                  Peripheral IV - Single Lumen 03/12/23 1256 18 G Anterior;Left 3 days                    Physical Exam  Vitals reviewed.   Constitutional:       Appearance: She is ill-appearing.   Neck:      Vascular: JVD present.   Cardiovascular:      Rate and Rhythm: Normal rate and regular rhythm.   Pulmonary:      Effort: Pulmonary effort is normal. Tachypnea present.      Breath sounds: Normal breath sounds. No wheezing, rhonchi or rales.    Abdominal:      General: Bowel sounds are normal.      Palpations: Abdomen is soft.   Skin:     General: Skin is warm and dry.       Significant Labs: ABG: No results for input(s): PH, PCO2, HCO3, POCSATURATED, BE in the last 48 hours., Blood Culture: No results for input(s): LABBLOO in the last 48 hours., BMP:   Recent Labs   Lab 03/14/23  0812 03/15/23  1338   * 126*    140   K 3.9 3.5    95*   CO2 26 36*   BUN 35* 36*   CREATININE 1.06* 1.11*   CALCIUM 9.6 9.5   , CMP   Recent Labs   Lab 03/14/23  0812 03/15/23  1338    140   K 3.9 3.5    95*   CO2 26 36*   * 126*   BUN 35* 36*   CREATININE 1.06* 1.11*   CALCIUM 9.6 9.5   ANIONGAP 12 13   , CBC   Recent Labs   Lab 03/14/23  0502 03/15/23  1338   WBC 23.10* 19.28*   HGB 12.6 12.3   HCT 37.9* 37.4*    311   , INR No results for input(s): INR, PROTIME in the last 48 hours., Lipid Panel No results for input(s): CHOL, HDL, LDLCALC, TRIG, CHOLHDL in the last 48 hours., and Troponin No results for input(s): TROPONINI in the last 48 hours.    Significant Imaging: CT scan: CTA Chest Non-Coronary (PE Studies)  Order: [id number]   Status: Final result      Visible to patient: No (inaccessible in Patient Portal)      Next appt: 04/24/2023 at 10:00 AM in Pain Medicine (SAILAJA Hodges)      0 Result Notes  Details    Reading Physician Reading Date Result Priority   David Soni DO  [phone] 3/14/2023 Routine     Narrative & Impression  EXAMINATION:  CTA CHEST NON CORONARY (PE STUDIES)     CLINICAL HISTORY:  Pulmonary embolism (PE) suspected, unknown D-dimer;     TECHNIQUE:  Multiplanar CT of the chest with PE protocol.  MIP projections obtained.  This exam is adequate for interpretation.     COMPARISON:  3/13/23     FINDINGS:  Pulmonary artery: Patent     Lower neck: Normal     Chest/airways: Diffuse patchy airspace opacities, probably pulmonary edema.     Mediastinum: Mildly enlarged heart.     Chest wall: Normal      Bones: Multilevel degenerative change.  Multiple treated compression fractures.  Multiple other mild-to-moderate age-indeterminate fractures scattered throughout the upper and middle thoracic spine, correlate with point tenderness.     Upper abdomen: Fatty liver.     Impression:     1. No evidence to suggest pulmonary embolism.     Diffuse patchy airspace opacities, probably pulmonary edema.     Multiple treated compression fractures.  Multiple other mild-to-moderate age-indeterminate fractures scattered throughout the upper and middle thoracic spine, correlate with point tenderness.        Electronically signed by: David Soni  Date:                                            03/14/2023  Time:                                           14:29   , Echocardiogram: Transthoracic echo (TTE) complete (Cupid Only):   Results for orders placed or performed during the hospital encounter of 03/11/23   Echo   Result Value Ref Range    BSA 1.33 m2    EF 45 %    AORTIC VALVE CUSP SEPERATION 1.52 cm    LVIDd 3.03 (A) 3.5 - 6.0 cm    IVS 1.23 (A) 0.6 - 1.1 cm    Posterior Wall 1.10 0.6 - 1.1 cm    LVIDs 2.46 2.1 - 4.0 cm    FS 19 28 - 44 %    LV mass 105.58 g    LA size 2.70 cm    Left Ventricle Relative Wall Thickness 0.73 cm    E wave deceleration time 163.00 msec    Ao peak herb 1.1 m/s    AV peak gradient 5 mmHg    MV Peak E Herb 0.70 m/s    TR Max Herb 3.62 m/s    LV Systolic Volume 21.44 mL    LV Systolic Volume Index 15.9 mL/m2    LV Diastolic Volume 35.86 mL    LV Diastolic Volume Index 26.56 mL/m2    LV Mass Index 78 g/m2    Triscuspid Valve Regurgitation Peak Gradient 52 mmHg    Narrative    · The left ventricle is normal in size with mild concentric hypertrophy   and mildly decreased systolic function.  · The estimated ejection fraction is 45%.  · Left ventricular diastolic dysfunction.  · Severe tricuspid regurgitation.  · There is moderate pulmonary hypertension.  · Moderate right ventricular enlargement with low  normal right ventricular   systolic function.  · Mild right atrial enlargement.      , and X-Ray: CXR: X-Ray Chest 1 View (CXR):   Results for orders placed or performed during the hospital encounter of 03/11/23   X-Ray Chest 1 View    Narrative    EXAMINATION:  XR CHEST 1 VIEW    CLINICAL HISTORY:  ARDS;    COMPARISON:  Chest x-ray March 11, 2023    TECHNIQUE:  Frontal view/views of the chest.    FINDINGS:  The cardiomediastinal silhouette is stable in configuration.  Similar ground-glass and reticular opacification of the bilateral lungs.  Visualized osseous and surrounding soft tissue structures appear grossly unchanged.  Multilevel vertebroplasty.      Impression    Insert in    Point of Service: [facility]      Electronically signed by: Vasquez Khan  Date:    03/13/2023  Time:    07:47     Assessment and Plan:     Brief HPI: 71 y/o with PMH of COPD, CKD and HTN who presented to ED with c/o SOB and generalized weakness. She reports symptoms have progressively worsened over last two weeks. Upon arrival SpO2 of 79% on room air with pt placed on BiPAP.      In ED, labs significant for leukocytosis of 26.59, Lactate of 1.9, proBNP of 3K, Troponin of 613 and BUN/Cr of 25/1.36. CXR with bilateral infiltrates suspicious of atypical pneumonia. EKG with sinus tachycardia at 106 bpm. COVID/Flu negative. Blood cultures x2 obtained and patient received 1500mL NS, Duonebs, Budesonide, Solumedrol 125mg, Rocephin and Azithromycin.    RVF (right ventricular failure)  Echo shows moderate to severe right ventricular dilatation, elevated PA pressures consistent with pulmonary hypertension. Unclear if etiology is new pneumonia, however would consider PE, recommend CT chest when pt can tolerate.  She is on systemic anticoagulation with heparin, would continue at least until able to undergo definitive studies to rule out PE.    3/13/2023:  - Patient seen and evaluated by Dr. Reid  - US negative for DVT  - BNP 3000;  Troponin 600, 800, 1000; EKG reviewed  - Start IV lasix 80mg BID; potassium 40meq now and repeat at 4pm  - BMP in am    3/15/2023:  - Patient seen and evaluated by Dr. eRid  - CT PE negative for PE, show probable pulmonary edema  - Creatinine stable, 1.1; Continue diuresis  - Cardiology will sign off at this time, please call if any further assistance is needed  - Schedule follow up with cardiology 2 weeks after discharge      Elevated brain natriuretic peptide (BNP) level  Echo shows moderate to severe right ventricular dilatation, elevated PA pressures consistent with pulmonary hypertension.  Unclear if etiology is new pneumonia, however would consider PE, recommend CT chest when pt can tolerate.  She is on systemic anticoagulation with heparin, would continue at least until able to undergo definitive studies to rule out PE.    Type 2 MI (myocardial infarction)  Elevated troponin, No acute EKG changes, most consistent with type 2 MI, due to demand/perfusion mismatch rather than ACS, continue supportive care.    3/15/2023:  - Will not pursue invasive cardiac evaluation at this time, plan for outpatient ischemic eval once discharged    Community acquired pneumonia  Pt on antibiotics with some improvement in respiratory status.        VTE Risk Mitigation (From admission, onward)         Ordered     heparin (porcine) injection 5,000 Units  Every 8 hours         03/14/23 3155                MARY Pearl  Cardiology  [facility]

## 2023-03-15 NOTE — PLAN OF CARE
Problem: Adult Inpatient Plan of Care  Goal: Plan of Care Review  Outcome: Ongoing, Progressing  Goal: Patient-Specific Goal (Individualized)  Outcome: Ongoing, Progressing  Goal: Absence of Hospital-Acquired Illness or Injury  Outcome: Ongoing, Progressing  Goal: Optimal Comfort and Wellbeing  Outcome: Ongoing, Progressing  Goal: Readiness for Transition of Care  Outcome: Ongoing, Progressing     Problem: Adjustment to Illness (Sepsis/Septic Shock)  Goal: Optimal Coping  Outcome: Ongoing, Progressing     Problem: Fluid Imbalance (Pneumonia)  Goal: Fluid Balance  Outcome: Ongoing, Progressing     Problem: Infection (Pneumonia)  Goal: Resolution of Infection Signs and Symptoms  Outcome: Ongoing, Progressing     Problem: Respiratory Compromise (Pneumonia)  Goal: Effective Oxygenation and Ventilation  Outcome: Ongoing, Progressing     Problem: Fall Injury Risk  Goal: Absence of Fall and Fall-Related Injury  Outcome: Ongoing, Progressing     Problem: Infection  Goal: Absence of Infection Signs and Symptoms  Outcome: Ongoing, Progressing     Problem: Gas Exchange Impaired  Goal: Optimal Gas Exchange  Outcome: Ongoing, Progressing

## 2023-03-15 NOTE — SUBJECTIVE & OBJECTIVE
Interval History: Patient more responsive today, however not answering questions. Appears to be improving with IV diuresis. Creatinine stable, 1.1 today.    Review of Systems   Reason unable to perform ROS: pt more responsive, not answering questions appropriately.   Objective:     Vital Signs (Most Recent):  Temp: 98.2 °F (36.8 °C) (03/15/23 1515)  Pulse: 79 (03/15/23 1515)  Resp: (!) 22 (03/15/23 1515)  BP: 126/67 (03/15/23 1515)  SpO2: (!) 94 % (03/15/23 1515)   Vital Signs (24h Range):  Temp:  [98.2 °F (36.8 °C)-99 °F (37.2 °C)] 98.2 °F (36.8 °C)  Pulse:  [] 79  Resp:  [14-33] 22  SpO2:  [74 %-100 %] 94 %  BP: ()/() 126/67     Weight: 40.8 kg (89 lb 15.2 oz)  Body mass index is 16.45 kg/m².     SpO2: (!) 94 %         Intake/Output Summary (Last 24 hours) at 3/15/2023 1549  Last data filed at 3/15/2023 1243  Gross per 24 hour   Intake 480 ml   Output 785 ml   Net -305 ml       Lines/Drains/Airways       Drain  Duration                  Urethral Catheter 03/12/23 2050 16 Fr. 2 days              Peripheral Intravenous Line  Duration                  Peripheral IV - Single Lumen 03/12/23 1256 18 G Anterior;Left 3 days                    Physical Exam  Vitals reviewed.   Constitutional:       Appearance: She is ill-appearing.   Neck:      Vascular: JVD present.   Cardiovascular:      Rate and Rhythm: Normal rate and regular rhythm.   Pulmonary:      Effort: Pulmonary effort is normal. Tachypnea present.      Breath sounds: Normal breath sounds. No wheezing, rhonchi or rales.   Abdominal:      General: Bowel sounds are normal.      Palpations: Abdomen is soft.   Skin:     General: Skin is warm and dry.       Significant Labs: ABG: No results for input(s): PH, PCO2, HCO3, POCSATURATED, BE in the last 48 hours., Blood Culture: No results for input(s): LABBLOO in the last 48 hours., BMP:   Recent Labs   Lab 03/14/23  0812 03/15/23  1338   * 126*    140   K 3.9 3.5    95*   CO2 26 36*    BUN 35* 36*   CREATININE 1.06* 1.11*   CALCIUM 9.6 9.5   , CMP   Recent Labs   Lab 03/14/23  0812 03/15/23  1338    140   K 3.9 3.5    95*   CO2 26 36*   * 126*   BUN 35* 36*   CREATININE 1.06* 1.11*   CALCIUM 9.6 9.5   ANIONGAP 12 13   , CBC   Recent Labs   Lab 03/14/23  0502 03/15/23  1338   WBC 23.10* 19.28*   HGB 12.6 12.3   HCT 37.9* 37.4*    311   , INR No results for input(s): INR, PROTIME in the last 48 hours., Lipid Panel No results for input(s): CHOL, HDL, LDLCALC, TRIG, CHOLHDL in the last 48 hours., and Troponin No results for input(s): TROPONINI in the last 48 hours.    Significant Imaging: CT scan: CTA Chest Non-Coronary (PE Studies)  Order: [id number]  Status: Final result     Visible to patient: No (inaccessible in Patient Portal)     Next appt: 04/24/2023 at 10:00 AM in Pain Medicine (SAILA[facility] Hodges)     0 Result Notes  Details    Reading Physician Reading Date Result Priority   David T Zachariah,   [phone] 3/14/2023 Routine     Narrative & Impression  EXAMINATION:  CTA CHEST NON CORONARY (PE STUDIES)     CLINICAL HISTORY:  Pulmonary embolism (PE) suspected, unknown D-dimer;     TECHNIQUE:  Multiplanar CT of the chest with PE protocol.  MIP projections obtained.  This exam is adequate for interpretation.     COMPARISON:  3/13/23     FINDINGS:  Pulmonary artery: Patent     Lower neck: Normal     Chest/airways: Diffuse patchy airspace opacities, probably pulmonary edema.     Mediastinum: Mildly enlarged heart.     Chest wall: Normal     Bones: Multilevel degenerative change.  Multiple treated compression fractures.  Multiple other mild-to-moderate age-indeterminate fractures scattered throughout the upper and middle thoracic spine, correlate with point tenderness.     Upper abdomen: Fatty liver.     Impression:     1. No evidence to suggest pulmonary embolism.     Diffuse patchy airspace opacities, probably pulmonary edema.     Multiple treated compression  fractures.  Multiple other mild-to-moderate age-indeterminate fractures scattered throughout the upper and middle thoracic spine, correlate with point tenderness.        Electronically signed by: David Soni  Date:                                            03/14/2023  Time:                                           14:29   , Echocardiogram: Transthoracic echo (TTE) complete (Cupid Only):   Results for orders placed or performed during the hospital encounter of 03/11/23   Echo   Result Value Ref Range    BSA 1.33 m2    EF 45 %    AORTIC VALVE CUSP SEPERATION 1.52 cm    LVIDd 3.03 (A) 3.5 - 6.0 cm    IVS 1.23 (A) 0.6 - 1.1 cm    Posterior Wall 1.10 0.6 - 1.1 cm    LVIDs 2.46 2.1 - 4.0 cm    FS 19 28 - 44 %    LV mass 105.58 g    LA size 2.70 cm    Left Ventricle Relative Wall Thickness 0.73 cm    E wave deceleration time 163.00 msec    Ao peak herb 1.1 m/s    AV peak gradient 5 mmHg    MV Peak E Herb 0.70 m/s    TR Max Herb 3.62 m/s    LV Systolic Volume 21.44 mL    LV Systolic Volume Index 15.9 mL/m2    LV Diastolic Volume 35.86 mL    LV Diastolic Volume Index 26.56 mL/m2    LV Mass Index 78 g/m2    Triscuspid Valve Regurgitation Peak Gradient 52 mmHg    Narrative    · The left ventricle is normal in size with mild concentric hypertrophy   and mildly decreased systolic function.  · The estimated ejection fraction is 45%.  · Left ventricular diastolic dysfunction.  · Severe tricuspid regurgitation.  · There is moderate pulmonary hypertension.  · Moderate right ventricular enlargement with low normal right ventricular   systolic function.  · Mild right atrial enlargement.      , and X-Ray: CXR: X-Ray Chest 1 View (CXR):   Results for orders placed or performed during the hospital encounter of 03/11/23   X-Ray Chest 1 View    Narrative    EXAMINATION:  XR CHEST 1 VIEW    CLINICAL HISTORY:  ARDS;    COMPARISON:  Chest x-ray March 11, 2023    TECHNIQUE:  Frontal view/views of the chest.    FINDINGS:  The  cardiomediastinal silhouette is stable in configuration.  Similar ground-glass and reticular opacification of the bilateral lungs.  Visualized osseous and surrounding soft tissue structures appear grossly unchanged.  Multilevel vertebroplasty.      Impression    Insert in    Point of Service: [facility]      Electronically signed by: Vasquez Khan  Date:    03/13/2023  Time:    07:47

## 2023-03-15 NOTE — PROGRESS NOTES
[facility]  Pulmonology  Progress Note    Patient Name: Amelie Cerrato  MRN: [id number]  Admission Date: 3/11/2023  Hospital Length of Stay: 3 days  Code Status: Full Code  Attending Provider: Dustin Byrne DO  Primary Care Provider: Danilo Cloud IV, DO   Principal Problem: Acute respiratory failure with hypoxia    Subjective:     Interval History:  Patient without complaints      Objective:     Vital Signs (Most Recent):  Temp: 98.5 °F (36.9 °C) (03/15/23 0315)  Pulse: 62 (03/15/23 0615)  Resp: (!) 25 (03/15/23 0615)  BP: (!) 148/76 (03/15/23 0615)  SpO2: (!) 94 % (03/15/23 0615)   Vital Signs (24h Range):  Temp:  [97.5 °F (36.4 °C)-99.3 °F (37.4 °C)] 98.5 °F (36.9 °C)  Pulse:  [] 62  Resp:  [15-33] 25  SpO2:  [66 %-100 %] 94 %  BP: ()/() 148/76     Weight: 40.8 kg (89 lb 15.2 oz)  Body mass index is 16.45 kg/m².      Intake/Output Summary (Last 24 hours) at 3/15/2023 0713  Last data filed at 3/15/2023 0401  Gross per 24 hour   Intake 1165.91 ml   Output 2085 ml   Net -919.09 ml       Physical Exam  Vitals reviewed.   Constitutional:       Appearance: Normal appearance.      Interventions: She is not intubated.  HENT:      Head: Normocephalic and atraumatic.      Nose: Nose normal.      Mouth/Throat:      Mouth: Mucous membranes are dry.      Pharynx: Oropharynx is clear.   Eyes:      Extraocular Movements: Extraocular movements intact.      Conjunctiva/sclera: Conjunctivae normal.      Pupils: Pupils are equal, round, and reactive to light.   Cardiovascular:      Rate and Rhythm: Normal rate.      Heart sounds: Normal heart sounds. No murmur heard.  Pulmonary:      Effort: Pulmonary effort is normal. She is not intubated.      Breath sounds: Normal breath sounds.   Abdominal:      General: Abdomen is flat. Bowel sounds are normal.      Palpations: Abdomen is soft.   Musculoskeletal:         General: Normal range of motion.      Cervical back: Normal range of motion and  neck supple.      Right lower leg: No edema.      Left lower leg: No edema.   Skin:     General: Skin is warm and dry.      Capillary Refill: Capillary refill takes less than 2 seconds.   Neurological:      General: No focal deficit present.      Mental Status: She is alert and oriented to person, place, and time.   Psychiatric:         Mood and Affect: Mood normal.         Behavior: Behavior normal.     Review of Systems    Vents:  Oxygen Concentration (%): 32 (03/15/23 0125)    Lines/Drains/Airways       Drain  Duration                  Urethral Catheter 03/12/23 2050 16 Fr. 2 days              Peripheral Intravenous Line  Duration                  Peripheral IV - Single Lumen 03/12/23 1256 18 G Anterior;Left 2 days                    Significant Labs:    CBC/Anemia Profile:  Recent Labs   Lab 03/14/23  0502   WBC 23.10*   HGB 12.6   HCT 37.9*      MCV 87.5   RDW 18.7*        Chemistries:  Recent Labs   Lab 03/14/23  0812      K 3.9      CO2 26   BUN 35*   CREATININE 1.06*   CALCIUM 9.6       Recent Lab Results         03/14/23  1129   03/14/23  0812        Anion Gap   12       aPTT 60.6         BUN   35       BUN/CREAT RATIO   33       Calcium   9.6       Chloride   107       CO2   26       Creatinine   1.06       eGFR   56       Glucose   138       Potassium   3.9       Sodium   141               Significant Imaging:  I have reviewed all pertinent imaging results/findings within the past 24 hours.    Assessment/Plan:     Pulmonary  * Acute respiratory failure with hypoxia  Markedly  better weaning O2 easily    Acute pulmonary edema  Treat with intermittent Lasix, needs offloading with blood pressure medicines    COPD exacerbation  Continue treatment     Community acquired pneumonia  Seems to be improving  Continue current treatment plans     Cardiac/Vascular  RVF (right ventricular failure)  No evidence of right heart     Type 2 MI (myocardial infarction)  Troponin goes up a little higher will  consult Cardiology  - cardiology following; does not feel that it is ACS more demand/perfusion mismatch     ID  Severe sepsis  Resolved                 Pool Amador MD  Pulmonology  [facility]

## 2023-03-15 NOTE — SUBJECTIVE & OBJECTIVE
Interval History:  Patient without complaints      Objective:     Vital Signs (Most Recent):  Temp: 98.5 °F (36.9 °C) (03/15/23 0315)  Pulse: 62 (03/15/23 0615)  Resp: (!) 25 (03/15/23 0615)  BP: (!) 148/76 (03/15/23 0615)  SpO2: (!) 94 % (03/15/23 0615)   Vital Signs (24h Range):  Temp:  [97.5 °F (36.4 °C)-99.3 °F (37.4 °C)] 98.5 °F (36.9 °C)  Pulse:  [] 62  Resp:  [15-33] 25  SpO2:  [66 %-100 %] 94 %  BP: ()/() 148/76     Weight: 40.8 kg (89 lb 15.2 oz)  Body mass index is 16.45 kg/m².      Intake/Output Summary (Last 24 hours) at 3/15/2023 0713  Last data filed at 3/15/2023 0401  Gross per 24 hour   Intake 1165.91 ml   Output 2085 ml   Net -919.09 ml       Physical Exam  Vitals reviewed.   Constitutional:       Appearance: Normal appearance.      Interventions: She is not intubated.  HENT:      Head: Normocephalic and atraumatic.      Nose: Nose normal.      Mouth/Throat:      Mouth: Mucous membranes are dry.      Pharynx: Oropharynx is clear.   Eyes:      Extraocular Movements: Extraocular movements intact.      Conjunctiva/sclera: Conjunctivae normal.      Pupils: Pupils are equal, round, and reactive to light.   Cardiovascular:      Rate and Rhythm: Normal rate.      Heart sounds: Normal heart sounds. No murmur heard.  Pulmonary:      Effort: Pulmonary effort is normal. She is not intubated.      Breath sounds: Normal breath sounds.   Abdominal:      General: Abdomen is flat. Bowel sounds are normal.      Palpations: Abdomen is soft.   Musculoskeletal:         General: Normal range of motion.      Cervical back: Normal range of motion and neck supple.      Right lower leg: No edema.      Left lower leg: No edema.   Skin:     General: Skin is warm and dry.      Capillary Refill: Capillary refill takes less than 2 seconds.   Neurological:      General: No focal deficit present.      Mental Status: She is alert and oriented to person, place, and time.   Psychiatric:         Mood and Affect: Mood  normal.         Behavior: Behavior normal.     Review of Systems    Vents:  Oxygen Concentration (%): 32 (03/15/23 0125)    Lines/Drains/Airways       Drain  Duration                  Urethral Catheter 03/12/23 2050 16 Fr. 2 days              Peripheral Intravenous Line  Duration                  Peripheral IV - Single Lumen 03/12/23 1256 18 G Anterior;Left 2 days                    Significant Labs:    CBC/Anemia Profile:  Recent Labs   Lab 03/14/23  0502   WBC 23.10*   HGB 12.6   HCT 37.9*      MCV 87.5   RDW 18.7*        Chemistries:  Recent Labs   Lab 03/14/23  0812      K 3.9      CO2 26   BUN 35*   CREATININE 1.06*   CALCIUM 9.6       Recent Lab Results         03/14/23  1129   03/14/23  0812        Anion Gap   12       aPTT 60.6         BUN   35       BUN/CREAT RATIO   33       Calcium   9.6       Chloride   107       CO2   26       Creatinine   1.06       eGFR   56       Glucose   138       Potassium   3.9       Sodium   141               Significant Imaging:  I have reviewed all pertinent imaging results/findings within the past 24 hours.

## 2023-03-15 NOTE — ASSESSMENT & PLAN NOTE
Elevated troponin, No acute EKG changes, most consistent with type 2 MI, due to demand/perfusion mismatch rather than ACS, continue supportive care.    3/15/2023:  - Will not pursue invasive cardiac evaluation at this time, plan for outpatient ischemic eval once discharged

## 2023-03-15 NOTE — PROGRESS NOTES
[facility]  Adult Nutrition  Progress note         Reason for Assessment  Reason For Assessment: RD follow-up   Nutrition Risk Screen: no indicators present      RD follow up note. CBW is 89#. She is on IV lasix and weight fluctuations are to be expected. She continues with a poor appetite.     Wt Readings from Last 3 Encounters:   03/15/23 0315 40.8 kg (89 lb 15.2 oz)   03/14/23 0309 42.1 kg (92 lb 13 oz)   03/13/23 0245 43.1 kg (95 lb 0.3 oz)   03/12/23 0845 42.6 kg (93 lb 14.7 oz)   03/12/23 0700 40.4 kg (89 lb)   03/12/23 0320 40.8 kg (89 lb 15.2 oz)   03/11/23 2316 45.4 kg (100 lb)   01/24/23 0939 44 kg (97 lb)   11/07/22 1351 42.6 kg (94 lb)     Last BM 3/13 per flow sheets.     Per MD note on 3/12  HPI: 73 yo F with PMH of COPD, CKD and HTN who presented to ED with c/o SOB and generalized weakness. She reports symptoms have progressively worsened over last two weeks. Upon arrival SpO2 of 79% on room air with pt placed on BiPAP.      In ED, labs significant for leukocytosis of 26.59, Lactate of 1.9, proBNP of 3K, Troponin of 613 and BUN/Cr of 25/1.36. CXR with bilateral infiltrates suspicious of atypical pneumonia. EKG with sinus tachycardia at 106 bpm. COVID/Flu negative. Blood cultures x2 obtained and patient received 1500mL NS, Duonebs, Budesonide, Solumedrol 125mg, Rocephin and Azithromycin.      Upon examination, patient with non re-breather and bilateral lung crackles. She refuses BiPAP at this time. She reports currently smoking 4 cigarettes daily, using home oxygen occasionally, and only taking home HTN medication. At this time, patient admitted inpatient with telemetry at [facility] for further evaluation and management.       Per MD notes 3/15  Pulmonary  * Acute respiratory failure with hypoxia  Markedly  better weaning O2 easily     Acute pulmonary edema  Treat with intermittent Lasix, needs offloading with blood pressure medicines     COPD exacerbation  Continue treatment       Community acquired pneumonia  Seems to be improving  Continue current treatment plans      Cardiac/Vascular  RVF (right ventricular failure)  No evidence of right heart      Type 2 MI (myocardial infarction)  Troponin goes up a little higher will consult Cardiology  - cardiology following; does not feel that it is ACS more demand/perfusion mismatch      ID  Severe sepsis  Resolved       Malnutrition  Is Patient Malnourished: No  Skin Integrity  Vahid Risk Assessment  Sensory Perception: 3-->slightly limited  Moisture: 3-->occasionally moist  Activity: 1-->bedfast  Mobility: 3-->slightly limited  Nutrition: 2-->probably inadequate  Friction and Shear: 2-->potential problem  Vahid Score: 14  Comments on skin integrity: no issues  Nutrition Diagnosis  Increased nutrient needs of calories   related to Chronic illness as evidenced by COPD exacerbation    Nutrition Diagnosis Status: Chronic/ continues      Nutrition Risk  Level of Risk/Frequency of Follow-up: high  Comments on nutrition risk: poor intake  Recent Labs   Lab 03/12/23  1633 03/13/23  0531 03/14/23  0812   GLU  --    < > 138*   POCGLU 143*  --   --     < > = values in this interval not displayed.       Comments on Glucose: elevated due to sepsis  Nutrition Prescription / Recommendations  Recommendation/Intervention: Recommend continue with puree diet. Recommend changing Ensure Clear to ensure Max protein to provide extra calories and protein due to poor intake.  Goals: weight maintenance/weight gain, intake 50-75% + supplements  Nutrition Goal Status: progressing towards goal  Current Diet Order: puree diet  Oral Nutrition Supplement: Ensure Max Protein  Chewing or Swallowing Difficulty?: No Chewing or swallowing difficulty  Recommended Diet: puree  Is Nutrition Support Recommended: No  Is Education Recommended: No  Monitor and Evaluation  % current Intake: P.O. intake of 0 - 10%  % intake to meet estimated needs: P.O. + Supplements  Food and Nutrient  Intake: energy intake  Food and Nutrient Adminstration: diet order  Anthropometric Measurements: weight, weight change, body mass index  Biochemical Data, Medical Tests and Procedures: electrolyte and renal panel, lipid profile, gastrointestinal profile, glucose/endocrine profile, inflammatory profile  Nutrition-Focused Physical Findings: overall appearance, extremities, muscles and bones, head and eyes, skin     Current Medical Diagnosis and Past Medical History     Past Medical History:   Diagnosis Date    Anxiety and depression     Chronic kidney disease, stage 3a     Chronic pain syndrome     COPD (chronic obstructive pulmonary disease)     Fracture of multiple pubic rami, left, closed, initial encounter 10/01/2021    HLD (hyperlipidemia)     Hypertension     Lumbar compression fracture, sequela L1, L2, L4, L5, kyphoplasty 12/8/2021    Lumbar radiculopathy     Lumbar stenosis     Lumbosacral spondylosis     Neuropathy      Nutrition/Diet History  Food Allergies: NKFA  Factors Affecting Nutritional Intake: decreased appetite  Lab/Procedures/Meds  Recent Labs   Lab 03/13/23  0531 03/14/23  0812    141   K 3.2* 3.9   BUN 29* 35*   CREATININE 1.27* 1.06*   CALCIUM 9.4 9.6   ALBUMIN 2.3*  --     107   ALT 28  --    AST 43*  --      BUN, crea elevated, AST elevated, will monitor altered labs, Albumin low- possibly r/t inflammatory response  Last A1c:   Lab Results   Component Value Date    HGBA1C 5.9 04/19/2021     Lab Results   Component Value Date    RBC 4.33 03/14/2023    HGB 12.6 03/14/2023    HCT 37.9 (L) 03/14/2023    MCV 87.5 03/14/2023    MCH 29.1 03/14/2023    MCHC 33.2 03/14/2023     Pertinent Labs Reviewed: reviewed  Pertinent Labs Comments: Sodium: 141  Potassium: 3.9  Chloride: 107  CO2: 26  Anion Gap: 12  BUN: 35 (H)  Creatinine: 1.06 (H)  BUN/CREAT RATIO: 33 (H)  eGFR: 56 (L)  Glucose: 138 (H)  Calcium: 9.6  Pertinent Medications Reviewed: reviewed  Pertinent Medications Comments: losartin,  "rocephin, zithromax, methylprednisolone  Anthropometrics  Temp: 98.5 °F (36.9 °C)  Height Method: Estimated  Height: 5' 2" (157.5 cm)  Height (inches): 62 in  Weight Method: Bed Scale  Weight: 40.8 kg (89 lb 15.2 oz)  Weight (lb): 89.95 lb  Ideal Body Weight (IBW), Female: 110 lb  % Ideal Body Weight, Female (lb): 80.91 %  BMI (Calculated): 16.4  BMI Grade: 16 - 16.9 protein-energy malnutrition grade II     Estimated/Assessed Needs      Temp: 98.5 °F (36.9 °C)Axillary  Weight Used For Calorie Calculations: 43.1 kg (95 lb 0.3 oz)   Energy Need Method: Kcal/kg Energy Calorie Requirements (kcal): 4398-7699  Weight Used For Protein Calculations: 43.1 kg (95 lb 0.3 oz)  Protein Requirements: 43-51  Estimated Fluid Requirement Method: RDA Method    RDA Method (mL): 1077     Nutrition by Nursing  Diet/Nutrition Received: clear liquid  Intake (%):  (5%)  Diet/Feeding Assistance: total feed     Last Bowel Movement: 03/13/23              Nutrition Follow-Up  RD Follow-up?: Yes    "

## 2023-03-15 NOTE — ASSESSMENT & PLAN NOTE
Echo shows moderate to severe right ventricular dilatation, elevated PA pressures consistent with pulmonary hypertension. Unclear if etiology is new pneumonia, however would consider PE, recommend CT chest when pt can tolerate.  She is on systemic anticoagulation with heparin, would continue at least until able to undergo definitive studies to rule out PE.    3/13/2023:  - Patient seen and evaluated by Dr. Reid  - US negative for DVT  - BNP 3000; Troponin 600, 800, 1000; EKG reviewed  - Start IV lasix 80mg BID; potassium 40meq now and repeat at 4pm  - BMP in am    3/15/2023:  - Patient seen and evaluated by Dr. Reid  - CT PE negative for PE, show probable pulmonary edema  - Creatinine stable, 1.1; Continue diuresis  - Cardiology will sign off at this time, please call if any further assistance is needed  - Schedule follow up with cardiology 2 weeks after discharge

## 2023-03-16 LAB
ANION GAP SERPL CALCULATED.3IONS-SCNC: 16 MMOL/L (ref 7–16)
BASOPHILS # BLD AUTO: 0.04 K/UL (ref 0–0.2)
BASOPHILS NFR BLD AUTO: 0.2 % (ref 0–1)
BUN SERPL-MCNC: 35 MG/DL (ref 7–18)
BUN/CREAT SERPL: 31 (ref 6–20)
CALCIUM SERPL-MCNC: 10.1 MG/DL (ref 8.5–10.1)
CHLORIDE SERPL-SCNC: 90 MMOL/L (ref 98–107)
CO2 SERPL-SCNC: 34 MMOL/L (ref 21–32)
CREAT SERPL-MCNC: 1.14 MG/DL (ref 0.55–1.02)
DIFFERENTIAL METHOD BLD: ABNORMAL
EGFR (NO RACE VARIABLE) (RUSH/TITUS): 51 ML/MIN/1.73M²
EOSINOPHIL # BLD AUTO: 0 K/UL (ref 0–0.5)
EOSINOPHIL NFR BLD AUTO: 0 % (ref 1–4)
ERYTHROCYTE [DISTWIDTH] IN BLOOD BY AUTOMATED COUNT: 17.8 % (ref 11.5–14.5)
GLUCOSE SERPL-MCNC: 112 MG/DL (ref 74–106)
HCT VFR BLD AUTO: 42.6 % (ref 38–47)
HGB BLD-MCNC: 14 G/DL (ref 12–16)
IMM GRANULOCYTES # BLD AUTO: 0.17 K/UL (ref 0–0.04)
IMM GRANULOCYTES NFR BLD: 0.8 % (ref 0–0.4)
LYMPHOCYTES # BLD AUTO: 1.57 K/UL (ref 1–4.8)
LYMPHOCYTES NFR BLD AUTO: 7.2 % (ref 27–41)
MCH RBC QN AUTO: 28.6 PG (ref 27–31)
MCHC RBC AUTO-ENTMCNC: 32.9 G/DL (ref 32–36)
MCV RBC AUTO: 87.1 FL (ref 80–96)
MONOCYTES # BLD AUTO: 1.27 K/UL (ref 0–0.8)
MONOCYTES NFR BLD AUTO: 5.8 % (ref 2–6)
MPC BLD CALC-MCNC: 9.5 FL (ref 9.4–12.4)
NEUTROPHILS # BLD AUTO: 18.86 K/UL (ref 1.8–7.7)
NEUTROPHILS NFR BLD AUTO: 86 % (ref 53–65)
NRBC # BLD AUTO: 0.11 X10E3/UL
NRBC, AUTO (.00): 0.5 %
PLATELET # BLD AUTO: 354 K/UL (ref 150–400)
POTASSIUM SERPL-SCNC: 3.5 MMOL/L (ref 3.5–5.1)
RBC # BLD AUTO: 4.89 M/UL (ref 4.2–5.4)
SODIUM SERPL-SCNC: 136 MMOL/L (ref 136–145)
WBC # BLD AUTO: 21.91 K/UL (ref 4.5–11)

## 2023-03-16 PROCEDURE — 99232 PR SUBSEQUENT HOSPITAL CARE,LEVL II: ICD-10-PCS | Mod: ,,, | Performed by: INTERNAL MEDICINE

## 2023-03-16 PROCEDURE — 11000001 HC ACUTE MED/SURG PRIVATE ROOM

## 2023-03-16 PROCEDURE — 85025 COMPLETE CBC W/AUTO DIFF WBC: CPT | Performed by: NURSE PRACTITIONER

## 2023-03-16 PROCEDURE — 99232 SBSQ HOSP IP/OBS MODERATE 35: CPT | Mod: ,,, | Performed by: INTERNAL MEDICINE

## 2023-03-16 PROCEDURE — 25000003 PHARM REV CODE 250: Performed by: INTERNAL MEDICINE

## 2023-03-16 PROCEDURE — 25000003 PHARM REV CODE 250: Performed by: NURSE PRACTITIONER

## 2023-03-16 PROCEDURE — 63600175 PHARM REV CODE 636 W HCPCS: Performed by: NURSE PRACTITIONER

## 2023-03-16 PROCEDURE — 25000242 PHARM REV CODE 250 ALT 637 W/ HCPCS: Performed by: FAMILY MEDICINE

## 2023-03-16 PROCEDURE — 94761 N-INVAS EAR/PLS OXIMETRY MLT: CPT

## 2023-03-16 PROCEDURE — 27000221 HC OXYGEN, UP TO 24 HOURS

## 2023-03-16 PROCEDURE — 80048 BASIC METABOLIC PNL TOTAL CA: CPT | Performed by: NURSE PRACTITIONER

## 2023-03-16 PROCEDURE — 25000003 PHARM REV CODE 250: Performed by: FAMILY MEDICINE

## 2023-03-16 PROCEDURE — 63600175 PHARM REV CODE 636 W HCPCS: Performed by: INTERNAL MEDICINE

## 2023-03-16 PROCEDURE — 51798 US URINE CAPACITY MEASURE: CPT

## 2023-03-16 PROCEDURE — 94640 AIRWAY INHALATION TREATMENT: CPT

## 2023-03-16 PROCEDURE — 25000003 PHARM REV CODE 250: Performed by: HOSPITALIST

## 2023-03-16 RX ADMIN — TRAZODONE HYDROCHLORIDE 50 MG: 50 TABLET ORAL at 12:03

## 2023-03-16 RX ADMIN — PANTOPRAZOLE SODIUM 40 MG: 40 TABLET, DELAYED RELEASE ORAL at 06:03

## 2023-03-16 RX ADMIN — IPRATROPIUM BROMIDE AND ALBUTEROL SULFATE 3 ML: 2.5; .5 SOLUTION RESPIRATORY (INHALATION) at 02:03

## 2023-03-16 RX ADMIN — HALOPERIDOL LACTATE 5 MG: 5 INJECTION, SOLUTION INTRAMUSCULAR at 10:03

## 2023-03-16 RX ADMIN — AMLODIPINE BESYLATE 5 MG: 5 TABLET ORAL at 08:03

## 2023-03-16 RX ADMIN — MUPIROCIN: 20 OINTMENT TOPICAL at 08:03

## 2023-03-16 RX ADMIN — LORAZEPAM 1 MG: 2 INJECTION INTRAMUSCULAR; INTRAVENOUS at 01:03

## 2023-03-16 RX ADMIN — HEPARIN SODIUM 5000 UNITS: 5000 INJECTION, SOLUTION INTRAVENOUS; SUBCUTANEOUS at 06:03

## 2023-03-16 RX ADMIN — LORAZEPAM 1 MG: 2 INJECTION INTRAMUSCULAR; INTRAVENOUS at 09:03

## 2023-03-16 RX ADMIN — METHYLPREDNISOLONE SODIUM SUCCINATE 40 MG: 40 INJECTION, POWDER, FOR SOLUTION INTRAMUSCULAR; INTRAVENOUS at 05:03

## 2023-03-16 RX ADMIN — AZITHROMYCIN MONOHYDRATE 500 MG: 500 INJECTION, POWDER, LYOPHILIZED, FOR SOLUTION INTRAVENOUS at 09:03

## 2023-03-16 RX ADMIN — DEXTROSE MONOHYDRATE 1 G: 5 INJECTION INTRAVENOUS at 08:03

## 2023-03-16 RX ADMIN — MUPIROCIN: 20 OINTMENT TOPICAL at 09:03

## 2023-03-16 RX ADMIN — HYDROCODONE BITARTRATE AND ACETAMINOPHEN 1 TABLET: 7.5; 325 TABLET ORAL at 08:03

## 2023-03-16 RX ADMIN — IPRATROPIUM BROMIDE AND ALBUTEROL SULFATE 3 ML: 2.5; .5 SOLUTION RESPIRATORY (INHALATION) at 12:03

## 2023-03-16 RX ADMIN — LOSARTAN POTASSIUM 50 MG: 50 TABLET, FILM COATED ORAL at 08:03

## 2023-03-16 RX ADMIN — PANTOPRAZOLE SODIUM 40 MG: 40 TABLET, DELAYED RELEASE ORAL at 04:03

## 2023-03-16 RX ADMIN — METHYLPREDNISOLONE SODIUM SUCCINATE 40 MG: 40 INJECTION, POWDER, FOR SOLUTION INTRAMUSCULAR; INTRAVENOUS at 06:03

## 2023-03-16 RX ADMIN — HEPARIN SODIUM 5000 UNITS: 5000 INJECTION, SOLUTION INTRAVENOUS; SUBCUTANEOUS at 09:03

## 2023-03-16 RX ADMIN — ASPIRIN 81 MG: 81 TABLET, DELAYED RELEASE ORAL at 08:03

## 2023-03-16 RX ADMIN — FUROSEMIDE 80 MG: 10 INJECTION, SOLUTION INTRAMUSCULAR; INTRAVENOUS at 06:03

## 2023-03-16 RX ADMIN — FUROSEMIDE 80 MG: 10 INJECTION, SOLUTION INTRAMUSCULAR; INTRAVENOUS at 04:03

## 2023-03-16 RX ADMIN — HEPARIN SODIUM 5000 UNITS: 5000 INJECTION, SOLUTION INTRAVENOUS; SUBCUTANEOUS at 02:03

## 2023-03-16 RX ADMIN — IPRATROPIUM BROMIDE AND ALBUTEROL SULFATE 3 ML: 2.5; .5 SOLUTION RESPIRATORY (INHALATION) at 07:03

## 2023-03-16 NOTE — PROGRESS NOTES
[facility]  Pulmonology  Progress Note    Patient Name: Amelie Cerrato  MRN: [id number]  Admission Date: 3/11/2023  Hospital Length of Stay: 4 days  Code Status: Full Code  Attending Provider: Dustin Byrne DO  Primary Care Provider: Danilo Cloud IV, DO   Principal Problem: Acute respiratory failure with hypoxia    Subjective:     Interval History:  Patient without complaints      Objective:     Vital Signs (Most Recent):  Temp: 98.4 °F (36.9 °C) (03/15/23 2315)  Pulse: 80 (03/16/23 0430)  Resp: (!) 22 (03/16/23 0430)  BP: 110/64 (03/16/23 0430)  SpO2: 100 % (03/16/23 0430)   Vital Signs (24h Range):  Temp:  [98.2 °F (36.8 °C)-98.7 °F (37.1 °C)] 98.4 °F (36.9 °C)  Pulse:  [] 80  Resp:  [14-28] 22  SpO2:  [68 %-100 %] 100 %  BP: ()/(48-98) 110/64     Weight: 40.8 kg (89 lb 15.2 oz)  Body mass index is 16.45 kg/m².      Intake/Output Summary (Last 24 hours) at 3/16/2023 0643  Last data filed at 3/15/2023 1700  Gross per 24 hour   Intake 660 ml   Output 1100 ml   Net -440 ml       Physical Exam  Vitals reviewed.   Constitutional:       Appearance: Normal appearance.      Interventions: She is not intubated.  HENT:      Head: Normocephalic and atraumatic.      Nose: Nose normal.      Mouth/Throat:      Mouth: Mucous membranes are dry.      Pharynx: Oropharynx is clear.   Eyes:      Extraocular Movements: Extraocular movements intact.      Conjunctiva/sclera: Conjunctivae normal.      Pupils: Pupils are equal, round, and reactive to light.   Cardiovascular:      Rate and Rhythm: Normal rate.      Heart sounds: Normal heart sounds. No murmur heard.  Pulmonary:      Effort: Pulmonary effort is normal. She is not intubated.      Breath sounds: Normal breath sounds.   Abdominal:      General: Abdomen is flat. Bowel sounds are normal.      Palpations: Abdomen is soft.   Musculoskeletal:         General: Normal range of motion.      Cervical back: Normal range of motion and neck supple.       Right lower leg: No edema.      Left lower leg: No edema.   Skin:     General: Skin is warm and dry.      Capillary Refill: Capillary refill takes less than 2 seconds.   Neurological:      General: No focal deficit present.      Mental Status: She is alert and oriented to person, place, and time.   Psychiatric:         Mood and Affect: Mood normal.         Behavior: Behavior normal.     Review of Systems    Vents:  Oxygen Concentration (%): 28 (03/16/23 0000)    Lines/Drains/Airways       Peripheral Intravenous Line  Duration                  Peripheral IV - Single Lumen 03/12/23 1256 18 G Anterior;Left 3 days                    Significant Labs:    CBC/Anemia Profile:  Recent Labs   Lab 03/15/23  1338   WBC 19.28*   HGB 12.3   HCT 37.4*      MCV 87.2   RDW 18.2*        Chemistries:  Recent Labs   Lab 03/14/23  0812 03/15/23  1338 03/16/23  0505    140 136   K 3.9 3.5 3.5    95* 90*   CO2 26 36* 34*   BUN 35* 36* 35*   CREATININE 1.06* 1.11* 1.14*   CALCIUM 9.6 9.5 10.1       Recent Lab Results         03/16/23  0505   03/15/23  1338        Anion Gap 16   13       Aniso   1+       Bands   3       Baso #   0.03       Basophil %   0.2       BUN 35   36       BUN/CREAT RATIO 31   32       Calcium 10.1   9.5       Chloride 90   95       CO2 34   36       Creatinine 1.14   1.11       Differential Type   Manual       eGFR 51   53       Eos #   0.00       Eosinophil %   0.0       Glucose 112   126       Hematocrit   37.4       Hemoglobin   12.3       Hypo   1+       Immature Grans (Abs)   0.16       Immature Granulocytes   0.8       Lymph #   0.97       Lymph %   5.0          3       MCH   28.7       MCHC   32.9       MCV   87.2       Mono #   0.62       Mono %   3.2          2       MPV   10.2       Neutrophils, Abs   17.50       Neutrophils Relative   90.8       nRBC   1          0.6       NUCLEATED RBC ABSOLUTE   0.11       Ovalocytes   Few       PLATELET MORPHOLOGY   Few Large Platelets        Platelets   311       Poly   Few       Potassium 3.5   3.5       RBC   4.29       RDW   18.2       Segmented Neutrophils, Man %   92       Sodium 136   140       Target Cells   Few       WBC   19.28               Significant Imaging:  I have reviewed all pertinent imaging results/findings within the past 24 hours.    Assessment/Plan:     Pulmonary  Acute pulmonary edema  Treat with intermittent Lasix, needs offloading with blood pressure medicines    COPD exacerbation  Continue treatment     Community acquired pneumonia  Seems to be improving  Continue current treatment plans     Cardiac/Vascular  RVF (right ventricular failure)  No evidence of right heart     Type 2 MI (myocardial infarction)  Troponin goes up a little higher will consult Cardiology  - cardiology following; does not feel that it is ACS more demand/perfusion mismatch                  Pool Amador MD  Pulmonology  [facility]

## 2023-03-16 NOTE — SUBJECTIVE & OBJECTIVE
Interval History:  Patient without complaints      Objective:     Vital Signs (Most Recent):  Temp: 98.4 °F (36.9 °C) (03/15/23 2315)  Pulse: 80 (03/16/23 0430)  Resp: (!) 22 (03/16/23 0430)  BP: 110/64 (03/16/23 0430)  SpO2: 100 % (03/16/23 0430)   Vital Signs (24h Range):  Temp:  [98.2 °F (36.8 °C)-98.7 °F (37.1 °C)] 98.4 °F (36.9 °C)  Pulse:  [] 80  Resp:  [14-28] 22  SpO2:  [68 %-100 %] 100 %  BP: ()/(48-98) 110/64     Weight: 40.8 kg (89 lb 15.2 oz)  Body mass index is 16.45 kg/m².      Intake/Output Summary (Last 24 hours) at 3/16/2023 0643  Last data filed at 3/15/2023 1700  Gross per 24 hour   Intake 660 ml   Output 1100 ml   Net -440 ml       Physical Exam  Vitals reviewed.   Constitutional:       Appearance: Normal appearance.      Interventions: She is not intubated.  HENT:      Head: Normocephalic and atraumatic.      Nose: Nose normal.      Mouth/Throat:      Mouth: Mucous membranes are dry.      Pharynx: Oropharynx is clear.   Eyes:      Extraocular Movements: Extraocular movements intact.      Conjunctiva/sclera: Conjunctivae normal.      Pupils: Pupils are equal, round, and reactive to light.   Cardiovascular:      Rate and Rhythm: Normal rate.      Heart sounds: Normal heart sounds. No murmur heard.  Pulmonary:      Effort: Pulmonary effort is normal. She is not intubated.      Breath sounds: Normal breath sounds.   Abdominal:      General: Abdomen is flat. Bowel sounds are normal.      Palpations: Abdomen is soft.   Musculoskeletal:         General: Normal range of motion.      Cervical back: Normal range of motion and neck supple.      Right lower leg: No edema.      Left lower leg: No edema.   Skin:     General: Skin is warm and dry.      Capillary Refill: Capillary refill takes less than 2 seconds.   Neurological:      General: No focal deficit present.      Mental Status: She is alert and oriented to person, place, and time.   Psychiatric:         Mood and Affect: Mood normal.          Behavior: Behavior normal.     Review of Systems    Vents:  Oxygen Concentration (%): 28 (03/16/23 0000)    Lines/Drains/Airways       Peripheral Intravenous Line  Duration                  Peripheral IV - Single Lumen 03/12/23 1256 18 G Anterior;Left 3 days                    Significant Labs:    CBC/Anemia Profile:  Recent Labs   Lab 03/15/23  1338   WBC 19.28*   HGB 12.3   HCT 37.4*      MCV 87.2   RDW 18.2*        Chemistries:  Recent Labs   Lab 03/14/23  0812 03/15/23  1338 03/16/23  0505    140 136   K 3.9 3.5 3.5    95* 90*   CO2 26 36* 34*   BUN 35* 36* 35*   CREATININE 1.06* 1.11* 1.14*   CALCIUM 9.6 9.5 10.1       Recent Lab Results         03/16/23  0505   03/15/23  1338        Anion Gap 16   13       Aniso   1+       Bands   3       Baso #   0.03       Basophil %   0.2       BUN 35   36       BUN/CREAT RATIO 31   32       Calcium 10.1   9.5       Chloride 90   95       CO2 34   36       Creatinine 1.14   1.11       Differential Type   Manual       eGFR 51   53       Eos #   0.00       Eosinophil %   0.0       Glucose 112   126       Hematocrit   37.4       Hemoglobin   12.3       Hypo   1+       Immature Grans (Abs)   0.16       Immature Granulocytes   0.8       Lymph #   0.97       Lymph %   5.0          3       MCH   28.7       MCHC   32.9       MCV   87.2       Mono #   0.62       Mono %   3.2          2       MPV   10.2       Neutrophils, Abs   17.50       Neutrophils Relative   90.8       nRBC   1          0.6       NUCLEATED RBC ABSOLUTE   0.11       Ovalocytes   Few       PLATELET MORPHOLOGY   Few Large Platelets       Platelets   311       Poly   Few       Potassium 3.5   3.5       RBC   4.29       RDW   18.2       Segmented Neutrophils, Man %   92       Sodium 136   140       Target Cells   Few       WBC   19.28               Significant Imaging:  I have reviewed all pertinent imaging results/findings within the past 24 hours.

## 2023-03-16 NOTE — PLAN OF CARE
Problem: Adult Inpatient Plan of Care  Goal: Plan of Care Review  Outcome: Ongoing, Progressing  Goal: Patient-Specific Goal (Individualized)  Outcome: Ongoing, Progressing  Goal: Absence of Hospital-Acquired Illness or Injury  Outcome: Ongoing, Progressing  Goal: Optimal Comfort and Wellbeing  Outcome: Ongoing, Progressing  Goal: Readiness for Transition of Care  Outcome: Ongoing, Progressing     Problem: Adjustment to Illness (Sepsis/Septic Shock)  Goal: Optimal Coping  Outcome: Ongoing, Progressing     Problem: Bleeding (Sepsis/Septic Shock)  Goal: Absence of Bleeding  Outcome: Ongoing, Progressing     Problem: Glycemic Control Impaired (Sepsis/Septic Shock)  Goal: Blood Glucose Level Within Desired Range  Outcome: Ongoing, Progressing     Problem: Infection Progression (Sepsis/Septic Shock)  Goal: Absence of Infection Signs and Symptoms  Outcome: Ongoing, Progressing     Problem: Nutrition Impaired (Sepsis/Septic Shock)  Goal: Optimal Nutrition Intake  Outcome: Ongoing, Progressing     Problem: Fluid Imbalance (Pneumonia)  Goal: Fluid Balance  Outcome: Ongoing, Progressing     Problem: Infection (Pneumonia)  Goal: Resolution of Infection Signs and Symptoms  Outcome: Ongoing, Progressing     Problem: Respiratory Compromise (Pneumonia)  Goal: Effective Oxygenation and Ventilation  Outcome: Ongoing, Progressing     Problem: Skin Injury Risk Increased  Goal: Skin Health and Integrity  Outcome: Ongoing, Progressing     Problem: Fall Injury Risk  Goal: Absence of Fall and Fall-Related Injury  Outcome: Ongoing, Progressing     Problem: Restraint, Nonbehavioral (Nonviolent)  Goal: Absence of Harm or Injury  Outcome: Ongoing, Progressing     Problem: Infection  Goal: Absence of Infection Signs and Symptoms  Outcome: Ongoing, Progressing     Problem: Noninvasive Ventilation Acute  Goal: Effective Unassisted Ventilation and Oxygenation  Outcome: Ongoing, Progressing     Problem: Gas Exchange Impaired  Goal: Optimal Gas  Exchange  Outcome: Ongoing, Progressing     Problem: Impaired Wound Healing  Goal: Optimal Wound Healing  Outcome: Ongoing, Progressing

## 2023-03-17 LAB
ANION GAP SERPL CALCULATED.3IONS-SCNC: 9 MMOL/L (ref 7–16)
BACTERIA BLD CULT: NORMAL
BACTERIA BLD CULT: NORMAL
BASOPHILS # BLD AUTO: 0.04 K/UL (ref 0–0.2)
BASOPHILS NFR BLD AUTO: 0.2 % (ref 0–1)
BUN SERPL-MCNC: 45 MG/DL (ref 7–18)
BUN/CREAT SERPL: 42 (ref 6–20)
CALCIUM SERPL-MCNC: 10.1 MG/DL (ref 8.5–10.1)
CHLORIDE SERPL-SCNC: 93 MMOL/L (ref 98–107)
CO2 SERPL-SCNC: 39 MMOL/L (ref 21–32)
CREAT SERPL-MCNC: 1.07 MG/DL (ref 0.55–1.02)
DIFFERENTIAL METHOD BLD: ABNORMAL
EGFR (NO RACE VARIABLE) (RUSH/TITUS): 55 ML/MIN/1.73M²
EOSINOPHIL # BLD AUTO: 0 K/UL (ref 0–0.5)
EOSINOPHIL NFR BLD AUTO: 0 % (ref 1–4)
ERYTHROCYTE [DISTWIDTH] IN BLOOD BY AUTOMATED COUNT: 18 % (ref 11.5–14.5)
GLUCOSE SERPL-MCNC: 95 MG/DL (ref 74–106)
HCT VFR BLD AUTO: 45.6 % (ref 38–47)
HGB BLD-MCNC: 14.6 G/DL (ref 12–16)
IMM GRANULOCYTES # BLD AUTO: 0.2 K/UL (ref 0–0.04)
IMM GRANULOCYTES NFR BLD: 1.2 % (ref 0–0.4)
LYMPHOCYTES # BLD AUTO: 1.77 K/UL (ref 1–4.8)
LYMPHOCYTES NFR BLD AUTO: 10.9 % (ref 27–41)
MCH RBC QN AUTO: 29 PG (ref 27–31)
MCHC RBC AUTO-ENTMCNC: 32 G/DL (ref 32–36)
MCV RBC AUTO: 90.7 FL (ref 80–96)
MONOCYTES # BLD AUTO: 0.93 K/UL (ref 0–0.8)
MONOCYTES NFR BLD AUTO: 5.7 % (ref 2–6)
MPC BLD CALC-MCNC: 10.8 FL (ref 9.4–12.4)
NEUTROPHILS # BLD AUTO: 13.29 K/UL (ref 1.8–7.7)
NEUTROPHILS NFR BLD AUTO: 82 % (ref 53–65)
NRBC # BLD AUTO: 0.05 X10E3/UL
NRBC, AUTO (.00): 0.3 %
PLATELET # BLD AUTO: 346 K/UL (ref 150–400)
POTASSIUM SERPL-SCNC: 3.1 MMOL/L (ref 3.5–5.1)
RBC # BLD AUTO: 5.03 M/UL (ref 4.2–5.4)
SODIUM SERPL-SCNC: 138 MMOL/L (ref 136–145)
WBC # BLD AUTO: 16.23 K/UL (ref 4.5–11)

## 2023-03-17 PROCEDURE — 63600175 PHARM REV CODE 636 W HCPCS: Performed by: INTERNAL MEDICINE

## 2023-03-17 PROCEDURE — 80048 BASIC METABOLIC PNL TOTAL CA: CPT | Performed by: NURSE PRACTITIONER

## 2023-03-17 PROCEDURE — 25000242 PHARM REV CODE 250 ALT 637 W/ HCPCS: Performed by: INTERNAL MEDICINE

## 2023-03-17 PROCEDURE — 25000242 PHARM REV CODE 250 ALT 637 W/ HCPCS: Performed by: FAMILY MEDICINE

## 2023-03-17 PROCEDURE — 85025 COMPLETE CBC W/AUTO DIFF WBC: CPT | Performed by: NURSE PRACTITIONER

## 2023-03-17 PROCEDURE — 11000001 HC ACUTE MED/SURG PRIVATE ROOM

## 2023-03-17 PROCEDURE — 99233 SBSQ HOSP IP/OBS HIGH 50: CPT | Mod: ,,, | Performed by: INTERNAL MEDICINE

## 2023-03-17 PROCEDURE — 25000003 PHARM REV CODE 250: Performed by: INTERNAL MEDICINE

## 2023-03-17 PROCEDURE — 25000003 PHARM REV CODE 250: Performed by: NURSE PRACTITIONER

## 2023-03-17 PROCEDURE — 63600175 PHARM REV CODE 636 W HCPCS: Performed by: NURSE PRACTITIONER

## 2023-03-17 PROCEDURE — 25000003 PHARM REV CODE 250: Performed by: FAMILY MEDICINE

## 2023-03-17 PROCEDURE — 99900035 HC TECH TIME PER 15 MIN (STAT)

## 2023-03-17 PROCEDURE — 94761 N-INVAS EAR/PLS OXIMETRY MLT: CPT

## 2023-03-17 PROCEDURE — 94640 AIRWAY INHALATION TREATMENT: CPT

## 2023-03-17 PROCEDURE — 27000221 HC OXYGEN, UP TO 24 HOURS

## 2023-03-17 PROCEDURE — 97162 PT EVAL MOD COMPLEX 30 MIN: CPT

## 2023-03-17 PROCEDURE — 99233 PR SUBSEQUENT HOSPITAL CARE,LEVL III: ICD-10-PCS | Mod: ,,, | Performed by: INTERNAL MEDICINE

## 2023-03-17 RX ORDER — POTASSIUM CHLORIDE 20 MEQ/1
40 TABLET, EXTENDED RELEASE ORAL DAILY
Status: DISCONTINUED | OUTPATIENT
Start: 2023-03-17 | End: 2023-03-18

## 2023-03-17 RX ADMIN — HEPARIN SODIUM 5000 UNITS: 5000 INJECTION, SOLUTION INTRAVENOUS; SUBCUTANEOUS at 06:03

## 2023-03-17 RX ADMIN — IPRATROPIUM BROMIDE AND ALBUTEROL SULFATE 3 ML: 2.5; .5 SOLUTION RESPIRATORY (INHALATION) at 07:03

## 2023-03-17 RX ADMIN — METHYLPREDNISOLONE SODIUM SUCCINATE 40 MG: 40 INJECTION, POWDER, FOR SOLUTION INTRAMUSCULAR; INTRAVENOUS at 05:03

## 2023-03-17 RX ADMIN — IPRATROPIUM BROMIDE AND ALBUTEROL SULFATE 3 ML: 2.5; .5 SOLUTION RESPIRATORY (INHALATION) at 12:03

## 2023-03-17 RX ADMIN — TRAZODONE HYDROCHLORIDE 50 MG: 50 TABLET ORAL at 08:03

## 2023-03-17 RX ADMIN — DEXTROSE MONOHYDRATE 1 G: 5 INJECTION INTRAVENOUS at 09:03

## 2023-03-17 RX ADMIN — HEPARIN SODIUM 5000 UNITS: 5000 INJECTION, SOLUTION INTRAVENOUS; SUBCUTANEOUS at 09:03

## 2023-03-17 RX ADMIN — ASPIRIN 81 MG: 81 TABLET, DELAYED RELEASE ORAL at 09:03

## 2023-03-17 RX ADMIN — PANTOPRAZOLE SODIUM 40 MG: 40 TABLET, DELAYED RELEASE ORAL at 03:03

## 2023-03-17 RX ADMIN — FUROSEMIDE 80 MG: 10 INJECTION, SOLUTION INTRAMUSCULAR; INTRAVENOUS at 07:03

## 2023-03-17 RX ADMIN — AMLODIPINE BESYLATE 5 MG: 5 TABLET ORAL at 09:03

## 2023-03-17 RX ADMIN — POTASSIUM CHLORIDE 40 MEQ: 1500 TABLET, EXTENDED RELEASE ORAL at 05:03

## 2023-03-17 RX ADMIN — HYDROCODONE BITARTRATE AND ACETAMINOPHEN 1 TABLET: 7.5; 325 TABLET ORAL at 03:03

## 2023-03-17 RX ADMIN — PANTOPRAZOLE SODIUM 40 MG: 40 TABLET, DELAYED RELEASE ORAL at 05:03

## 2023-03-17 RX ADMIN — LOSARTAN POTASSIUM 50 MG: 50 TABLET, FILM COATED ORAL at 09:03

## 2023-03-17 NOTE — PROGRESS NOTES
03/17/23 0912   Wound Care Follow Up   Wound Care Follow-up? Yes   Wound Care- Next Visit Date 03/22/23   Follow Up Plan Gina POC

## 2023-03-17 NOTE — ASSESSMENT & PLAN NOTE
CXR with bilateral infiltrates, suspicious of atypical pneumonia with Leukocytosis of 26.59. COVID/Flu negative. Continue oxygen supplementation, current antibiotics, steroids and breathing treatments. Will obtain procalcitonin.     - Procalcitonin pending  - Rocephin 1g IV q24h (Initiated on 3/11/23)  - Azithromycin 500mg IV q24h  (Initiated on 3/11/23)  - Solumedrol 80mg IV q12h  - Duonebs q6h   - Continue oxygen supplementation     3/17: transferred out of ICU . Cont abx . Wean o2 as tolerated .
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
## 2023-03-17 NOTE — PROGRESS NOTES
[facility] - Short Stay [facility] Medicine  Progress Note    Patient Name: Amelie Cerrato  MRN: [id number]  Patient Class: IP- Inpatient   Admission Date: 3/11/2023  Length of Stay: 5 days  Attending Physician: Dwaine Garcia MD  Primary Care Provider: Danilo Cloud IV, DO        Subjective:     Principal Problem:Acute respiratory failure with hypoxia        HPI:  73 yo F with PMH of COPD, CKD and HTN who presented to ED with c/o SOB and generalized weakness. She reports symptoms have progressively worsened over last two weeks. Upon arrival SpO2 of 79% on room air with pt placed on BiPAP.     In ED, labs significant for leukocytosis of 26.59, Lactate of 1.9, proBNP of 3K, Troponin of 613 and BUN/Cr of 25/1.36. CXR with bilateral infiltrates suspicious of atypical pneumonia. EKG with sinus tachycardia at 106 bpm. COVID/Flu negative. Blood cultures x2 obtained and patient received 1500mL NS, Duonebs, Budesonide, Solumedrol 125mg, Rocephin and Azithromycin.     Upon examination, patient with non re-breather and bilateral lung crackles. She refuses BiPAP at this time. She reports currently smoking 4 cigarettes daily, using home oxygen occasionally, and only taking home HTN medication. At this time, patient admitted inpatient with telemetry at [facility] for further evaluation and management.         Overview/Hospital Course:  3/17 - pt states she is feeling ok . Breathing and cough improving . She wants to go home and doesn't want to go to rehab . Cont diuresis , abx and bronchodilator      Interval History:     Review of Systems   Respiratory:  Positive for cough and shortness of breath.    Cardiovascular:  Positive for leg swelling.   All other systems reviewed and are negative.  Objective:     Vital Signs (Most Recent):  Temp: 98.1 °F (36.7 °C) (03/17/23 0400)  Pulse: 84 (03/17/23 1200)  Resp: 16 (03/17/23 1552)  BP: (!) 140/80 (03/17/23 0400)  SpO2: 98 % (03/17/23 0417)   Vital Signs (24h  Range):  Temp:  [98.1 °F (36.7 °C)-99 °F (37.2 °C)] 98.1 °F (36.7 °C)  Pulse:  [83-94] 84  Resp:  [16-20] 16  SpO2:  [98 %] 98 %  BP: (126-140)/(75-80) 140/80     Weight: 40.8 kg (89 lb 15.2 oz)  Body mass index is 16.45 kg/m².  No intake or output data in the 24 hours ending 03/17/23 1623   Physical Exam  Vitals and nursing note reviewed.   Constitutional:       Comments: Thin and frail elderly F lying in bed   HENT:      Head: Normocephalic and atraumatic.      Mouth/Throat:      Mouth: Mucous membranes are moist.   Cardiovascular:      Rate and Rhythm: Normal rate.   Pulmonary:      Breath sounds: Rales present.   Neurological:      Mental Status: She is alert.       Significant Labs: All pertinent labs within the past 24 hours have been reviewed.    Significant Imaging: I have reviewed all pertinent imaging results/findings within the past 24 hours.      Assessment/Plan:      Acute pulmonary edema  3/17: better , cont lasix      Elevated brain natriuretic peptide (BNP) level  Patient with SOB and no known PMH of CHF with proBNP of 3,180. ECHO pending.      COPD exacerbation  Secondary to pneumonia. ABG results of 7.45/25/71/17.4 with pt refusing BiPAP. Respiratory currently has pt on non re-breather. Repeat ABG ordered to monitor for CO2 retention. Antibiotics, steroid and duonebs as above.     3/17: cont steroid and bronchodilator.    Type 2 MI (myocardial infarction)  Troponin of 613.4 with repeat Troponin of 826. EKG exhibits sinus tachycardia, No STEMI. Continue Telemetry with repeat Troponin and EKG pending. Consider Cardiology consult.        Community acquired pneumonia  CXR with bilateral infiltrates, suspicious of atypical pneumonia with Leukocytosis of 26.59. COVID/Flu negative. Continue oxygen supplementation, current antibiotics, steroids and breathing treatments. Will obtain procalcitonin.     - Procalcitonin pending  - Rocephin 1g IV q24h (Initiated on 3/11/23)  - Azithromycin 500mg IV q24h   (Initiated on 3/11/23)  - Solumedrol 80mg IV q12h  - Duonebs q6h   - Continue oxygen supplementation     3/17: transferred out of ICU . Cont abx . Wean o2 as tolerated .       VTE Risk Mitigation (From admission, onward)         Ordered     heparin (porcine) injection 5,000 Units  Every 8 hours         03/14/23 151                Discharge Planning   FRANCIS:      Code Status: Full Code   Is the patient medically ready for discharge?:     Reason for patient still in hospital (select all that apply): Treatment  Discharge Plan A: Home with family                  ARIC FRANCOIS MD  Department of Hospital Medicine   [facility] - Short Stay Unit

## 2023-03-17 NOTE — PT/OT/SLP EVAL
Physical Therapy Evaluation    Patient Name:  Amelie Cerrato   MRN:  [id number]    Recommendations:     Discharge Recommendations: home health PT   Discharge Equipment Recommendations:  (to be determined)   Barriers to discharge:  ongoing medical care    Assessment:     Amelie Cerrato is a 72 y.o. female admitted with a medical diagnosis of Acute respiratory failure with hypoxia.  She presents with the following impairments/functional limitations: weakness, impaired endurance, gait instability, impaired balance, impaired cardiopulmonary response to activity. Upon entry to room, O2 is at 2L but not properly donned on pt's nose. Sats read 77% with possible inaccuracy d/t poor connection. Once O2 donned, unable to get O2 sat reading. RN notified.    Rehab Prognosis: Good; patient would benefit from acute skilled PT services to address these deficits and reach maximum level of function.    Recent Surgery: * No surgery found *      Plan:     During this hospitalization, patient to be seen 5 x/week to address the identified rehab impairments via gait training, therapeutic activities, therapeutic exercises and progress toward the following goals:    Plan of Care Expires:  04/17/23    Subjective     Chief Complaint: community acquired pneumonia  Patient/Family Comments/goals: agreeable to PT  Pain/Comfort:  Pain Rating 1: 3/10  Location 1: back  Pain Addressed 1: Reposition, Distraction  Pain Rating Post-Intervention 1: 2/10    Patients cultural, spiritual, Congregation conflicts given the current situation:      Living Environment:  Home with mother, daugther and son, 1 level with 1 step to enter.   Prior to admission, patients level of function was ambulatory with rollator per her report.  Equipment used at home: cane, straight, rollator, grab bar, shower chair.  DME owned (not currently used): none.  Upon discharge, patient will have assistance from [facility].    Objective:     Communicated with JAYLA Strong RN prior to session.   Patient found HOB elevated with oxygen, peripheral IV, telemetry (O2 not properly donned upon entry to room)  upon PT entry to room.    General Precautions: Standard, respiratory, fall  Orthopedic Precautions:N/A   Braces: N/A  Respiratory Status: Nasal cannula, flow 2 L/min    Exams:  Cognitive Exam:  Patient is oriented to Person, Place, Time, and Situation  Sensation:    -       Intact  RLE ROM: WFL  RLE Strength: 4-/5  LLE ROM: WFL  LLE Strength: 4-/5    Functional Mobility:  Bed Mobility:     Rolling Left:  contact guard assistance and minimum assistance  Rolling Right: contact guard assistance and minimum assistance  Supine to Sit: contact guard assistance  Transfers:     Sit to Stand:  contact guard assistance with rolling walker  Gait: 15-20ft with RW, CGA with cues for posture and to maintain close proximity to RW  Balance: Fair to Fair+ in stance      AM-PAC 6 CLICK MOBILITY  Total Score:17       Treatment & Education:  Pt educated on PT role/POC.   Importance of OOB activity with staff assistance.  Importance of sitting up in the chair throughout the day as tolerated, especially for meals   Safety during functional t/f and mobility with use of RW  White board updated   Multiple self-care tasks/functional mobility completed- assistance level noted above   All questions/concerns answered within PT scope of practice     Patient left up in chair with all lines intact, call button in reach, and RN notified.    GOALS:   Multidisciplinary Problems       Physical Therapy Goals          Problem: Physical Therapy    Goal Priority Disciplines Outcome Goal Variances Interventions   Physical Therapy Goal     PT, PT/OT Ongoing, Progressing     Description: Short Term Goals to be met by: 3/31/23    Patient will increase functional independence with mobility by performin. Supine to sit with supervision  2. Sit to stand transfer with supervision using Rolling walker  3. Bed to chair transfer with supervision using  Rolling walker  4. Gait  x 100 feet with Stand by assist using Rolling walker  5. Lower extremity exercise program x30 reps per handout, with assistance as needed  6. Pt to negotiate 1 step with SBA using RW    Long Term Goals to be met by: 4/17/23    Pt will regain full independent functional mobility with Rolling walker to return to home situation and prior activities of daily living.                        History:     Past Medical History:   Diagnosis Date    Anxiety and depression     Chronic kidney disease, stage 3a     Chronic pain syndrome     COPD (chronic obstructive pulmonary disease)     Fracture of multiple pubic rami, left, closed, initial encounter 10/01/2021    HLD (hyperlipidemia)     Hypertension     Lumbar compression fracture, sequela L1, L2, L4, L5, kyphoplasty 12/8/2021    Lumbar radiculopathy     Lumbar stenosis     Lumbosacral spondylosis     Neuropathy        Past Surgical History:   Procedure Laterality Date    CHOLECYSTECTOMY      CYSTOSCOPY      EPIDURAL STEROID INJECTION INTO LUMBAR SPINE N/A 05/27/2020    L1-2 WILD     EPIDURAL STEROID INJECTION INTO THORACIC SPINE N/A 02/03/2021    T9-10 WILD     EPIDURAL STEROID INJECTION INTO THORACIC SPINE N/A 3/18/2021    Procedure: INJECTION, STEROID, SPINE, THORACIC, EPIDURAL;  Surgeon: Arelis Burns MD;  Location: [facility];  Service: Pain Management;  Laterality: N/A;    EPIDURAL STEROID INJECTION INTO THORACIC SPINE N/A 12/23/2021    Procedure: Injection-steroid-epidural-thoracic T9/10;  Surgeon: Arelis Burns MD;  Location: [facility];  Service: Pain Management;  Laterality: N/A;  HAD VAC  WILL BRING CARD    HYSTERECTOMY      INJECTION OF ANESTHETIC AGENT AROUND MEDIAL BRANCH NERVES INNERVATING LUMBAR FACET JOINT Bilateral 4/22/2021    Procedure: Block-nerve-medial branch-lumbar Bilateral L3-4,4-5,5-S1 MBB #2;  Surgeon: Arelis Burns MD;  Location: [facility];  Service: Pain Management;  Laterality: Bilateral;     INJECTION OF ANESTHETIC AGENT AROUND MEDIAL BRANCH NERVES INNERVATING LUMBAR FACET JOINT Bilateral 9/6/2022    Procedure: Block-nerve-medial branch-lumbar, bilateral L4 through S1;  Surgeon: Arelis Burns MD;  Location: [facility];  Service: Pain Management;  Laterality: Bilateral;    INJECTION OF ANESTHETIC AGENT AROUND MEDIAL BRANCH NERVES INNERVATING LUMBAR FACET JOINT Bilateral 10/11/2022    Procedure: Block-nerve-medial branch-lumbar, bilateral L4 through S1;  Surgeon: Arelis Burns MD;  Location: [facility];  Service: Pain Management;  Laterality: Bilateral;  vaccinated.    INJECTION OF FACET JOINT Bilateral 12/04/2020    RADIOFREQUENCY ABLATION OF LUMBAR MEDIAL BRANCH NERVE AT SINGLE LEVEL Bilateral 5/20/2021    Procedure: RADIOFREQUENCY ABLATION, NERVE, SPINAL, LUMBAR, MEDIAL BRANCH, 1 LEVEL;  Surgeon: Arelis Burns MD;  Location: [facility];  Service: Pain Management;  Laterality: Bilateral;  Bilateral L3-S1 RFTC (both sides same day)       Time Tracking:     PT Received On: 03/17/23  PT Start Time: 1449     PT Stop Time: 1536  PT Total Time (min): 47 min     Billable Minutes: Evaluation moderate complexity      03/17/2023

## 2023-03-17 NOTE — ASSESSMENT & PLAN NOTE
Secondary to pneumonia. ABG results of 7.45/25/71/17.4 with pt refusing BiPAP. Respiratory currently has pt on non re-breather. Repeat ABG ordered to monitor for CO2 retention. Antibiotics, steroid and duonebs as above.     3/17: cont steroid and bronchodilator.

## 2023-03-17 NOTE — SUBJECTIVE & OBJECTIVE
Interval History:     Review of Systems   Respiratory:  Positive for cough and shortness of breath.    Cardiovascular:  Positive for leg swelling.   All other systems reviewed and are negative.  Objective:     Vital Signs (Most Recent):  Temp: 98.1 °F (36.7 °C) (03/17/23 0400)  Pulse: 84 (03/17/23 1200)  Resp: 16 (03/17/23 1552)  BP: (!) 140/80 (03/17/23 0400)  SpO2: 98 % (03/17/23 0417)   Vital Signs (24h Range):  Temp:  [98.1 °F (36.7 °C)-99 °F (37.2 °C)] 98.1 °F (36.7 °C)  Pulse:  [83-94] 84  Resp:  [16-20] 16  SpO2:  [98 %] 98 %  BP: (126-140)/(75-80) 140/80     Weight: 40.8 kg (89 lb 15.2 oz)  Body mass index is 16.45 kg/m².  No intake or output data in the 24 hours ending 03/17/23 1623   Physical Exam  Vitals and nursing note reviewed.   Constitutional:       Comments: Thin and frail elderly F lying in bed   HENT:      Head: Normocephalic and atraumatic.      Mouth/Throat:      Mouth: Mucous membranes are moist.   Cardiovascular:      Rate and Rhythm: Normal rate.   Pulmonary:      Breath sounds: Rales present.   Neurological:      Mental Status: She is alert.       Significant Labs: All pertinent labs within the past 24 hours have been reviewed.    Significant Imaging: I have reviewed all pertinent imaging results/findings within the past 24 hours.   Patient presents with father to be seen for cough

## 2023-03-17 NOTE — PLAN OF CARE
Rcd consult for dc planning. Per previous notes this assessment was complete on admission. Pt will plan to dc to family at home when medically stable. Will follow dc needs as arise.

## 2023-03-17 NOTE — PLAN OF CARE
Problem: Physical Therapy  Goal: Physical Therapy Goal  Description: Short Term Goals to be met by: 3/31/23    Patient will increase functional independence with mobility by performin. Supine to sit with supervision  2. Sit to stand transfer with supervision using Rolling walker  3. Bed to chair transfer with supervision using Rolling walker  4. Gait  x 100 feet with Stand by assist using Rolling walker  5. Lower extremity exercise program x30 reps per handout, with assistance as needed  6. Pt to negotiate 1 step with SBA using RW    Long Term Goals to be met by: 23    Pt will regain full independent functional mobility with Rolling walker to return to home situation and prior activities of daily living.   Outcome: Ongoing, Progressing

## 2023-03-18 PROBLEM — N17.9 AKI (ACUTE KIDNEY INJURY): Status: ACTIVE | Noted: 2023-03-18

## 2023-03-18 LAB
ANION GAP SERPL CALCULATED.3IONS-SCNC: 9 MMOL/L (ref 7–16)
ANISOCYTOSIS BLD QL SMEAR: ABNORMAL
BASOPHILS # BLD AUTO: 0.04 K/UL (ref 0–0.2)
BASOPHILS NFR BLD AUTO: 0.2 % (ref 0–1)
BUN SERPL-MCNC: 54 MG/DL (ref 7–18)
BUN/CREAT SERPL: 38 (ref 6–20)
CALCIUM SERPL-MCNC: 9.7 MG/DL (ref 8.5–10.1)
CHLORIDE SERPL-SCNC: 93 MMOL/L (ref 98–107)
CO2 SERPL-SCNC: 34 MMOL/L (ref 21–32)
CREAT SERPL-MCNC: 1.41 MG/DL (ref 0.55–1.02)
DIFFERENTIAL METHOD BLD: ABNORMAL
EGFR (NO RACE VARIABLE) (RUSH/TITUS): 40 ML/MIN/1.73M²
EOSINOPHIL # BLD AUTO: 0.01 K/UL (ref 0–0.5)
EOSINOPHIL NFR BLD AUTO: 0.1 % (ref 1–4)
ERYTHROCYTE [DISTWIDTH] IN BLOOD BY AUTOMATED COUNT: 17.9 % (ref 11.5–14.5)
GLUCOSE SERPL-MCNC: 108 MG/DL (ref 74–106)
HCT VFR BLD AUTO: 44.6 % (ref 38–47)
HGB BLD-MCNC: 14.1 G/DL (ref 12–16)
IMM GRANULOCYTES # BLD AUTO: 0.26 K/UL (ref 0–0.04)
IMM GRANULOCYTES NFR BLD: 1.4 % (ref 0–0.4)
LYMPHOCYTES # BLD AUTO: 2.04 K/UL (ref 1–4.8)
LYMPHOCYTES NFR BLD AUTO: 10.7 % (ref 27–41)
LYMPHOCYTES NFR BLD MANUAL: 12 % (ref 27–41)
MCH RBC QN AUTO: 28.4 PG (ref 27–31)
MCHC RBC AUTO-ENTMCNC: 31.6 G/DL (ref 32–36)
MCV RBC AUTO: 89.9 FL (ref 80–96)
MONOCYTES # BLD AUTO: 0.81 K/UL (ref 0–0.8)
MONOCYTES NFR BLD AUTO: 4.3 % (ref 2–6)
MONOCYTES NFR BLD MANUAL: 6 % (ref 2–6)
MPC BLD CALC-MCNC: 11.4 FL (ref 9.4–12.4)
NEUTROPHILS # BLD AUTO: 15.85 K/UL (ref 1.8–7.7)
NEUTROPHILS NFR BLD AUTO: 83.3 % (ref 53–65)
NEUTS SEG NFR BLD MANUAL: 82 % (ref 50–62)
NRBC # BLD AUTO: 0.02 X10E3/UL
NRBC, AUTO (.00): 0.1 %
PLATELET # BLD AUTO: 314 K/UL (ref 150–400)
PLATELET MORPHOLOGY: NORMAL
POTASSIUM SERPL-SCNC: 4.4 MMOL/L (ref 3.5–5.1)
RBC # BLD AUTO: 4.96 M/UL (ref 4.2–5.4)
SODIUM SERPL-SCNC: 132 MMOL/L (ref 136–145)
WBC # BLD AUTO: 19.01 K/UL (ref 4.5–11)

## 2023-03-18 PROCEDURE — 99900035 HC TECH TIME PER 15 MIN (STAT)

## 2023-03-18 PROCEDURE — 85025 COMPLETE CBC W/AUTO DIFF WBC: CPT | Performed by: INTERNAL MEDICINE

## 2023-03-18 PROCEDURE — 11000001 HC ACUTE MED/SURG PRIVATE ROOM

## 2023-03-18 PROCEDURE — 94640 AIRWAY INHALATION TREATMENT: CPT

## 2023-03-18 PROCEDURE — 94761 N-INVAS EAR/PLS OXIMETRY MLT: CPT

## 2023-03-18 PROCEDURE — 63600175 PHARM REV CODE 636 W HCPCS: Performed by: INTERNAL MEDICINE

## 2023-03-18 PROCEDURE — 25000242 PHARM REV CODE 250 ALT 637 W/ HCPCS: Performed by: INTERNAL MEDICINE

## 2023-03-18 PROCEDURE — 27000221 HC OXYGEN, UP TO 24 HOURS

## 2023-03-18 PROCEDURE — 25000003 PHARM REV CODE 250: Performed by: INTERNAL MEDICINE

## 2023-03-18 PROCEDURE — 80048 BASIC METABOLIC PNL TOTAL CA: CPT | Performed by: INTERNAL MEDICINE

## 2023-03-18 PROCEDURE — 25000003 PHARM REV CODE 250: Performed by: NURSE PRACTITIONER

## 2023-03-18 PROCEDURE — 99232 PR SUBSEQUENT HOSPITAL CARE,LEVL II: ICD-10-PCS | Mod: ,,, | Performed by: INTERNAL MEDICINE

## 2023-03-18 PROCEDURE — 97165 OT EVAL LOW COMPLEX 30 MIN: CPT

## 2023-03-18 PROCEDURE — 99232 SBSQ HOSP IP/OBS MODERATE 35: CPT | Mod: ,,, | Performed by: INTERNAL MEDICINE

## 2023-03-18 RX ORDER — PREDNISONE 20 MG/1
40 TABLET ORAL DAILY
Status: DISCONTINUED | OUTPATIENT
Start: 2023-03-18 | End: 2023-03-19

## 2023-03-18 RX ORDER — FUROSEMIDE 40 MG/1
40 TABLET ORAL DAILY
Status: DISCONTINUED | OUTPATIENT
Start: 2023-03-18 | End: 2023-03-18

## 2023-03-18 RX ORDER — SILVER SULFADIAZINE 10 G/1000G
CREAM TOPICAL DAILY
Status: DISCONTINUED | OUTPATIENT
Start: 2023-03-19 | End: 2023-03-20 | Stop reason: HOSPADM

## 2023-03-18 RX ORDER — BACITRACIN 500 [USP'U]/G
OINTMENT TOPICAL 3 TIMES DAILY
Status: DISCONTINUED | OUTPATIENT
Start: 2023-03-18 | End: 2023-03-20 | Stop reason: HOSPADM

## 2023-03-18 RX ORDER — AMOXICILLIN AND CLAVULANATE POTASSIUM 500; 125 MG/1; MG/1
1 TABLET, FILM COATED ORAL 2 TIMES DAILY
Status: DISCONTINUED | OUTPATIENT
Start: 2023-03-18 | End: 2023-03-20 | Stop reason: HOSPADM

## 2023-03-18 RX ADMIN — AMOXICILLIN AND CLAVULANATE POTASSIUM 500 MG: 500; 125 TABLET, FILM COATED ORAL at 10:03

## 2023-03-18 RX ADMIN — HEPARIN SODIUM 5000 UNITS: 5000 INJECTION, SOLUTION INTRAVENOUS; SUBCUTANEOUS at 09:03

## 2023-03-18 RX ADMIN — IPRATROPIUM BROMIDE AND ALBUTEROL SULFATE 3 ML: 2.5; .5 SOLUTION RESPIRATORY (INHALATION) at 12:03

## 2023-03-18 RX ADMIN — PREDNISONE 40 MG: 20 TABLET ORAL at 09:03

## 2023-03-18 RX ADMIN — IPRATROPIUM BROMIDE AND ALBUTEROL SULFATE 3 ML: 2.5; .5 SOLUTION RESPIRATORY (INHALATION) at 07:03

## 2023-03-18 RX ADMIN — IPRATROPIUM BROMIDE AND ALBUTEROL SULFATE 3 ML: 2.5; .5 SOLUTION RESPIRATORY (INHALATION) at 01:03

## 2023-03-18 RX ADMIN — PANTOPRAZOLE SODIUM 40 MG: 40 TABLET, DELAYED RELEASE ORAL at 04:03

## 2023-03-18 RX ADMIN — ASPIRIN 81 MG: 81 TABLET, DELAYED RELEASE ORAL at 09:03

## 2023-03-18 RX ADMIN — HYDROCODONE BITARTRATE AND ACETAMINOPHEN 1 TABLET: 7.5; 325 TABLET ORAL at 06:03

## 2023-03-18 RX ADMIN — PANTOPRAZOLE SODIUM 40 MG: 40 TABLET, DELAYED RELEASE ORAL at 06:03

## 2023-03-18 RX ADMIN — AMLODIPINE BESYLATE 5 MG: 5 TABLET ORAL at 09:03

## 2023-03-18 RX ADMIN — METHYLPREDNISOLONE SODIUM SUCCINATE 40 MG: 40 INJECTION, POWDER, FOR SOLUTION INTRAMUSCULAR; INTRAVENOUS at 06:03

## 2023-03-18 RX ADMIN — TRAZODONE HYDROCHLORIDE 50 MG: 50 TABLET ORAL at 09:03

## 2023-03-18 RX ADMIN — HYDROCODONE BITARTRATE AND ACETAMINOPHEN 1 TABLET: 7.5; 325 TABLET ORAL at 09:03

## 2023-03-18 RX ADMIN — AMOXICILLIN AND CLAVULANATE POTASSIUM 500 MG: 500; 125 TABLET, FILM COATED ORAL at 09:03

## 2023-03-18 RX ADMIN — HEPARIN SODIUM 5000 UNITS: 5000 INJECTION, SOLUTION INTRAVENOUS; SUBCUTANEOUS at 02:03

## 2023-03-18 RX ADMIN — HYDROCODONE BITARTRATE AND ACETAMINOPHEN 1 TABLET: 7.5; 325 TABLET ORAL at 11:03

## 2023-03-18 RX ADMIN — HEPARIN SODIUM 5000 UNITS: 5000 INJECTION, SOLUTION INTRAVENOUS; SUBCUTANEOUS at 06:03

## 2023-03-18 NOTE — PT/OT/SLP EVAL
Occupational Therapy   Evaluation    Name: Amelie Cerrato  MRN: [id number]  Admitting Diagnosis: Acute respiratory failure with hypoxia  Recent Surgery: * No surgery found *      Recommendations:     Discharge Recommendations: home with home health  Discharge Equipment Recommendations:   (to be determined)  Barriers to discharge:  None    Assessment:     Amelie Cerrato is a 72 y.o. female with a medical diagnosis of Acute respiratory failure with hypoxia.  She presents with no complaints. Pt agreed to OT evaluation. Performance deficits affecting function: weakness, impaired endurance, impaired self care skills, impaired functional mobility, gait instability, impaired cardiopulmonary response to activity.      Rehab Prognosis: Good; patient would benefit from acute skilled OT services to address these deficits and reach maximum level of function.       Plan:     Patient to be seen 5 x/week to address the above listed problems via self-care/home management, therapeutic activities, therapeutic exercises  Plan of Care Expires:    Plan of Care Reviewed with: patient    Subjective     Chief Complaint: CAP  Patient/Family Comments/goals: To return home.    Occupational Profile:  Living Environment: Pt lives in 1 story home with mother and son  Previous level of function: Pt reports being (I) with self care and mobility  Roles and Routines: I with daily activities.   Equipment Used at Home: cane, straight, rollator  Assistance upon Discharge: Family    Pain/Comfort:  Pain Rating 1: 0/10  Pain Rating Post-Intervention 1: 0/10    Patients cultural, spiritual, Yazidism conflicts given the current situation: no    Objective:     Communicated with: JONATHON More prior to session.  Patient found HOB elevated with oxygen, peripheral IV, telemetry upon OT entry to room.    General Precautions: Standard, fall, respiratory  Orthopedic Precautions: N/A  Braces: N/A  Respiratory Status: Nasal cannula, flow 2 L/min    Occupational  Performance:    Bed Mobility:    Patient completed Rolling/Turning to Left with  stand by assistance  Patient completed Supine to Sit with stand by assistance    Functional Mobility/Transfers:  Patient completed Sit <> Stand Transfer with contact guard assistance  with  gait belt to stand to RW   Patient completed Bed <> Chair Transfer using Step Transfer technique with contact guard assistance with rolling walker and gait belt  Functional Mobility: CGA with RW    Activities of Daily Living:  Upper Body Dressing: minimum assistance donning gown as a robe  Lower Body Dressing: stand by assistance donning socks    Cognitive/Visual Perceptual:  Cognitive/Psychosocial Skills:     -       Oriented to: Person, Place, and Situation   -       Follows Commands/attention:Follows one-step commands  -       Communication: clear/fluent  Visual/Perceptual:      -Intact wears glasses. Hearing wfl    Physical Exam:  Balance:    -       (S) with EOB sitting. CGA with standing balance  Skin integrity: Visible skin intact  Edema:  None noted  Sensation:    -       Intact  Motor Planning:    -       wfl  Dominant hand:    -       Right  Upper Extremity Range of Motion:     -       Right Upper Extremity: WFL  -       Left Upper Extremity: WFL  Upper Extremity Strength:    -       Right Upper Extremity: WFL +3/5  -       Left Upper Extremity: WFL  +3/5   Strength:    -       Right Upper Extremity: WFL  -       Left Upper Extremity: WFL    AMPAC 6 Click ADL:  AMPAC Total Score: 20    Treatment & Education:  OT evaluation completed. See eval for details.  Pt educated on OT role/POC.   Importance of OOB activity with staff assistance.  Importance of sitting up in the chair throughout the day as tolerated, especially for meals   Safety during functional t/f and mobility with use of RW  Importance of assisting with self-care activities   All questions/concerns answered within OT scope of practice     Patient left up in chair with all  lines intact, call button in reach, and nurse notified    GOALS:   Multidisciplinary Problems       Occupational Therapy Goals          Problem: Occupational Therapy    Goal Priority Disciplines Outcome Interventions   Occupational Therapy Goal     OT, PT/OT Ongoing, Progressing    Description: STG:  Pt will perform grooming (I)  Pt will be (I) with bathing  Pt will perform UE dressing (I)  Pt will perform LE dressing (I)  Pt will transfer bed/chair/bsc with Mod I  Pt will perform standing task x 2 min with Mod I  Pt will tolerate 15 minutes of tx without fatigue      LT.Restore to max I with self care and mobility.                          History:     Past Medical History:   Diagnosis Date    Anxiety and depression     Chronic kidney disease, stage 3a     Chronic pain syndrome     COPD (chronic obstructive pulmonary disease)     Fracture of multiple pubic rami, left, closed, initial encounter 10/01/2021    HLD (hyperlipidemia)     Hypertension     Lumbar compression fracture, sequela L1, L2, L4, L5, kyphoplasty 2021    Lumbar radiculopathy     Lumbar stenosis     Lumbosacral spondylosis     Neuropathy          Past Surgical History:   Procedure Laterality Date    CHOLECYSTECTOMY      CYSTOSCOPY      EPIDURAL STEROID INJECTION INTO LUMBAR SPINE N/A 2020    L1-2 WILD     EPIDURAL STEROID INJECTION INTO THORACIC SPINE N/A 2021    T9-10 WILD     EPIDURAL STEROID INJECTION INTO THORACIC SPINE N/A 3/18/2021    Procedure: INJECTION, STEROID, SPINE, THORACIC, EPIDURAL;  Surgeon: Arelis Burns MD;  Location: [facility];  Service: Pain Management;  Laterality: N/A;    EPIDURAL STEROID INJECTION INTO THORACIC SPINE N/A 2021    Procedure: Injection-steroid-epidural-thoracic T9/10;  Surgeon: Arelis Burns MD;  Location: [facility];  Service: Pain Management;  Laterality: N/A;  HAD VAC  WILL BRING CARD    HYSTERECTOMY      INJECTION OF ANESTHETIC AGENT AROUND MEDIAL BRANCH NERVES  INNERVATING LUMBAR FACET JOINT Bilateral 4/22/2021    Procedure: Block-nerve-medial branch-lumbar Bilateral L3-4,4-5,5-S1 MBB #2;  Surgeon: Arelis Burns MD;  Location: [facility];  Service: Pain Management;  Laterality: Bilateral;    INJECTION OF ANESTHETIC AGENT AROUND MEDIAL BRANCH NERVES INNERVATING LUMBAR FACET JOINT Bilateral 9/6/2022    Procedure: Block-nerve-medial branch-lumbar, bilateral L4 through S1;  Surgeon: Arelis Burns MD;  Location: [facility];  Service: Pain Management;  Laterality: Bilateral;    INJECTION OF ANESTHETIC AGENT AROUND MEDIAL BRANCH NERVES INNERVATING LUMBAR FACET JOINT Bilateral 10/11/2022    Procedure: Block-nerve-medial branch-lumbar, bilateral L4 through S1;  Surgeon: Arelis Burns MD;  Location: [facility];  Service: Pain Management;  Laterality: Bilateral;  vaccinated.    INJECTION OF FACET JOINT Bilateral 12/04/2020    RADIOFREQUENCY ABLATION OF LUMBAR MEDIAL BRANCH NERVE AT SINGLE LEVEL Bilateral 5/20/2021    Procedure: RADIOFREQUENCY ABLATION, NERVE, SPINAL, LUMBAR, MEDIAL BRANCH, 1 LEVEL;  Surgeon: Arelis Burns MD;  Location: [facility];  Service: Pain Management;  Laterality: Bilateral;  Bilateral L3-S1 RFTC (both sides same day)       Time Tracking:     OT Date of Treatment: 03/18/23  OT Start Time: 0830  OT Stop Time: 0849  OT Total Time (min): 19 min    Billable Minutes:Evaluation 19    3/18/2023

## 2023-03-18 NOTE — PROGRESS NOTES
[facility] [address] [facility] Medicine  Progress Note    Patient Name: Amelie Cerrato  MRN: [id number]  Patient Class: IP- Inpatient   Admission Date: 3/11/2023  Length of Stay: 6 days  Attending Physician: Dwaine Garcia MD  Primary Care Provider: Danilo Cloud IV, DO        Subjective:     Principal Problem:Acute respiratory failure with hypoxia        HPI:  71 yo F with PMH of COPD, CKD and HTN who presented to ED with c/o SOB and generalized weakness. She reports symptoms have progressively worsened over last two weeks. Upon arrival SpO2 of 79% on room air with pt placed on BiPAP.     In ED, labs significant for leukocytosis of 26.59, Lactate of 1.9, proBNP of 3K, Troponin of 613 and BUN/Cr of 25/1.36. CXR with bilateral infiltrates suspicious of atypical pneumonia. EKG with sinus tachycardia at 106 bpm. COVID/Flu negative. Blood cultures x2 obtained and patient received 1500mL NS, Duonebs, Budesonide, Solumedrol 125mg, Rocephin and Azithromycin.     Upon examination, patient with non re-breather and bilateral lung crackles. She refuses BiPAP at this time. She reports currently smoking 4 cigarettes daily, using home oxygen occasionally, and only taking home HTN medication. At this time, patient admitted inpatient with telemetry at [facility] for further evaluation and management.         Overview/Hospital Course:  3/17 - pt states she is feeling ok . Breathing and cough improving . She wants to go home and doesn't want to go to rehab . Cont diuresis , abx and bronchodilator    3/18: pt feels good . Lost IV access . Cr slightly up ,stopped diuresis and encourage PO intake for today . Wean o2 as tolerated . Change steroid and abx to PO       Interval History:     Review of Systems   Respiratory:  Negative for cough.    All other systems reviewed and are negative.  Objective:     Vital Signs (Most Recent):  Temp: 98 °F (36.7 °C) (03/18/23 0800)  Pulse: 84 (03/18/23 0800)  Resp: 18 (03/18/23  1130)  BP: (!) 140/63 (03/18/23 0800)  SpO2: 96 % (03/18/23 0345)   Vital Signs (24h Range):  Temp:  [97.8 °F (36.6 °C)-98.9 °F (37.2 °C)] 98 °F (36.7 °C)  Pulse:  [83-95] 84  Resp:  [16-18] 18  SpO2:  [96 %-98 %] 96 %  BP: (106-140)/(53-66) 140/63     Weight: 40.8 kg (89 lb 15.2 oz)  Body mass index is 16.45 kg/m².  No intake or output data in the 24 hours ending 03/18/23 1137   Physical Exam  HENT:      Head: Normocephalic.      Nose: Nose normal.   Eyes:      Pupils: Pupils are equal, round, and reactive to light.   Cardiovascular:      Rate and Rhythm: Normal rate.   Pulmonary:      Effort: Pulmonary effort is normal.   Abdominal:      Palpations: Abdomen is soft.   Neurological:      Mental Status: She is alert.       Significant Labs: All pertinent labs within the past 24 hours have been reviewed.    Significant Imaging: I have reviewed all pertinent imaging results/findings within the past 24 hours.      Assessment/Plan:      CELIA (acute kidney injury)  Hold diuresis and acei . liberalize po intake . Check bmp in am    Acute pulmonary edema  3/17: better , cont lasix  3/18: hold lasix    Elevated brain natriuretic peptide (BNP) level  Patient with SOB and no known PMH of CHF with proBNP of 3,180. ECHO pending.      COPD exacerbation  Secondary to pneumonia. ABG results of 7.45/25/71/17.4 with pt refusing BiPAP. Respiratory currently has pt on non re-breather. Repeat ABG ordered to monitor for CO2 retention. Antibiotics, steroid and duonebs as above.     3/17: cont steroid and bronchodilator.    3/18: change to po steroids     Type 2 MI (myocardial infarction)  Troponin of 613.4 with repeat Troponin of 826. EKG exhibits sinus tachycardia, No STEMI. Continue Telemetry with repeat Troponin and EKG pending. Consider Cardiology consult.        Community acquired pneumonia  CXR with bilateral infiltrates, suspicious of atypical pneumonia with Leukocytosis of 26.59. COVID/Flu negative. Continue oxygen  supplementation, current antibiotics, steroids and breathing treatments. Will obtain procalcitonin.     - Procalcitonin pending  - Rocephin 1g IV q24h (Initiated on 3/11/23)  - Azithromycin 500mg IV q24h  (Initiated on 3/11/23)  - Solumedrol 80mg IV q12h  - Duonebs q6h   - Continue oxygen supplementation     3/17: transferred out of ICU . Cont abx . Wean o2 as tolerated .     9/18: change to po abx      VTE Risk Mitigation (From admission, onward)         Ordered     heparin (porcine) injection 5,000 Units  Every 8 hours         03/14/23 1515                Discharge Planning   FRANCIS:      Code Status: Full Code   Is the patient medically ready for discharge?:     Reason for patient still in hospital (select all that apply): Treatment  Discharge Plan A: Home with family                  ARIC FRANCOIS MD  Department of Hospital Medicine   [facility] Short Stay [facility]

## 2023-03-18 NOTE — PLAN OF CARE
Problem: Adult Inpatient Plan of Care  Goal: Plan of Care Review  Outcome: Ongoing, Progressing  Flowsheets (Taken 3/17/2023 1956)  Plan of Care Reviewed With: patient  Goal: Patient-Specific Goal (Individualized)  Outcome: Ongoing, Progressing  Flowsheets (Taken 3/17/2023 1956)  Anxieties, Fears or Concerns: not being able to control anxiety  Individualized Care Needs: pain managemant  Goal: Absence of Hospital-Acquired Illness or Injury  Outcome: Ongoing, Progressing  Intervention: Identify and Manage Fall Risk  Flowsheets (Taken 3/17/2023 1956)  Safety Promotion/Fall Prevention: assistive device/personal item within reach  Intervention: Prevent Skin Injury  Flowsheets (Taken 3/17/2023 1956)  Body Position: weight shifting  Skin Protection: adhesive use limited  Intervention: Prevent and Manage VTE (Venous Thromboembolism) Risk  Flowsheets (Taken 3/17/2023 1956)  Activity Management: Rolling - L1  VTE Prevention/Management: fluids promoted  Range of Motion: active ROM (range of motion) encouraged  Goal: Optimal Comfort and Wellbeing  Outcome: Ongoing, Progressing  Intervention: Monitor Pain and Promote Comfort  Flowsheets (Taken 3/17/2023 1956)  Pain Management Interventions: care clustered  Intervention: Provide Person-Centered Care  Flowsheets (Taken 3/17/2023 1956)  Trust Relationship/Rapport: care explained  Goal: Readiness for Transition of Care  Outcome: Ongoing, Progressing     Problem: Adjustment to Illness (Sepsis/Septic Shock)  Goal: Optimal Coping  Outcome: Ongoing, Progressing     Problem: Bleeding (Sepsis/Septic Shock)  Goal: Absence of Bleeding  Outcome: Ongoing, Progressing     Problem: Glycemic Control Impaired (Sepsis/Septic Shock)  Goal: Blood Glucose Level Within Desired Range  Outcome: Ongoing, Progressing     Problem: Infection Progression (Sepsis/Septic Shock)  Goal: Absence of Infection Signs and Symptoms  Outcome: Ongoing, Progressing     Problem: Nutrition Impaired (Sepsis/Septic  Shock)  Goal: Optimal Nutrition Intake  Outcome: Ongoing, Progressing     Problem: Fluid Imbalance (Pneumonia)  Goal: Fluid Balance  Outcome: Ongoing, Progressing     Problem: Infection (Pneumonia)  Goal: Resolution of Infection Signs and Symptoms  Outcome: Ongoing, Progressing     Problem: Respiratory Compromise (Pneumonia)  Goal: Effective Oxygenation and Ventilation  Outcome: Ongoing, Progressing     Problem: Skin Injury Risk Increased  Goal: Skin Health and Integrity  Outcome: Ongoing, Progressing     Problem: Fall Injury Risk  Goal: Absence of Fall and Fall-Related Injury  Outcome: Ongoing, Progressing     Problem: Restraint, Nonbehavioral (Nonviolent)  Goal: Absence of Harm or Injury  Outcome: Ongoing, Progressing     Problem: Infection  Goal: Absence of Infection Signs and Symptoms  Outcome: Ongoing, Progressing     Problem: Noninvasive Ventilation Acute  Goal: Effective Unassisted Ventilation and Oxygenation  Outcome: Ongoing, Progressing     Problem: Gas Exchange Impaired  Goal: Optimal Gas Exchange  Outcome: Ongoing, Progressing     Problem: Impaired Wound Healing  Goal: Optimal Wound Healing  Outcome: Ongoing, Progressing

## 2023-03-18 NOTE — ASSESSMENT & PLAN NOTE
Secondary to pneumonia. ABG results of 7.45/25/71/17.4 with pt refusing BiPAP. Respiratory currently has pt on non re-breather. Repeat ABG ordered to monitor for CO2 retention. Antibiotics, steroid and duonebs as above.     3/17: cont steroid and bronchodilator.    3/18: change to po steroids
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
## 2023-03-18 NOTE — SUBJECTIVE & OBJECTIVE
Interval History:     Review of Systems   Respiratory:  Negative for cough.    All other systems reviewed and are negative.  Objective:     Vital Signs (Most Recent):  Temp: 98 °F (36.7 °C) (03/18/23 0800)  Pulse: 84 (03/18/23 0800)  Resp: 18 (03/18/23 1130)  BP: (!) 140/63 (03/18/23 0800)  SpO2: 96 % (03/18/23 0345)   Vital Signs (24h Range):  Temp:  [97.8 °F (36.6 °C)-98.9 °F (37.2 °C)] 98 °F (36.7 °C)  Pulse:  [83-95] 84  Resp:  [16-18] 18  SpO2:  [96 %-98 %] 96 %  BP: (106-140)/(53-66) 140/63     Weight: 40.8 kg (89 lb 15.2 oz)  Body mass index is 16.45 kg/m².  No intake or output data in the 24 hours ending 03/18/23 1137   Physical Exam  HENT:      Head: Normocephalic.      Nose: Nose normal.   Eyes:      Pupils: Pupils are equal, round, and reactive to light.   Cardiovascular:      Rate and Rhythm: Normal rate.   Pulmonary:      Effort: Pulmonary effort is normal.   Abdominal:      Palpations: Abdomen is soft.   Neurological:      Mental Status: She is alert.       Significant Labs: All pertinent labs within the past 24 hours have been reviewed.    Significant Imaging: I have reviewed all pertinent imaging results/findings within the past 24 hours.

## 2023-03-18 NOTE — NURSING
Pt transferred to 5N room 570. Pt transferred via wc, with son and daughter at side. Report given to charge nurse and JONATHON Anderson.

## 2023-03-18 NOTE — PLAN OF CARE
Problem: Occupational Therapy  Goal: Occupational Therapy Goal  Description: STG:  Pt will perform grooming (I)  Pt will be (I) with bathing  Pt will perform UE dressing (I)  Pt will perform LE dressing (I)  Pt will transfer bed/chair/bsc with Mod I  Pt will perform standing task x 2 min with Mod I  Pt will tolerate 15 minutes of tx without fatigue      LT.Restore to max I with self care and mobility.     Outcome: Ongoing, Progressing

## 2023-03-18 NOTE — ASSESSMENT & PLAN NOTE
CXR with bilateral infiltrates, suspicious of atypical pneumonia with Leukocytosis of 26.59. COVID/Flu negative. Continue oxygen supplementation, current antibiotics, steroids and breathing treatments. Will obtain procalcitonin.     - Procalcitonin pending  - Rocephin 1g IV q24h (Initiated on 3/11/23)  - Azithromycin 500mg IV q24h  (Initiated on 3/11/23)  - Solumedrol 80mg IV q12h  - Duonebs q6h   - Continue oxygen supplementation     3/17: transferred out of ICU . Cont abx . Wean o2 as tolerated .     9/18: change to po abx

## 2023-03-19 PROBLEM — G93.41 ENCEPHALOPATHY, METABOLIC: Status: ACTIVE | Noted: 2023-03-19

## 2023-03-19 LAB
ANION GAP SERPL CALCULATED.3IONS-SCNC: 12 MMOL/L (ref 7–16)
ANISOCYTOSIS BLD QL SMEAR: ABNORMAL
BASOPHILS # BLD AUTO: 0.06 K/UL (ref 0–0.2)
BASOPHILS NFR BLD AUTO: 0.2 % (ref 0–1)
BUN SERPL-MCNC: 60 MG/DL (ref 7–18)
BUN/CREAT SERPL: 38 (ref 6–20)
CALCIUM SERPL-MCNC: 10 MG/DL (ref 8.5–10.1)
CHLORIDE SERPL-SCNC: 93 MMOL/L (ref 98–107)
CO2 SERPL-SCNC: 32 MMOL/L (ref 21–32)
CREAT SERPL-MCNC: 1.58 MG/DL (ref 0.55–1.02)
DIFFERENTIAL METHOD BLD: ABNORMAL
EGFR (NO RACE VARIABLE) (RUSH/TITUS): 35 ML/MIN/1.73M²
EOSINOPHIL # BLD AUTO: 0.02 K/UL (ref 0–0.5)
EOSINOPHIL NFR BLD AUTO: 0.1 % (ref 1–4)
ERYTHROCYTE [DISTWIDTH] IN BLOOD BY AUTOMATED COUNT: 18.1 % (ref 11.5–14.5)
GLUCOSE SERPL-MCNC: 124 MG/DL (ref 74–106)
HCT VFR BLD AUTO: 41 % (ref 38–47)
HGB BLD-MCNC: 13.1 G/DL (ref 12–16)
IMM GRANULOCYTES # BLD AUTO: 0.47 K/UL (ref 0–0.04)
IMM GRANULOCYTES NFR BLD: 1.8 % (ref 0–0.4)
LYMPHOCYTES # BLD AUTO: 4 K/UL (ref 1–4.8)
LYMPHOCYTES NFR BLD AUTO: 14.9 % (ref 27–41)
LYMPHOCYTES NFR BLD MANUAL: 11 % (ref 27–41)
MCH RBC QN AUTO: 28.6 PG (ref 27–31)
MCHC RBC AUTO-ENTMCNC: 32 G/DL (ref 32–36)
MCV RBC AUTO: 89.5 FL (ref 80–96)
MONOCYTES # BLD AUTO: 1.59 K/UL (ref 0–0.8)
MONOCYTES NFR BLD AUTO: 5.9 % (ref 2–6)
MONOCYTES NFR BLD MANUAL: 8 % (ref 2–6)
MPC BLD CALC-MCNC: 10.9 FL (ref 9.4–12.4)
NEUTROPHILS # BLD AUTO: 20.64 K/UL (ref 1.8–7.7)
NEUTROPHILS NFR BLD AUTO: 77.1 % (ref 53–65)
NEUTS BAND NFR BLD MANUAL: 1 % (ref 1–5)
NEUTS SEG NFR BLD MANUAL: 80 % (ref 50–62)
NRBC # BLD AUTO: 0.02 X10E3/UL
NRBC, AUTO (.00): 0.1 %
PLATELET # BLD AUTO: 380 K/UL (ref 150–400)
PLATELET MORPHOLOGY: NORMAL
POTASSIUM SERPL-SCNC: 4.1 MMOL/L (ref 3.5–5.1)
RBC # BLD AUTO: 4.58 M/UL (ref 4.2–5.4)
SODIUM SERPL-SCNC: 133 MMOL/L (ref 136–145)
WBC # BLD AUTO: 26.78 K/UL (ref 4.5–11)

## 2023-03-19 PROCEDURE — 11000001 HC ACUTE MED/SURG PRIVATE ROOM

## 2023-03-19 PROCEDURE — 25000003 PHARM REV CODE 250: Performed by: INTERNAL MEDICINE

## 2023-03-19 PROCEDURE — 63600175 PHARM REV CODE 636 W HCPCS: Performed by: INTERNAL MEDICINE

## 2023-03-19 PROCEDURE — 25000003 PHARM REV CODE 250: Performed by: NURSE PRACTITIONER

## 2023-03-19 PROCEDURE — 99233 PR SUBSEQUENT HOSPITAL CARE,LEVL III: ICD-10-PCS | Mod: ,,, | Performed by: INTERNAL MEDICINE

## 2023-03-19 PROCEDURE — 94761 N-INVAS EAR/PLS OXIMETRY MLT: CPT

## 2023-03-19 PROCEDURE — 84145 PROCALCITONIN (PCT): CPT | Mod: 90 | Performed by: INTERNAL MEDICINE

## 2023-03-19 PROCEDURE — 80048 BASIC METABOLIC PNL TOTAL CA: CPT | Performed by: INTERNAL MEDICINE

## 2023-03-19 PROCEDURE — 99900035 HC TECH TIME PER 15 MIN (STAT)

## 2023-03-19 PROCEDURE — 25000242 PHARM REV CODE 250 ALT 637 W/ HCPCS: Performed by: INTERNAL MEDICINE

## 2023-03-19 PROCEDURE — 94640 AIRWAY INHALATION TREATMENT: CPT

## 2023-03-19 PROCEDURE — 85025 COMPLETE CBC W/AUTO DIFF WBC: CPT | Performed by: INTERNAL MEDICINE

## 2023-03-19 PROCEDURE — 27000221 HC OXYGEN, UP TO 24 HOURS

## 2023-03-19 PROCEDURE — 99233 SBSQ HOSP IP/OBS HIGH 50: CPT | Mod: ,,, | Performed by: INTERNAL MEDICINE

## 2023-03-19 RX ORDER — PREDNISONE 20 MG/1
20 TABLET ORAL DAILY
Status: DISCONTINUED | OUTPATIENT
Start: 2023-03-20 | End: 2023-03-20 | Stop reason: HOSPADM

## 2023-03-19 RX ORDER — SODIUM CHLORIDE 9 MG/ML
INJECTION, SOLUTION INTRAVENOUS ONCE
Status: COMPLETED | OUTPATIENT
Start: 2023-03-19 | End: 2023-03-19

## 2023-03-19 RX ADMIN — HEPARIN SODIUM 5000 UNITS: 5000 INJECTION, SOLUTION INTRAVENOUS; SUBCUTANEOUS at 09:03

## 2023-03-19 RX ADMIN — IPRATROPIUM BROMIDE AND ALBUTEROL SULFATE 3 ML: 2.5; .5 SOLUTION RESPIRATORY (INHALATION) at 07:03

## 2023-03-19 RX ADMIN — AMLODIPINE BESYLATE 5 MG: 5 TABLET ORAL at 10:03

## 2023-03-19 RX ADMIN — AMOXICILLIN AND CLAVULANATE POTASSIUM 500 MG: 500; 125 TABLET, FILM COATED ORAL at 09:03

## 2023-03-19 RX ADMIN — AMOXICILLIN AND CLAVULANATE POTASSIUM 500 MG: 500; 125 TABLET, FILM COATED ORAL at 10:03

## 2023-03-19 RX ADMIN — HYDROCODONE BITARTRATE AND ACETAMINOPHEN 1 TABLET: 7.5; 325 TABLET ORAL at 08:03

## 2023-03-19 RX ADMIN — SILVER SULFADIAZINE: 10 CREAM TOPICAL at 09:03

## 2023-03-19 RX ADMIN — SODIUM CHLORIDE: 9 INJECTION, SOLUTION INTRAVENOUS at 03:03

## 2023-03-19 RX ADMIN — PANTOPRAZOLE SODIUM 40 MG: 40 TABLET, DELAYED RELEASE ORAL at 06:03

## 2023-03-19 RX ADMIN — ASPIRIN 81 MG: 81 TABLET, DELAYED RELEASE ORAL at 10:03

## 2023-03-19 RX ADMIN — LORAZEPAM 1 MG: 2 INJECTION INTRAMUSCULAR; INTRAVENOUS at 05:03

## 2023-03-19 RX ADMIN — HEPARIN SODIUM 5000 UNITS: 5000 INJECTION, SOLUTION INTRAVENOUS; SUBCUTANEOUS at 06:03

## 2023-03-19 RX ADMIN — PREDNISONE 40 MG: 20 TABLET ORAL at 10:03

## 2023-03-19 RX ADMIN — IPRATROPIUM BROMIDE AND ALBUTEROL SULFATE 3 ML: 2.5; .5 SOLUTION RESPIRATORY (INHALATION) at 01:03

## 2023-03-19 RX ADMIN — HALOPERIDOL LACTATE 5 MG: 5 INJECTION, SOLUTION INTRAMUSCULAR at 12:03

## 2023-03-19 NOTE — NURSING
Patient has came to nursing station and stated she wanted to sign her papers to leave, stated she was ready to go. We reinforced with her that the doctor was not going to discharge her today, patient stated she was leaving and wanted these lines out. Stated if we didn't take them out then she would take them out herself and went back to room.

## 2023-03-19 NOTE — PLAN OF CARE
SW received consult for home health. Attempted to reach pt's son to discuss consult but call got disconnected.  Phoned son again, received voicemail and left message requesting a return call.  SS following.

## 2023-03-19 NOTE — NURSING
Patient eloped off floor. Got a call from MONICA Rico that patient was in the lobby on 1st floor. Myself and Monica Anderson went to first floor to bring patient back up. Patient is refusing to come back to the floor. Patient is refusing to call her daughter to talk to her. Patient states she is going to walk as far as she can walk. Nursing supervisor called to get in contact with Dr. Garcia to come talk to patient. Monica Anderson still currently with patient downstairs.
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
## 2023-03-19 NOTE — NURSING
Patient came to nursing station and stated she needed to sign her papers to leave, we reinforced with her that the doctor was not going to discharge her today, patient stated she was leaving today and wanted lines out, stated we can take them out or she will take them out and headed back towards her room.

## 2023-03-19 NOTE — SUBJECTIVE & OBJECTIVE
Interval History:     Review of Systems   Constitutional:  Negative for diaphoresis and fever.   Respiratory:  Negative for cough.    Cardiovascular:  Negative for chest pain.   Gastrointestinal:  Negative for abdominal pain.   Genitourinary:  Negative for difficulty urinating.   All other systems reviewed and are negative.  Objective:     Vital Signs (Most Recent):  Temp: 97.8 °F (36.6 °C) (03/19/23 1100)  Pulse: 88 (03/19/23 1100)  Resp: 19 (03/19/23 1100)  BP: 128/65 (03/19/23 1100)  SpO2: (!) 94 % (03/19/23 1100)   Vital Signs (24h Range):  Temp:  [97.8 °F (36.6 °C)-98.9 °F (37.2 °C)] 97.8 °F (36.6 °C)  Pulse:  [] 88  Resp:  [15-20] 19  SpO2:  [90 %-97 %] 94 %  BP: (114-133)/(60-72) 128/65     Weight: 40.8 kg (89 lb 15.2 oz)  Body mass index is 16.45 kg/m².  No intake or output data in the 24 hours ending 03/19/23 1333   Physical Exam  Vitals and nursing note reviewed.   HENT:      Head: Normocephalic.      Nose: Nose normal.      Mouth/Throat:      Mouth: Mucous membranes are moist.   Eyes:      Pupils: Pupils are equal, round, and reactive to light.   Cardiovascular:      Rate and Rhythm: Normal rate.      Pulses: Normal pulses.   Pulmonary:      Effort: Pulmonary effort is normal.      Breath sounds: Normal breath sounds.   Abdominal:      Palpations: Abdomen is soft.   Musculoskeletal:      Cervical back: Normal range of motion.   Skin:     Findings: Lesion present.   Neurological:      Mental Status: She is alert. She is disoriented.       Significant Labs: All pertinent labs within the past 24 hours have been reviewed.    Significant Imaging: I have reviewed all pertinent imaging results/findings within the past 24 hours.

## 2023-03-19 NOTE — NURSING
Called daughter to notify her that patient had came to nursing station looking for her and we had redirected her back to her room after telling her that daughter would be back later. Informed daughter that patient seems agitated and a little disoriented. Daughter states that patient just wants to go home today and that she has told her she isnt. Daughter stated we have her permission to restrain patient if needed to keep her in facility if have to (though this wasn't discussed). Daughter stated that she would be back at hospital later this afternoon

## 2023-03-19 NOTE — ASSESSMENT & PLAN NOTE
3/19 : start IVF and monitor renal fucntion . Haldol PRN . Decrease steroids . Reorient frequently . 1:1 sitter at night

## 2023-03-19 NOTE — ASSESSMENT & PLAN NOTE
CXR with bilateral infiltrates, suspicious of atypical pneumonia with Leukocytosis of 26.59. COVID/Flu negative. Continue oxygen supplementation, current antibiotics, steroids and breathing treatments. Will obtain procalcitonin.     - Procalcitonin pending  - Rocephin 1g IV q24h (Initiated on 3/11/23)  - Azithromycin 500mg IV q24h  (Initiated on 3/11/23)  - Solumedrol 80mg IV q12h  - Duonebs q6h   - Continue oxygen supplementation     3/17: transferred out of ICU . Cont abx . Wean o2 as tolerated .     9/18: change to po abx    9/19: decrease steroids

## 2023-03-20 VITALS
HEIGHT: 62 IN | BODY MASS INDEX: 16.55 KG/M2 | TEMPERATURE: 98 F | RESPIRATION RATE: 19 BRPM | HEART RATE: 78 BPM | DIASTOLIC BLOOD PRESSURE: 60 MMHG | SYSTOLIC BLOOD PRESSURE: 103 MMHG | OXYGEN SATURATION: 94 % | WEIGHT: 89.94 LBS

## 2023-03-20 LAB
ANION GAP SERPL CALCULATED.3IONS-SCNC: 10 MMOL/L (ref 7–16)
BASOPHILS # BLD AUTO: 0.01 K/UL (ref 0–0.2)
BASOPHILS NFR BLD AUTO: 0.1 % (ref 0–1)
BUN SERPL-MCNC: 45 MG/DL (ref 7–18)
BUN/CREAT SERPL: 39 (ref 6–20)
CALCIUM SERPL-MCNC: 9.1 MG/DL (ref 8.5–10.1)
CHLORIDE SERPL-SCNC: 103 MMOL/L (ref 98–107)
CO2 SERPL-SCNC: 32 MMOL/L (ref 21–32)
CREAT SERPL-MCNC: 1.16 MG/DL (ref 0.55–1.02)
DIFFERENTIAL METHOD BLD: ABNORMAL
EGFR (NO RACE VARIABLE) (RUSH/TITUS): 50 ML/MIN/1.73M²
EOSINOPHIL # BLD AUTO: 0 K/UL (ref 0–0.5)
EOSINOPHIL NFR BLD AUTO: 0 % (ref 1–4)
ERYTHROCYTE [DISTWIDTH] IN BLOOD BY AUTOMATED COUNT: 17.6 % (ref 11.5–14.5)
GLUCOSE SERPL-MCNC: 112 MG/DL (ref 74–106)
HCT VFR BLD AUTO: 33.8 % (ref 38–47)
HGB BLD-MCNC: 10.9 G/DL (ref 12–16)
IMM GRANULOCYTES # BLD AUTO: 0.19 K/UL (ref 0–0.04)
IMM GRANULOCYTES NFR BLD: 1.1 % (ref 0–0.4)
LYMPHOCYTES # BLD AUTO: 1.83 K/UL (ref 1–4.8)
LYMPHOCYTES NFR BLD AUTO: 10.8 % (ref 27–41)
MCH RBC QN AUTO: 29.1 PG (ref 27–31)
MCHC RBC AUTO-ENTMCNC: 32.2 G/DL (ref 32–36)
MCV RBC AUTO: 90.1 FL (ref 80–96)
MONOCYTES # BLD AUTO: 0.96 K/UL (ref 0–0.8)
MONOCYTES NFR BLD AUTO: 5.7 % (ref 2–6)
MPC BLD CALC-MCNC: 10 FL (ref 9.4–12.4)
NEUTROPHILS # BLD AUTO: 13.95 K/UL (ref 1.8–7.7)
NEUTROPHILS NFR BLD AUTO: 82.3 % (ref 53–65)
NRBC # BLD AUTO: 0 X10E3/UL
NRBC, AUTO (.00): 0 %
PLATELET # BLD AUTO: 397 K/UL (ref 150–400)
POTASSIUM SERPL-SCNC: 3.6 MMOL/L (ref 3.5–5.1)
RBC # BLD AUTO: 3.75 M/UL (ref 4.2–5.4)
SODIUM SERPL-SCNC: 141 MMOL/L (ref 136–145)
WBC # BLD AUTO: 16.94 K/UL (ref 4.5–11)

## 2023-03-20 PROCEDURE — 25000003 PHARM REV CODE 250: Performed by: INTERNAL MEDICINE

## 2023-03-20 PROCEDURE — 85025 COMPLETE CBC W/AUTO DIFF WBC: CPT | Performed by: INTERNAL MEDICINE

## 2023-03-20 PROCEDURE — 63600175 PHARM REV CODE 636 W HCPCS: Performed by: INTERNAL MEDICINE

## 2023-03-20 PROCEDURE — 25000003 PHARM REV CODE 250: Performed by: NURSE PRACTITIONER

## 2023-03-20 PROCEDURE — 27000221 HC OXYGEN, UP TO 24 HOURS

## 2023-03-20 PROCEDURE — 94761 N-INVAS EAR/PLS OXIMETRY MLT: CPT

## 2023-03-20 PROCEDURE — 80048 BASIC METABOLIC PNL TOTAL CA: CPT | Performed by: INTERNAL MEDICINE

## 2023-03-20 PROCEDURE — 99900035 HC TECH TIME PER 15 MIN (STAT)

## 2023-03-20 PROCEDURE — 25000242 PHARM REV CODE 250 ALT 637 W/ HCPCS: Performed by: INTERNAL MEDICINE

## 2023-03-20 PROCEDURE — 94640 AIRWAY INHALATION TREATMENT: CPT

## 2023-03-20 RX ORDER — BACITRACIN 500 [USP'U]/G
OINTMENT TOPICAL 3 TIMES DAILY
Qty: 1 EACH | Refills: 0 | Status: SHIPPED | OUTPATIENT
Start: 2023-03-20 | End: 2023-04-03

## 2023-03-20 RX ORDER — AMLODIPINE BESYLATE 5 MG/1
5 TABLET ORAL DAILY
Qty: 30 TABLET | Refills: 0 | Status: SHIPPED | OUTPATIENT
Start: 2023-03-21 | End: 2023-06-26 | Stop reason: SDUPTHER

## 2023-03-20 RX ORDER — ASPIRIN 81 MG/1
81 TABLET ORAL DAILY
Qty: 30 TABLET | Refills: 0 | Status: SHIPPED | OUTPATIENT
Start: 2023-03-21 | End: 2024-03-27

## 2023-03-20 RX ORDER — PREDNISONE 20 MG/1
20 TABLET ORAL DAILY
Qty: 4 TABLET | Refills: 0 | Status: SHIPPED | OUTPATIENT
Start: 2023-03-21 | End: 2023-03-25

## 2023-03-20 RX ORDER — AMOXICILLIN AND CLAVULANATE POTASSIUM 500; 125 MG/1; MG/1
1 TABLET, FILM COATED ORAL 2 TIMES DAILY
Qty: 10 TABLET | Refills: 0 | Status: SHIPPED | OUTPATIENT
Start: 2023-03-20 | End: 2023-03-25

## 2023-03-20 RX ORDER — SILVER SULFADIAZINE 10 G/1000G
CREAM TOPICAL DAILY
Qty: 1 EACH | Refills: 0 | Status: SHIPPED | OUTPATIENT
Start: 2023-03-21 | End: 2023-04-04

## 2023-03-20 RX ORDER — FUROSEMIDE 40 MG/1
40 TABLET ORAL DAILY
Qty: 14 TABLET | Refills: 0 | Status: SHIPPED | OUTPATIENT
Start: 2023-03-20 | End: 2023-03-28

## 2023-03-20 RX ADMIN — HEPARIN SODIUM 5000 UNITS: 5000 INJECTION, SOLUTION INTRAVENOUS; SUBCUTANEOUS at 06:03

## 2023-03-20 RX ADMIN — AMOXICILLIN AND CLAVULANATE POTASSIUM 500 MG: 500; 125 TABLET, FILM COATED ORAL at 08:03

## 2023-03-20 RX ADMIN — ASPIRIN 81 MG: 81 TABLET, DELAYED RELEASE ORAL at 08:03

## 2023-03-20 RX ADMIN — HYDROCODONE BITARTRATE AND ACETAMINOPHEN 1 TABLET: 7.5; 325 TABLET ORAL at 06:03

## 2023-03-20 RX ADMIN — AMLODIPINE BESYLATE 5 MG: 5 TABLET ORAL at 08:03

## 2023-03-20 RX ADMIN — BACITRACIN: 500 OINTMENT TOPICAL at 08:03

## 2023-03-20 RX ADMIN — PANTOPRAZOLE SODIUM 40 MG: 40 TABLET, DELAYED RELEASE ORAL at 06:03

## 2023-03-20 RX ADMIN — SILVER SULFADIAZINE: 10 CREAM TOPICAL at 08:03

## 2023-03-20 RX ADMIN — IPRATROPIUM BROMIDE AND ALBUTEROL SULFATE 3 ML: 2.5; .5 SOLUTION RESPIRATORY (INHALATION) at 07:03

## 2023-03-20 RX ADMIN — PREDNISONE 20 MG: 20 TABLET ORAL at 08:03

## 2023-03-20 RX ADMIN — IPRATROPIUM BROMIDE AND ALBUTEROL SULFATE 3 ML: 2.5; .5 SOLUTION RESPIRATORY (INHALATION) at 01:03

## 2023-03-20 NOTE — PLAN OF CARE
[facility] - [facility] Telemetry  Discharge Final Note    Primary Care Provider: Danilo Cloud IV, DO    Expected Discharge Date: 3/20/2023    Final Discharge Note (most recent)       Final Note - 03/20/23 1210          Final Note    Assessment Type Final Discharge Note     Anticipated Discharge Disposition Home-Health Care Svc        Post-Acute Status    Post-Acute Authorization Home Health     Home Health Status Set-up Complete/Auth obtained     Patient choice form signed by patient/caregiver List with quality metrics by geographic area provided;List from CMS Compare;List from System Post-Acute Care     Discharge Delays None known at this time                     Important Message from Medicare  Important Message from Medicare regarding Discharge Appeal Rights: Given to patient/caregiver     Date IMM was signed: 03/20/23  Time IMM was signed: 1209     Follow-up providers       MARY Pearl   Specialty: Family Medicine, Cardiology    [address] [place] [address] [employer] [facility] [place] [address]   Phone: [phone]       Next Steps: Follow up on 4/4/2023    Instructions: Schedule cardiology follow up 2 weeks after hospital discharge; Will need outpatient ishcemic evaluation.; 2:30 pm    Danilo Cloud IV, DO   Specialty: Family Medicine   Relationship: PCP - General    [address] [facility] [place] [address]   Phone: [phone]       Next Steps: Schedule an appointment as soon as possible for a visit on 3/27/2023    Instructions: Hospital d/c follow up; 10:00 am              After-discharge care                Home Medical Care       [facility]   Service: [facility] Health Services    [address] [facility] [place] [address]   Phone: [phone]                             Pt dc home with hh. Referral complete with [facility]. 0 further dc needs.

## 2023-03-20 NOTE — ASSESSMENT & PLAN NOTE
Patient with SOB and no known PMH of CHF with proBNP of 3,180.       ECHO Summary 3/12/13     The left ventricle is normal in size with mild concentric hypertrophy and mildly decreased systolic function.   The estimated ejection fraction is 45%.   Left ventricular diastolic dysfunction.   Severe tricuspid regurgitation.   There is moderate pulmonary hypertension.   Moderate right ventricular enlargement with low normal right ventricular systolic function.   Mild right atrial enlargement.     -Cardiology recommended CT PE to rule out PE and it was negative.
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
## 2023-03-20 NOTE — PLAN OF CARE
Problem: Adult Inpatient Plan of Care  Goal: Plan of Care Review  Outcome: Ongoing, Progressing  Goal: Patient-Specific Goal (Individualized)  Outcome: Ongoing, Progressing  Goal: Absence of Hospital-Acquired Illness or Injury  Outcome: Ongoing, Progressing  Goal: Optimal Comfort and Wellbeing  Outcome: Ongoing, Progressing  Goal: Readiness for Transition of Care  Outcome: Ongoing, Progressing     Problem: Adjustment to Illness (Sepsis/Septic Shock)  Goal: Optimal Coping  Outcome: Ongoing, Progressing     Problem: Bleeding (Sepsis/Septic Shock)  Goal: Absence of Bleeding  Outcome: Ongoing, Progressing     Problem: Glycemic Control Impaired (Sepsis/Septic Shock)  Goal: Blood Glucose Level Within Desired Range  Outcome: Ongoing, Progressing     Problem: Infection Progression (Sepsis/Septic Shock)  Goal: Absence of Infection Signs and Symptoms  Outcome: Ongoing, Progressing     Problem: Nutrition Impaired (Sepsis/Septic Shock)  Goal: Optimal Nutrition Intake  Outcome: Ongoing, Progressing     Problem: Fluid Imbalance (Pneumonia)  Goal: Fluid Balance  Outcome: Ongoing, Progressing     Problem: Infection (Pneumonia)  Goal: Resolution of Infection Signs and Symptoms  Outcome: Ongoing, Progressing     Problem: Respiratory Compromise (Pneumonia)  Goal: Effective Oxygenation and Ventilation  Outcome: Ongoing, Progressing     Problem: Skin Injury Risk Increased  Goal: Skin Health and Integrity  Outcome: Ongoing, Progressing     Problem: Fall Injury Risk  Goal: Absence of Fall and Fall-Related Injury  Outcome: Ongoing, Progressing     Problem: Restraint, Nonbehavioral (Nonviolent)  Goal: Absence of Harm or Injury  Outcome: Ongoing, Progressing     Problem: Infection  Goal: Absence of Infection Signs and Symptoms  Outcome: Ongoing, Progressing     Problem: Noninvasive Ventilation Acute  Goal: Effective Unassisted Ventilation and Oxygenation  Outcome: Ongoing, Progressing     Problem: Gas Exchange Impaired  Goal: Optimal Gas  Exchange  Outcome: Ongoing, Progressing     Problem: Impaired Wound Healing  Goal: Optimal Wound Healing  Outcome: Ongoing, Progressing     Problem: Fluid and Electrolyte Imbalance (Acute Kidney Injury/Impairment)  Goal: Fluid and Electrolyte Balance  Outcome: Ongoing, Progressing     Problem: Oral Intake Inadequate (Acute Kidney Injury/Impairment)  Goal: Optimal Nutrition Intake  Outcome: Ongoing, Progressing     Problem: Renal Function Impairment (Acute Kidney Injury/Impairment)  Goal: Effective Renal Function  Outcome: Ongoing, Progressing

## 2023-03-20 NOTE — ASSESSMENT & PLAN NOTE
Troponin of 613.4 with repeat Troponin of 826. EKG exhibits sinus tachycardia, No STEMI. Continue Telemetry with repeat Troponin and EKG pending. Consider Cardiology consult.      3/20: There were no acute EKG changes. Type 2 MI due to demand/perfusion mismatch rather than ACS, cardiology evaluated and noted that they would not pursue invasive cardiac eval at this time, plan is for outpatient ischemic eval in 2 weeks.

## 2023-03-20 NOTE — ASSESSMENT & PLAN NOTE
3/17: better , cont lasix  3/18: hold lasix    3/20: Lasix held due to sCr rise, sCr today is down to 1.16, will give Lasix PO daily with repeat BMP per PCP in 1 week.

## 2023-03-20 NOTE — ASSESSMENT & PLAN NOTE
3/18 :Hold diuresis and acei . liberalize po intake . Check bmp in am    3/19 : cr slightly worse , will start gentle hydration and monitor renal function.     3/20: sCr better today, has dropped to 1.16, encouraged continued PO hydration. Patient to have repeat BMP in 1 week with PCP.

## 2023-03-20 NOTE — NURSING
Pt's daughter Karly Sanon to take pt home. Pt taken down to private vehicle via wheelchair per staff. All belongings with patient. NADN. No needs or concerns voiced. Pt discharged to home.
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
## 2023-03-20 NOTE — ASSESSMENT & PLAN NOTE
CXR with bilateral infiltrates, suspicious of atypical pneumonia with Leukocytosis of 26.59. COVID/Flu negative. Continue oxygen supplementation, current antibiotics, steroids and breathing treatments. Will obtain procalcitonin.     - Procalcitonin pending  - Rocephin 1g IV q24h (Initiated on 3/11/23)  - Azithromycin 500mg IV q24h  (Initiated on 3/11/23)  - Solumedrol 80mg IV q12h  - Duonebs q6h   - Continue oxygen supplementation     3/17: transferred out of ICU . Cont abx . Wean o2 as tolerated .     3/18: change to po abx    3/19: decrease steroids    3/20: Continue PO steroids for additional 4 days for full 7 day PO treatment, will continue Augmentin PO for additional 5 days. Patient is currently on 2L via [place] which is back to her baseline oxygen demand. She has home oxygen already.

## 2023-03-21 ENCOUNTER — PATIENT OUTREACH (OUTPATIENT)
Dept: ADMINISTRATIVE | Facility: CLINIC | Age: 73
End: 2023-03-21

## 2023-03-21 LAB — PROCALCITONIN SERPL-MCNC: 0.21 NG/ML

## 2023-03-21 NOTE — PROGRESS NOTES
C3 nurse spoke with Amelie Cerrato and her daughter, Karly for a TCC post hospital discharge follow up call. The patient has a scheduled HOSFU appointment with Danilo Cloud IV, DO  on 03/ 27/2023 @ 1000.

## 2023-03-21 NOTE — TELEPHONE ENCOUNTER
Contacted [facility] home health agency regarding referral .  States they have called and left message with no response from patient today.   Daughter, Karly phone number [phone] given to [facility].  This is the number Karly requested that I call her back on.   Attempted to contact Karly regarding no home health initiaiton with no answer.  Left message on voicemail

## 2023-03-28 ENCOUNTER — OFFICE VISIT (OUTPATIENT)
Dept: FAMILY MEDICINE | Facility: CLINIC | Age: 73
End: 2023-03-28
Payer: MEDICARE

## 2023-03-28 VITALS
TEMPERATURE: 98 F | OXYGEN SATURATION: 94 % | SYSTOLIC BLOOD PRESSURE: 151 MMHG | HEIGHT: 62 IN | BODY MASS INDEX: 16.38 KG/M2 | DIASTOLIC BLOOD PRESSURE: 79 MMHG | WEIGHT: 89 LBS | HEART RATE: 108 BPM

## 2023-03-28 DIAGNOSIS — I10 ESSENTIAL HYPERTENSION: ICD-10-CM

## 2023-03-28 DIAGNOSIS — J44.9 CHRONIC OBSTRUCTIVE PULMONARY DISEASE, UNSPECIFIED COPD TYPE: ICD-10-CM

## 2023-03-28 DIAGNOSIS — J18.9 COMMUNITY ACQUIRED PNEUMONIA, UNSPECIFIED LATERALITY: Primary | ICD-10-CM

## 2023-03-28 DIAGNOSIS — H61.23 BILATERAL IMPACTED CERUMEN: ICD-10-CM

## 2023-03-28 DIAGNOSIS — N18.31 CHRONIC KIDNEY DISEASE, STAGE 3A: ICD-10-CM

## 2023-03-28 PROCEDURE — 99214 OFFICE O/P EST MOD 30 MIN: CPT | Mod: ,,, | Performed by: FAMILY MEDICINE

## 2023-03-28 PROCEDURE — 99214 PR OFFICE/OUTPT VISIT, EST, LEVL IV, 30-39 MIN: ICD-10-PCS | Mod: ,,, | Performed by: FAMILY MEDICINE

## 2023-03-28 RX ORDER — FUROSEMIDE 40 MG/1
40 TABLET ORAL 2 TIMES DAILY
Qty: 30 TABLET | Refills: 0 | Status: SHIPPED | OUTPATIENT
Start: 2023-03-28 | End: 2023-04-18 | Stop reason: SDUPTHER

## 2023-03-28 NOTE — ASSESSMENT & PLAN NOTE
Patient was advised to attempt Debrox for one-week I will perform manual disimpaction if this does not clear by then.
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
## 2023-04-05 DIAGNOSIS — E78.5 HYPERLIPIDEMIA, UNSPECIFIED HYPERLIPIDEMIA TYPE: ICD-10-CM

## 2023-04-05 RX ORDER — ATORVASTATIN CALCIUM 10 MG/1
10 TABLET, FILM COATED ORAL NIGHTLY
Qty: 90 TABLET | Refills: 3 | Status: SHIPPED | OUTPATIENT
Start: 2023-04-05 | End: 2024-04-04

## 2023-04-12 DIAGNOSIS — R63.0 DECREASED APPETITE: Chronic | ICD-10-CM

## 2023-04-12 RX ORDER — CYPROHEPTADINE HYDROCHLORIDE 4 MG/1
4 TABLET ORAL 3 TIMES DAILY
Qty: 270 TABLET | Refills: 1 | Status: SHIPPED | OUTPATIENT
Start: 2023-04-12

## 2023-04-14 ENCOUNTER — TELEPHONE (OUTPATIENT)
Dept: FAMILY MEDICINE | Facility: CLINIC | Age: 73
End: 2023-04-14

## 2023-04-18 DIAGNOSIS — I10 ESSENTIAL HYPERTENSION: ICD-10-CM

## 2023-04-18 RX ORDER — FUROSEMIDE 40 MG/1
40 TABLET ORAL 2 TIMES DAILY
Qty: 30 TABLET | Refills: 0 | Status: SHIPPED | OUTPATIENT
Start: 2023-04-18 | End: 2023-05-08 | Stop reason: SDUPTHER

## 2023-04-24 ENCOUNTER — OFFICE VISIT (OUTPATIENT)
Dept: PAIN MEDICINE | Facility: CLINIC | Age: 73
End: 2023-04-24
Payer: MEDICARE

## 2023-04-24 ENCOUNTER — TELEPHONE (OUTPATIENT)
Dept: PAIN MEDICINE | Facility: CLINIC | Age: 73
End: 2023-04-24
Payer: MEDICARE

## 2023-04-24 VITALS
RESPIRATION RATE: 20 BRPM | HEART RATE: 98 BPM | DIASTOLIC BLOOD PRESSURE: 81 MMHG | BODY MASS INDEX: 16.56 KG/M2 | SYSTOLIC BLOOD PRESSURE: 151 MMHG | HEIGHT: 62 IN | WEIGHT: 90 LBS

## 2023-04-24 DIAGNOSIS — M43.16 SPONDYLOLISTHESIS, LUMBAR REGION: Chronic | ICD-10-CM

## 2023-04-24 DIAGNOSIS — M54.14 THORACIC RADICULOPATHY: Chronic | ICD-10-CM

## 2023-04-24 DIAGNOSIS — Z79.899 ENCOUNTER FOR LONG-TERM (CURRENT) USE OF OTHER MEDICATIONS: ICD-10-CM

## 2023-04-24 DIAGNOSIS — M47.817 SPONDYLOSIS WITHOUT MYELOPATHY OR RADICULOPATHY, LUMBOSACRAL REGION: Primary | Chronic | ICD-10-CM

## 2023-04-24 PROCEDURE — 99214 PR OFFICE/OUTPT VISIT, EST, LEVL IV, 30-39 MIN: ICD-10-PCS | Mod: S$PBB,,, | Performed by: PHYSICIAN ASSISTANT

## 2023-04-24 PROCEDURE — 80305 DRUG TEST PRSMV DIR OPT OBS: CPT | Mod: PBBFAC | Performed by: PHYSICIAN ASSISTANT

## 2023-04-24 PROCEDURE — 99214 OFFICE O/P EST MOD 30 MIN: CPT | Mod: S$PBB,,, | Performed by: PHYSICIAN ASSISTANT

## 2023-04-24 PROCEDURE — 99214 OFFICE O/P EST MOD 30 MIN: CPT | Mod: PBBFAC | Performed by: PHYSICIAN ASSISTANT

## 2023-04-24 RX ORDER — HYDROCODONE BITARTRATE AND ACETAMINOPHEN 10; 325 MG/1; MG/1
1 TABLET ORAL EVERY 8 HOURS
Qty: 90 TABLET | Refills: 0 | Status: SHIPPED | OUTPATIENT
Start: 2023-06-23 | End: 2023-07-18 | Stop reason: SDUPTHER

## 2023-04-24 RX ORDER — HYDROCODONE BITARTRATE AND ACETAMINOPHEN 10; 325 MG/1; MG/1
1 TABLET ORAL EVERY 8 HOURS
Qty: 90 TABLET | Refills: 0 | Status: SHIPPED | OUTPATIENT
Start: 2023-05-26 | End: 2023-07-18 | Stop reason: SDUPTHER

## 2023-04-24 RX ORDER — HYDROCODONE BITARTRATE AND ACETAMINOPHEN 10; 325 MG/1; MG/1
1 TABLET ORAL EVERY 8 HOURS
Qty: 90 TABLET | Refills: 0 | Status: SHIPPED | OUTPATIENT
Start: 2023-04-26 | End: 2023-07-18 | Stop reason: SDUPTHER

## 2023-04-24 NOTE — TELEPHONE ENCOUNTER
LAKISHA CORDOVA      1:30 PM attempted to call patient no answer.  2:00 PM  patient returned call, informed to come in tomorrow for office visit and pill count by 2 pm. Patient voiced understanding.

## 2023-04-24 NOTE — PROGRESS NOTES
Subjective:         Patient ID: Amelie Cerrato is a 72 y.o. female.    Chief Complaint: Low-back Pain and Abdominal Pain        Pain  This is a chronic problem. The current episode started more than 1 year ago. The problem occurs daily. The problem has been unchanged. Associated symptoms include arthralgias, myalgias and neck pain. Pertinent negatives include no anorexia, chest pain, chills, coughing, diaphoresis, fever, sore throat, swollen glands, vertigo, visual change or vomiting.   Review of Systems   Constitutional:  Negative for activity change, appetite change, chills, diaphoresis, fever and unexpected weight change.   HENT:  Negative for drooling, ear discharge, ear pain, facial swelling, mouth sores, nosebleeds, sore throat, trouble swallowing, voice change and goiter.    Eyes:  Negative for photophobia, pain, discharge, redness, visual disturbance and eye dryness.   Respiratory:  Negative for apnea, cough, choking, chest tightness, shortness of breath, wheezing and stridor.    Cardiovascular:  Negative for chest pain, palpitations and leg swelling.   Gastrointestinal:  Negative for abdominal distention, anorexia, diarrhea, rectal pain, vomiting and fecal incontinence.   Endocrine: Negative for cold intolerance, heat intolerance, polydipsia, polyphagia and polyuria.   Genitourinary:  Negative for flank pain, frequency, hematuria and hot flashes.   Musculoskeletal:  Positive for arthralgias, back pain, myalgias, neck pain and neck stiffness.   Integumentary:  Negative for color change and pallor.   Allergic/Immunologic: Negative for immunocompromised state.   Neurological:  Negative for dizziness, vertigo, seizures, syncope, facial asymmetry, speech difficulty, light-headedness, coordination difficulties, memory loss and coordination difficulties.   Hematological:  Negative for adenopathy. Does not bruise/bleed easily.   Psychiatric/Behavioral:  Negative for agitation, behavioral problems, confusion,  decreased concentration, dysphoric mood, hallucinations, self-injury and suicidal ideas. The patient is not nervous/anxious and is not hyperactive.          Past Medical History:   Diagnosis Date    Anxiety and depression     Chronic kidney disease, stage 3a     Chronic pain syndrome     COPD (chronic obstructive pulmonary disease)     Fracture of multiple pubic rami, left, closed, initial encounter 10/01/2021    HLD (hyperlipidemia)     Hypertension     Lumbar compression fracture, sequela L1, L2, L4, L5, kyphoplasty 12/8/2021    Lumbar radiculopathy     Lumbar stenosis     Lumbosacral spondylosis     Neuropathy      Past Surgical History:   Procedure Laterality Date    CHOLECYSTECTOMY      CYSTOSCOPY      EPIDURAL STEROID INJECTION INTO LUMBAR SPINE N/A 05/27/2020    L1-2 WILD     EPIDURAL STEROID INJECTION INTO THORACIC SPINE N/A 02/03/2021    T9-10 WILD     EPIDURAL STEROID INJECTION INTO THORACIC SPINE N/A 3/18/2021    Procedure: INJECTION, STEROID, SPINE, THORACIC, EPIDURAL;  Surgeon: Arelis Burns MD;  Location: [facility];  Service: Pain Management;  Laterality: N/A;    EPIDURAL STEROID INJECTION INTO THORACIC SPINE N/A 12/23/2021    Procedure: Injection-steroid-epidural-thoracic T9/10;  Surgeon: Arelis Burns MD;  Location: [facility];  Service: Pain Management;  Laterality: N/A;  HAD VAC  WILL BRING CARD    HYSTERECTOMY      INJECTION OF ANESTHETIC AGENT AROUND MEDIAL BRANCH NERVES INNERVATING LUMBAR FACET JOINT Bilateral 4/22/2021    Procedure: Block-nerve-medial branch-lumbar Bilateral L3-4,4-5,5-S1 MBB #2;  Surgeon: Arelis Burns MD;  Location: [facility];  Service: Pain Management;  Laterality: Bilateral;    INJECTION OF ANESTHETIC AGENT AROUND MEDIAL BRANCH NERVES INNERVATING LUMBAR FACET JOINT Bilateral 9/6/2022    Procedure: Block-nerve-medial branch-lumbar, bilateral L4 through S1;  Surgeon: Arelis Burns MD;  Location: [facility];  Service: Pain  Management;  Laterality: Bilateral;    INJECTION OF ANESTHETIC AGENT AROUND MEDIAL BRANCH NERVES INNERVATING LUMBAR FACET JOINT Bilateral 10/11/2022    Procedure: Block-nerve-medial branch-lumbar, bilateral L4 through S1;  Surgeon: Arelis Burns MD;  Location: [facility];  Service: Pain Management;  Laterality: Bilateral;  vaccinated.    INJECTION OF FACET JOINT Bilateral 12/04/2020    RADIOFREQUENCY ABLATION OF LUMBAR MEDIAL BRANCH NERVE AT SINGLE LEVEL Bilateral 5/20/2021    Procedure: RADIOFREQUENCY ABLATION, NERVE, SPINAL, LUMBAR, MEDIAL BRANCH, 1 LEVEL;  Surgeon: Arelis Burns MD;  Location: [facility];  Service: Pain Management;  Laterality: Bilateral;  Bilateral L3-S1 RFTC (both sides same day)     Social History     Socioeconomic History    Marital status:    Tobacco Use    Smoking status: Former    Smokeless tobacco: Never   Substance and Sexual Activity    Alcohol use: Never     Comment: unknown    Drug use: Never     Types: Hydrocodone    Sexual activity: Not Currently     Social Determinants of Health     Financial Resource Strain: Low Risk     Difficulty of Paying Living Expenses: Not very hard   Food Insecurity: No Food Insecurity    Worried About Running Out of Food in the Last Year: Never true    Ran Out of Food in the Last Year: Never true   Transportation Needs: No Transportation Needs    Lack of Transportation (Medical): No    Lack of Transportation (Non-Medical): No   Physical Activity: Inactive    Days of Exercise per Week: 0 days    Minutes of Exercise per Session: 0 min   Stress: Stress Concern Present    Feeling of Stress : Rather much   Social Connections: Moderately Isolated    Frequency of Communication with Friends and Family: Three times a week    Frequency of Social Gatherings with Friends and Family: Three times a week    Attends Jainism Services: More than 4 times per year    Active Member of Clubs or Organizations: No    Attends Club or Organization  "Meetings: Never    Marital Status:    Housing Stability: Unknown    Unable to Pay for Housing in the Last Year: No    Unstable Housing in the Last Year: No     Family History   Problem Relation Age of Onset    Hypertension Mother     Heart disease Father      Review of patient's allergies indicates:   Allergen Reactions    Insect venom      Note: - Phreesia 05/07/2019    Sulfa (sulfonamide antibiotics) Nausea And Vomiting        Objective:  Vitals:    04/24/23 0950   BP: (!) 151/81   Pulse: 98   Resp: 20   Weight: 40.8 kg (90 lb)   Height: 5' 2" (1.575 m)   PainSc:   7         Physical Exam  Vitals and nursing note reviewed. Exam conducted with a chaperone present.   Constitutional:       General: She is awake. She is not in acute distress.     Appearance: Normal appearance. She is not ill-appearing, toxic-appearing or diaphoretic.   HENT:      Head: Normocephalic and atraumatic.      Nose: Nose normal.      Mouth/Throat:      Mouth: Mucous membranes are moist.      Pharynx: Oropharynx is clear.   Eyes:      Conjunctiva/sclera: Conjunctivae normal.      Pupils: Pupils are equal, round, and reactive to light.   Cardiovascular:      Rate and Rhythm: Normal rate.   Pulmonary:      Effort: Pulmonary effort is normal. No respiratory distress.   Abdominal:      Palpations: Abdomen is soft.   Musculoskeletal:         General: Normal range of motion.      Right shoulder: Tenderness present.      Left shoulder: Tenderness present.      Cervical back: Normal range of motion and neck supple. Tenderness present.      Thoracic back: Tenderness present.      Lumbar back: Tenderness present.   Skin:     General: Skin is warm and dry.   Neurological:      General: No focal deficit present.      Mental Status: She is alert and oriented to person, place, and time. Mental status is at baseline.      Cranial Nerves: No cranial nerve deficit (II-XII).   Psychiatric:         Mood and Affect: Mood normal.         Behavior: Behavior " normal. Behavior is cooperative.         Thought Content: Thought content normal.         CTA Chest Non-Coronary (PE Studies)  Narrative: EXAMINATION:  CTA CHEST NON CORONARY (PE STUDIES)    CLINICAL HISTORY:  Pulmonary embolism (PE) suspected, unknown D-dimer;    TECHNIQUE:  Multiplanar CT of the chest with PE protocol.  MIP projections obtained.  This exam is adequate for interpretation.    COMPARISON:  3/13/23    FINDINGS:  Pulmonary artery: Patent    Lower neck: Normal    Chest/airways: Diffuse patchy airspace opacities, probably pulmonary edema.    Mediastinum: Mildly enlarged heart.    Chest wall: Normal    Bones: Multilevel degenerative change.  Multiple treated compression fractures.  Multiple other mild-to-moderate age-indeterminate fractures scattered throughout the upper and middle thoracic spine, correlate with point tenderness.    Upper abdomen: Fatty liver.  Impression: 1. No evidence to suggest pulmonary embolism.    Diffuse patchy airspace opacities, probably pulmonary edema.    Multiple treated compression fractures.  Multiple other mild-to-moderate age-indeterminate fractures scattered throughout the upper and middle thoracic spine, correlate with point tenderness.    Electronically signed by: David Soni  Date:    03/14/2023  Time:    14:29         No results displayed because visit has over 200 results.      Office Visit on 11/07/2022   Component Date Value Ref Range Status    POC Amphetamines 11/07/2022 Negative  Negative, Inconclusive Final    POC Barbiturates 11/07/2022 Negative  Negative, Inconclusive Final    POC Benzodiazepines 11/07/2022 Negative  Negative, Inconclusive Final    POC Cocaine 11/07/2022 Negative  Negative, Inconclusive Final    POC THC 11/07/2022 Negative  Negative, Inconclusive Final    POC Methadone 11/07/2022 Negative  Negative, Inconclusive Final    POC Methamphetamine 11/07/2022 Negative  Negative, Inconclusive Final    POC Opiates 11/07/2022 Presumptive Positive  (A)  Negative, Inconclusive Final    POC Oxycodone 11/07/2022 Negative  Negative, Inconclusive Final    POC Phencyclidine 11/07/2022 Negative  Negative, Inconclusive Final    POC Methylenedioxymethamphetamine * 11/07/2022 Negative  Negative, Inconclusive Final    POC Tricyclic Antidepressants 11/07/2022 Negative  Negative, Inconclusive Final    POC Buprenorphine 11/07/2022 Negative   Final     Acceptable 11/07/2022 Yes   Final    POC Temperature (Urine) 11/07/2022 90   Final   Admission on 10/31/2022, Discharged on 10/31/2022   Component Date Value Ref Range Status    Influenza A 10/31/2022 Negative  Negative, Invalid Final    Influenza B 10/31/2022 Negative  Negative, Invalid Final    COVID-19 Ag 10/31/2022 Negative  Negative, Invalid Final         Orders Placed This Encounter   Procedures    POCT Urine Drug Screen Presump     Interpretive Information:     Negative:  No drug detected at the cut off level.   Positive:  This result represents presumptive positive for the   tested drug, other substances may yield a positive response other   than the analyte of interest. This result should be utilized for   diagnostic purpose only. Confirmation testing will be performed upon physician request only.            Requested Prescriptions     Pending Prescriptions Disp Refills    HYDROcodone-acetaminophen (NORCO)  mg per tablet 90 tablet 0     Sig: Take 1 tablet by mouth every 8 (eight) hours.    HYDROcodone-acetaminophen (NORCO)  mg per tablet 90 tablet 0     Sig: Take 1 tablet by mouth every 8 (eight) hours.    HYDROcodone-acetaminophen (NORCO)  mg per tablet 90 tablet 0     Sig: Take 1 tablet by mouth every 8 (eight) hours.       Assessment:     1. Spondylosis without myelopathy or radiculopathy, lumbosacral region    2. Spondylolisthesis, lumbar region    3. Thoracic radiculopathy    4. Encounter for long-term (current) use of other medications         A's of Opioid Risk  Assessment  Activity:Patient can perform ADL.   Analgesia:Patients pain is partially controlled by current medication. Patient has tried OTC medications such as Tylenol and Ibuprofen with out relief.   Adverse Effects: Patient denies constipation or sedation.  Aberrant Behavior:  reviewed with no aberrant drug seeking/taking behavior.  Overdose reversal drug naloxone discussed    Drug screen reviewed      Plan:    Narcan January 2023    Pharmacy closed on weekends     She can pick her June prescription of June 23rd     Use June 25 as refill date next visit    Following [facility]     She states she doing quite well current medication is helping control her discomfort    She would like to continue with current medication conservative management    Continue home exercise program as directed    Continue current medication    Follow-up 3 months    Dr. Burns, October 2023    Bring original prescription medication bottles/container/box with labels to each visit

## 2023-05-04 ENCOUNTER — PATIENT OUTREACH (OUTPATIENT)
Dept: ADMINISTRATIVE | Facility: HOSPITAL | Age: 73
End: 2023-05-04

## 2023-05-04 NOTE — PROGRESS NOTES
Chart Review. Chart review completed for HM test overdue (mammograms, Colonoscopies, pap smears, DM labs, and/or EYE EXAMs)      Care Everywhere and media, updates requested and reviewed.     [facility] and [facility] reviewed.      [facility] reviewed for immunizations.    [facility] abstracted. Note added to appointment details for upcoming appointment      Health Maintenance Due   Topic Date Due    Pneumococcal Vaccines (Age 65+) (1 - PCV) Never done    TETANUS VACCINE  Never done    Urine Drug Screen  Never done    Mammogram  Never done    DEXA Scan  Never done    Shingles Vaccine (1 of 2) Never done    COVID-19 Vaccine (4 - Booster for Moderna series) 04/12/2022    Colorectal Cancer Screening  10/04/2022

## 2023-05-08 ENCOUNTER — OFFICE VISIT (OUTPATIENT)
Dept: FAMILY MEDICINE | Facility: CLINIC | Age: 73
End: 2023-05-08
Payer: MEDICARE

## 2023-05-08 VITALS
WEIGHT: 92 LBS | SYSTOLIC BLOOD PRESSURE: 170 MMHG | BODY MASS INDEX: 16.93 KG/M2 | HEART RATE: 82 BPM | DIASTOLIC BLOOD PRESSURE: 81 MMHG | HEIGHT: 62 IN

## 2023-05-08 DIAGNOSIS — I70.0 AORTIC ATHEROSCLEROSIS: Primary | ICD-10-CM

## 2023-05-08 DIAGNOSIS — I10 ESSENTIAL HYPERTENSION: ICD-10-CM

## 2023-05-08 DIAGNOSIS — F41.9 ANXIETY AND DEPRESSION: ICD-10-CM

## 2023-05-08 DIAGNOSIS — F32.A ANXIETY AND DEPRESSION: ICD-10-CM

## 2023-05-08 PROCEDURE — 99214 PR OFFICE/OUTPT VISIT, EST, LEVL IV, 30-39 MIN: ICD-10-PCS | Mod: ,,, | Performed by: FAMILY MEDICINE

## 2023-05-08 PROCEDURE — 99214 OFFICE O/P EST MOD 30 MIN: CPT | Mod: ,,, | Performed by: FAMILY MEDICINE

## 2023-05-08 RX ORDER — FUROSEMIDE 40 MG/1
TABLET ORAL
Qty: 105 TABLET | Refills: 0 | Status: SHIPPED | OUTPATIENT
Start: 2023-05-08 | End: 2023-08-06

## 2023-05-08 RX ORDER — FLUOXETINE 10 MG/1
10 CAPSULE ORAL DAILY
Qty: 30 CAPSULE | Refills: 11 | Status: SHIPPED | OUTPATIENT
Start: 2023-05-08 | End: 2024-05-07

## 2023-05-08 NOTE — PROGRESS NOTES
"              Danilo Cloud IV, DO  [facility] [place]  [address], [place] [address]  Phone: [phone]      Subjective     Name: Amelie Cerrato   Sex: female  YOB: 1950 (72 y.o.)  MRN: [id number]  Visit Date: 05/08/2023   Chief Complaint: Fatigue (Weakness and hands and feet are peeling. )        HISTORY OF PRESENT ILLNESS:    Chief Complaint   Patient presents with    Fatigue     Weakness and hands and feet are peeling.        HPI  See tests that keep you healthy and the problem oriented documentation below.    Tests to Keep You Healthy    Colon Cancer Screening: DUE  Last Blood Pressure <= 139/89 (5/8/2023): NO        Portions of this note may have been created with voice recognition software. Occasional "wrong-word" or "sound-a-like" substitutions may have occurred due to the inherent limitations of voice recognition software. Please, read the note carefully and recognize, using context, where substitutions have occurred.     PAST MEDICAL HISTORY:  Significant Diagnoses - Patient  has a past medical history of Anxiety and depression, Chronic kidney disease, stage 3a, Chronic pain syndrome, COPD (chronic obstructive pulmonary disease), Fracture of multiple pubic rami, left, closed, initial encounter (10/01/2021), HLD (hyperlipidemia), Hypertension, Lumbar compression fracture, sequela L1, L2, L4, L5, kyphoplasty (12/8/2021), Lumbar radiculopathy, Lumbar stenosis, Lumbosacral spondylosis, and Neuropathy.  Medications - Patient has a current medication list which includes the following long-term medication(s): amlodipine, aspirin, atorvastatin, brimonidine 0.2%, duloxetine, latanoprost, pantoprazole, fluoxetine, and furosemide.   Allergies - Patient is allergic to sulfa (sulfonamide antibiotics) and insect venom.  Surgeries - Patient  has a past surgical history that includes Hysterectomy; Cholecystectomy; Epidural steroid injection into lumbar spine (N/A, 05/27/2020); Epidural " steroid injection into thoracic spine (N/A, 02/03/2021); Injection of facet joint (Bilateral, 12/04/2020); Epidural steroid injection into thoracic spine (N/A, 3/18/2021); Injection of anesthetic agent around medial branch nerves innervating lumbar facet joint (Bilateral, 4/22/2021); Radiofrequency ablation of lumbar medial branch nerve at single level (Bilateral, 5/20/2021); Cystoscopy; Epidural steroid injection into thoracic spine (N/A, 12/23/2021); Injection of anesthetic agent around medial branch nerves innervating lumbar facet joint (Bilateral, 9/6/2022); and Injection of anesthetic agent around medial branch nerves innervating lumbar facet joint (Bilateral, 10/11/2022).  Family History - Patient family history includes Heart disease in her father; Hypertension in her mother.      SOCIAL HISTORY:  Tobacco - Patient  reports that she has quit smoking. She has never used smokeless tobacco.   Alcohol - Patient  reports no history of alcohol use.   Recreational Drugs - Patient  reports no history of drug use.       Review of Systems   All other systems reviewed and are negative.       Past Medical History:   Diagnosis Date    Anxiety and depression     Chronic kidney disease, stage 3a     Chronic pain syndrome     COPD (chronic obstructive pulmonary disease)     Fracture of multiple pubic rami, left, closed, initial encounter 10/01/2021    HLD (hyperlipidemia)     Hypertension     Lumbar compression fracture, sequela L1, L2, L4, L5, kyphoplasty 12/8/2021    Lumbar radiculopathy     Lumbar stenosis     Lumbosacral spondylosis     Neuropathy         Review of patient's allergies indicates:   Allergen Reactions    Sulfa (sulfonamide antibiotics) Nausea And Vomiting and Itching    Insect venom      Note: - Phreesia 05/07/2019        Past Surgical History:   Procedure Laterality Date    CHOLECYSTECTOMY      CYSTOSCOPY      EPIDURAL STEROID INJECTION INTO LUMBAR SPINE N/A 05/27/2020    L1-2 WILD      EPIDURAL STEROID INJECTION INTO THORACIC SPINE N/A 02/03/2021    T9-10 WILD     EPIDURAL STEROID INJECTION INTO THORACIC SPINE N/A 3/18/2021    Procedure: INJECTION, STEROID, SPINE, THORACIC, EPIDURAL;  Surgeon: Arelis Burns MD;  Location: [facility];  Service: Pain Management;  Laterality: N/A;    EPIDURAL STEROID INJECTION INTO THORACIC SPINE N/A 12/23/2021    Procedure: Injection-steroid-epidural-thoracic T9/10;  Surgeon: Arelis Burns MD;  Location: [facility];  Service: Pain Management;  Laterality: N/A;  HAD VAC  WILL BRING CARD    HYSTERECTOMY      INJECTION OF ANESTHETIC AGENT AROUND MEDIAL BRANCH NERVES INNERVATING LUMBAR FACET JOINT Bilateral 4/22/2021    Procedure: Block-nerve-medial branch-lumbar Bilateral L3-4,4-5,5-S1 MBB #2;  Surgeon: Arelis Burns MD;  Location: [facility];  Service: Pain Management;  Laterality: Bilateral;    INJECTION OF ANESTHETIC AGENT AROUND MEDIAL BRANCH NERVES INNERVATING LUMBAR FACET JOINT Bilateral 9/6/2022    Procedure: Block-nerve-medial branch-lumbar, bilateral L4 through S1;  Surgeon: Arelis Burns MD;  Location: [facility];  Service: Pain Management;  Laterality: Bilateral;    INJECTION OF ANESTHETIC AGENT AROUND MEDIAL BRANCH NERVES INNERVATING LUMBAR FACET JOINT Bilateral 10/11/2022    Procedure: Block-nerve-medial branch-lumbar, bilateral L4 through S1;  Surgeon: Arelis Burns MD;  Location: [facility];  Service: Pain Management;  Laterality: Bilateral;  vaccinated.    INJECTION OF FACET JOINT Bilateral 12/04/2020    RADIOFREQUENCY ABLATION OF LUMBAR MEDIAL BRANCH NERVE AT SINGLE LEVEL Bilateral 5/20/2021    Procedure: RADIOFREQUENCY ABLATION, NERVE, SPINAL, LUMBAR, MEDIAL BRANCH, 1 LEVEL;  Surgeon: Arelis Burns MD;  Location: [facility];  Service: Pain Management;  Laterality: Bilateral;  Bilateral L3-S1 RFTC (both sides same day)        Family History   Problem Relation Age of Onset     "Hypertension Mother     Heart disease Father        Current Outpatient Medications   Medication Instructions    amLODIPine (NORVASC) 5 mg, Oral, Daily    aspirin (ECOTRIN) 81 mg, Oral, Daily    atorvastatin (LIPITOR) 10 mg, Oral, Nightly    brimonidine 0.2% (ALPHAGAN) 0.2 % Drop 1 drop, Both Eyes, 2 times daily    cyproheptadine (PERIACTIN) 4 mg, Oral, 3 times daily    DULoxetine (CYMBALTA) 60 MG capsule TAKE ONE CAPSULE BY MOUTH EVERY TWELVE HOURS    FLUoxetine 10 mg, Oral, Daily    furosemide (LASIX) 40 MG tablet Take 1 tablet (40 mg total) by mouth 2 (two) times daily for 15 days, THEN 1 tablet (40 mg total) once daily.    HYDROcodone-acetaminophen (NORCO)  mg per tablet 1 tablet, Oral, Every 8 hours    [START ON 5/26/2023] HYDROcodone-acetaminophen (NORCO)  mg per tablet 1 tablet, Oral, Every 8 hours    [START ON 6/23/2023] HYDROcodone-acetaminophen (NORCO)  mg per tablet 1 tablet, Oral, Every 8 hours    INCRUSE ELLIPTA 62.5 mcg, Inhalation, Daily, INHALE ONE PUFF BY MOUTH DAILY AT THE SAME TIME EACH DAY    latanoprost 0.005 % ophthalmic solution 1 drop, Both Eyes, Nightly    mineral oil-hydrophil petrolat (AQUAPHOR) Oint Topical (Top), As needed (PRN)    ondansetron (ZOFRAN) 4 mg, Oral, Every 6 hours    pantoprazole (PROTONIX) 40 MG tablet TAKE ONE TABLET BY MOUTH TWICE DAILY BEFORE meals        Objective     BP (!) 170/81   Pulse 82   Ht 5' 2" (1.575 m)   Wt 41.7 kg (92 lb)   LMP  (LMP Unknown)   BMI 16.83 kg/m²     Physical Exam  Constitutional:       General: She is not in acute distress.     Appearance: Normal appearance. She is not ill-appearing.   HENT:      Head: Normocephalic and atraumatic.      Right Ear: External ear normal.      Left Ear: External ear normal.   Eyes:      Extraocular Movements: Extraocular movements intact.      Conjunctiva/sclera: Conjunctivae normal.   Cardiovascular:      Rate and Rhythm: Normal rate.      Heart sounds: No murmur " heard.  Pulmonary:      Effort: Pulmonary effort is normal.   Musculoskeletal:      Cervical back: Normal range of motion.   Skin:     General: Skin is warm and dry.      Coloration: Skin is not jaundiced.      Findings: No rash.   Neurological:      Mental Status: She is alert.   Psychiatric:         Mood and Affect: Mood normal.        All recently obtained labs have been reviewed and discussed in detail with the patient.   Assessment     1. Aortic atherosclerosis    2. Essential hypertension    3. Anxiety and depression         Plan        Problem List Items Addressed This Visit          Psychiatric    Anxiety and depression    Relevant Medications    FLUoxetine 10 MG capsule       Cardiac/Vascular    Aortic atherosclerosis - Primary     Other Visit Diagnoses       Essential hypertension        Relevant Medications    furosemide (LASIX) 40 MG tablet            No follow-ups on file.    Patient advised that is symptoms worsen, they should call or report directly to local emergency department.    Danilo Cloud DO  [facility]

## 2023-05-09 DIAGNOSIS — Z71.89 COMPLEX CARE COORDINATION: ICD-10-CM

## 2023-05-12 ENCOUNTER — HOSPITAL ENCOUNTER (EMERGENCY)
Facility: HOSPITAL | Age: 73
Discharge: HOME | End: 2023-05-12
Payer: MEDICARE

## 2023-05-12 VITALS
BODY MASS INDEX: 16.56 KG/M2 | RESPIRATION RATE: 18 BRPM | WEIGHT: 90 LBS | DIASTOLIC BLOOD PRESSURE: 76 MMHG | OXYGEN SATURATION: 100 % | HEIGHT: 62 IN | HEART RATE: 77 BPM | TEMPERATURE: 99 F | SYSTOLIC BLOOD PRESSURE: 146 MMHG

## 2023-05-12 DIAGNOSIS — L02.215 PERINEAL ABSCESS: Primary | ICD-10-CM

## 2023-05-12 DIAGNOSIS — N76.4 ABSCESS OF RIGHT GENITAL LABIA: ICD-10-CM

## 2023-05-12 PROCEDURE — 99284 EMERGENCY DEPT VISIT MOD MDM: CPT

## 2023-05-12 PROCEDURE — 10061 I&D ABSCESS COMP/MULTIPLE: CPT

## 2023-05-12 PROCEDURE — 56405 I&D VULVA/PERINEAL ABSCESS: CPT

## 2023-05-12 PROCEDURE — 99284 EMERGENCY DEPT VISIT MOD MDM: CPT | Mod: GF

## 2023-05-12 PROCEDURE — 25000003 PHARM REV CODE 250: Performed by: NURSE PRACTITIONER

## 2023-05-12 PROCEDURE — 87070 CULTURE OTHR SPECIMN AEROBIC: CPT | Performed by: NURSE PRACTITIONER

## 2023-05-12 RX ORDER — CLINDAMYCIN HYDROCHLORIDE 300 MG/1
300 CAPSULE ORAL 3 TIMES DAILY
Qty: 30 CAPSULE | Refills: 0 | Status: SHIPPED | OUTPATIENT
Start: 2023-05-12 | End: 2023-05-22

## 2023-05-12 RX ORDER — LIDOCAINE HYDROCHLORIDE 10 MG/ML
10 INJECTION, SOLUTION EPIDURAL; INFILTRATION; INTRACAUDAL; PERINEURAL
Status: COMPLETED | OUTPATIENT
Start: 2023-05-12 | End: 2023-05-12

## 2023-05-12 RX ORDER — HYDROCODONE BITARTRATE AND ACETAMINOPHEN 10; 325 MG/1; MG/1
1 TABLET ORAL
Status: COMPLETED | OUTPATIENT
Start: 2023-05-12 | End: 2023-05-12

## 2023-05-12 RX ADMIN — LIDOCAINE HYDROCHLORIDE 100 MG: 10 INJECTION, SOLUTION EPIDURAL; INFILTRATION; INTRACAUDAL; PERINEURAL at 08:05

## 2023-05-12 RX ADMIN — HYDROCODONE BITARTRATE AND ACETAMINOPHEN 1 TABLET: 10; 325 TABLET ORAL at 08:05

## 2023-05-12 NOTE — ED PROVIDER NOTES
Encounter Date: 5/12/2023       History     Chief Complaint   Patient presents with    Abscess     2 abscesses to groin     Presented with c/o pain and swelling to perineal area that started about a 1 week ago. Denies fever or chills or n/v. Reports cannot sit and cannot walk due to the pain. Denies previous similar episodes. Has not sought medical care for her  condition until today.    Review of patient's allergies indicates:   Allergen Reactions    Sulfa (sulfonamide antibiotics) Nausea And Vomiting and Itching    Insect venom      Note: - Phreesia 05/07/2019     Past Medical History:   Diagnosis Date    Anxiety and depression     Chronic kidney disease, stage 3a     Chronic pain syndrome     COPD (chronic obstructive pulmonary disease)     Fracture of multiple pubic rami, left, closed, initial encounter 10/01/2021    HLD (hyperlipidemia)     Hypertension     Lumbar compression fracture, sequela L1, L2, L4, L5, kyphoplasty 12/8/2021    Lumbar radiculopathy     Lumbar stenosis     Lumbosacral spondylosis     Neuropathy      Past Surgical History:   Procedure Laterality Date    CHOLECYSTECTOMY      CYSTOSCOPY      EPIDURAL STEROID INJECTION INTO LUMBAR SPINE N/A 05/27/2020    L1-2 WILD     EPIDURAL STEROID INJECTION INTO THORACIC SPINE N/A 02/03/2021    T9-10 WILD     EPIDURAL STEROID INJECTION INTO THORACIC SPINE N/A 3/18/2021    Procedure: INJECTION, STEROID, SPINE, THORACIC, EPIDURAL;  Surgeon: Arelis Burns MD;  Location: [facility];  Service: Pain Management;  Laterality: N/A;    EPIDURAL STEROID INJECTION INTO THORACIC SPINE N/A 12/23/2021    Procedure: Injection-steroid-epidural-thoracic T9/10;  Surgeon: Arelis Burns MD;  Location: [facility];  Service: Pain Management;  Laterality: N/A;  HAD VAC  WILL BRING CARD    HYSTERECTOMY      INJECTION OF ANESTHETIC AGENT AROUND MEDIAL BRANCH NERVES INNERVATING LUMBAR FACET JOINT Bilateral 4/22/2021    Procedure: Block-nerve-medial branch-lumbar  Bilateral L3-4,4-5,5-S1 MBB #2;  Surgeon: Arleis Burns MD;  Location: [facility];  Service: Pain Management;  Laterality: Bilateral;    INJECTION OF ANESTHETIC AGENT AROUND MEDIAL BRANCH NERVES INNERVATING LUMBAR FACET JOINT Bilateral 9/6/2022    Procedure: Block-nerve-medial branch-lumbar, bilateral L4 through S1;  Surgeon: Arelis Burns MD;  Location: [facility];  Service: Pain Management;  Laterality: Bilateral;    INJECTION OF ANESTHETIC AGENT AROUND MEDIAL BRANCH NERVES INNERVATING LUMBAR FACET JOINT Bilateral 10/11/2022    Procedure: Block-nerve-medial branch-lumbar, bilateral L4 through S1;  Surgeon: Arelis Burns MD;  Location: [facility];  Service: Pain Management;  Laterality: Bilateral;  vaccinated.    INJECTION OF FACET JOINT Bilateral 12/04/2020    RADIOFREQUENCY ABLATION OF LUMBAR MEDIAL BRANCH NERVE AT SINGLE LEVEL Bilateral 5/20/2021    Procedure: RADIOFREQUENCY ABLATION, NERVE, SPINAL, LUMBAR, MEDIAL BRANCH, 1 LEVEL;  Surgeon: Arelis Burns MD;  Location: [facility]IN [facility];  Service: Pain Management;  Laterality: Bilateral;  Bilateral L3-S1 RFTC (both sides same day)     Family History   Problem Relation Age of Onset    Hypertension Mother     Heart disease Father      Social History     Tobacco Use    Smoking status: Former    Smokeless tobacco: Never   Substance Use Topics    Alcohol use: Never     Comment: unknown    Drug use: Never     Types: Hydrocodone     Review of Systems   Constitutional:  Positive for activity change. Negative for fever.   HENT: Negative.     Respiratory: Negative.     Cardiovascular: Negative.    Gastrointestinal: Negative.    Genitourinary:  Positive for genital sores (abscess bilat). Negative for dysuria.   Musculoskeletal: Negative.    Skin:  Positive for wound (abscess x 2 to perineal area).   Neurological: Negative.    Psychiatric/Behavioral: Negative.       Physical Exam     Initial Vitals [05/12/23 0818]   BP Pulse Resp Temp  SpO2   (!) 165/78 92 20 98.7 °F (37.1 °C) 97 %      MAP       --         Physical Exam    Nursing note and vitals reviewed.  Constitutional: She appears well-developed and well-nourished. No distress.   HENT:   Head: Normocephalic.   Right Ear: External ear normal.   Left Ear: External ear normal.   Nose: Nose normal.   Mouth/Throat: Oropharynx is clear and moist.   Eyes: Conjunctivae and EOM are normal.   Neck: Neck supple.   Normal range of motion.  Cardiovascular:  Normal rate, regular rhythm and normal heart sounds.           Pulmonary/Chest: Breath sounds normal.   Abdominal: Abdomen is soft. Bowel sounds are normal. There is no abdominal tenderness.   Genitourinary:       Musculoskeletal:         General: Normal range of motion.      Cervical back: Normal range of motion and neck supple.     Neurological: She is alert and oriented to person, place, and time. She has normal strength. GCS score is 15. GCS eye subscore is 4. GCS verbal subscore is 5. GCS motor subscore is 6.   Skin: Skin is warm and dry. Capillary refill takes less than 2 seconds.   Psychiatric: She has a normal mood and affect.       Medical Screening Exam   See Full Note    ED Course   I & D - Incision and Drainage    Date/Time: 5/12/2023 9:23 AM  Location procedure was performed: [facility] EMERGENCY DEPARTMENT  Performed by: Joanna Dickerson NP  Authorized by: Joanna Dickerson NP   Consent Done: Yes  Consent: Verbal consent obtained. Written consent obtained.  Risks and benefits: risks, benefits and alternatives were discussed  Consent given by: patient  Patient understanding: patient states understanding of the procedure being performed  Patient consent: the patient's understanding of the procedure matches consent given  Procedure consent: procedure consent matches procedure scheduled  Patient identity confirmed: verbally with patient  Type: abscess  Body area: anogenital  Location details: perineum  Anesthesia: local  infiltration    Anesthesia:  Local Anesthetic: lidocaine 1% without epinephrine  Anesthetic total: 6 mL    Patient sedated: no  Scalpel size: 11  Incision type: single straight  Incision depth: dermal  Complexity: simple  Drainage: pus  Drainage amount: moderate  Wound treatment: incision, drainage, expression of material and wound packed  Packing material: 1/4 in iodoform gauze  Complications: No  Specimens: Yes    Incision depth: dermal      I & D - Incision and Drainage    Date/Time: 5/12/2023 9:25 AM  Location procedure was performed: [facility] EMERGENCY DEPARTMENT  Performed by: Joanna Dickerson NP  Authorized by: Joanna Dickerson NP   Type: abscess  Body area: anogenital  Location details: vulva  Anesthesia: local infiltration    Anesthesia:  Local Anesthetic: lidocaine 1% without epinephrine  Anesthetic total: 2 mL    Patient sedated: no  Scalpel size: 11  Incision type: single straight  Incision depth: dermal  Complexity: simple  Drainage: pus  Drainage amount: moderate  Wound treatment: incision, drainage and expression of material  Specimens: No  Patient tolerance: Patient tolerated the procedure well with no immediate complications    Incision depth: dermal      Labs Reviewed   CULTURE, WOUND          Imaging Results    None          Medications   HYDROcodone-acetaminophen  mg per tablet 1 tablet (1 tablet Oral Given 5/12/23 0845)   LIDOcaine (PF) 10 mg/ml (1%) injection 100 mg (100 mg Infiltration Given 5/12/23 0845)     Medical Decision Making:   ED Management:  Presented with c/o pain and swelling to perineal area that started about a 1 week ago. Denies fever or chills or n/v. Reports cannot sit and cannot walk due to the pain. Denies previous similar episodes. Has not sought medical care for her  condition until today.    Explained need for I&D with pt. Instructed on procedure. Pt agreed. I&D performed to abscess right labia majora and left perineum. Pt paulette procedure well.     Norco 10/325 mg po given  in the ED. She was discharged home on Clindamycin, instructed on wound care and follow-up. Is to return in 2 days for recheck.                       Clinical Impression:   Final diagnoses:  [L02.215] Perineal abscess (Primary)  [N76.4] Abscess of right genital labia        ED Disposition Condition    Discharge Stable          ED Prescriptions       Medication Sig Dispense Start Date End Date Auth. Provider    clindamycin (CLEOCIN) 300 MG capsule Take 1 capsule (300 mg total) by mouth 3 (three) times daily. for 10 days 30 capsule 5/12/2023 5/22/2023 Joanna Dickerson NP          Follow-up Information       Follow up With Specialties Details Why Contact Info    [facility] - Emergency Department Emergency Medicine In 2 days For wound re-check [address] [place] [id number] [phone]             Joanna Dickerson NP  05/12/23 0912

## 2023-05-12 NOTE — ED TRIAGE NOTES
Noticed a small knot that began a little over a week ago, then a couple of days later a second knot was noted, has had a small knot to come up before but reports it went away on its own, 2 swollen areas noted to either side of genital area

## 2023-05-14 LAB — MICROORGANISM SPEC CULT: ABNORMAL

## 2023-05-16 NOTE — ADDENDUM NOTE
Encounter addended by: Mita Hair on: 5/16/2023 12:32 PM   Actions taken: SmartForm saved, Flowsheet accepted

## 2023-06-13 ENCOUNTER — EXTERNAL HOME HEALTH (OUTPATIENT)
Dept: HOME HEALTH SERVICES | Facility: HOSPITAL | Age: 73
End: 2023-06-13
Payer: MEDICARE

## 2023-06-19 PROBLEM — J81.0 ACUTE PULMONARY EDEMA: Status: RESOLVED | Noted: 2023-03-12 | Resolved: 2023-06-19

## 2023-06-19 PROBLEM — I21.A1 TYPE 2 MI (MYOCARDIAL INFARCTION): Status: RESOLVED | Noted: 2023-03-12 | Resolved: 2023-06-19

## 2023-06-19 PROBLEM — N17.9 AKI (ACUTE KIDNEY INJURY): Status: RESOLVED | Noted: 2023-03-18 | Resolved: 2023-06-19

## 2023-06-26 ENCOUNTER — OFFICE VISIT (OUTPATIENT)
Dept: FAMILY MEDICINE | Facility: CLINIC | Age: 73
End: 2023-06-26
Payer: MEDICARE

## 2023-06-26 VITALS
HEIGHT: 62 IN | SYSTOLIC BLOOD PRESSURE: 171 MMHG | BODY MASS INDEX: 15.86 KG/M2 | HEART RATE: 79 BPM | WEIGHT: 86.19 LBS | DIASTOLIC BLOOD PRESSURE: 77 MMHG

## 2023-06-26 DIAGNOSIS — H61.23 BILATERAL IMPACTED CERUMEN: Primary | ICD-10-CM

## 2023-06-26 DIAGNOSIS — I10 PRIMARY HYPERTENSION: ICD-10-CM

## 2023-06-26 DIAGNOSIS — L85.3 DRY SKIN: ICD-10-CM

## 2023-06-26 DIAGNOSIS — B37.31 CANDIDIASIS OF GENITALIA IN FEMALE: ICD-10-CM

## 2023-06-26 PROCEDURE — 99214 PR OFFICE/OUTPT VISIT, EST, LEVL IV, 30-39 MIN: ICD-10-PCS | Mod: ,,, | Performed by: FAMILY MEDICINE

## 2023-06-26 PROCEDURE — 99214 OFFICE O/P EST MOD 30 MIN: CPT | Mod: ,,, | Performed by: FAMILY MEDICINE

## 2023-06-26 RX ORDER — AMLODIPINE BESYLATE 5 MG/1
5 TABLET ORAL DAILY
Qty: 30 TABLET | Refills: 2 | Status: SHIPPED | OUTPATIENT
Start: 2023-06-26 | End: 2024-03-27

## 2023-06-26 RX ORDER — FLUCONAZOLE 150 MG/1
150 TABLET ORAL
Qty: 2 TABLET | Refills: 0 | Status: SHIPPED | OUTPATIENT
Start: 2023-06-26 | End: 2023-06-30

## 2023-06-27 NOTE — PROGRESS NOTES
"              Danilo Cloud IV, DO  [facility]  [address] [place] [address]  Phone: [phone]      Subjective     Name: Amelie Cerrato   Sex: female  YOB: 1950 (72 y.o.)  MRN: [id number]  Visit Date: 06/27/2023   Chief Complaint: ears stopped up (Itching all over/No appetite)        HISTORY OF PRESENT ILLNESS:    Chief Complaint   Patient presents with    ears stopped up     Itching all over  No appetite       HPI  See tests that keep you healthy and the problem oriented documentation below.    Tests to Keep You Healthy    Mammogram: DUE  Colon Cancer Screening: DUE  Last Blood Pressure <= 139/89 (6/26/2023): NO      Portions of this note may have been created with voice recognition software. Occasional "wrong-word" or "sound-a-like" substitutions may have occurred due to the inherent limitations of voice recognition software. Please, read the note carefully and recognize, using context, where substitutions have occurred.     PAST MEDICAL HISTORY:  Significant Diagnoses - Patient  has a past medical history of Anxiety and depression, Chronic kidney disease, stage 3a, Chronic pain syndrome, COPD (chronic obstructive pulmonary disease), Fracture of multiple pubic rami, left, closed, initial encounter (10/01/2021), HLD (hyperlipidemia), Hypertension, Lumbar compression fracture, sequela L1, L2, L4, L5, kyphoplasty (12/8/2021), Lumbar radiculopathy, Lumbar stenosis, Lumbosacral spondylosis, and Neuropathy.  Medications - Patient has a current medication list which includes the following long-term medication(s): amlodipine, aspirin, atorvastatin, brimonidine 0.2%, duloxetine, fluoxetine, furosemide, latanoprost, and pantoprazole.   Allergies - Patient is allergic to sulfa (sulfonamide antibiotics) and insect venom.  Surgeries - Patient  has a past surgical history that includes Hysterectomy; Cholecystectomy; Epidural steroid injection into lumbar spine (N/A, 05/27/2020); " Epidural steroid injection into thoracic spine (N/A, 02/03/2021); Injection of facet joint (Bilateral, 12/04/2020); Epidural steroid injection into thoracic spine (N/A, 3/18/2021); Injection of anesthetic agent around medial branch nerves innervating lumbar facet joint (Bilateral, 4/22/2021); Radiofrequency ablation of lumbar medial branch nerve at single level (Bilateral, 5/20/2021); Cystoscopy; Epidural steroid injection into thoracic spine (N/A, 12/23/2021); Injection of anesthetic agent around medial branch nerves innervating lumbar facet joint (Bilateral, 9/6/2022); and Injection of anesthetic agent around medial branch nerves innervating lumbar facet joint (Bilateral, 10/11/2022).  Family History - Patient family history includes Heart disease in her father; Hypertension in her mother.      SOCIAL HISTORY:  Tobacco - Patient  reports that she has quit smoking. She has never used smokeless tobacco.   Alcohol - Patient  reports no history of alcohol use.   Recreational Drugs - Patient  reports no history of drug use.       Review of Systems   Constitutional:  Negative for fatigue and fever.   HENT:  Negative for nasal congestion and sore throat.    Respiratory:  Negative for cough and shortness of breath.    Cardiovascular:  Negative for chest pain.   Gastrointestinal:  Negative for abdominal pain, nausea and vomiting.   Genitourinary:  Negative for dysuria.   Musculoskeletal:  Negative for myalgias.   Integumentary:  Negative for rash.   Neurological:  Negative for dizziness, weakness and headaches.   All other systems reviewed and are negative.       Past Medical History:   Diagnosis Date    Anxiety and depression     Chronic kidney disease, stage 3a     Chronic pain syndrome     COPD (chronic obstructive pulmonary disease)     Fracture of multiple pubic rami, left, closed, initial encounter 10/01/2021    HLD (hyperlipidemia)     Hypertension     Lumbar compression fracture, sequela L1, L2, L4, L5,  kyphoplasty 12/8/2021    Lumbar radiculopathy     Lumbar stenosis     Lumbosacral spondylosis     Neuropathy         Review of patient's allergies indicates:   Allergen Reactions    Sulfa (sulfonamide antibiotics) Nausea And Vomiting and Itching    Insect venom      Note: - [employer] 05/07/2019        Past Surgical History:   Procedure Laterality Date    CHOLECYSTECTOMY      CYSTOSCOPY      EPIDURAL STEROID INJECTION INTO LUMBAR SPINE N/A 05/27/2020    L1-2 WILD     EPIDURAL STEROID INJECTION INTO THORACIC SPINE N/A 02/03/2021    T9-10 WILD     EPIDURAL STEROID INJECTION INTO THORACIC SPINE N/A 3/18/2021    Procedure: INJECTION, STEROID, SPINE, THORACIC, EPIDURAL;  Surgeon: Arelis Burns MD;  Location: [facility];  Service: Pain Management;  Laterality: N/A;    EPIDURAL STEROID INJECTION INTO THORACIC SPINE N/A 12/23/2021    Procedure: Injection-steroid-epidural-thoracic T9/10;  Surgeon: Arelis Burns MD;  Location: [facility];  Service: Pain Management;  Laterality: N/A;  HAD VAC  WILL BRING CARD    HYSTERECTOMY      INJECTION OF ANESTHETIC AGENT AROUND MEDIAL BRANCH NERVES INNERVATING LUMBAR FACET JOINT Bilateral 4/22/2021    Procedure: Block-nerve-medial branch-lumbar Bilateral L3-4,4-5,5-S1 MBB #2;  Surgeon: Arelis Burns MD;  Location: [facility];  Service: Pain Management;  Laterality: Bilateral;    INJECTION OF ANESTHETIC AGENT AROUND MEDIAL BRANCH NERVES INNERVATING LUMBAR FACET JOINT Bilateral 9/6/2022    Procedure: Block-nerve-medial branch-lumbar, bilateral L4 through S1;  Surgeon: Arelis Burns MD;  Location: [facility];  Service: Pain Management;  Laterality: Bilateral;    INJECTION OF ANESTHETIC AGENT AROUND MEDIAL BRANCH NERVES INNERVATING LUMBAR FACET JOINT Bilateral 10/11/2022    Procedure: Block-nerve-medial branch-lumbar, bilateral L4 through S1;  Surgeon: Arelis Burns MD;  Location: [facility];  Service: Pain Management;   "Laterality: Bilateral;  vaccinated.    INJECTION OF FACET JOINT Bilateral 12/04/2020    RADIOFREQUENCY ABLATION OF LUMBAR MEDIAL BRANCH NERVE AT SINGLE LEVEL Bilateral 5/20/2021    Procedure: RADIOFREQUENCY ABLATION, NERVE, SPINAL, LUMBAR, MEDIAL BRANCH, 1 LEVEL;  Surgeon: Arelis Burns MD;  Location: [facility];  Service: Pain Management;  Laterality: Bilateral;  Bilateral L3-S1 RFTC (both sides same day)        Family History   Problem Relation Age of Onset    Hypertension Mother     Heart disease Father        Current Outpatient Medications   Medication Instructions    amLODIPine (NORVASC) 5 mg, Oral, Daily    aspirin (ECOTRIN) 81 mg, Oral, Daily    atorvastatin (LIPITOR) 10 mg, Oral, Nightly    brimonidine 0.2% (ALPHAGAN) 0.2 % Drop 1 drop, Both Eyes, 2 times daily    cyproheptadine (PERIACTIN) 4 mg, Oral, 3 times daily    DULoxetine (CYMBALTA) 60 MG capsule TAKE ONE CAPSULE BY MOUTH EVERY TWELVE HOURS    fluconazole (DIFLUCAN) 150 mg, Oral, Every 3 days    FLUoxetine 10 mg, Oral, Daily    furosemide (LASIX) 40 MG tablet Take 1 tablet (40 mg total) by mouth 2 (two) times daily for 15 days, THEN 1 tablet (40 mg total) once daily.    HYDROcodone-acetaminophen (NORCO)  mg per tablet 1 tablet, Oral, Every 8 hours    HYDROcodone-acetaminophen (NORCO)  mg per tablet 1 tablet, Oral, Every 8 hours    HYDROcodone-acetaminophen (NORCO)  mg per tablet 1 tablet, Oral, Every 8 hours    latanoprost 0.005 % ophthalmic solution 1 drop, Both Eyes, Nightly    mineral oil-hydrophil petrolat (AQUAPHOR) Oint Topical (Top), As needed (PRN)    ondansetron (ZOFRAN) 4 mg, Oral, Every 6 hours    pantoprazole (PROTONIX) 40 MG tablet TAKE ONE TABLET BY MOUTH TWICE DAILY BEFORE meals        Objective     BP (!) 171/77   Pulse 79   Ht 5' 2" (1.575 m)   Wt 39.1 kg (86 lb 3.2 oz)   LMP  (LMP Unknown)   BMI 15.77 kg/m²     Physical Exam  Constitutional:       General: She is not in acute " distress.     Appearance: Normal appearance. She is not ill-appearing.   HENT:      Head: Normocephalic and atraumatic.      Right Ear: External ear normal.      Left Ear: External ear normal.   Eyes:      Extraocular Movements: Extraocular movements intact.      Conjunctiva/sclera: Conjunctivae normal.   Cardiovascular:      Rate and Rhythm: Normal rate.      Heart sounds: No murmur heard.  Pulmonary:      Effort: Pulmonary effort is normal.   Musculoskeletal:      Cervical back: Normal range of motion.   Skin:     General: Skin is warm and dry.      Coloration: Skin is not jaundiced.      Findings: No rash.   Neurological:      Mental Status: She is alert.   Psychiatric:         Mood and Affect: Mood normal.        All recently obtained labs have been reviewed and discussed in detail with the patient.   Assessment     1. Bilateral impacted cerumen    2. Primary hypertension    3. Candidiasis of genitalia in female    4. Dry skin         Plan        Problem List Items Addressed This Visit          ENT    Bilateral impacted cerumen - Primary    Relevant Orders    Ambulatory referral/consult to ENT    Ambulatory referral/consult to Outpatient Case Management       Derm    Dry skin       Cardiac/Vascular    Hypertension    Relevant Medications    amLODIPine (NORVASC) 5 MG tablet    Other Relevant Orders    Ambulatory referral/consult to Outpatient Case Management       Renal/    Candidiasis of genitalia in female    Relevant Medications    fluconazole (DIFLUCAN) 150 MG Tab    Other Relevant Orders    Ambulatory referral/consult to Outpatient Case Management       No follow-ups on file.    Patient advised that is symptoms worsen, they should call or report directly to local emergency department.    Danilo Cloud DO  [facility]

## 2023-07-05 ENCOUNTER — OFFICE VISIT (OUTPATIENT)
Dept: OTOLARYNGOLOGY | Facility: CLINIC | Age: 73
End: 2023-07-05
Payer: MEDICARE

## 2023-07-05 VITALS — BODY MASS INDEX: 15.83 KG/M2 | WEIGHT: 86 LBS | HEIGHT: 62 IN

## 2023-07-05 DIAGNOSIS — H90.2 CONDUCTIVE HEARING LOSS, UNSPECIFIED LATERALITY: Primary | ICD-10-CM

## 2023-07-05 DIAGNOSIS — H61.23 BILATERAL IMPACTED CERUMEN: ICD-10-CM

## 2023-07-05 DIAGNOSIS — H92.03 OTALGIA OF BOTH EARS: ICD-10-CM

## 2023-07-05 PROCEDURE — 69210 REMOVE IMPACTED EAR WAX UNI: CPT | Mod: S$PBB,,, | Performed by: OTOLARYNGOLOGY

## 2023-07-05 PROCEDURE — 99214 OFFICE O/P EST MOD 30 MIN: CPT | Mod: PBBFAC,25 | Performed by: OTOLARYNGOLOGY

## 2023-07-05 PROCEDURE — 99204 PR OFFICE/OUTPT VISIT, NEW, LEVL IV, 45-59 MIN: ICD-10-PCS | Mod: 25,S$PBB,, | Performed by: OTOLARYNGOLOGY

## 2023-07-05 PROCEDURE — 99204 OFFICE O/P NEW MOD 45 MIN: CPT | Mod: 25,S$PBB,, | Performed by: OTOLARYNGOLOGY

## 2023-07-05 PROCEDURE — 69210 PR REMOVAL IMPACTED CERUMEN REQUIRING INSTRUMENTATION, UNILATERAL: ICD-10-PCS | Mod: S$PBB,,, | Performed by: OTOLARYNGOLOGY

## 2023-07-05 PROCEDURE — 69210 REMOVE IMPACTED EAR WAX UNI: CPT | Mod: PBBFAC | Performed by: OTOLARYNGOLOGY

## 2023-07-05 NOTE — PROGRESS NOTES
Subjective:       Patient ID: Amelie Cerrato is a 72 y.o. female.    Chief Complaint: Cerumen Impaction (Bilateral ear cleaning. Pt states her ears needs cleaning, ears feel full. ) and Ear Fullness (Bilateral ears. )  Hearing loss and pain  Ear Fullness   Associated symptoms include hearing loss.   Review of Systems   HENT:  Positive for ear pain and hearing loss.    All other systems reviewed and are negative.    Objective:      Physical Exam  General: NAD  Head: Normocephalic, atraumatic, no facial asymmetry/normal strength,  Ears: Both auricules normal in appearance, w/o deformities tympanic membranes normal external auditory canals normal  Nose: External nose w/o deformities normal turbinates no drainage or inflammation  Oral Cavity: Lips, gums, floor of mouth, tongue hard palate, and buccal mucosa without mass/lesion  Oropharynx: Mucosa pink and moist, soft palate, posterior pharynx and oropharyngeal wall without mass/lesion  Neck: Supple, symmetric, trachea midline, no palpable mass/lesion, no palpable cervical lymphadenopathy  Skin: Warm and dry, no concerning lesions  Respiratory: Respirations even, unlabored     Procedure: Binocular microscopy with removal of cerumen impaction using microsurgical instrumentation.  After explaining the procedure and obtaining verbal assent,  each external auditory canal visualized with the 250 mm focal length lens through the operating microscope. The obstructing cerumen was removed with microsurgical instrumentation and irrigation to reveal normal and healthy external auditory canals. The patient tolerated this procedure well without complication.     Assessment:       1. Conductive hearing loss, unspecified laterality    2. Bilateral impacted cerumen    3. Otalgia of both ears        Plan:       Discussed keeping clean at home measures

## 2023-07-11 ENCOUNTER — OUTPATIENT CASE MANAGEMENT (OUTPATIENT)
Dept: ADMINISTRATIVE | Facility: OTHER | Age: 73
End: 2023-07-11

## 2023-07-11 NOTE — LETTER
Amelie Cerrato  [address] [place] [address]      Dear Amelie Cerrato,     I work with [facility] Outpatient Care Management Department. We received a referral to call you to discuss your medical history. These services are free of charge and are offered to [facility] patients who have recently been discharged from any of our facilities or who have medical conditions that may require the skill of a nurse to assist with management.     I am a Registered Nurse who specializes in connecting patients with available medical and financial resources as well as addressing any educational needs that may be indicated.    I attempted to reach you by telephone, but I was unsuccessful. Please call our department so that we can go over some questions with you, regarding your health.    The Outpatient Care Management Department can be reached at [phone], from 8:00AM to 4:30 PM, on Monday thru Friday.     Additionally, [facility] also has a program where a nurse is available 24/7 to answer questions or provide medical advice, their number is [phone].      Thanks,        Delilah Brennan RN [facility]  Outpatient Care Management  Phone #: [phone]

## 2023-07-11 NOTE — PROGRESS NOTES
07/11/23-Attempt assessment with patient for outpatient case management. No answer. Left message requesting call back.  RN OPCM 1'st assessment attempt. Letter Mailed.

## 2023-07-11 NOTE — LETTER
Amelie Cerrato  [address] [place] [address]      Dear Amelie Cerrato,     I work with [facility] Outpatient Care Management Department. We received a referral to call you to discuss your medical history. These services are free of charge and are offered to [facility] patients who have recently been discharged from any of our facilities or who have medical conditions that may require the skill of a nurse to assist with management.     I am a Registered Nurse who specializes in connecting patients with available medical and financial resources as well as addressing any educational needs that may be indicated.    I attempted to reach you by telephone, but I was unsuccessful. Please call our department so that we can go over some questions with you, regarding your health.    The Outpatient Care Management Department can be reached at [phone], from 8:00AM to 4:30 PM, on Monday thru Friday.     Additionally, [facility] also has a program where a nurse is available 24/7 to answer questions or provide medical advice, their number is [phone].      Thanks,        Delilah Brennan RN  Outpatient Care Management  Phone #: [phone]

## 2023-07-13 ENCOUNTER — OUTPATIENT CASE MANAGEMENT (OUTPATIENT)
Dept: ADMINISTRATIVE | Facility: OTHER | Age: 73
End: 2023-07-13

## 2023-07-13 NOTE — PROGRESS NOTES
07/13/23-Attempt assessment with patient for outpatient case management. No answer. Left message requesting call back.  RN OPCM 3'rd assessment attempt. OPCM Case Closure.

## 2023-07-18 NOTE — PROGRESS NOTES
Subjective:         Patient ID: Amelie Cerrato is a 72 y.o. female.    Chief Complaint: Low-back Pain        Pain  This is a chronic problem. The current episode started more than 1 year ago. The problem occurs daily. The problem has been waxing and waning. Associated symptoms include arthralgias, myalgias and neck pain. Pertinent negatives include no anorexia, chest pain, chills, coughing, diaphoresis, fever, sore throat, swollen glands, vertigo, visual change or vomiting.   Review of Systems   Constitutional:  Negative for activity change, appetite change, chills, diaphoresis, fever and unexpected weight change.   HENT:  Negative for drooling, ear discharge, ear pain, facial swelling, mouth sores, nosebleeds, sore throat, trouble swallowing, voice change and goiter.    Eyes:  Negative for photophobia, pain, discharge, redness, visual disturbance and eye dryness.   Respiratory:  Negative for apnea, cough, choking, chest tightness, shortness of breath, wheezing and stridor.    Cardiovascular:  Negative for chest pain, palpitations and leg swelling.   Gastrointestinal:  Negative for abdominal distention, anorexia, diarrhea, rectal pain, vomiting and fecal incontinence.   Endocrine: Negative for cold intolerance, heat intolerance, polydipsia, polyphagia and polyuria.   Genitourinary:  Negative for flank pain, frequency, hematuria and hot flashes.   Musculoskeletal:  Positive for arthralgias, back pain, myalgias, neck pain and neck stiffness.   Integumentary:  Negative for color change and pallor.   Allergic/Immunologic: Negative for immunocompromised state.   Neurological:  Negative for dizziness, vertigo, seizures, syncope, facial asymmetry, speech difficulty, light-headedness, coordination difficulties, memory loss and coordination difficulties.   Hematological:  Negative for adenopathy. Does not bruise/bleed easily.   Psychiatric/Behavioral:  Negative for agitation, behavioral problems, confusion, decreased  concentration, dysphoric mood, hallucinations, self-injury and suicidal ideas. The patient is not nervous/anxious and is not hyperactive.          Past Medical History:   Diagnosis Date    Anxiety and depression     Chronic kidney disease, stage 3a     Chronic pain syndrome     COPD (chronic obstructive pulmonary disease)     Fracture of multiple pubic rami, left, closed, initial encounter 10/01/2021    HLD (hyperlipidemia)     Hypertension     Lumbar compression fracture, sequela L1, L2, L4, L5, kyphoplasty 12/8/2021    Lumbar radiculopathy     Lumbar stenosis     Lumbosacral spondylosis     Neuropathy      Past Surgical History:   Procedure Laterality Date    CHOLECYSTECTOMY      CYSTOSCOPY      EPIDURAL STEROID INJECTION INTO LUMBAR SPINE N/A 05/27/2020    L1-2 WILD     EPIDURAL STEROID INJECTION INTO THORACIC SPINE N/A 02/03/2021    T9-10 WILD     EPIDURAL STEROID INJECTION INTO THORACIC SPINE N/A 3/18/2021    Procedure: INJECTION, STEROID, SPINE, THORACIC, EPIDURAL;  Surgeon: Arelis Burns MD;  Location: [facility];  Service: Pain Management;  Laterality: N/A;    EPIDURAL STEROID INJECTION INTO THORACIC SPINE N/A 12/23/2021    Procedure: Injection-steroid-epidural-thoracic T9/10;  Surgeon: Arelis Burns MD;  Location: [facility];  Service: Pain Management;  Laterality: N/A;  HAD VAC  WILL BRING CARD    HYSTERECTOMY      INJECTION OF ANESTHETIC AGENT AROUND MEDIAL BRANCH NERVES INNERVATING LUMBAR FACET JOINT Bilateral 4/22/2021    Procedure: Block-nerve-medial branch-lumbar Bilateral L3-4,4-5,5-S1 MBB #2;  Surgeon: Arelis Burns MD;  Location: [facility];  Service: Pain Management;  Laterality: Bilateral;    INJECTION OF ANESTHETIC AGENT AROUND MEDIAL BRANCH NERVES INNERVATING LUMBAR FACET JOINT Bilateral 9/6/2022    Procedure: Block-nerve-medial branch-lumbar, bilateral L4 through S1;  Surgeon: Arelis Burns MD;  Location: [facility];  Service: Pain Management;   Laterality: Bilateral;    INJECTION OF ANESTHETIC AGENT AROUND MEDIAL BRANCH NERVES INNERVATING LUMBAR FACET JOINT Bilateral 10/11/2022    Procedure: Block-nerve-medial branch-lumbar, bilateral L4 through S1;  Surgeon: Arelis Burns MD;  Location: [facility];  Service: Pain Management;  Laterality: Bilateral;  vaccinated.    INJECTION OF FACET JOINT Bilateral 12/04/2020    RADIOFREQUENCY ABLATION OF LUMBAR MEDIAL BRANCH NERVE AT SINGLE LEVEL Bilateral 5/20/2021    Procedure: RADIOFREQUENCY ABLATION, NERVE, SPINAL, LUMBAR, MEDIAL BRANCH, 1 LEVEL;  Surgeon: Arelis Burns MD;  Location: [facility];  Service: Pain Management;  Laterality: Bilateral;  Bilateral L3-S1 RFTC (both sides same day)     Social History     Socioeconomic History    Marital status:    Tobacco Use    Smoking status: Former    Smokeless tobacco: Never   Substance and Sexual Activity    Alcohol use: Never     Comment: unknown    Drug use: Never     Types: Hydrocodone    Sexual activity: Not Currently     Social Determinants of Health     Financial Resource Strain: Low Risk     Difficulty of Paying Living Expenses: Not very hard   Food Insecurity: No Food Insecurity    Worried About Running Out of Food in the Last Year: Never true    Ran Out of Food in the Last Year: Never true   Transportation Needs: No Transportation Needs    Lack of Transportation (Medical): No    Lack of Transportation (Non-Medical): No   Physical Activity: Inactive    Days of Exercise per Week: 0 days    Minutes of Exercise per Session: 0 min   Stress: Stress Concern Present    Feeling of Stress : Rather much   Social Connections: Moderately Isolated    Frequency of Communication with Friends and Family: Three times a week    Frequency of Social Gatherings with Friends and Family: Three times a week    Attends Zoroastrian Services: More than 4 times per year    Active Member of Clubs or Organizations: No    Attends Club or Organization Meetings: Never  "   Marital Status:    Housing Stability: Unknown    Unable to Pay for Housing in the Last Year: No    Unstable Housing in the Last Year: No     Family History   Problem Relation Age of Onset    Hypertension Mother     Heart disease Father      Review of patient's allergies indicates:   Allergen Reactions    Sulfa (sulfonamide antibiotics) Nausea And Vomiting and Itching    Insect venom      Note: - Estefania 05/07/2019        Objective:  Vitals:    07/19/23 1329   BP: 126/75   Pulse: 84   Resp: 18   Weight: 39 kg (86 lb)   Height: 5' 2" (1.575 m)   PainSc:   9         Physical Exam  Vitals and nursing note reviewed. Exam conducted with a chaperone present.   Constitutional:       General: She is awake. She is not in acute distress.     Appearance: Normal appearance. She is not ill-appearing, toxic-appearing or diaphoretic.   HENT:      Head: Normocephalic and atraumatic.      Nose: Nose normal.      Mouth/Throat:      Mouth: Mucous membranes are moist.      Pharynx: Oropharynx is clear.   Eyes:      Conjunctiva/sclera: Conjunctivae normal.      Pupils: Pupils are equal, round, and reactive to light.   Cardiovascular:      Rate and Rhythm: Normal rate.   Pulmonary:      Effort: Pulmonary effort is normal. No respiratory distress.   Abdominal:      Palpations: Abdomen is soft.   Musculoskeletal:         General: Normal range of motion.      Right shoulder: Tenderness present.      Left shoulder: Tenderness present.      Cervical back: Normal range of motion and neck supple. Tenderness present.      Thoracic back: Tenderness present.      Lumbar back: Tenderness present.   Skin:     General: Skin is warm and dry.   Neurological:      General: No focal deficit present.      Mental Status: She is alert and oriented to person, place, and time. Mental status is at baseline.      Cranial Nerves: No cranial nerve deficit (II-XII).   Psychiatric:         Mood and Affect: Mood normal.         Behavior: Behavior normal. " Behavior is cooperative.         Thought Content: Thought content normal.         CTA Chest Non-Coronary (PE Studies)  Narrative: EXAMINATION:  CTA CHEST NON CORONARY (PE STUDIES)    CLINICAL HISTORY:  Pulmonary embolism (PE) suspected, unknown D-dimer;    TECHNIQUE:  Multiplanar CT of the chest with PE protocol.  MIP projections obtained.  This exam is adequate for interpretation.    COMPARISON:  3/13/23    FINDINGS:  Pulmonary artery: Patent    Lower neck: Normal    Chest/airways: Diffuse patchy airspace opacities, probably pulmonary edema.    Mediastinum: Mildly enlarged heart.    Chest wall: Normal    Bones: Multilevel degenerative change.  Multiple treated compression fractures.  Multiple other mild-to-moderate age-indeterminate fractures scattered throughout the upper and middle thoracic spine, correlate with point tenderness.    Upper abdomen: Fatty liver.  Impression: 1. No evidence to suggest pulmonary embolism.    Diffuse patchy airspace opacities, probably pulmonary edema.    Multiple treated compression fractures.  Multiple other mild-to-moderate age-indeterminate fractures scattered throughout the upper and middle thoracic spine, correlate with point tenderness.    Electronically signed by: David Soni  Date:    03/14/2023  Time:    14:29         Admission on 05/12/2023, Discharged on 05/12/2023   Component Date Value Ref Range Status    Culture, Wound/Abscess 05/12/2023 Heavy Growth Methicillin resistant Staphylococcus aureus (A)   Final   Office Visit on 04/24/2023   Component Date Value Ref Range Status    POC Amphetamines 04/24/2023 Negative  Negative, Inconclusive Final    POC Barbiturates 04/24/2023 Negative  Negative, Inconclusive Final    POC Benzodiazepines 04/24/2023 Negative  Negative, Inconclusive Final    POC Cocaine 04/24/2023 Negative  Negative, Inconclusive Final    POC THC 04/24/2023 Negative  Negative, Inconclusive Final    POC Methadone 04/24/2023 Negative  Negative, Inconclusive  Final    POC Methamphetamine 04/24/2023 Negative  Negative, Inconclusive Final    POC Opiates 04/24/2023 Presumptive Positive (A)  Negative, Inconclusive Final    POC Oxycodone 04/24/2023 Negative  Negative, Inconclusive Final    POC Phencyclidine 04/24/2023 Negative  Negative, Inconclusive Final    POC Methylenedioxymethamphetamine * 04/24/2023 Negative  Negative, Inconclusive Final    POC Tricyclic Antidepressants 04/24/2023 Negative  Negative, Inconclusive Final    POC Buprenorphine 04/24/2023 Negative   Final     Acceptable 04/24/2023 Yes   Final    POC Temperature (Urine) 04/24/2023 92   Final   No results displayed because visit has over 200 results.            No orders of the defined types were placed in this encounter.        Requested Prescriptions     Signed Prescriptions Disp Refills    HYDROcodone-acetaminophen (NORCO)  mg per tablet 90 tablet 0     Sig: Take 1 tablet by mouth every 8 (eight) hours.    HYDROcodone-acetaminophen (NORCO)  mg per tablet 90 tablet 0     Sig: Take 1 tablet by mouth every 8 (eight) hours.    HYDROcodone-acetaminophen (NORCO)  mg per tablet 90 tablet 0     Sig: Take 1 tablet by mouth every 8 (eight) hours.       Assessment:     1. Spondylolisthesis, lumbar region    2. Thoracic radiculopathy    3. Spondylosis without myelopathy or radiculopathy, lumbosacral region         A's of Opioid Risk Assessment  Activity:Patient can perform ADL.   Analgesia:Patients pain is partially controlled by current medication. Patient has tried OTC medications such as Tylenol and Ibuprofen with out relief.   Adverse Effects: Patient denies constipation or sedation.  Aberrant Behavior:  reviewed with no aberrant drug seeking/taking behavior.  Overdose reversal drug naloxone discussed    Drug screen reviewed      Plan:    Presumptive drug screen July 19, 2023 positive for opiates/oxycodone    Patient takes Norco/hydrocodone     Order definitive drug screen for  clarification    Narcan January 2023    Following [facility]     Having some back pain     She is considering repeating procedure     She would like to continue with current medication conservative management    Continue home exercise program as directed    Continue current medication    Follow-up 3 months    Dr. Burns, October 2023    Bring original prescription medication bottles/container/box with labels to each visit

## 2023-07-19 ENCOUNTER — OFFICE VISIT (OUTPATIENT)
Dept: PAIN MEDICINE | Facility: CLINIC | Age: 73
End: 2023-07-19
Payer: MEDICARE

## 2023-07-19 VITALS
BODY MASS INDEX: 15.83 KG/M2 | HEIGHT: 62 IN | HEART RATE: 84 BPM | RESPIRATION RATE: 18 BRPM | DIASTOLIC BLOOD PRESSURE: 75 MMHG | SYSTOLIC BLOOD PRESSURE: 126 MMHG | WEIGHT: 86 LBS

## 2023-07-19 DIAGNOSIS — Z79.899 ENCOUNTER FOR LONG-TERM (CURRENT) USE OF OTHER MEDICATIONS: ICD-10-CM

## 2023-07-19 DIAGNOSIS — M47.817 SPONDYLOSIS WITHOUT MYELOPATHY OR RADICULOPATHY, LUMBOSACRAL REGION: Primary | Chronic | ICD-10-CM

## 2023-07-19 DIAGNOSIS — M43.16 SPONDYLOLISTHESIS, LUMBAR REGION: Chronic | ICD-10-CM

## 2023-07-19 DIAGNOSIS — M54.14 THORACIC RADICULOPATHY: Chronic | ICD-10-CM

## 2023-07-19 LAB
CTP QC/QA: YES
POC (AMP) AMPHETAMINE: NEGATIVE
POC (BAR) BARBITURATES: NEGATIVE
POC (BUP) BUPRENORPHINE: NEGATIVE
POC (BZO) BENZODIAZEPINES: NEGATIVE
POC (COC) COCAINE: NEGATIVE
POC (MDMA) METHYLENEDIOXYMETHAMPHETAMINE 3,4: NEGATIVE
POC (MET) METHAMPHETAMINE: NEGATIVE
POC (MOP) OPIATES: ABNORMAL
POC (MTD) METHADONE: NEGATIVE
POC (OXY) OXYCODONE: ABNORMAL
POC (PCP) PHENCYCLIDINE: NEGATIVE
POC (TCA) TRICYCLIC ANTIDEPRESSANTS: NEGATIVE
POC TEMPERATURE (URINE): 92
POC THC: NEGATIVE

## 2023-07-19 PROCEDURE — 80305 DRUG TEST PRSMV DIR OPT OBS: CPT | Mod: PBBFAC | Performed by: PHYSICIAN ASSISTANT

## 2023-07-19 PROCEDURE — 99214 PR OFFICE/OUTPT VISIT, EST, LEVL IV, 30-39 MIN: ICD-10-PCS | Mod: S$PBB,,, | Performed by: PHYSICIAN ASSISTANT

## 2023-07-19 PROCEDURE — 99214 OFFICE O/P EST MOD 30 MIN: CPT | Mod: PBBFAC | Performed by: PHYSICIAN ASSISTANT

## 2023-07-19 PROCEDURE — 99214 OFFICE O/P EST MOD 30 MIN: CPT | Mod: S$PBB,,, | Performed by: PHYSICIAN ASSISTANT

## 2023-07-19 RX ORDER — HYDROCODONE BITARTRATE AND ACETAMINOPHEN 10; 325 MG/1; MG/1
1 TABLET ORAL EVERY 8 HOURS
Qty: 90 TABLET | Refills: 0 | Status: SHIPPED | OUTPATIENT
Start: 2023-08-24 | End: 2024-02-26

## 2023-07-19 RX ORDER — HYDROCODONE BITARTRATE AND ACETAMINOPHEN 10; 325 MG/1; MG/1
1 TABLET ORAL EVERY 8 HOURS
Qty: 90 TABLET | Refills: 0 | Status: SHIPPED | OUTPATIENT
Start: 2023-09-23 | End: 2024-02-26

## 2023-07-19 RX ORDER — HYDROCODONE BITARTRATE AND ACETAMINOPHEN 10; 325 MG/1; MG/1
1 TABLET ORAL EVERY 8 HOURS
Qty: 90 TABLET | Refills: 0 | Status: SHIPPED | OUTPATIENT
Start: 2023-07-25 | End: 2024-02-26

## 2023-07-21 ENCOUNTER — TELEPHONE (OUTPATIENT)
Dept: FAMILY MEDICINE | Facility: CLINIC | Age: 73
End: 2023-07-21
Payer: MEDICARE

## 2023-07-21 VITALS — SYSTOLIC BLOOD PRESSURE: 139 MMHG | DIASTOLIC BLOOD PRESSURE: 71 MMHG

## 2023-07-21 NOTE — TELEPHONE ENCOUNTER
----- Message from Danilo Cloud IV, DO sent at 7/19/2023  2:12 PM CDT -----  Hormone blood pressure check

## 2023-08-01 LAB — BEAKER SEE SCANNED REPORT: NORMAL

## 2023-08-22 DIAGNOSIS — K21.9 GASTROESOPHAGEAL REFLUX DISEASE, UNSPECIFIED WHETHER ESOPHAGITIS PRESENT: ICD-10-CM

## 2023-08-22 DIAGNOSIS — R63.0 DECREASED APPETITE: Chronic | ICD-10-CM

## 2023-08-22 RX ORDER — CYPROHEPTADINE HYDROCHLORIDE 4 MG/1
4 TABLET ORAL 3 TIMES DAILY
Qty: 270 TABLET | Refills: 1 | Status: CANCELLED | OUTPATIENT
Start: 2023-08-22

## 2023-08-22 RX ORDER — PANTOPRAZOLE SODIUM 40 MG/1
40 TABLET, DELAYED RELEASE ORAL
Qty: 60 TABLET | Refills: 2 | Status: SHIPPED | OUTPATIENT
Start: 2023-08-22 | End: 2023-11-20

## 2023-08-22 NOTE — TELEPHONE ENCOUNTER
Cyproheptadine is on the [place] criteria list that should be avoided for the strong anticholinergic properties.  Patient will need a visit for evaluation if she would like to continue this medication.

## 2023-12-06 ENCOUNTER — HOSPITAL ENCOUNTER (EMERGENCY)
Facility: HOSPITAL | Age: 73
Discharge: HOME | End: 2023-12-07
Attending: FAMILY MEDICINE
Payer: MEDICARE

## 2023-12-06 DIAGNOSIS — E86.0 DEHYDRATION: ICD-10-CM

## 2023-12-06 DIAGNOSIS — R41.0 ACUTE DRUG-INDUCED CONFUSION: Primary | ICD-10-CM

## 2023-12-06 DIAGNOSIS — R06.02 SOB (SHORTNESS OF BREATH): ICD-10-CM

## 2023-12-06 DIAGNOSIS — T50.905A ACUTE DRUG-INDUCED CONFUSION: Primary | ICD-10-CM

## 2023-12-06 LAB
ALBUMIN SERPL BCP-MCNC: 3.2 G/DL (ref 3.5–5)
ALBUMIN/GLOB SERPL: 1 {RATIO}
ALP SERPL-CCNC: 108 U/L (ref 55–142)
ALT SERPL W P-5'-P-CCNC: 26 U/L (ref 13–56)
AMPHET UR QL SCN: NEGATIVE
ANION GAP SERPL CALCULATED.3IONS-SCNC: 10 MMOL/L (ref 7–16)
APAP SERPL-MCNC: <2 ΜG/ML (ref 10–30)
AST SERPL W P-5'-P-CCNC: 39 U/L (ref 15–37)
BARBITURATES UR QL SCN: NEGATIVE
BASOPHILS # BLD AUTO: 0.09 K/UL (ref 0–0.2)
BASOPHILS NFR BLD AUTO: 0.5 % (ref 0–1)
BENZODIAZ METAB UR QL SCN: NEGATIVE
BILIRUB SERPL-MCNC: 0.4 MG/DL (ref ?–1.2)
BUN SERPL-MCNC: 31 MG/DL (ref 7–18)
BUN/CREAT SERPL: 22 (ref 6–20)
CALCIUM SERPL-MCNC: 8.9 MG/DL (ref 8.5–10.1)
CANNABINOIDS UR QL SCN: NEGATIVE
CHLORIDE SERPL-SCNC: 105 MMOL/L (ref 98–107)
CO2 SERPL-SCNC: 25 MMOL/L (ref 21–32)
COCAINE UR QL SCN: NEGATIVE
CREAT SERPL-MCNC: 1.42 MG/DL (ref 0.55–1.02)
DIFFERENTIAL METHOD BLD: ABNORMAL
EGFR (NO RACE VARIABLE) (RUSH/TITUS): 39 ML/MIN/1.73M2
EOSINOPHIL # BLD AUTO: 0.03 K/UL (ref 0–0.5)
EOSINOPHIL NFR BLD AUTO: 0.2 % (ref 1–4)
ERYTHROCYTE [DISTWIDTH] IN BLOOD BY AUTOMATED COUNT: 17.3 % (ref 11.5–14.5)
GLOBULIN SER-MCNC: 3.1 G/DL (ref 2–4)
GLUCOSE SERPL-MCNC: 92 MG/DL (ref 74–106)
HCT VFR BLD AUTO: 36 % (ref 38–47)
HGB BLD-MCNC: 11.6 G/DL (ref 12–16)
IMM GRANULOCYTES # BLD AUTO: 0.16 K/UL (ref 0–0.04)
IMM GRANULOCYTES NFR BLD: 0.9 % (ref 0–0.4)
LYMPHOCYTES # BLD AUTO: 1.62 K/UL (ref 1–4.8)
LYMPHOCYTES NFR BLD AUTO: 9.1 % (ref 27–41)
MCH RBC QN AUTO: 28.2 PG (ref 27–31)
MCHC RBC AUTO-ENTMCNC: 32.2 G/DL (ref 32–36)
MCV RBC AUTO: 87.4 FL (ref 80–96)
MONOCYTES # BLD AUTO: 1.28 K/UL (ref 0–0.8)
MONOCYTES NFR BLD AUTO: 7.2 % (ref 2–6)
MPC BLD CALC-MCNC: 9 FL (ref 9.4–12.4)
NEUTROPHILS # BLD AUTO: 14.61 K/UL (ref 1.8–7.7)
NEUTROPHILS NFR BLD AUTO: 82.1 % (ref 53–65)
NRBC # BLD AUTO: 0.03 X10E3/UL
NRBC, AUTO (.00): 0.2 %
NT-PROBNP SERPL-MCNC: 466 PG/ML (ref 1–125)
OPIATES UR QL SCN: POSITIVE
PCP UR QL SCN: NEGATIVE
PLATELET # BLD AUTO: 339 K/UL (ref 150–400)
POTASSIUM SERPL-SCNC: 4.2 MMOL/L (ref 3.5–5.1)
PROT SERPL-MCNC: 6.3 G/DL (ref 6.4–8.2)
RBC # BLD AUTO: 4.12 M/UL (ref 4.2–5.4)
SALICYLATES SERPL-MCNC: 13.3 MG/DL (ref 3–30)
SODIUM SERPL-SCNC: 136 MMOL/L (ref 136–145)
TROPONIN I SERPL DL<=0.01 NG/ML-MCNC: 59.1 PG/ML
WBC # BLD AUTO: 17.79 K/UL (ref 4.5–11)

## 2023-12-06 PROCEDURE — 93010 EKG 12-LEAD: ICD-10-PCS | Mod: ,,, | Performed by: INTERNAL MEDICINE

## 2023-12-06 PROCEDURE — 25000003 PHARM REV CODE 250: Performed by: FAMILY MEDICINE

## 2023-12-06 PROCEDURE — 85025 COMPLETE CBC W/AUTO DIFF WBC: CPT | Performed by: FAMILY MEDICINE

## 2023-12-06 PROCEDURE — 63600175 PHARM REV CODE 636 W HCPCS: Performed by: FAMILY MEDICINE

## 2023-12-06 PROCEDURE — 80143 DRUG ASSAY ACETAMINOPHEN: CPT | Performed by: FAMILY MEDICINE

## 2023-12-06 PROCEDURE — 80053 COMPREHEN METABOLIC PANEL: CPT | Performed by: FAMILY MEDICINE

## 2023-12-06 PROCEDURE — 99284 EMERGENCY DEPT VISIT MOD MDM: CPT | Mod: ,,, | Performed by: FAMILY MEDICINE

## 2023-12-06 PROCEDURE — 80307 DRUG TEST PRSMV CHEM ANLYZR: CPT | Performed by: FAMILY MEDICINE

## 2023-12-06 PROCEDURE — 93010 ELECTROCARDIOGRAM REPORT: CPT | Mod: ,,, | Performed by: INTERNAL MEDICINE

## 2023-12-06 PROCEDURE — 93005 ELECTROCARDIOGRAM TRACING: CPT

## 2023-12-06 PROCEDURE — 84484 ASSAY OF TROPONIN QUANT: CPT | Performed by: FAMILY MEDICINE

## 2023-12-06 PROCEDURE — 96374 THER/PROPH/DIAG INJ IV PUSH: CPT

## 2023-12-06 PROCEDURE — 96361 HYDRATE IV INFUSION ADD-ON: CPT

## 2023-12-06 PROCEDURE — 99285 EMERGENCY DEPT VISIT HI MDM: CPT | Mod: 25

## 2023-12-06 PROCEDURE — 99284 PR EMERGENCY DEPT VISIT,LEVEL IV: ICD-10-PCS | Mod: ,,, | Performed by: FAMILY MEDICINE

## 2023-12-06 PROCEDURE — 83880 ASSAY OF NATRIURETIC PEPTIDE: CPT | Performed by: FAMILY MEDICINE

## 2023-12-06 PROCEDURE — 80179 DRUG ASSAY SALICYLATE: CPT | Mod: 91 | Performed by: FAMILY MEDICINE

## 2023-12-06 RX ORDER — NALOXONE HCL 0.4 MG/ML
0.4 VIAL (ML) INJECTION
Status: COMPLETED | OUTPATIENT
Start: 2023-12-06 | End: 2023-12-06

## 2023-12-06 RX ADMIN — SODIUM CHLORIDE 1000 ML: 9 INJECTION, SOLUTION INTRAVENOUS at 08:12

## 2023-12-06 RX ADMIN — NALOXONE HYDROCHLORIDE 0.4 MG: 0.4 INJECTION, SOLUTION INTRAMUSCULAR; INTRAVENOUS; SUBCUTANEOUS at 07:12

## 2023-12-07 VITALS
HEART RATE: 105 BPM | HEIGHT: 62 IN | SYSTOLIC BLOOD PRESSURE: 121 MMHG | TEMPERATURE: 98 F | WEIGHT: 107 LBS | OXYGEN SATURATION: 98 % | BODY MASS INDEX: 19.69 KG/M2 | DIASTOLIC BLOOD PRESSURE: 67 MMHG | RESPIRATION RATE: 17 BRPM

## 2023-12-07 LAB
APAP SERPL-MCNC: 4 ΜG/ML (ref 10–30)
SALICYLATES SERPL-MCNC: 10.6 MG/DL (ref 3–30)

## 2023-12-07 PROCEDURE — 96361 HYDRATE IV INFUSION ADD-ON: CPT

## 2023-12-07 PROCEDURE — 25000003 PHARM REV CODE 250: Performed by: FAMILY MEDICINE

## 2023-12-07 RX ADMIN — SODIUM CHLORIDE 1000 ML: 9 INJECTION, SOLUTION INTRAVENOUS at 02:12

## 2023-12-07 NOTE — ED NOTES
RN spoke with patient's grandson, Pascual, at this time regarding him being able to  patient from hospital due to being discharged.  Grandson, Pascual, stated that he was dealing with a lot from the family and did not have the gas money to come  patient from the hospital and hung up phone on RN.  RN attempting to call Medicaid transport at this time for transportation for patient.

## 2023-12-07 NOTE — ED TRIAGE NOTES
Patient arrives via EMS from home after her family called 911 for syncope.  It was noted by EMS that she had norco filled on 11/29/23 with 90 tablet. 1.5 tablets remain.  They administered 0.5mg narcan and she was woke up to her baseline.  On arrival to ED she is lethargic.

## 2023-12-07 NOTE — ED NOTES
"Attempted to contact daughter marco a, no answer, voicemail left. Contacted PTs son Brendan, and he states he "will call for someone to come get her."  "

## 2023-12-07 NOTE — ED NOTES
Spoke to [facility] attempting to set patient up transportation and I was informed that her Medicaid is no longer active.

## 2023-12-07 NOTE — ED NOTES
Pt states she does not want her family to know anything about her care and to make sure her DC papers are given to her.

## 2023-12-07 NOTE — ED PROVIDER NOTES
Encounter Date: 12/6/2023    SCRIBE #1 NOTE: I, Ruthann Cuadra, am scribing for, and in the presence of,  Danilo Romero DO. I have scribed the entire note.       History     Chief Complaint   Patient presents with    Loss of Consciousness     73 year old female presents to the ED via EMS complaining of loss of consciousness. EMS says that family of the patient called for a syncopal episode but upon EMS arrival patient's hydrocodone was found to have 1.5 tablet remaining of a prescription that originally had 90 tablets filled on 11/29. Patient was given 0.5 mg of Narcan given PTA and EMS says the patient woke up some. Patient has a PMHx of COPD, chronic kidney disease, HLD, HTN.    The history is provided by the EMS personnel. No  was used.     Review of patient's allergies indicates:   Allergen Reactions    Sulfa (sulfonamide antibiotics) Nausea And Vomiting and Itching    Insect venom      Note: - [employer] 05/07/2019     Past Medical History:   Diagnosis Date    Anxiety and depression     Chronic kidney disease, stage 3a     Chronic pain syndrome     COPD (chronic obstructive pulmonary disease)     Fracture of multiple pubic rami, left, closed, initial encounter 10/01/2021    HLD (hyperlipidemia)     Hypertension     Lumbar compression fracture, sequela L1, L2, L4, L5, kyphoplasty 12/8/2021    Lumbar radiculopathy     Lumbar stenosis     Lumbosacral spondylosis     Neuropathy      Past Surgical History:   Procedure Laterality Date    CHOLECYSTECTOMY      CYSTOSCOPY      EPIDURAL STEROID INJECTION INTO LUMBAR SPINE N/A 05/27/2020    L1-2 WILD     EPIDURAL STEROID INJECTION INTO THORACIC SPINE N/A 02/03/2021    T9-10 WILD     EPIDURAL STEROID INJECTION INTO THORACIC SPINE N/A 3/18/2021    Procedure: INJECTION, STEROID, SPINE, THORACIC, EPIDURAL;  Surgeon: Arelis Burns MD;  Location: [facility];  Service: Pain Management;  Laterality: N/A;    EPIDURAL STEROID INJECTION INTO THORACIC  SPINE N/A 12/23/2021    Procedure: Injection-steroid-epidural-thoracic T9/10;  Surgeon: Arelis Burns MD;  Location: [facility];  Service: Pain Management;  Laterality: N/A;  HAD VAC  WILL BRING CARD    HYSTERECTOMY      INJECTION OF ANESTHETIC AGENT AROUND MEDIAL BRANCH NERVES INNERVATING LUMBAR FACET JOINT Bilateral 4/22/2021    Procedure: Block-nerve-medial branch-lumbar Bilateral L3-4,4-5,5-S1 MBB #2;  Surgeon: Arelis Burns MD;  Location: [facility];  Service: Pain Management;  Laterality: Bilateral;    INJECTION OF ANESTHETIC AGENT AROUND MEDIAL BRANCH NERVES INNERVATING LUMBAR FACET JOINT Bilateral 9/6/2022    Procedure: Block-nerve-medial branch-lumbar, bilateral L4 through S1;  Surgeon: Arelis Burns MD;  Location: [facility];  Service: Pain Management;  Laterality: Bilateral;    INJECTION OF ANESTHETIC AGENT AROUND MEDIAL BRANCH NERVES INNERVATING LUMBAR FACET JOINT Bilateral 10/11/2022    Procedure: Block-nerve-medial branch-lumbar, bilateral L4 through S1;  Surgeon: Arelis Burns MD;  Location: [facility];  Service: Pain Management;  Laterality: Bilateral;  vaccinated.    INJECTION OF FACET JOINT Bilateral 12/04/2020    RADIOFREQUENCY ABLATION OF LUMBAR MEDIAL BRANCH NERVE AT SINGLE LEVEL Bilateral 5/20/2021    Procedure: RADIOFREQUENCY ABLATION, NERVE, SPINAL, LUMBAR, MEDIAL BRANCH, 1 LEVEL;  Surgeon: Arelis Burns MD;  Location: [facility];  Service: Pain Management;  Laterality: Bilateral;  Bilateral L3-S1 RFTC (both sides same day)     Family History   Problem Relation Age of Onset    Hypertension Mother     Heart disease Father      Social History     Tobacco Use    Smoking status: Former    Smokeless tobacco: Never   Substance Use Topics    Alcohol use: Never     Comment: unknown    Drug use: Never     Types: Hydrocodone     Review of Systems   Cardiovascular:  Positive for leg swelling.   Neurological:  Positive for syncope.    Psychiatric/Behavioral:  Positive for confusion.        Physical Exam     Initial Vitals   BP Pulse Resp Temp SpO2   12/06/23 1939 12/06/23 1933 12/06/23 1933 12/06/23 1933 12/06/23 1939   122/67 (!) 117 16 98.3 °F (36.8 °C) 96 %      MAP       --                Physical Exam    Nursing note and vitals reviewed.  HENT:   Dry mucosa around oral cavity   Cardiovascular:  Normal heart sounds.           Pulmonary/Chest: Breath sounds normal.   Musculoskeletal:         General: Edema present.      Comments: +4 bilateral lower extremity edema     Neurological:   Patient appears confused         ED Course   Procedures  Labs Reviewed   COMPREHENSIVE METABOLIC PANEL - Abnormal; Notable for the following components:       Result Value    BUN 31 (*)     Creatinine 1.42 (*)     BUN/Creatinine Ratio 22 (*)     Total Protein 6.3 (*)     Albumin 3.2 (*)     AST 39 (*)     eGFR 39 (*)     All other components within normal limits   NT-PRO NATRIURETIC PEPTIDE - Abnormal; Notable for the following components:    ProBNP 466 (*)     All other components within normal limits   CBC WITH DIFFERENTIAL - Abnormal; Notable for the following components:    WBC 17.79 (*)     RBC 4.12 (*)     Hemoglobin 11.6 (*)     Hematocrit 36.0 (*)     RDW 17.3 (*)     MPV 9.0 (*)     Neutrophils % 82.1 (*)     Lymphocytes % 9.1 (*)     Monocytes % 7.2 (*)     Eosinophils % 0.2 (*)     Immature Granulocytes % 0.9 (*)     nRBC, Auto 0.2 (*)     Neutrophils, Abs 14.61 (*)     Monocytes, Absolute 1.28 (*)     Immature Granulocytes, Absolute 0.16 (*)     nRBC, Absolute 0.03 (*)     All other components within normal limits   DRUG SCREEN, URINE (BEAKER) - Abnormal; Notable for the following components:    Opiates, Urine Positive (*)     All other components within normal limits   ACETAMINOPHEN LEVEL - Abnormal; Notable for the following components:    Acetaminophen <2 (*)     All other components within normal limits   ACETAMINOPHEN LEVEL - Abnormal; Notable  for the following components:    Acetaminophen 4 (*)     All other components within normal limits   TROPONIN I - Normal   SALICYLATE LEVEL - Normal   SALICYLATE LEVEL - Normal   CBC W/ AUTO DIFFERENTIAL    Narrative:     The following orders were created for panel order CBC Auto Differential.  Procedure                               Abnormality         Status                     ---------                               -----------         ------                     CBC with Differential[[id number]]       Abnormal            Final result                 Please view results for these tests on the individual orders.        ECG Results              EKG 12-lead (Final result)  Result time 12/12/23 04:34:27      Final result by Interface, Lab In [facility] (12/12/23 04:34:27)                   Narrative:    Test Reason : R06.02,    Vent. Rate : 117 BPM     Atrial Rate : 000 BPM     P-R Int : 142 ms          QRS Dur : 082 ms      QT Int : 332 ms       P-R-T Axes : 034 000 026 degrees     QTc Int : 432 ms    Sinus tachycardia  Normal ECG except for rate    Confirmed by Radha WASHINGTON, Stefano LOPEZ ([id number]) on 12/12/2023 4:34:19 AM    Referred By: [email or web address]   SELF           Confirmed By:Stefano Garcia MD                                  Imaging Results              X-Ray Chest AP Portable (Final result)  Result time 12/06/23 20:59:20      Final result by Fernando King MD (12/06/23 20:59:20)                   Impression:      No acute cardiopulmonary process or significant change.    Place of service: [facility]      Electronically signed by: Fernando King  Date:    12/06/2023  Time:    20:59               Narrative:    EXAMINATION:  XR CHEST AP PORTABLE    CLINICAL HISTORY:  coughing;    TECHNIQUE:  AP portable    COMPARISON:  Prior radiograph dated 03/13/2023    FINDINGS:  The cardiomediastinal silhouette is within normal limits. The lungs are clear. There is no pneumothorax or pleural effusion.    There is no acute osseous  or soft tissue abnormality.  Multi level PMMA cement vertebral augmentation changes are noted within the thoracic and lumbar spine.                                       CT Head Without Contrast (Final result)  Result time 12/06/23 21:01:10      Final result by Fernando King MD (12/06/23 21:01:10)                   Impression:      1. No acute intracranial abnormality.    2.  Similar parenchymal atrophy and findings suggestive of sequela of chronic microvascular ischemia.    Place of service: [facility]      Electronically signed by: Fernando King  Date:    12/06/2023  Time:    21:01               Narrative:    EXAMINATION:  CT HEAD WITHOUT CONTRAST    CLINICAL HISTORY:  Mental status change, unknown cause;    TECHNIQUE:  Axial CT imaging from the vertex to skull the skull base was performed without contrast. Total DLP: 983 mGy*cm    Dose reduction:    The CT exam was performed using one or more dose reduction techniques: Automatic exposure control, automated adjustment of the MA and/or kVP according to patient size, or use of iterative reconstruction technique.    COMPARISON:  Noncontrast CT head 10/02/2021.    FINDINGS:  Atrophy is noted with prominence of sulci and ventricles.  The basilar cisterns are within normal limits in appearance. There is no evidence of hydrocephalus, midline shift or mass effect.  Periventricular hypodensity changes are noted which do not demonstrate mass effect on are favored to represent sequela of chronic microvascular ischemia.  Otherwise, gray and white matter differentiation is preserved. There is no CT evidence of acute intracranial hemorrhage or infarction.    The calvarium is intact. The visualized orbits and globes appear within normal limits. The paranasal sinuses and mastoid air cells are predominantly clear. Scalp soft tissues appear unremarkable.                                       Medications   naloxone 0.4 mg/mL injection 0.4 mg (0.4 mg Intravenous Given  12/6/23 1943)   sodium chloride 0.9% bolus 1,000 mL 1,000 mL (0 mLs Intravenous Stopped 12/6/23 2221)   sodium chloride 0.9% bolus 1,000 mL 1,000 mL (0 mLs Intravenous Stopped 12/7/23 3939)     Medical Decision Making  Amount and/or Complexity of Data Reviewed  Labs: ordered.  Radiology: ordered.    Risk  Prescription drug management.              Attending Attestation:           Physician Attestation for Scribe:  Physician Attestation Statement for Scribe #1: I, Danilo Romero DO, reviewed documentation, as scribed by Ruthann Cuadra in my presence, and it is both accurate and complete.                        Medical Decision Making:   Initial Assessment:   Patient with loss of consciousness.  Differential Diagnosis:   Vasovagal reaction.  ED Management:  Follow-up primary care provider             Clinical Impression:  Final diagnoses:  [R06.02] SOB (shortness of breath)  [R41.0, T50.905A] Acute drug-induced confusion (Primary)  [E86.0] Dehydration          ED Disposition Condition    Discharge Stable          ED Prescriptions    None       Follow-up Information    None          Danilo Romero DO  12/29/23 0639

## 2023-12-08 ENCOUNTER — PATIENT OUTREACH (OUTPATIENT)
Dept: EMERGENCY MEDICINE | Facility: HOSPITAL | Age: 73
End: 2023-12-08
Payer: MEDICARE

## 2023-12-08 NOTE — PROGRESS NOTES
1st attempt to reach patient to assist in scheduling a post ED 7-day follow up with PCP. I left a voicemail and will try again later.

## 2023-12-11 NOTE — PROGRESS NOTES
ED Navigator attempted to contact patient on 2 separate occasions, patient is unable to reach. ED Navigator to close encounter at this time.

## 2024-02-26 NOTE — PROGRESS NOTES
Subjective:         Patient ID: Amelie Cerrato is a 73 y.o. female.    Chief Complaint: Low-back Pain, Hip Pain, and Shoulder Pain        Pain  This is a chronic problem. The current episode started more than 1 year ago. The problem occurs daily. The problem has been unchanged. Associated symptoms include arthralgias, myalgias and neck pain. Pertinent negatives include no anorexia, chest pain, chills, coughing, diaphoresis, fever, sore throat, swollen glands, vertigo, visual change or vomiting.     Review of Systems   Constitutional:  Negative for activity change, appetite change, chills, diaphoresis, fever and unexpected weight change.   HENT:  Negative for drooling, ear discharge, ear pain, facial swelling, mouth sores, nosebleeds, sore throat, trouble swallowing, voice change and goiter.    Eyes:  Negative for photophobia, pain, discharge, redness, visual disturbance and eye dryness.   Respiratory:  Negative for apnea, cough, choking, chest tightness, shortness of breath, wheezing and stridor.    Cardiovascular:  Negative for chest pain, palpitations and leg swelling.   Gastrointestinal:  Negative for abdominal distention, anorexia, diarrhea, rectal pain, vomiting and fecal incontinence.   Endocrine: Negative for cold intolerance, heat intolerance, polydipsia, polyphagia and polyuria.   Genitourinary:  Negative for flank pain, frequency, hematuria and hot flashes.   Musculoskeletal:  Positive for arthralgias, back pain, myalgias, neck pain and neck stiffness.   Integumentary:  Negative for color change and pallor.   Allergic/Immunologic: Negative for immunocompromised state.   Neurological:  Negative for dizziness, vertigo, seizures, syncope, facial asymmetry, speech difficulty, light-headedness, memory loss and coordination difficulties.   Hematological:  Negative for adenopathy. Does not bruise/bleed easily.   Psychiatric/Behavioral:  Negative for agitation, behavioral problems, confusion, decreased  concentration, dysphoric mood, hallucinations, self-injury and suicidal ideas. The patient is not nervous/anxious and is not hyperactive.            Past Medical History:   Diagnosis Date    Anxiety and depression     Chronic kidney disease, stage 3a     Chronic pain syndrome     COPD (chronic obstructive pulmonary disease)     Fracture of multiple pubic rami, left, closed, initial encounter 10/01/2021    HLD (hyperlipidemia)     Hypertension     Lumbar compression fracture, sequela L1, L2, L4, L5, kyphoplasty 12/8/2021    Lumbar radiculopathy     Lumbar stenosis     Lumbosacral spondylosis     Neuropathy      Past Surgical History:   Procedure Laterality Date    CHOLECYSTECTOMY      CYSTOSCOPY      EPIDURAL STEROID INJECTION INTO LUMBAR SPINE N/A 05/27/2020    L1-2 WILD     EPIDURAL STEROID INJECTION INTO THORACIC SPINE N/A 02/03/2021    T9-10 WILD     EPIDURAL STEROID INJECTION INTO THORACIC SPINE N/A 3/18/2021    Procedure: INJECTION, STEROID, SPINE, THORACIC, EPIDURAL;  Surgeon: Arelis Burns MD;  Location: [facility];  Service: Pain Management;  Laterality: N/A;    EPIDURAL STEROID INJECTION INTO THORACIC SPINE N/A 12/23/2021    Procedure: Injection-steroid-epidural-thoracic T9/10;  Surgeon: Arelis Burns MD;  Location: [facility];  Service: Pain Management;  Laterality: N/A;  HAD VAC  WILL BRING CARD    HYSTERECTOMY      INJECTION OF ANESTHETIC AGENT AROUND MEDIAL BRANCH NERVES INNERVATING LUMBAR FACET JOINT Bilateral 4/22/2021    Procedure: Block-nerve-medial branch-lumbar Bilateral L3-4,4-5,5-S1 MBB #2;  Surgeon: Arelis Burns MD;  Location: [facility];  Service: Pain Management;  Laterality: Bilateral;    INJECTION OF ANESTHETIC AGENT AROUND MEDIAL BRANCH NERVES INNERVATING LUMBAR FACET JOINT Bilateral 9/6/2022    Procedure: Block-nerve-medial branch-lumbar, bilateral L4 through S1;  Surgeon: Arelis Burns MD;  Location: [facility];  Service: Pain Management;   Laterality: Bilateral;    INJECTION OF ANESTHETIC AGENT AROUND MEDIAL BRANCH NERVES INNERVATING LUMBAR FACET JOINT Bilateral 10/11/2022    Procedure: Block-nerve-medial branch-lumbar, bilateral L4 through S1;  Surgeon: Arelis Burns MD;  Location: [facility];  Service: Pain Management;  Laterality: Bilateral;  vaccinated.    INJECTION OF FACET JOINT Bilateral 12/04/2020    RADIOFREQUENCY ABLATION OF LUMBAR MEDIAL BRANCH NERVE AT SINGLE LEVEL Bilateral 5/20/2021    Procedure: RADIOFREQUENCY ABLATION, NERVE, SPINAL, LUMBAR, MEDIAL BRANCH, 1 LEVEL;  Surgeon: Arelis Burns MD;  Location: [facility];  Service: Pain Management;  Laterality: Bilateral;  Bilateral L3-S1 RFTC (both sides same day)     Social History     Socioeconomic History    Marital status:    Tobacco Use    Smoking status: Former    Smokeless tobacco: Never   Substance and Sexual Activity    Alcohol use: Never     Comment: unknown    Drug use: Never     Types: Hydrocodone    Sexual activity: Not Currently     Social Determinants of Health     Financial Resource Strain: Low Risk  (3/13/2023)    Overall Financial Resource Strain (CARDIA)     Difficulty of Paying Living Expenses: Not very hard   Food Insecurity: No Food Insecurity (3/13/2023)    Hunger Vital Sign     Worried About Running Out of Food in the Last Year: Never true     Ran Out of Food in the Last Year: Never true   Transportation Needs: No Transportation Needs (3/13/2023)    PRAPARE - Transportation     Lack of Transportation (Medical): No     Lack of Transportation (Non-Medical): No   Physical Activity: Inactive (3/13/2023)    Exercise Vital Sign     Days of Exercise per Week: 0 days     Minutes of Exercise per Session: 0 min   Stress: Stress Concern Present (3/13/2023)    [employer] - Occupational Stress Questionnaire     Feeling of Stress : Rather much   Social Connections: Moderately Isolated (3/13/2023)    Social Connection and  "Isolation Panel [NHANES]     Frequency of Communication with Friends and Family: Three times a week     Frequency of Social Gatherings with Friends and Family: Three times a week     Attends Jew Services: More than 4 times per year     Active Member of Clubs or Organizations: No     Attends Club or Organization Meetings: Never     Marital Status:    Housing Stability: Unknown (3/13/2023)    Housing Stability Vital Sign     Unable to Pay for Housing in the Last Year: No     Unstable Housing in the Last Year: No     Family History   Problem Relation Age of Onset    Hypertension Mother     Heart disease Father      Review of patient's allergies indicates:   Allergen Reactions    Sulfa (sulfonamide antibiotics) Nausea And Vomiting and Itching    Insect venom      Note: - [employer] 05/07/2019        Objective:  Vitals:    02/28/24 1335   BP: 128/67   Pulse: 91   Resp: 20   Weight: 40.8 kg (90 lb)   Height: 5' 2" (1.575 m)   PainSc:   9         Physical Exam  Vitals and nursing note reviewed. Exam conducted with a chaperone present.   Constitutional:       General: She is awake. She is not in acute distress.     Appearance: Normal appearance. She is not ill-appearing, toxic-appearing or diaphoretic.   HENT:      Head: Normocephalic and atraumatic.      Nose: Nose normal.      Mouth/Throat:      Mouth: Mucous membranes are moist.      Pharynx: Oropharynx is clear.   Eyes:      Conjunctiva/sclera: Conjunctivae normal.      Pupils: Pupils are equal, round, and reactive to light.   Cardiovascular:      Rate and Rhythm: Normal rate.   Pulmonary:      Effort: Pulmonary effort is normal. No respiratory distress.   Abdominal:      Palpations: Abdomen is soft.   Musculoskeletal:         General: Normal range of motion.      Right shoulder: Tenderness present.      Left shoulder: Tenderness present.      Cervical back: Normal range of motion and neck supple. Tenderness present.      Thoracic back: Tenderness present.    "   Lumbar back: Tenderness present.   Skin:     General: Skin is warm and dry.   Neurological:      General: No focal deficit present.      Mental Status: She is alert and oriented to person, place, and time. Mental status is at baseline.      Cranial Nerves: No cranial nerve deficit (II-XII).   Psychiatric:         Mood and Affect: Mood normal.         Behavior: Behavior normal. Behavior is cooperative.         Thought Content: Thought content normal.           CT Head Without Contrast  Narrative: EXAMINATION:  CT HEAD WITHOUT CONTRAST    CLINICAL HISTORY:  Mental status change, unknown cause;    TECHNIQUE:  Axial CT imaging from the vertex to skull the skull base was performed without contrast. Total DLP: 983 mGy*cm    Dose reduction:    The CT exam was performed using one or more dose reduction techniques: Automatic exposure control, automated adjustment of the MA and/or kVP according to patient size, or use of iterative reconstruction technique.    COMPARISON:  Noncontrast CT head 10/02/2021.    FINDINGS:  Atrophy is noted with prominence of sulci and ventricles.  The basilar cisterns are within normal limits in appearance. There is no evidence of hydrocephalus, midline shift or mass effect.  Periventricular hypodensity changes are noted which do not demonstrate mass effect on are favored to represent sequela of chronic microvascular ischemia.  Otherwise, gray and white matter differentiation is preserved. There is no CT evidence of acute intracranial hemorrhage or infarction.    The calvarium is intact. The visualized orbits and globes appear within normal limits. The paranasal sinuses and mastoid air cells are predominantly clear. Scalp soft tissues appear unremarkable.  Impression: 1. No acute intracranial abnormality.    2.  Similar parenchymal atrophy and findings suggestive of sequela of chronic microvascular ischemia.    Place of service: [facility]    Electronically signed by: Fernando  Fernando  Date:    12/06/2023  Time:    21:01  X-Ray Chest AP Portable  Narrative: EXAMINATION:  XR CHEST AP PORTABLE    CLINICAL HISTORY:  coughing;    TECHNIQUE:  AP portable    COMPARISON:  Prior radiograph dated 03/13/2023    FINDINGS:  The cardiomediastinal silhouette is within normal limits. The lungs are clear. There is no pneumothorax or pleural effusion.    There is no acute osseous or soft tissue abnormality.  Multi level PMMA cement vertebral augmentation changes are noted within the thoracic and lumbar spine.  Impression: No acute cardiopulmonary process or significant change.    Place of service: [facility]    Electronically signed by: Fernando King  Date:    12/06/2023  Time:    20:59         Admission on 12/06/2023, Discharged on 12/07/2023   Component Date Value Ref Range Status    Sodium 12/06/2023 136  136 - 145 mmol/L Final    Potassium 12/06/2023 4.2  3.5 - 5.1 mmol/L Final    Chloride 12/06/2023 105  98 - 107 mmol/L Final    CO2 12/06/2023 25  21 - 32 mmol/L Final    Anion Gap 12/06/2023 10  7 - 16 mmol/L Final    Glucose 12/06/2023 92  74 - 106 mg/dL Final    BUN 12/06/2023 31 (H)  7 - 18 mg/dL Final    Creatinine 12/06/2023 1.42 (H)  0.55 - 1.02 mg/dL Final    BUN/Creatinine Ratio 12/06/2023 22 (H)  6 - 20 Final    Calcium 12/06/2023 8.9  8.5 - 10.1 mg/dL Final    Total Protein 12/06/2023 6.3 (L)  6.4 - 8.2 g/dL Final    Albumin 12/06/2023 3.2 (L)  3.5 - 5.0 g/dL Final    Globulin 12/06/2023 3.1  2.0 - 4.0 g/dL Final    A/G Ratio 12/06/2023 1.0   Final    Bilirubin, Total 12/06/2023 0.4  >0.0 - 1.2 mg/dL Final    Alk Phos 12/06/2023 108  55 - 142 U/L Final    ALT 12/06/2023 26  13 - 56 U/L Final    AST 12/06/2023 39 (H)  15 - 37 U/L Final    eGFR 12/06/2023 39 (L)  >=60 mL/min/1.73m2 Final    Troponin I High Sensitivity 12/06/2023 59.1  <=60.4 pg/mL Final    ProBNP 12/06/2023 466 (H)  1 - 125 pg/mL Final    WBC 12/06/2023 17.79 (H)  4.50 - 11.00 K/uL Final    RBC 12/06/2023 4.12  (L)  4.20 - 5.40 M/uL Final    Hemoglobin 12/06/2023 11.6 (L)  12.0 - 16.0 g/dL Final    Hematocrit 12/06/2023 36.0 (L)  38.0 - 47.0 % Final    MCV 12/06/2023 87.4  80.0 - 96.0 fL Final    MCH 12/06/2023 28.2  27.0 - 31.0 pg Final    MCHC 12/06/2023 32.2  32.0 - 36.0 g/dL Final    RDW 12/06/2023 17.3 (H)  11.5 - 14.5 % Final    Platelet Count 12/06/2023 339  150 - 400 K/uL Final    MPV 12/06/2023 9.0 (L)  9.4 - 12.4 fL Final    Neutrophils % 12/06/2023 82.1 (H)  53.0 - 65.0 % Final    Lymphocytes % 12/06/2023 9.1 (L)  27.0 - 41.0 % Final    Monocytes % 12/06/2023 7.2 (H)  2.0 - 6.0 % Final    Eosinophils % 12/06/2023 0.2 (L)  1.0 - 4.0 % Final    Basophils % 12/06/2023 0.5  0.0 - 1.0 % Final    Immature Granulocytes % 12/06/2023 0.9 (H)  0.0 - 0.4 % Final    nRBC, Auto 12/06/2023 0.2 (H)  <=0.0 % Final    Neutrophils, Abs 12/06/2023 14.61 (H)  1.80 - 7.70 K/uL Final    Lymphocytes, Absolute 12/06/2023 1.62  1.00 - 4.80 K/uL Final    Monocytes, Absolute 12/06/2023 1.28 (H)  0.00 - 0.80 K/uL Final    Eosinophils, Absolute 12/06/2023 0.03  0.00 - 0.50 K/uL Final    Basophils, Absolute 12/06/2023 0.09  0.00 - 0.20 K/uL Final    Immature Granulocytes, Absolute 12/06/2023 0.16 (H)  0.00 - 0.04 K/uL Final    nRBC, Absolute 12/06/2023 0.03 (H)  <=0.00 x10e3/uL Final    Diff Type 12/06/2023 Auto   Final    Barbiturates, Urine 12/06/2023 Negative  Negative Final    Benzodiazepine, Urine 12/06/2023 Negative  Negative Final    Opiates, Urine 12/06/2023 Positive (A)  Negative Final    Phencyclidine, Urine 12/06/2023 Negative  Negative Final    Amphetamine, Urine 12/06/2023 Negative  Negative Final    Cannabinoid, Urine 12/06/2023 Negative  Negative Final    Cocaine, Urine 12/06/2023 Negative  Negative Final    Salicylate 12/06/2023 13.3  3.0 - 30.0 mg/dL Final    Acetaminophen 12/06/2023 <2 (L)  10 - 30 µg/mL Final    Acetaminophen 12/06/2023 4 (L)  10 - 30 µg/mL Final    Salicylate 12/06/2023 10.6  3.0 - 30.0 mg/dL Final          Orders Placed This Encounter   Procedures    POCT Urine Drug Screen Presump     Interpretive Information:     Negative:  No drug detected at the cut off level.   Positive:  This result represents presumptive positive for the   tested drug, other substances may yield a positive response other   than the analyte of interest. This result should be utilized for   diagnostic purpose only. Confirmation testing will be performed upon physician request only.            Requested Prescriptions     Signed Prescriptions Disp Refills    HYDROcodone-acetaminophen (NORCO)  mg per tablet 90 tablet 0     Sig: Take 1 tablet by mouth every 8 (eight) hours.       Assessment:     1. Spondylolisthesis, lumbar region    2. Thoracic radiculopathy    3. Spondylosis without myelopathy or radiculopathy, lumbosacral region    4. Encounter for long-term (current) use of other medications         A's of Opioid Risk Assessment  Activity:Patient can perform ADL.   Analgesia:Patients pain is partially controlled by current medication. Patient has tried OTC medications such as Tylenol and Ibuprofen with out relief.   Adverse Effects: Patient denies constipation or sedation.  Aberrant Behavior:  reviewed with no aberrant drug seeking/taking behavior.  Overdose reversal drug naloxone discussed    Drug screen reviewed      Plan:    Last seen in clinic July 19, 2023        Presumptive drug screen July 19, 2023 positive for opiates/oxycodone    Patient takes Norco/hydrocodone     Order definitive drug screen for clarification    Narcan January 2023    Following [facility]       Moved to [place] July 2023    Has been following [facility] in [place]     Will obtain records from this clinic      reviewed was obtaining hydrocodone 10, 90 tablets,  Buprenorphine patch 10 mcg Q week    She has moved back to [place]    She is requesting resume her narcotics     January 25, 2024 [facility], closed  compression fracture L3    Verbalized understanding narcotics agreement     Presumptive/definitive drug screen    Continue home exercise program as directed    Continue current medication    Follow-up follow-up 1 month    Dr. Burns, October 2023    Bring original prescription medication bottles/container/box with labels to each visit

## 2024-02-28 ENCOUNTER — OFFICE VISIT (OUTPATIENT)
Dept: PAIN MEDICINE | Facility: CLINIC | Age: 74
End: 2024-02-28
Payer: MEDICARE

## 2024-02-28 VITALS
WEIGHT: 90 LBS | HEART RATE: 91 BPM | HEIGHT: 62 IN | BODY MASS INDEX: 16.56 KG/M2 | SYSTOLIC BLOOD PRESSURE: 128 MMHG | RESPIRATION RATE: 20 BRPM | DIASTOLIC BLOOD PRESSURE: 67 MMHG

## 2024-02-28 DIAGNOSIS — M43.16 SPONDYLOLISTHESIS, LUMBAR REGION: Primary | Chronic | ICD-10-CM

## 2024-02-28 DIAGNOSIS — M54.14 THORACIC RADICULOPATHY: Chronic | ICD-10-CM

## 2024-02-28 DIAGNOSIS — M47.817 SPONDYLOSIS WITHOUT MYELOPATHY OR RADICULOPATHY, LUMBOSACRAL REGION: Chronic | ICD-10-CM

## 2024-02-28 DIAGNOSIS — Z79.899 ENCOUNTER FOR LONG-TERM (CURRENT) USE OF OTHER MEDICATIONS: ICD-10-CM

## 2024-02-28 PROCEDURE — 3074F SYST BP LT 130 MM HG: CPT | Mod: CPTII,,, | Performed by: PHYSICIAN ASSISTANT

## 2024-02-28 PROCEDURE — 1159F MED LIST DOCD IN RCRD: CPT | Mod: CPTII,,, | Performed by: PHYSICIAN ASSISTANT

## 2024-02-28 PROCEDURE — 3288F FALL RISK ASSESSMENT DOCD: CPT | Mod: CPTII,,, | Performed by: PHYSICIAN ASSISTANT

## 2024-02-28 PROCEDURE — 99214 OFFICE O/P EST MOD 30 MIN: CPT | Mod: PBBFAC | Performed by: PHYSICIAN ASSISTANT

## 2024-02-28 PROCEDURE — 1100F PTFALLS ASSESS-DOCD GE2>/YR: CPT | Mod: CPTII,,, | Performed by: PHYSICIAN ASSISTANT

## 2024-02-28 PROCEDURE — 3078F DIAST BP <80 MM HG: CPT | Mod: CPTII,,, | Performed by: PHYSICIAN ASSISTANT

## 2024-02-28 PROCEDURE — 1125F AMNT PAIN NOTED PAIN PRSNT: CPT | Mod: CPTII,,, | Performed by: PHYSICIAN ASSISTANT

## 2024-02-28 PROCEDURE — 80305 DRUG TEST PRSMV DIR OPT OBS: CPT | Mod: PBBFAC | Performed by: PHYSICIAN ASSISTANT

## 2024-02-28 PROCEDURE — 3008F BODY MASS INDEX DOCD: CPT | Mod: CPTII,,, | Performed by: PHYSICIAN ASSISTANT

## 2024-02-28 PROCEDURE — 99214 OFFICE O/P EST MOD 30 MIN: CPT | Mod: S$PBB,,, | Performed by: PHYSICIAN ASSISTANT

## 2024-02-28 PROCEDURE — 99999PBSHW POCT URINE DRUG SCREEN PRESUMP: Mod: PBBFAC,,,

## 2024-02-28 RX ORDER — GABAPENTIN 300 MG/1
300 CAPSULE ORAL 3 TIMES DAILY
COMMUNITY
End: 2024-03-27 | Stop reason: SDUPTHER

## 2024-02-28 RX ORDER — HYDROCODONE BITARTRATE AND ACETAMINOPHEN 10; 325 MG/1; MG/1
1 TABLET ORAL EVERY 8 HOURS
Qty: 90 TABLET | Refills: 0 | Status: SHIPPED | OUTPATIENT
Start: 2024-02-28 | End: 2024-04-22 | Stop reason: SDUPTHER

## 2024-03-27 ENCOUNTER — OFFICE VISIT (OUTPATIENT)
Dept: PAIN MEDICINE | Facility: CLINIC | Age: 74
End: 2024-03-27
Payer: MEDICARE

## 2024-03-27 VITALS
BODY MASS INDEX: 16.2 KG/M2 | RESPIRATION RATE: 18 BRPM | DIASTOLIC BLOOD PRESSURE: 77 MMHG | WEIGHT: 88 LBS | HEART RATE: 75 BPM | HEIGHT: 62 IN | SYSTOLIC BLOOD PRESSURE: 119 MMHG

## 2024-03-27 DIAGNOSIS — M54.16 LUMBAR RADICULOPATHY: Primary | Chronic | ICD-10-CM

## 2024-03-27 DIAGNOSIS — G89.29 CHRONIC BILATERAL LOW BACK PAIN WITH BILATERAL SCIATICA: Chronic | ICD-10-CM

## 2024-03-27 DIAGNOSIS — M43.16 SPONDYLOLISTHESIS, LUMBAR REGION: Chronic | ICD-10-CM

## 2024-03-27 DIAGNOSIS — M54.41 CHRONIC BILATERAL LOW BACK PAIN WITH BILATERAL SCIATICA: Chronic | ICD-10-CM

## 2024-03-27 DIAGNOSIS — M54.42 CHRONIC BILATERAL LOW BACK PAIN WITH BILATERAL SCIATICA: Chronic | ICD-10-CM

## 2024-03-27 PROCEDURE — 1159F MED LIST DOCD IN RCRD: CPT | Mod: CPTII,,, | Performed by: PAIN MEDICINE

## 2024-03-27 PROCEDURE — 1125F AMNT PAIN NOTED PAIN PRSNT: CPT | Mod: CPTII,,, | Performed by: PAIN MEDICINE

## 2024-03-27 PROCEDURE — 3074F SYST BP LT 130 MM HG: CPT | Mod: CPTII,,, | Performed by: PAIN MEDICINE

## 2024-03-27 PROCEDURE — 99215 OFFICE O/P EST HI 40 MIN: CPT | Mod: PBBFAC | Performed by: PAIN MEDICINE

## 2024-03-27 PROCEDURE — 3288F FALL RISK ASSESSMENT DOCD: CPT | Mod: CPTII,,, | Performed by: PAIN MEDICINE

## 2024-03-27 PROCEDURE — 1101F PT FALLS ASSESS-DOCD LE1/YR: CPT | Mod: CPTII,,, | Performed by: PAIN MEDICINE

## 2024-03-27 PROCEDURE — 3008F BODY MASS INDEX DOCD: CPT | Mod: CPTII,,, | Performed by: PAIN MEDICINE

## 2024-03-27 PROCEDURE — 3078F DIAST BP <80 MM HG: CPT | Mod: CPTII,,, | Performed by: PAIN MEDICINE

## 2024-03-27 PROCEDURE — 99214 OFFICE O/P EST MOD 30 MIN: CPT | Mod: S$PBB,,, | Performed by: PAIN MEDICINE

## 2024-03-27 RX ORDER — GABAPENTIN 300 MG/1
300 CAPSULE ORAL 3 TIMES DAILY
Qty: 90 CAPSULE | Refills: 1 | Status: SHIPPED | OUTPATIENT
Start: 2024-03-27 | End: 2024-04-22 | Stop reason: SDUPTHER

## 2024-03-27 RX ORDER — HYDROCODONE BITARTRATE AND ACETAMINOPHEN 10; 325 MG/1; MG/1
1 TABLET ORAL EVERY 8 HOURS PRN
Qty: 90 TABLET | Refills: 0 | Status: SHIPPED | OUTPATIENT
Start: 2024-03-29 | End: 2024-04-22 | Stop reason: SDUPTHER

## 2024-03-27 NOTE — PROGRESS NOTES
She Disclaimer: This note has been generated using voice-recognition software. There may be typographical errors that have been missed during proof-reading        Patient ID: Amelie Cerrato is a 73 y.o. female.      Chief Complaint: Follow-up (All over pain)      73-year-old female returns for re-evaluation of intractable lower back pain and bilateral lumbar radiculopathy, right greater than left,  to both feet.  She notes weakness and paresthesia.  Current medications are providing some relief but with excessive drowsiness.  She has a history of multiple compression fractures and severe osteoarthritis.  She is received medial branch blocks, vertebroplasties and epidurals in injections in the past.  She relocated to live with her daughter in [place] for several years and was treated conservatively with Butrans and Norco.  She recently returned to [place] and has experienced worsening lower back pain.  MRI revealed an L3 compression fracture with mild retropulsion, broad-based disc bulge at L1-2, L3-4, L45 and L5-S1.  She was unable to tolerate the lumbar support brace due to size and discomfort.  She is unable to participate in physical therapy due to multiple recurrent vertebral fractures and osteoporosis.  Epidural steroid injection was discussed for acute intractable lower back pain.  I will also consider vertebroplasty if her pain remains intractable after WILD.            Pain Assessment  Pain Assessment: 0-10  Pain Score:   9  Pain Location: Back  Pain Radiating Towards: all over pain  Pain Descriptors: Aching  Pain Frequency: Constant/continuous  Pain Onset: Awakened from sleep  Clinical Progression: Gradually worsening  Aggravating Factors: Other (Comment) (anything)  Pain Intervention(s): Medication (See eMAR), Home medication, Heat applied      A's of Opioid Risk Assessment  Activity:Patient is unable to  perform ADL.   Analgesia:Patients pain is not controlled by current medication.   Adverse Effects:  Patient denies constipation or sedation.  Aberrant Behavior:  reviewed with no aberrant drug seeking/taking behavior.      Patient denies any suicidal or homicidal ideations    Physical Therapy/Home Exercise: no     CT Head Without Contrast  Narrative: EXAMINATION:  CT HEAD WITHOUT CONTRAST    CLINICAL HISTORY:  Mental status change, unknown cause;    TECHNIQUE:  Axial CT imaging from the vertex to skull the skull base was performed without contrast. Total DLP: 983 mGy*cm    Dose reduction:    The CT exam was performed using one or more dose reduction techniques: Automatic exposure control, automated adjustment of the MA and/or kVP according to patient size, or use of iterative reconstruction technique.    COMPARISON:  Noncontrast CT head 10/02/2021.    FINDINGS:  Atrophy is noted with prominence of sulci and ventricles.  The basilar cisterns are within normal limits in appearance. There is no evidence of hydrocephalus, midline shift or mass effect.  Periventricular hypodensity changes are noted which do not demonstrate mass effect on are favored to represent sequela of chronic microvascular ischemia.  Otherwise, gray and white matter differentiation is preserved. There is no CT evidence of acute intracranial hemorrhage or infarction.    The calvarium is intact. The visualized orbits and globes appear within normal limits. The paranasal sinuses and mastoid air cells are predominantly clear. Scalp soft tissues appear unremarkable.  Impression: 1. No acute intracranial abnormality.    2.  Similar parenchymal atrophy and findings suggestive of sequela of chronic microvascular ischemia.    Place of service: [facility]    Electronically signed by: Fernando King  Date:    12/06/2023  Time:    21:01  X-Ray Chest AP Portable  Narrative: EXAMINATION:  XR CHEST AP PORTABLE    CLINICAL HISTORY:  coughing;    TECHNIQUE:  AP portable    COMPARISON:  Prior radiograph dated 03/13/2023    FINDINGS:  The cardiomediastinal  silhouette is within normal limits. The lungs are clear. There is no pneumothorax or pleural effusion.    There is no acute osseous or soft tissue abnormality.  Multi level PMMA cement vertebral augmentation changes are noted within the thoracic and lumbar spine.  Impression: No acute cardiopulmonary process or significant change.    Place of service: [facility]    Electronically signed by: Fernando King  Date:    12/06/2023  Time:    20:59      Review of Systems   Constitutional: Negative.    HENT: Negative.     Eyes: Negative.    Respiratory: Negative.     Cardiovascular: Negative.    Gastrointestinal: Negative.    Endocrine: Negative.    Genitourinary: Negative.    Musculoskeletal:  Positive for arthralgias, back pain, gait problem and leg pain (BLE).   Integumentary:  Negative.   Neurological:  Positive for weakness (BLE) and numbness (BLE).   Hematological: Negative.    Psychiatric/Behavioral:  Positive for sleep disturbance.              Past Medical History:   Diagnosis Date    Anxiety and depression     Chronic kidney disease, stage 3a     Chronic pain syndrome     COPD (chronic obstructive pulmonary disease)     Fracture of multiple pubic rami, left, closed, initial encounter 10/01/2021    HLD (hyperlipidemia)     Hypertension     Lumbar compression fracture, sequela L1, L2, L4, L5, kyphoplasty 12/8/2021    Lumbar radiculopathy     Lumbar stenosis     Lumbosacral spondylosis     Neuropathy      Past Surgical History:   Procedure Laterality Date    CHOLECYSTECTOMY      CYSTOSCOPY      EPIDURAL STEROID INJECTION INTO LUMBAR SPINE N/A 05/27/2020    L1-2 WILD     EPIDURAL STEROID INJECTION INTO THORACIC SPINE N/A 02/03/2021    T9-10 WILD     EPIDURAL STEROID INJECTION INTO THORACIC SPINE N/A 3/18/2021    Procedure: INJECTION, STEROID, SPINE, THORACIC, EPIDURAL;  Surgeon: Arelis Burns MD;  Location: [facility] PAIN [facility];  Service: Pain Management;  Laterality: N/A;    EPIDURAL STEROID INJECTION  INTO THORACIC SPINE N/A 12/23/2021    Procedure: Injection-steroid-epidural-thoracic T9/10;  Surgeon: Arelis Burns MD;  Location: [facility] PAIN [facility];  Service: Pain Management;  Laterality: N/A;  HAD VAC  WILL BRING CARD    HYSTERECTOMY      INJECTION OF ANESTHETIC AGENT AROUND MEDIAL BRANCH NERVES INNERVATING LUMBAR FACET JOINT Bilateral 4/22/2021    Procedure: Block-nerve-medial branch-lumbar Bilateral L3-4,4-5,5-S1 MBB #2;  Surgeon: Arelis Burns MD;  Location: [facility];  Service: Pain Management;  Laterality: Bilateral;    INJECTION OF ANESTHETIC AGENT AROUND MEDIAL BRANCH NERVES INNERVATING LUMBAR FACET JOINT Bilateral 9/6/2022    Procedure: Block-nerve-medial branch-lumbar, bilateral L4 through S1;  Surgeon: Arelis Burns MD;  Location: [facility];  Service: Pain Management;  Laterality: Bilateral;    INJECTION OF ANESTHETIC AGENT AROUND MEDIAL BRANCH NERVES INNERVATING LUMBAR FACET JOINT Bilateral 10/11/2022    Procedure: Block-nerve-medial branch-lumbar, bilateral L4 through S1;  Surgeon: Arelis Burns MD;  Location: [facility] PAIN [facility];  Service: Pain Management;  Laterality: Bilateral;  vaccinated.    INJECTION OF FACET JOINT Bilateral 12/04/2020    RADIOFREQUENCY ABLATION OF LUMBAR MEDIAL BRANCH NERVE AT SINGLE LEVEL Bilateral 5/20/2021    Procedure: RADIOFREQUENCY ABLATION, NERVE, SPINAL, LUMBAR, MEDIAL BRANCH, 1 LEVEL;  Surgeon: Arelis Burns MD;  Location: [facility] PAIN [facility];  Service: Pain Management;  Laterality: Bilateral;  Bilateral L3-S1 RFTC (both sides same day)     Social History     Socioeconomic History    Marital status:    Tobacco Use    Smoking status: Former    Smokeless tobacco: Never   Substance and Sexual Activity    Alcohol use: Never     Comment: unknown    Drug use: Never     Types: Hydrocodone    Sexual activity: Not Currently     Social Determinants of Health     Financial Resource Strain: Low Risk  (3/13/2023)    Overall Financial Resource  Strain (CARDIA)     Difficulty of Paying Living Expenses: Not very hard   Food Insecurity: No Food Insecurity (3/13/2023)    Hunger Vital Sign     Worried About Running Out of Food in the Last Year: Never true     Ran Out of Food in the Last Year: Never true   Transportation Needs: No Transportation Needs (3/13/2023)    PRAPARE - Transportation     Lack of Transportation (Medical): No     Lack of Transportation (Non-Medical): No   Physical Activity: Inactive (3/13/2023)    Exercise Vital Sign     Days of Exercise per Week: 0 days     Minutes of Exercise per Session: 0 min   Stress: Stress Concern Present (3/13/2023)    [employer] [place] [employer] - Occupational Stress Questionnaire     Feeling of Stress : Rather much   Social Connections: Moderately Isolated (3/13/2023)    Social Connection and Isolation Panel [NHANES]     Frequency of Communication with Friends and Family: Three times a week     Frequency of Social Gatherings with Friends and Family: Three times a week     Attends Worship Services: More than 4 times per year     Active Member of Clubs or Organizations: No     Attends Club or Organization Meetings: Never     Marital Status:    Housing Stability: Unknown (3/13/2023)    Housing Stability Vital Sign     Unable to Pay for Housing in the Last Year: No     Unstable Housing in the Last Year: No     Family History   Problem Relation Age of Onset    Hypertension Mother     Heart disease Father      Review of patient's allergies indicates:   Allergen Reactions    Sulfa (sulfonamide antibiotics) Nausea And Vomiting and Itching    Insect venom      Note: - Estefania 05/07/2019     has a current medication list which includes the following prescription(s): amlodipine, aspirin, atorvastatin, brimonidine 0.2%, cyproheptadine, duloxetine, fluoxetine, hydrocodone-acetaminophen, latanoprost, mineral oil-hydrophil petrolat, ondansetron, furosemide, gabapentin, [START ON 3/29/2024]  hydrocodone-acetaminophen, and pantoprazole.      Objective:  Vitals:    03/27/24 0958   BP: 119/77   Pulse: 75   Resp: 18        Physical Exam  Vitals and nursing note reviewed.   Constitutional:       General: She is not in acute distress.     Appearance: Normal appearance. She is underweight. She is not ill-appearing, toxic-appearing or diaphoretic.   HENT:      Head: Normocephalic and atraumatic.      Nose: Nose normal.      Mouth/Throat:      Mouth: Mucous membranes are moist.   Eyes:      Extraocular Movements: Extraocular movements intact.      Pupils: Pupils are equal, round, and reactive to light.   Cardiovascular:      Rate and Rhythm: Normal rate and regular rhythm.      Heart sounds: Normal heart sounds.   Pulmonary:      Effort: Pulmonary effort is normal. No respiratory distress.      Breath sounds: Normal breath sounds. No stridor. No wheezing or rhonchi.   Abdominal:      General: Bowel sounds are normal.      Palpations: Abdomen is soft.   Musculoskeletal:         General: No swelling or deformity.      Cervical back: Normal and normal range of motion. No spasms or tenderness. No pain with movement. Normal range of motion.      Thoracic back: Normal.      Lumbar back: Spasms, tenderness and bony tenderness present. Decreased range of motion. Positive right straight leg raise test and positive left straight leg raise test. No scoliosis.      Right lower leg: No edema.      Left lower leg: No edema.      Comments: Lumbar pain with flexion, extension lateral rotation.  Bilateral facet patient from L4-S1   Skin:     General: Skin is warm.   Neurological:      General: No focal deficit present.      Mental Status: She is alert and oriented to person, place, and time. Mental status is at baseline.      Cranial Nerves: No cranial nerve deficit.      Sensory: Sensory deficit (BLE) present.      Motor: No weakness (BLE).      Coordination: Coordination normal.      Gait: Gait abnormal.      Deep Tendon  Reflexes:      Reflex Scores:       Patellar reflexes are 1+ on the right side and 1+ on the left side.       Achilles reflexes are 1+ on the left side.  Psychiatric:         Mood and Affect: Mood normal.         Behavior: Behavior normal.           Assessment:      1. Lumbar radiculopathy    2. Spondylolisthesis, lumbar region    3. Chronic bilateral low back pain with bilateral sciatica          Plan:  1. reviewed  2.Addiction, Dependency, Tolerance, Opioid abuse-misuse, Death, Diversion Discussed. Overdose reversal drug Naloxone discussed. Patient is prescribed opiates for chronic nonmalignant pain pathology.  Patient is receiving opiates which require greater than a 72 hour supply of therapy.  Patient was educated on potential dependency associated with long-term opioid use as well as decreasing efficacy with prolonged use.  Patient was advised of risks, benefits and side effects and how to utilize each medication.  Patient was also informed that any deviation from therapy protocol will  lead to discontinuation of opiates.  It is reasonable to prescribe opioid analgesics for patient based on positive response to opioid medications, lack of side effects and  limited aberrant behavior.    3.Refill/Continue medications for pain control and function       Requested Prescriptions     Signed Prescriptions Disp Refills    HYDROcodone-acetaminophen (NORCO)  mg per tablet 90 tablet 0     Sig: Take 1 tablet by mouth every 8 (eight) hours as needed for Pain.    gabapentin (NEURONTIN) 300 MG capsule 90 capsule 1     Sig: Take 1 capsule (300 mg total) by mouth 3 (three) times daily.     4. Schedule L3-4 epidural epidurogram under fluoroscopy   Orders Placed This Encounter   Procedures    Case Request Operating Room: L3-4 WILD     Order Specific Question:   Medical Necessity:     Answer:   Medically Non-Urgent [100]     Order Specific Question:   CPT Code:     Answer:   MA INJ LUMBAR/SACRAL, W/IMAGING GUIDANCE [33966]      Order Specific Question:   Positioning:     Answer:   Prone [1003]     Order Specific Question:   Post-Procedure Disposition:     Answer:   PACU [1]     Order Specific Question:   Estimated Length of Stay:     Answer:   0 midnight     Order Specific Question:   Implant Required:     Answer:   No [1001]     Order Specific Question:   Is an on-site pathologist required for this procedure?     Answer:   N/A      5.Indications for this procedure for this specific patient include the following   -History, physical examination and concordant radiological image based diagnostic testing supports medical necessity for an epidural steroid injection  - neurogenic claudication due to central disc pathology, osteophyte complexes, severe degenerative disc disease producing foraminal or central stenosis  - Pt has no major risk factors for spinal cancer or contraindicated condition   - Neurogenic claudication and Radicular pain are interfering with functional activity  - Pain is associated with symptoms of nerve root irritation   - Any numbness documented is accompanied with paresthesia   - No evidence of systemic or local infection, bleeding tendency or unstable medical condition   - Epidural provided as part of a comprehensive pain management program  - All repeat injections have at least 80% pain relief and increase functional gain and physical activity, and reduction in reliance on the use of medication and or physical therapy  - Pt has significant functional limitation resulting in diminished quality of life and impaired age appropriate ADL's.   - Diagnostic evaluation has ruled out other causes of pain  - Pt participating in an active rehabilitation program or home exercise program which has been discussed with the patient including heat ice and rest  - No more than 3 epidurals will be done in a 6 month period at the same level with at least 7 days between injections  - MAC is only offered in cases of needle phobia   -  Injection done at L3-4  level which is consistent with patient's dermatomal pain complaint    6.Monitored Anesthesia Care medical necessity authorization request:    Monitor anesthesia request is medically indicated for the scheduled nerve block procedure due to:  - needle phobia and anxiety, placing  the patient at risk during the provided service.  -patient has an ASA class greater than 3 and requires constant presence of an anesthesiologist during the procedure:  -patient has severe problems with muscles and muscle spasticity that makes it hard to lie still  -patient suffers from chronic pain and is unable to function due to  diminished ADLs    7.The planned medically necessary  surgical procedure is performed in a hospital outpatient department and not in an ambulatory surgical center due to:     -there is no geographically assessable ambulatory surgery center that has the  necessary equipment and fluoroscopy needed for the procedure     -there is no geographically assessable ambulatory surgical center available at which the physician has privileges     -an ASC's  specific  guideline regarding the individuals weight or health conditions that prevent the use of an ASC       -injections must be performed under fluoroscopy image guidance with contrast unless the patient has a documented contrast allergy or pregnancy      report:  Reviewed and consistent with medication use as prescribed.      The total time spent for evaluation and management on 03/28/2024 including reviewing separately obtained history, performing a medically appropriate exam and evaluation, documenting clinical information in the health record, independently interpreting results and communicating them to the patient/family/caregiver, and ordering medications/tests/procedures was between 15-29 minutes.    The above plan and management options were discussed at length with patient. Patient is in agreement with the above and verbalized  understanding. It will be communicated with the referring physician via electronic record, fax, or mail.

## 2024-03-27 NOTE — PATIENT INSTRUCTIONS
YOU ARE SCHEDULED ON 4/16/2024 AT 11:00 AM FOR YOUR PROCEDURE- L3-4 WILD WITH DR. WOODS.    STOP TAKING ASPIRIN 7 DAYS BEFORE PROCEDURE.    Procedure Instructions:    Nothing to eat or drink for 8 hours or after midnight including gum, candy, mints, or tobacco products.  If you are scheduled for 1:30 or later nothing to eat or drink after 5 a.m. the morning of the procedure, including gum, candy, mints, or tobacco products.  Must have a  at least 18 yrs of age to stay present at all times  No Diabetic medications the morning of procedure, check blood sugar the morning of procedure, if it is greater than 200 call the office at [phone]  If you are started on antibiotics or have been prescribed antibiotics, have a fever, or have any other type of infection call to reschedule procedure.  If you take blood pressure medications you can take it at your regular scheduled time with a small sip of WATER!  Dentures are to be removed prior to procedure or we can provide you with a denture cup to remove them prior to being taken back for procedure.   False eyelashes are to be removed before the morning of procedure.    Contacts will have to be removed prior to procedure.  No jewelry is to be worn to the procedure.     HOLD ASPIRIN AND ASPIRIN PRODUCTS  (ASPIRIN, BC POWDER ETC. ) FOR 7 DAYS  PRIOR TO PROCEDURE  HOLD NSAIDS( ibuprofen, mobic, meloxicam, advil, diclofenac, naproxen, relafen, celebrex,  methotrexate, aleve etc....)  FOR 3 DAYS   PRIOR TO PROCEDURE    SIGNATURE___________________________________________________________________________

## 2024-03-27 NOTE — H&P (VIEW-ONLY)
She Disclaimer: This note has been generated using voice-recognition software. There may be typographical errors that have been missed during proof-reading        Patient ID: Amelie Cerrato is a 73 y.o. female.      Chief Complaint: Follow-up (All over pain)      73-year-old female returns for re-evaluation of intractable lower back pain and bilateral lumbar radiculopathy, right greater than left,  to both feet.  She notes weakness and paresthesia.  Current medications are providing some relief but with excessive drowsiness.  She has a history of multiple compression fractures and severe osteoarthritis.  She is received medial branch blocks, vertebroplasties and epidurals in injections in the past.  She relocated to live with her daughter in [place] for several years and was treated conservatively with Butrans and Norco.  She recently returned to [place] and has experienced worsening lower back pain.  MRI revealed an L3 compression fracture with mild retropulsion, broad-based disc bulge at L1-2, L3-4, L45 and L5-S1.  She was unable to tolerate the lumbar support brace due to size and discomfort.  She is unable to participate in physical therapy due to multiple recurrent vertebral fractures and osteoporosis.  Epidural steroid injection was discussed for acute intractable lower back pain.  I will also consider vertebroplasty if her pain remains intractable after WILD.            Pain Assessment  Pain Assessment: 0-10  Pain Score:   9  Pain Location: Back  Pain Radiating Towards: all over pain  Pain Descriptors: Aching  Pain Frequency: Constant/continuous  Pain Onset: Awakened from sleep  Clinical Progression: Gradually worsening  Aggravating Factors: Other (Comment) (anything)  Pain Intervention(s): Medication (See eMAR), Home medication, Heat applied      A's of Opioid Risk Assessment  Activity:Patient is unable to  perform ADL.   Analgesia:Patients pain is not controlled by current medication.   Adverse Effects:  Patient denies constipation or sedation.  Aberrant Behavior:  reviewed with no aberrant drug seeking/taking behavior.      Patient denies any suicidal or homicidal ideations    Physical Therapy/Home Exercise: no     CT Head Without Contrast  Narrative: EXAMINATION:  CT HEAD WITHOUT CONTRAST    CLINICAL HISTORY:  Mental status change, unknown cause;    TECHNIQUE:  Axial CT imaging from the vertex to skull the skull base was performed without contrast. Total DLP: 983 mGy*cm    Dose reduction:    The CT exam was performed using one or more dose reduction techniques: Automatic exposure control, automated adjustment of the MA and/or kVP according to patient size, or use of iterative reconstruction technique.    COMPARISON:  Noncontrast CT head 10/02/2021.    FINDINGS:  Atrophy is noted with prominence of sulci and ventricles.  The basilar cisterns are within normal limits in appearance. There is no evidence of hydrocephalus, midline shift or mass effect.  Periventricular hypodensity changes are noted which do not demonstrate mass effect on are favored to represent sequela of chronic microvascular ischemia.  Otherwise, gray and white matter differentiation is preserved. There is no CT evidence of acute intracranial hemorrhage or infarction.    The calvarium is intact. The visualized orbits and globes appear within normal limits. The paranasal sinuses and mastoid air cells are predominantly clear. Scalp soft tissues appear unremarkable.  Impression: 1. No acute intracranial abnormality.    2.  Similar parenchymal atrophy and findings suggestive of sequela of chronic microvascular ischemia.    Place of service: [facility]    Electronically signed by: Fernando King  Date:    12/06/2023  Time:    21:01  X-Ray Chest AP Portable  Narrative: EXAMINATION:  XR CHEST AP PORTABLE    CLINICAL HISTORY:  coughing;    TECHNIQUE:  AP portable    COMPARISON:  Prior radiograph dated 03/13/2023    FINDINGS:  The cardiomediastinal  silhouette is within normal limits. The lungs are clear. There is no pneumothorax or pleural effusion.    There is no acute osseous or soft tissue abnormality.  Multi level PMMA cement vertebral augmentation changes are noted within the thoracic and lumbar spine.  Impression: No acute cardiopulmonary process or significant change.    Place of service: [facility]    Electronically signed by: Fernando King  Date:    12/06/2023  Time:    20:59      Review of Systems   Constitutional: Negative.    HENT: Negative.     Eyes: Negative.    Respiratory: Negative.     Cardiovascular: Negative.    Gastrointestinal: Negative.    Endocrine: Negative.    Genitourinary: Negative.    Musculoskeletal:  Positive for arthralgias, back pain, gait problem and leg pain (BLE).   Integumentary:  Negative.   Neurological:  Positive for weakness (BLE) and numbness (BLE).   Hematological: Negative.    Psychiatric/Behavioral:  Positive for sleep disturbance.              Past Medical History:   Diagnosis Date    Anxiety and depression     Chronic kidney disease, stage 3a     Chronic pain syndrome     COPD (chronic obstructive pulmonary disease)     Fracture of multiple pubic rami, left, closed, initial encounter 10/01/2021    HLD (hyperlipidemia)     Hypertension     Lumbar compression fracture, sequela L1, L2, L4, L5, kyphoplasty 12/8/2021    Lumbar radiculopathy     Lumbar stenosis     Lumbosacral spondylosis     Neuropathy      Past Surgical History:   Procedure Laterality Date    CHOLECYSTECTOMY      CYSTOSCOPY      EPIDURAL STEROID INJECTION INTO LUMBAR SPINE N/A 05/27/2020    L1-2 WILD     EPIDURAL STEROID INJECTION INTO THORACIC SPINE N/A 02/03/2021    T9-10 WILD     EPIDURAL STEROID INJECTION INTO THORACIC SPINE N/A 3/18/2021    Procedure: INJECTION, STEROID, SPINE, THORACIC, EPIDURAL;  Surgeon: Arelis Burns MD;  Location: [facility];  Service: Pain Management;  Laterality: N/A;    EPIDURAL STEROID INJECTION  INTO THORACIC SPINE N/A 12/23/2021    Procedure: Injection-steroid-epidural-thoracic T9/10;  Surgeon: Arelis Burns MD;  Location: [facility] PAIN [facility];  Service: Pain Management;  Laterality: N/A;  HAD VAC  WILL BRING CARD    HYSTERECTOMY      INJECTION OF ANESTHETIC AGENT AROUND MEDIAL BRANCH NERVES INNERVATING LUMBAR FACET JOINT Bilateral 4/22/2021    Procedure: Block-nerve-medial branch-lumbar Bilateral L3-4,4-5,5-S1 MBB #2;  Surgeon: Arelis Burns MD;  Location: [facility];  Service: Pain Management;  Laterality: Bilateral;    INJECTION OF ANESTHETIC AGENT AROUND MEDIAL BRANCH NERVES INNERVATING LUMBAR FACET JOINT Bilateral 9/6/2022    Procedure: Block-nerve-medial branch-lumbar, bilateral L4 through S1;  Surgeon: Arelis Burns MD;  Location: [facility];  Service: Pain Management;  Laterality: Bilateral;    INJECTION OF ANESTHETIC AGENT AROUND MEDIAL BRANCH NERVES INNERVATING LUMBAR FACET JOINT Bilateral 10/11/2022    Procedure: Block-nerve-medial branch-lumbar, bilateral L4 through S1;  Surgeon: Arelis Burns MD;  Location: [facility] PAIN [facility];  Service: Pain Management;  Laterality: Bilateral;  vaccinated.    INJECTION OF FACET JOINT Bilateral 12/04/2020    RADIOFREQUENCY ABLATION OF LUMBAR MEDIAL BRANCH NERVE AT SINGLE LEVEL Bilateral 5/20/2021    Procedure: RADIOFREQUENCY ABLATION, NERVE, SPINAL, LUMBAR, MEDIAL BRANCH, 1 LEVEL;  Surgeon: Arelis Burns MD;  Location: [facility] PAIN [facility];  Service: Pain Management;  Laterality: Bilateral;  Bilateral L3-S1 RFTC (both sides same day)     Social History     Socioeconomic History    Marital status:    Tobacco Use    Smoking status: Former    Smokeless tobacco: Never   Substance and Sexual Activity    Alcohol use: Never     Comment: unknown    Drug use: Never     Types: Hydrocodone    Sexual activity: Not Currently     Social Determinants of Health     Financial Resource Strain: Low Risk  (3/13/2023)    Overall Financial Resource  Strain (CARDIA)     Difficulty of Paying Living Expenses: Not very hard   Food Insecurity: No Food Insecurity (3/13/2023)    Hunger Vital Sign     Worried About Running Out of Food in the Last Year: Never true     Ran Out of Food in the Last Year: Never true   Transportation Needs: No Transportation Needs (3/13/2023)    PRAPARE - Transportation     Lack of Transportation (Medical): No     Lack of Transportation (Non-Medical): No   Physical Activity: Inactive (3/13/2023)    Exercise Vital Sign     Days of Exercise per Week: 0 days     Minutes of Exercise per Session: 0 min   Stress: Stress Concern Present (3/13/2023)    [employer] - Occupational Stress Questionnaire     Feeling of Stress : Rather much   Social Connections: Moderately Isolated (3/13/2023)    Social Connection and Isolation Panel [NHANES]     Frequency of Communication with Friends and Family: Three times a week     Frequency of Social Gatherings with Friends and Family: Three times a week     Attends [employer] Services: More than 4 times per year     Active Member of Clubs or Organizations: No     Attends Club or Organization Meetings: Never     Marital Status:    Housing Stability: Unknown (3/13/2023)    Housing Stability Vital Sign     Unable to Pay for Housing in the Last Year: No     Unstable Housing in the Last Year: No     Family History   Problem Relation Age of Onset    Hypertension Mother     Heart disease Father      Review of patient's allergies indicates:   Allergen Reactions    Sulfa (sulfonamide antibiotics) Nausea And Vomiting and Itching    Insect venom      Note: - Estefania 05/07/2019     has a current medication list which includes the following prescription(s): amlodipine, aspirin, atorvastatin, brimonidine 0.2%, cyproheptadine, duloxetine, fluoxetine, hydrocodone-acetaminophen, latanoprost, mineral oil-hydrophil petrolat, ondansetron, furosemide, gabapentin, [START ON 3/29/2024]  hydrocodone-acetaminophen, and pantoprazole.      Objective:  Vitals:    03/27/24 0958   BP: 119/77   Pulse: 75   Resp: 18        Physical Exam  Vitals and nursing note reviewed.   Constitutional:       General: She is not in acute distress.     Appearance: Normal appearance. She is underweight. She is not ill-appearing, toxic-appearing or diaphoretic.   HENT:      Head: Normocephalic and atraumatic.      Nose: Nose normal.      Mouth/Throat:      Mouth: Mucous membranes are moist.   Eyes:      Extraocular Movements: Extraocular movements intact.      Pupils: Pupils are equal, round, and reactive to light.   Cardiovascular:      Rate and Rhythm: Normal rate and regular rhythm.      Heart sounds: Normal heart sounds.   Pulmonary:      Effort: Pulmonary effort is normal. No respiratory distress.      Breath sounds: Normal breath sounds. No stridor. No wheezing or rhonchi.   Abdominal:      General: Bowel sounds are normal.      Palpations: Abdomen is soft.   Musculoskeletal:         General: No swelling or deformity.      Cervical back: Normal and normal range of motion. No spasms or tenderness. No pain with movement. Normal range of motion.      Thoracic back: Normal.      Lumbar back: Spasms, tenderness and bony tenderness present. Decreased range of motion. Positive right straight leg raise test and positive left straight leg raise test. No scoliosis.      Right lower leg: No edema.      Left lower leg: No edema.      Comments: Lumbar pain with flexion, extension lateral rotation.  Bilateral facet patient from L4-S1   Skin:     General: Skin is warm.   Neurological:      General: No focal deficit present.      Mental Status: She is alert and oriented to person, place, and time. Mental status is at baseline.      Cranial Nerves: No cranial nerve deficit.      Sensory: Sensory deficit (BLE) present.      Motor: No weakness (BLE).      Coordination: Coordination normal.      Gait: Gait abnormal.      Deep Tendon  Reflexes:      Reflex Scores:       Patellar reflexes are 1+ on the right side and 1+ on the left side.       Achilles reflexes are 1+ on the left side.  Psychiatric:         Mood and Affect: Mood normal.         Behavior: Behavior normal.           Assessment:      1. Lumbar radiculopathy    2. Spondylolisthesis, lumbar region    3. Chronic bilateral low back pain with bilateral sciatica          Plan:  1. reviewed  2.Addiction, Dependency, Tolerance, Opioid abuse-misuse, Death, Diversion Discussed. Overdose reversal drug Naloxone discussed. Patient is prescribed opiates for chronic nonmalignant pain pathology.  Patient is receiving opiates which require greater than a 72 hour supply of therapy.  Patient was educated on potential dependency associated with long-term opioid use as well as decreasing efficacy with prolonged use.  Patient was advised of risks, benefits and side effects and how to utilize each medication.  Patient was also informed that any deviation from therapy protocol will  lead to discontinuation of opiates.  It is reasonable to prescribe opioid analgesics for patient based on positive response to opioid medications, lack of side effects and  limited aberrant behavior.    3.Refill/Continue medications for pain control and function       Requested Prescriptions     Signed Prescriptions Disp Refills    HYDROcodone-acetaminophen (NORCO)  mg per tablet 90 tablet 0     Sig: Take 1 tablet by mouth every 8 (eight) hours as needed for Pain.    gabapentin (NEURONTIN) 300 MG capsule 90 capsule 1     Sig: Take 1 capsule (300 mg total) by mouth 3 (three) times daily.     4. Schedule L3-4 epidural epidurogram under fluoroscopy   Orders Placed This Encounter   Procedures    Case Request Operating Room: L3-4 WILD     Order Specific Question:   Medical Necessity:     Answer:   Medically Non-Urgent [100]     Order Specific Question:   CPT Code:     Answer:   SD INJ LUMBAR/SACRAL, W/IMAGING GUIDANCE [24423]      Order Specific Question:   Positioning:     Answer:   Prone [1003]     Order Specific Question:   Post-Procedure Disposition:     Answer:   PACU [1]     Order Specific Question:   Estimated Length of Stay:     Answer:   0 midnight     Order Specific Question:   Implant Required:     Answer:   No [1001]     Order Specific Question:   Is an on-site pathologist required for this procedure?     Answer:   N/A      5.Indications for this procedure for this specific patient include the following   -History, physical examination and concordant radiological image based diagnostic testing supports medical necessity for an epidural steroid injection  - neurogenic claudication due to central disc pathology, osteophyte complexes, severe degenerative disc disease producing foraminal or central stenosis  - Pt has no major risk factors for spinal cancer or contraindicated condition   - Neurogenic claudication and Radicular pain are interfering with functional activity  - Pain is associated with symptoms of nerve root irritation   - Any numbness documented is accompanied with paresthesia   - No evidence of systemic or local infection, bleeding tendency or unstable medical condition   - Epidural provided as part of a comprehensive pain management program  - All repeat injections have at least 80% pain relief and increase functional gain and physical activity, and reduction in reliance on the use of medication and or physical therapy  - Pt has significant functional limitation resulting in diminished quality of life and impaired age appropriate ADL's.   - Diagnostic evaluation has ruled out other causes of pain  - Pt participating in an active rehabilitation program or home exercise program which has been discussed with the patient including heat ice and rest  - No more than 3 epidurals will be done in a 6 month period at the same level with at least 7 days between injections  - MAC is only offered in cases of needle phobia   -  Injection done at L3-4  level which is consistent with patient's dermatomal pain complaint    6.Monitored Anesthesia Care medical necessity authorization request:    Monitor anesthesia request is medically indicated for the scheduled nerve block procedure due to:  - needle phobia and anxiety, placing  the patient at risk during the provided service.  -patient has an ASA class greater than 3 and requires constant presence of an anesthesiologist during the procedure:  -patient has severe problems with muscles and muscle spasticity that makes it hard to lie still  -patient suffers from chronic pain and is unable to function due to  diminished ADLs    7.The planned medically necessary  surgical procedure is performed in a hospital outpatient department and not in an ambulatory surgical center due to:     -there is no geographically assessable ambulatory surgery center that has the  necessary equipment and fluoroscopy needed for the procedure     -there is no geographically assessable ambulatory surgical center available at which the physician has privileges     -an ASC's  specific  guideline regarding the individuals weight or health conditions that prevent the use of an ASC       -injections must be performed under fluoroscopy image guidance with contrast unless the patient has a documented contrast allergy or pregnancy      report:  Reviewed and consistent with medication use as prescribed.      The total time spent for evaluation and management on 03/28/2024 including reviewing separately obtained history, performing a medically appropriate exam and evaluation, documenting clinical information in the health record, independently interpreting results and communicating them to the patient/family/caregiver, and ordering medications/tests/procedures was between 15-29 minutes.    The above plan and management options were discussed at length with patient. Patient is in agreement with the above and verbalized  understanding. It will be communicated with the referring physician via electronic record, fax, or mail.

## 2024-04-07 ENCOUNTER — HOSPITAL ENCOUNTER (EMERGENCY)
Facility: HOSPITAL | Age: 74
Discharge: HOME | End: 2024-04-08
Attending: EMERGENCY MEDICINE
Payer: MEDICARE

## 2024-04-07 DIAGNOSIS — R29.818 ACUTE FOCAL NEUROLOGICAL DEFICIT: ICD-10-CM

## 2024-04-07 DIAGNOSIS — R53.1 WEAKNESS: Primary | ICD-10-CM

## 2024-04-07 LAB
ALBUMIN SERPL BCP-MCNC: 3.1 G/DL (ref 3.5–5)
ALBUMIN/GLOB SERPL: 0.9 {RATIO}
ALP SERPL-CCNC: 78 U/L (ref 55–142)
ALT SERPL W P-5'-P-CCNC: 16 U/L (ref 13–56)
ANION GAP SERPL CALCULATED.3IONS-SCNC: 9 MMOL/L (ref 7–16)
APTT PPP: 34.9 SECONDS (ref 25.2–37.3)
AST SERPL W P-5'-P-CCNC: 16 U/L (ref 15–37)
BASOPHILS # BLD AUTO: 0.04 K/UL (ref 0–0.2)
BASOPHILS NFR BLD AUTO: 0.5 % (ref 0–1)
BILIRUB SERPL-MCNC: 0.3 MG/DL (ref ?–1.2)
BILIRUB UR QL STRIP: NEGATIVE
BUN SERPL-MCNC: 17 MG/DL (ref 7–18)
BUN/CREAT SERPL: 17 (ref 6–20)
CALCIUM SERPL-MCNC: 9 MG/DL (ref 8.5–10.1)
CHLORIDE SERPL-SCNC: 112 MMOL/L (ref 98–107)
CHOLEST SERPL-MCNC: 177 MG/DL (ref 0–200)
CHOLEST/HDLC SERPL: 2.2 {RATIO}
CLARITY UR: CLEAR
CO2 SERPL-SCNC: 24 MMOL/L (ref 21–32)
COLOR UR: COLORLESS
CREAT SERPL-MCNC: 0.99 MG/DL (ref 0.55–1.02)
DIFFERENTIAL METHOD BLD: ABNORMAL
EGFR (NO RACE VARIABLE) (RUSH/TITUS): 60 ML/MIN/1.73M2
EOSINOPHIL # BLD AUTO: 0.09 K/UL (ref 0–0.5)
EOSINOPHIL NFR BLD AUTO: 1.1 % (ref 1–4)
ERYTHROCYTE [DISTWIDTH] IN BLOOD BY AUTOMATED COUNT: 19.6 % (ref 11.5–14.5)
GLOBULIN SER-MCNC: 3.3 G/DL (ref 2–4)
GLUCOSE SERPL-MCNC: 93 MG/DL (ref 74–106)
GLUCOSE UR STRIP-MCNC: NORMAL MG/DL
HCT VFR BLD AUTO: 34.1 % (ref 38–47)
HDLC SERPL-MCNC: 79 MG/DL (ref 40–60)
HGB BLD-MCNC: 11.2 G/DL (ref 12–16)
IMM GRANULOCYTES # BLD AUTO: 0.03 K/UL (ref 0–0.04)
IMM GRANULOCYTES NFR BLD: 0.4 % (ref 0–0.4)
INFLUENZA A MOLECULAR (OHS): NEGATIVE
INFLUENZA B MOLECULAR (OHS): NEGATIVE
INR BLD: 1.03
KETONES UR STRIP-SCNC: NEGATIVE MG/DL
LDLC SERPL CALC-MCNC: 79 MG/DL
LDLC/HDLC SERPL: 1 {RATIO}
LEUKOCYTE ESTERASE UR QL STRIP: NEGATIVE
LYMPHOCYTES # BLD AUTO: 2.15 K/UL (ref 1–4.8)
LYMPHOCYTES NFR BLD AUTO: 26 % (ref 27–41)
MCH RBC QN AUTO: 27.9 PG (ref 27–31)
MCHC RBC AUTO-ENTMCNC: 32.8 G/DL (ref 32–36)
MCV RBC AUTO: 84.8 FL (ref 80–96)
MONOCYTES # BLD AUTO: 0.56 K/UL (ref 0–0.8)
MONOCYTES NFR BLD AUTO: 6.8 % (ref 2–6)
MPC BLD CALC-MCNC: 9.6 FL (ref 9.4–12.4)
NEUTROPHILS # BLD AUTO: 5.41 K/UL (ref 1.8–7.7)
NEUTROPHILS NFR BLD AUTO: 65.2 % (ref 53–65)
NITRITE UR QL STRIP: NEGATIVE
NONHDLC SERPL-MCNC: 98 MG/DL
NRBC # BLD AUTO: 0 X10E3/UL
NRBC, AUTO (.00): 0 %
PH UR STRIP: 5.5 PH UNITS
PLATELET # BLD AUTO: 291 K/UL (ref 150–400)
POTASSIUM SERPL-SCNC: 3.3 MMOL/L (ref 3.5–5.1)
PROT SERPL-MCNC: 6.4 G/DL (ref 6.4–8.2)
PROT UR QL STRIP: NEGATIVE
PROTHROMBIN TIME: 13.4 SECONDS (ref 11.7–14.7)
RBC # BLD AUTO: 4.02 M/UL (ref 4.2–5.4)
RBC # UR STRIP: NEGATIVE /UL
SARS-COV-2 RDRP RESP QL NAA+PROBE: NEGATIVE
SODIUM SERPL-SCNC: 142 MMOL/L (ref 136–145)
SP GR UR STRIP: 1.01
TRIGL SERPL-MCNC: 96 MG/DL (ref 35–150)
TROPONIN I SERPL DL<=0.01 NG/ML-MCNC: 64.2 PG/ML
TROPONIN I SERPL DL<=0.01 NG/ML-MCNC: 65.1 PG/ML
TSH SERPL DL<=0.005 MIU/L-ACNC: 0.66 UIU/ML (ref 0.36–3.74)
UROBILINOGEN UR STRIP-ACNC: NORMAL MG/DL
VLDLC SERPL-MCNC: 19 MG/DL
WBC # BLD AUTO: 8.28 K/UL (ref 4.5–11)

## 2024-04-07 PROCEDURE — 93005 ELECTROCARDIOGRAM TRACING: CPT

## 2024-04-07 PROCEDURE — 80061 LIPID PANEL: CPT | Performed by: EMERGENCY MEDICINE

## 2024-04-07 PROCEDURE — 85610 PROTHROMBIN TIME: CPT | Performed by: EMERGENCY MEDICINE

## 2024-04-07 PROCEDURE — 84443 ASSAY THYROID STIM HORMONE: CPT | Performed by: EMERGENCY MEDICINE

## 2024-04-07 PROCEDURE — 84484 ASSAY OF TROPONIN QUANT: CPT | Mod: 91 | Performed by: EMERGENCY MEDICINE

## 2024-04-07 PROCEDURE — 81003 URINALYSIS AUTO W/O SCOPE: CPT | Performed by: EMERGENCY MEDICINE

## 2024-04-07 PROCEDURE — 85025 COMPLETE CBC W/AUTO DIFF WBC: CPT | Performed by: EMERGENCY MEDICINE

## 2024-04-07 PROCEDURE — 87502 INFLUENZA DNA AMP PROBE: CPT | Performed by: EMERGENCY MEDICINE

## 2024-04-07 PROCEDURE — 99285 EMERGENCY DEPT VISIT HI MDM: CPT | Mod: 25

## 2024-04-07 PROCEDURE — 85730 THROMBOPLASTIN TIME PARTIAL: CPT | Performed by: EMERGENCY MEDICINE

## 2024-04-07 PROCEDURE — 87635 SARS-COV-2 COVID-19 AMP PRB: CPT | Performed by: EMERGENCY MEDICINE

## 2024-04-07 PROCEDURE — 87428 SARSCOV & INF VIR A&B AG IA: CPT | Performed by: EMERGENCY MEDICINE

## 2024-04-07 PROCEDURE — 80053 COMPREHEN METABOLIC PANEL: CPT | Performed by: EMERGENCY MEDICINE

## 2024-04-07 PROCEDURE — 93010 ELECTROCARDIOGRAM REPORT: CPT | Mod: ,,, | Performed by: HOSPITALIST

## 2024-04-07 PROCEDURE — 99284 EMERGENCY DEPT VISIT MOD MDM: CPT | Mod: ,,, | Performed by: EMERGENCY MEDICINE

## 2024-04-07 NOTE — ED TRIAGE NOTES
Patient presents to the ED with c/o abnormal gate to the left side and a headache. Patient states this started 2 days ago and she has been dragging her left side when walking and having a bad headache

## 2024-04-07 NOTE — ED PROVIDER NOTES
Encounter Date: 4/7/2024       History     Chief Complaint   Patient presents with    Abnormal Gait    Headache     A 73-year-old female patient is brought to the emergency department because of unsteady gait.  The patient states that 2 days ago she started having headache.  Then she states that she started having difficulty walking and she has been falling to the left and having difficulty keeping her balance.  The patient denies shortness of breath or chest pain .  She  mentioned that she has multiple lumbar compression fractures.  There is no fever or chills.  There is no nausea or vomiting there is no abdominal    The history is provided by the patient. No  was used.     Review of patient's allergies indicates:   Allergen Reactions    Sulfa (sulfonamide antibiotics) Nausea And Vomiting and Itching    Insect venom      Note: - [employer] 05/07/2019     Past Medical History:   Diagnosis Date    Anxiety and depression     Chronic kidney disease, stage 3a     Chronic pain syndrome     COPD (chronic obstructive pulmonary disease)     Fracture of multiple pubic rami, left, closed, initial encounter 10/01/2021    HLD (hyperlipidemia)     Hypertension     Lumbar compression fracture, sequela L1, L2, L4, L5, kyphoplasty 12/8/2021    Lumbar radiculopathy     Lumbar stenosis     Lumbosacral spondylosis     Neuropathy      Past Surgical History:   Procedure Laterality Date    CHOLECYSTECTOMY      CYSTOSCOPY      EPIDURAL STEROID INJECTION INTO LUMBAR SPINE N/A 05/27/2020    L1-2 WILD     EPIDURAL STEROID INJECTION INTO THORACIC SPINE N/A 02/03/2021    T9-10 WILD     EPIDURAL STEROID INJECTION INTO THORACIC SPINE N/A 3/18/2021    Procedure: INJECTION, STEROID, SPINE, THORACIC, EPIDURAL;  Surgeon: Arelis Burns MD;  Location: [facility];  Service: Pain Management;  Laterality: N/A;    EPIDURAL STEROID INJECTION INTO THORACIC SPINE N/A 12/23/2021    Procedure: Injection-steroid-epidural-thoracic  T9/10;  Surgeon: Arelis Burns MD;  Location: Counts include 234 beds at the [facility];  Service: Pain Management;  Laterality: N/A;  HAD VAC  WILL BRING CARD    HYSTERECTOMY      INJECTION OF ANESTHETIC AGENT AROUND MEDIAL BRANCH NERVES INNERVATING LUMBAR FACET JOINT Bilateral 4/22/2021    Procedure: Block-nerve-medial branch-lumbar Bilateral L3-4,4-5,5-S1 MBB #2;  Surgeon: Arelis Burns MD;  Location: Counts include 234 beds at the [facility];  Service: Pain Management;  Laterality: Bilateral;    INJECTION OF ANESTHETIC AGENT AROUND MEDIAL BRANCH NERVES INNERVATING LUMBAR FACET JOINT Bilateral 9/6/2022    Procedure: Block-nerve-medial branch-lumbar, bilateral L4 through S1;  Surgeon: Arelis Burns MD;  Location: Counts include 234 beds at the [facility];  Service: Pain Management;  Laterality: Bilateral;    INJECTION OF ANESTHETIC AGENT AROUND MEDIAL BRANCH NERVES INNERVATING LUMBAR FACET JOINT Bilateral 10/11/2022    Procedure: Block-nerve-medial branch-lumbar, bilateral L4 through S1;  Surgeon: Arelis Burns MD;  Location: Counts include 234 beds at the [facility];  Service: Pain Management;  Laterality: Bilateral;  vaccinated.    INJECTION OF FACET JOINT Bilateral 12/04/2020    RADIOFREQUENCY ABLATION OF LUMBAR MEDIAL BRANCH NERVE AT SINGLE LEVEL Bilateral 5/20/2021    Procedure: RADIOFREQUENCY ABLATION, NERVE, SPINAL, LUMBAR, MEDIAL BRANCH, 1 LEVEL;  Surgeon: Arelis Burns MD;  Location: [facility];  Service: Pain Management;  Laterality: Bilateral;  Bilateral L3-S1 RFTC (both sides same day)     Family History   Problem Relation Age of Onset    Hypertension Mother     Heart disease Father      Social History     Tobacco Use    Smoking status: Former    Smokeless tobacco: Never   Substance Use Topics    Alcohol use: Never     Comment: unknown    Drug use: Never     Types: Hydrocodone     Review of Systems   Constitutional:  Negative for fever.   HENT:  Negative for sore throat.    Respiratory:  Negative for shortness of breath.    Cardiovascular:  Negative for chest pain.    Gastrointestinal:  Negative for nausea.   Genitourinary:  Negative for dysuria.   Musculoskeletal:  Negative for back pain.   Skin:  Negative for rash.   Neurological:  Negative for weakness.   Hematological:  Does not bruise/bleed easily.   All other systems reviewed and are negative.      Physical Exam     Initial Vitals [04/07/24 1811]   BP Pulse Resp Temp SpO2   131/67 76 12 98.4 °F (36.9 °C) 97 %      MAP       --         Physical Exam    Nursing note and vitals reviewed.  Constitutional: She appears well-developed and well-nourished.   HENT:   Head: Normocephalic and atraumatic.   Eyes: EOM are normal. Pupils are equal, round, and reactive to light.   Neck: Neck supple. No thyromegaly present.   Normal range of motion.  Cardiovascular:  Normal rate, regular rhythm, normal heart sounds and intact distal pulses.           No murmur heard.  Pulmonary/Chest: Breath sounds normal. She has no wheezes.   Abdominal: Abdomen is soft. Bowel sounds are normal. There is no abdominal tenderness.   Musculoskeletal:         General: No tenderness or edema. Normal range of motion.      Cervical back: Normal range of motion and neck supple.     Lymphadenopathy:     She has no cervical adenopathy.   Neurological: She is alert and oriented to person, place, and time. She displays tremor. Gait (Patient falls to the left) abnormal. GCS score is 15. GCS eye subscore is 4. GCS verbal subscore is 5. GCS motor subscore is 6.   Cerebellar examination showed positive dysmethria worse on the left more than right   Skin: Skin is warm and dry. Capillary refill takes less than 2 seconds. No rash noted.   Psychiatric: She has a normal mood and affect.         Medical Screening Exam   See Full Note    ED Course   Procedures  Labs Reviewed   COMPREHENSIVE METABOLIC PANEL - Abnormal; Notable for the following components:       Result Value    Potassium 3.3 (*)     Chloride 112 (*)     Albumin 3.1 (*)     All other components within normal  limits   LIPID PANEL - Abnormal; Notable for the following components:    HDL Cholesterol 79 (*)     All other components within normal limits   TROPONIN I - Abnormal; Notable for the following components:    Troponin I High Sensitivity 64.2 (*)     All other components within normal limits   CBC WITH DIFFERENTIAL - Abnormal; Notable for the following components:    RBC 4.02 (*)     Hemoglobin 11.2 (*)     Hematocrit 34.1 (*)     RDW 19.6 (*)     Neutrophils % 65.2 (*)     Lymphocytes % 26.0 (*)     Monocytes % 6.8 (*)     All other components within normal limits   TROPONIN I - Abnormal; Notable for the following components:    Troponin I High Sensitivity 65.1 (*)     All other components within normal limits   INFLUENZA A & B BY MOLECULAR - Normal   PROTIME-INR - Normal   TSH - Normal   APTT - Normal   SARS-COV-2 RNA AMPLIFICATION, QUAL - Normal    Narrative:     Negative SARS-CoV results should not be used as the sole basis for treatment or patient management decisions; negative results should be considered in the context of a patient's recent exposures, history and the presene of clinical signs and symptoms consistent with COVID-19.  Negative results should be treated as presumptive and confirmed by molecular assay, if necessary for patient management.   CBC W/ AUTO DIFFERENTIAL    Narrative:     The following orders were created for panel order CBC W/ AUTO DIFFERENTIAL.  Procedure                               Abnormality         Status                     ---------                               -----------         ------                     CBC with Differential[[id number]]       Abnormal            Final result                 Please view results for these tests on the individual orders.   URINALYSIS, REFLEX TO URINE CULTURE          Imaging Results              CT Head Without Contrast (Final result)  Result time 04/07/24 19:34:36      Final result by Brendan Chavira MD (04/07/24 19:34:36)                    Impression:      No acute intracranial process      Electronically signed by: Brendan Chavira  Date:    04/07/2024  Time:    19:34               Narrative:    EXAMINATION:  CT head without contrast    CLINICAL HISTORY:  Neuro deficit, acute, stroke suspected;    TECHNIQUE:  Transaxial CT sections were obtained through the brain without contrast.    The CT examination was performed using one or more of the following dose reduction techniques: Automated exposure control, adjustment of the mA and kV according to patient's size, use of acute or iterative reconstruction techniques.    COMPARISON:  CT head December 6, 2023    FINDINGS:  The ventricles are midline in position without evidence of hydrocephalus. There is no mass or area of parenchymal hemorrhage. There is no gross CT evidence of acute cortical stroke. There is no extra-axial hematoma. The partially visualized sinuses are generally clear. There is no obvious skull fracture.                                       Medications - No data to display  Medical Decision Making  Amount and/or Complexity of Data Reviewed  Labs: ordered.  Radiology: ordered.               ED Course as of 04/09/24 0645   Sun Apr 07, 2024   3906 MDM:  A 73-year-old female patient came to the emergency department because of increasing weakness.  Her workup did not reveal acute findings including CT scan of the head.  We will discharge the patient home to follow up with her primary care provider. [HK]      ED Course User Index  [HK] Yazan Bragg MD                           Clinical Impression:   Final diagnoses:  [R29.818] Acute focal neurological deficit  [R53.1] Weakness (Primary)        ED Disposition Condition    Discharge Stable          ED Prescriptions    None       Follow-up Information    None          Yazan Bragg MD  04/09/24 0646

## 2024-04-08 ENCOUNTER — TELEPHONE (OUTPATIENT)
Dept: EMERGENCY MEDICINE | Facility: HOSPITAL | Age: 74
End: 2024-04-08
Payer: MEDICARE

## 2024-04-08 VITALS
BODY MASS INDEX: 15.27 KG/M2 | HEIGHT: 62 IN | OXYGEN SATURATION: 98 % | SYSTOLIC BLOOD PRESSURE: 158 MMHG | TEMPERATURE: 98 F | RESPIRATION RATE: 15 BRPM | HEART RATE: 69 BPM | WEIGHT: 83 LBS | DIASTOLIC BLOOD PRESSURE: 71 MMHG

## 2024-04-10 LAB
OHS QRS DURATION: 90 MS
OHS QTC CALCULATION: 450 MS

## 2024-04-16 ENCOUNTER — ANESTHESIA EVENT (OUTPATIENT)
Dept: PAIN MEDICINE | Facility: HOSPITAL | Age: 74
End: 2024-04-16
Payer: MEDICARE

## 2024-04-16 ENCOUNTER — ANESTHESIA (OUTPATIENT)
Dept: PAIN MEDICINE | Facility: HOSPITAL | Age: 74
End: 2024-04-16
Payer: MEDICARE

## 2024-04-16 ENCOUNTER — HOSPITAL ENCOUNTER (OUTPATIENT)
Facility: HOSPITAL | Age: 74
Discharge: HOME | End: 2024-04-16
Attending: PAIN MEDICINE | Admitting: PAIN MEDICINE
Payer: MEDICARE

## 2024-04-16 VITALS
WEIGHT: 87.63 LBS | SYSTOLIC BLOOD PRESSURE: 143 MMHG | OXYGEN SATURATION: 100 % | DIASTOLIC BLOOD PRESSURE: 74 MMHG | RESPIRATION RATE: 13 BRPM | HEART RATE: 80 BPM | HEIGHT: 62 IN | BODY MASS INDEX: 16.13 KG/M2 | TEMPERATURE: 99 F

## 2024-04-16 DIAGNOSIS — M54.16 LUMBAR RADICULOPATHY: ICD-10-CM

## 2024-04-16 PROCEDURE — 63600175 PHARM REV CODE 636 W HCPCS: Performed by: NURSE ANESTHETIST, CERTIFIED REGISTERED

## 2024-04-16 PROCEDURE — 37000009 HC ANESTHESIA EA ADD 15 MINS: Performed by: PAIN MEDICINE

## 2024-04-16 PROCEDURE — 62323 NJX INTERLAMINAR LMBR/SAC: CPT | Mod: ,,, | Performed by: PAIN MEDICINE

## 2024-04-16 PROCEDURE — 37000008 HC ANESTHESIA 1ST 15 MINUTES: Performed by: PAIN MEDICINE

## 2024-04-16 PROCEDURE — 63600175 PHARM REV CODE 636 W HCPCS: Performed by: PAIN MEDICINE

## 2024-04-16 PROCEDURE — D9220A PRA ANESTHESIA: Mod: 23,,, | Performed by: NURSE ANESTHETIST, CERTIFIED REGISTERED

## 2024-04-16 PROCEDURE — 25000003 PHARM REV CODE 250: Performed by: NURSE ANESTHETIST, CERTIFIED REGISTERED

## 2024-04-16 PROCEDURE — 62323 NJX INTERLAMINAR LMBR/SAC: CPT | Performed by: PAIN MEDICINE

## 2024-04-16 PROCEDURE — 25500020 PHARM REV CODE 255: Performed by: PAIN MEDICINE

## 2024-04-16 RX ORDER — LIDOCAINE HYDROCHLORIDE 20 MG/ML
INJECTION INTRAVENOUS
Status: DISCONTINUED | OUTPATIENT
Start: 2024-04-16 | End: 2024-04-16

## 2024-04-16 RX ORDER — TRIAMCINOLONE ACETONIDE 40 MG/ML
INJECTION, SUSPENSION INTRA-ARTICULAR; INTRAMUSCULAR CODE/TRAUMA/SEDATION MEDICATION
Status: DISCONTINUED | OUTPATIENT
Start: 2024-04-16 | End: 2024-04-16 | Stop reason: HOSPADM

## 2024-04-16 RX ORDER — IOPAMIDOL 612 MG/ML
INJECTION, SOLUTION INTRATHECAL CODE/TRAUMA/SEDATION MEDICATION
Status: DISCONTINUED | OUTPATIENT
Start: 2024-04-16 | End: 2024-04-16 | Stop reason: HOSPADM

## 2024-04-16 RX ORDER — PROPOFOL 10 MG/ML
VIAL (ML) INTRAVENOUS
Status: DISCONTINUED | OUTPATIENT
Start: 2024-04-16 | End: 2024-04-16

## 2024-04-16 RX ORDER — SODIUM CHLORIDE 9 MG/ML
INJECTION, SOLUTION INTRAVENOUS CONTINUOUS
Status: DISCONTINUED | OUTPATIENT
Start: 2024-04-16 | End: 2024-04-16 | Stop reason: HOSPADM

## 2024-04-16 RX ADMIN — PROPOFOL 20 MG: 10 INJECTION, EMULSION INTRAVENOUS at 12:04

## 2024-04-16 RX ADMIN — LIDOCAINE HYDROCHLORIDE 100 MG: 20 INJECTION, SOLUTION INTRAVENOUS at 12:04

## 2024-04-16 RX ADMIN — SODIUM CHLORIDE: 9 INJECTION, SOLUTION INTRAVENOUS at 12:04

## 2024-04-16 RX ADMIN — PROPOFOL 30 MG: 10 INJECTION, EMULSION INTRAVENOUS at 12:04

## 2024-04-16 NOTE — TRANSFER OF CARE
"Anesthesia Transfer of Care Note    Patient: Amelie Cerrato    Procedure(s) Performed: Procedure(s) (LRB):  L3-4 WILD (Bilateral)    Patient location: Other:    Anesthesia Type: general    Transport from OR: Transported from OR on room air with adequate spontaneous ventilation    Post pain: adequate analgesia    Post assessment: no apparent anesthetic complications    Post vital signs: stable    Level of consciousness: responds to stimulation, awake and sedated    Nausea/Vomiting: no nausea/vomiting    Complications: none    Transfer of care protocol was followed    Last vitals: Visit Vitals  /73 (BP Location: Right arm, Patient Position: Lying)   Pulse 79   Temp 37 °C (98.6 °F) (Oral)   Resp 16   Ht 5' 2" (1.575 m)   Wt 39.7 kg (87 lb 9.6 oz)   LMP  (LMP Unknown)   SpO2 100%   BMI 16.02 kg/m²     "

## 2024-04-16 NOTE — BRIEF OP NOTE
The  Discharge Note  Short Stay    Admit Date: 4/16/2024    Discharge Date: 4/16/2024    Attending Physician: Arelis Burns     Discharge Provider: Arelis Burns    Diagnosis: Lumbar radiculopathy    Procedure performed: L3-4 WILD via bilateral transforaminal approach    Findings: Procedure tolerated well and without complications. Consistent with diagnosis.    EBL: 0cc    Specimens: None    Discharged Condition: Good    Final Diagnoses: Lumbar radiculopathy [M54.16]    Disposition: Home or Self Care    Hospital Course: No complications, uneventful    Outcome of Hospitalization, Treatment, Procedure, or Surgery:  Patient was admitted for outpatient interventional pain management procedure. The patient tolerated the procedure well with no complications.    Follow up/Patient Instructions:  Follow up as scheduled in Pain Management office in 3-4 weeks.  Patient has received instructions and follow up date and time.    Medications:  Continue previous medications

## 2024-04-16 NOTE — ANESTHESIA PREPROCEDURE EVALUATION
04/16/2024  Amelie Cerrato is a 73 y.o., female.      Pre-op Assessment    I have reviewed the Patient Summary Reports.     I have reviewed the Nursing Notes. I have reviewed the NPO Status.   I have reviewed the Medications.     Review of Systems  Cardiovascular:     Hypertension                                        Renal/:  Chronic Renal Disease, CKD                Musculoskeletal:  Arthritis          Spine Disorders: lumbar            Neurological:    Neuromuscular Disease,                                   Psych:  Psychiatric History                  Physical Exam  General: Well nourished, Cooperative, Alert and Oriented    Airway:  Mallampati: II   Mouth Opening: Normal  TM Distance: Normal  Tongue: Normal  Neck ROM: Normal ROM        Anesthesia Plan  Type of Anesthesia, risks & benefits discussed:    Anesthesia Type: Gen Natural Airway  Intra-op Monitoring Plan: Standard ASA Monitors  Post Op Pain Control Plan: multimodal analgesia  Induction:  IV  Airway Plan: , Awake  Informed Consent: Informed consent signed with the Patient and all parties understand the risks and agree with anesthesia plan.  All questions answered. Patient consented to blood products? Yes  ASA Score: 3  Day of Surgery Review of History & Physical: H&P Update referred to the surgeon/provider.    Ready For Surgery From Anesthesia Perspective.     .

## 2024-04-16 NOTE — DISCHARGE SUMMARY
[facility] - Pain Management  Discharge Note  Short Stay    Procedure(s) (LRB):  L3-4 WILD (Bilateral)      OUTCOME: Patient tolerated treatment/procedure well without complication and is now ready for discharge.    DISPOSITION: Home or Self Care    FINAL DIAGNOSIS:  Lumbar radiculopathy    FOLLOWUP: In clinic    DISCHARGE INSTRUCTIONS:  See nurse's notes     TIME SPENT ON DISCHARGE: 5 minutes

## 2024-04-16 NOTE — ANESTHESIA POSTPROCEDURE EVALUATION
Anesthesia Post Evaluation    Patient: Amelie Cerrato    Procedure(s) Performed: Procedure(s) (LRB):  L3-4 WILD (Bilateral)    Final Anesthesia Type: general      Patient location during evaluation: OPS  Patient participation: Yes- Able to Participate  Level of consciousness: awake and alert and oriented  Post-procedure vital signs: reviewed and stable  Pain management: adequate  Airway patency: patent    PONV status at discharge: No PONV  Anesthetic complications: no      Cardiovascular status: blood pressure returned to baseline and stable  Respiratory status: unassisted, spontaneous ventilation and room air  Hydration status: euvolemic  Follow-up not needed.  Comments: Refer to nursing note for pain/keith score upon discharge from recovery.           Vitals Value Taken Time   /71 04/16/24 1305   Temp 37 °C (98.6 °F) 04/16/24 1305   Pulse 81 04/16/24 1305   Resp 19 04/16/24 1305   SpO2 100 % 04/16/24 1305   Vitals shown include unfiled device data.      No case tracking events are documented in the log.      Pain/Keith Score: No data recorded

## 2024-04-16 NOTE — OP NOTE
"Procedure Note    Procedure Date: 4/16/2024    Procedure Performed:  Lumbar interlaminar epidural steroid injection under fluoroscopy at L3-4    Indications: Patient failed conservative therapy.      Pre-op diagnosis: Lumbar Radiculopathy    Post-op diagnosis: same    Physician: Arelis Burns MD    Anesthesia: MAC    Medications injected: Kenalog 40mg,  2 mL sterile preservative-free normal saline.    Local anesthetic used: 1% Lidocaine, 3 ml    Estimated Blood Loss: None    Complications:  None    Technique:  The patient was interviewed in the holding area and Risks/Benefits were discussed, including, but not limited to, the possibility of new or different pain, bleeding or infection.   All questions were answered.  The patient understood and accepted risks.  Consent was verified and signed.   A time-out was taken to identify patient and procedure prior to starting the procedure. The patient was placed in the prone position on the fluoroscopy table. The area of the lumbar spine was prepped with Chloraprep and draped in a sterile manner. The L3-4  interspace was identified and marked under AP fluoroscopy. The skin and subcutaneous tissues overlying the targeted interspace were anesthetized with 3-5 mL of 1% lidocaine using a 25G 1.5" needle.  A 20G  3.5" Tuohy epidural needle was directed toward the interspace under fluoroscopic guidance until the ligamentum flavum was engaged.  There was difficulty with needle placement due severe stenosis a bilateral transforaminal approach was L3-L4 without difficulty.  An appropriate epidurogram was noted.  A 3mL mixture consisting of saline and 40 mg of kenalog was injected slowly and without resistance.  The needle was  removed and a sterile Band-Aid dressing was applied to the puncture site.  The patient tolerated the procedure well and was transferred to the [facility] in stable condition.  The patient was monitored after the procedure and was given post-procedure and discharge " instructions to follow at home. The patient was discharged in a stable condition and accompanied by an adult .    Epidurogram:3 mL allotment of Isovue M 300 contrast revealed excellent delineation along the bilateral L3 nerve roots. There were filling defects or obstruction to dye flow noted.  There was no  intravascular or intrathecal spread noted with dye flow.

## 2024-04-16 NOTE — PLAN OF CARE
Plan:  D/c pt via wheelchair at 1350  Informed pt if does not void in 8 hours to go to ER. Notify if redness, drainage, from injection site or fever over next 3-4 days. Rest and drink plenty of fluids for the remainder of the day. No lifting over 5 lbs. For the remainder of the day. Continue regular medications as prescribed. May take pain medications as prescribed.     Pain improved 100%  Pre-procedure pain: 9  Post-procedure pain: 0

## 2024-04-22 NOTE — PROGRESS NOTES
Subjective:         Patient ID: Amelie Cerrato is a 73 y.o. female.    Chief Complaint: Back Pain        Pain  This is a chronic problem. The current episode started more than 1 year ago. The problem occurs daily. The problem has been gradually improving. Associated symptoms include arthralgias, myalgias and neck pain. Pertinent negatives include no anorexia, chest pain, chills, coughing, diaphoresis, fever, sore throat, swollen glands, vertigo, visual change or vomiting.     Review of Systems   Constitutional:  Negative for activity change, appetite change, chills, diaphoresis, fever and unexpected weight change.   HENT:  Negative for drooling, ear discharge, ear pain, facial swelling, mouth sores, nosebleeds, sore throat, trouble swallowing, voice change and goiter.    Eyes:  Negative for photophobia, pain, discharge, redness, visual disturbance and eye dryness.   Respiratory:  Negative for apnea, cough, choking, chest tightness, shortness of breath, wheezing and stridor.    Cardiovascular:  Negative for chest pain, palpitations and leg swelling.   Gastrointestinal:  Negative for abdominal distention, anorexia, diarrhea, rectal pain, vomiting and fecal incontinence.   Endocrine: Negative for cold intolerance, heat intolerance, polydipsia, polyphagia and polyuria.   Genitourinary:  Negative for flank pain, frequency, hematuria and hot flashes.   Musculoskeletal:  Positive for arthralgias, back pain, myalgias, neck pain and neck stiffness.   Integumentary:  Negative for color change and pallor.   Allergic/Immunologic: Negative for immunocompromised state.   Neurological:  Negative for dizziness, vertigo, seizures, syncope, facial asymmetry, speech difficulty, light-headedness, memory loss and coordination difficulties.   Hematological:  Negative for adenopathy. Does not bruise/bleed easily.   Psychiatric/Behavioral:  Negative for agitation, behavioral problems, confusion, decreased concentration, dysphoric mood,  hallucinations, self-injury and suicidal ideas. The patient is not nervous/anxious and is not hyperactive.            Past Medical History:   Diagnosis Date    Anxiety and depression     Chronic kidney disease, stage 3a     Chronic pain syndrome     COPD (chronic obstructive pulmonary disease)     Fracture of multiple pubic rami, left, closed, initial encounter 10/01/2021    HLD (hyperlipidemia)     Hypertension     Lumbar compression fracture, sequela L1, L2, L4, L5, kyphoplasty 12/8/2021    Lumbar radiculopathy     Lumbar stenosis     Lumbosacral spondylosis     Neuropathy      Past Surgical History:   Procedure Laterality Date    CHOLECYSTECTOMY      CYSTOSCOPY      EPIDURAL STEROID INJECTION INTO LUMBAR SPINE N/A 05/27/2020    L1-2 WILD     EPIDURAL STEROID INJECTION INTO LUMBAR SPINE Bilateral 4/16/2024    Procedure: L3-4 WILD;  Surgeon: Arelis Burns MD;  Location: [facility];  Service: Pain Management;  Laterality: Bilateral;    EPIDURAL STEROID INJECTION INTO THORACIC SPINE N/A 02/03/2021    T9-10 WILD     EPIDURAL STEROID INJECTION INTO THORACIC SPINE N/A 3/18/2021    Procedure: INJECTION, STEROID, SPINE, THORACIC, EPIDURAL;  Surgeon: Arelis Burns MD;  Location: [facility];  Service: Pain Management;  Laterality: N/A;    EPIDURAL STEROID INJECTION INTO THORACIC SPINE N/A 12/23/2021    Procedure: Injection-steroid-epidural-thoracic T9/10;  Surgeon: Arelis Burns MD;  Location: [facility];  Service: Pain Management;  Laterality: N/A;  HAD VAC  WILL BRING CARD    HYSTERECTOMY      INJECTION OF ANESTHETIC AGENT AROUND MEDIAL BRANCH NERVES INNERVATING LUMBAR FACET JOINT Bilateral 4/22/2021    Procedure: Block-nerve-medial branch-lumbar Bilateral L3-4,4-5,5-S1 MBB #2;  Surgeon: Arelis Burns MD;  Location: [facility];  Service: Pain Management;  Laterality: Bilateral;    INJECTION OF ANESTHETIC AGENT AROUND MEDIAL BRANCH NERVES INNERVATING LUMBAR  FACET JOINT Bilateral 9/6/2022    Procedure: Block-nerve-medial branch-lumbar, bilateral L4 through S1;  Surgeon: Arelis Burns MD;  Location: [facility];  Service: Pain Management;  Laterality: Bilateral;    INJECTION OF ANESTHETIC AGENT AROUND MEDIAL BRANCH NERVES INNERVATING LUMBAR FACET JOINT Bilateral 10/11/2022    Procedure: Block-nerve-medial branch-lumbar, bilateral L4 through S1;  Surgeon: Arelis Burns MD;  Location: [facility];  Service: Pain Management;  Laterality: Bilateral;  vaccinated.    INJECTION OF FACET JOINT Bilateral 12/04/2020    RADIOFREQUENCY ABLATION OF LUMBAR MEDIAL BRANCH NERVE AT SINGLE LEVEL Bilateral 5/20/2021    Procedure: RADIOFREQUENCY ABLATION, NERVE, SPINAL, LUMBAR, MEDIAL BRANCH, 1 LEVEL;  Surgeon: Arelis Burns MD;  Location: [facility];  Service: Pain Management;  Laterality: Bilateral;  Bilateral L3-S1 RFTC (both sides same day)     Social History     Socioeconomic History    Marital status:    Tobacco Use    Smoking status: Former    Smokeless tobacco: Never   Substance and Sexual Activity    Alcohol use: Never     Comment: unknown    Drug use: Never     Types: Hydrocodone    Sexual activity: Not Currently     Social Determinants of Health     Financial Resource Strain: Low Risk  (3/13/2023)    Overall Financial Resource Strain (CARDIA)     Difficulty of Paying Living Expenses: Not very hard   Food Insecurity: No Food Insecurity (12/17/2023)    [facility]    Hunger Vital Sign     Worried About Running Out of Food in the Last Year: Never true     Ran Out of Food in the Last Year: Never true   Transportation Needs: No Transportation Needs (12/17/2023)    [facility]    PRAPARE - Transportation     Lack of Transportation (Medical): No     Lack of Transportation (Non-Medical): No   Physical Activity: Inactive (3/13/2023)     "Exercise Vital Sign     Days of Exercise per Week: 0 days     Minutes of Exercise per Session: 0 min   Stress: Stress Concern Present (3/13/2023)    [employer] [place] [employer] - Occupational Stress Questionnaire     Feeling of Stress : Rather much   Social Connections: Moderately Isolated (3/13/2023)    Social Connection and Isolation Panel [NHANES]     Frequency of Communication with Friends and Family: Three times a week     Frequency of Social Gatherings with Friends and Family: Three times a week     Attends [employer] Services: More than 4 times per year     Active Member of Clubs or Organizations: No     Attends Club or Organization Meetings: Never     Marital Status:    Housing Stability: Unknown (12/17/2023)    [facility]    Housing Stability Vital Sign     Unable to Pay for Housing in the Last Year: No     Unstable Housing in the Last Year: No     Family History   Problem Relation Name Age of Onset    Hypertension Mother      Heart disease Father       Review of patient's allergies indicates:   Allergen Reactions    Sulfa (sulfonamide antibiotics) Nausea And Vomiting and Itching    Insect venom      Note: - [employer] 05/07/2019        Objective:  Vitals:    04/29/24 1011   BP: (!) 174/80   Pulse: 83   Resp: 18   Weight: 40.8 kg (90 lb)   Height: 5' 2" (1.575 m)   PainSc:   8           Physical Exam  Vitals and nursing note reviewed. Exam conducted with a chaperone present.   Constitutional:       General: She is awake. She is not in acute distress.     Appearance: Normal appearance. She is not ill-appearing, toxic-appearing or diaphoretic.   HENT:      Head: Normocephalic and atraumatic.      Nose: Nose normal.      Mouth/Throat:      Mouth: Mucous membranes are moist.      Pharynx: Oropharynx is clear.   Eyes:      Conjunctiva/sclera: Conjunctivae normal.      Pupils: Pupils are equal, round, and reactive to light. "   Cardiovascular:      Rate and Rhythm: Normal rate.   Pulmonary:      Effort: Pulmonary effort is normal. No respiratory distress.   Abdominal:      Palpations: Abdomen is soft.   Musculoskeletal:         General: Normal range of motion.      Right shoulder: Tenderness present.      Left shoulder: Tenderness present.      Cervical back: Normal range of motion and neck supple. Tenderness present.      Thoracic back: Tenderness present.      Lumbar back: Tenderness present.   Skin:     General: Skin is warm and dry.   Neurological:      General: No focal deficit present.      Mental Status: She is alert and oriented to person, place, and time. Mental status is at baseline.      Cranial Nerves: No cranial nerve deficit (II-XII).   Psychiatric:         Mood and Affect: Mood normal.         Behavior: Behavior normal. Behavior is cooperative.         Thought Content: Thought content normal.         FL Fluoro for Pain Management  See OP Notes for results.     IMPRESSION: See OP Notes for results.     This procedure was auto-finalized by: Virtual Radiologist         Admission on 04/07/2024, Discharged on 04/08/2024   Component Date Value Ref Range Status    Sodium 04/07/2024 142  136 - 145 mmol/L Final    Potassium 04/07/2024 3.3 (L)  3.5 - 5.1 mmol/L Final    Chloride 04/07/2024 112 (H)  98 - 107 mmol/L Final    CO2 04/07/2024 24  21 - 32 mmol/L Final    Anion Gap 04/07/2024 9  7 - 16 mmol/L Final    Glucose 04/07/2024 93  74 - 106 mg/dL Final    BUN 04/07/2024 17  7 - 18 mg/dL Final    Creatinine 04/07/2024 0.99  0.55 - 1.02 mg/dL Final    BUN/Creatinine Ratio 04/07/2024 17  6 - 20 Final    Calcium 04/07/2024 9.0  8.5 - 10.1 mg/dL Final    Total Protein 04/07/2024 6.4  6.4 - 8.2 g/dL Final    Albumin 04/07/2024 3.1 (L)  3.5 - 5.0 g/dL Final    Globulin 04/07/2024 3.3  2.0 - 4.0 g/dL Final    A/G Ratio 04/07/2024 0.9   Final    Bilirubin, Total 04/07/2024 0.3  >0.0 - 1.2 mg/dL Final    Alk Phos 04/07/2024  78  55 - 142 U/L Final    ALT 04/07/2024 16  13 - 56 U/L Final    AST 04/07/2024 16  15 - 37 U/L Final    eGFR 04/07/2024 60  >=60 mL/min/1.73m2 Final    PT 04/07/2024 13.4  11.7 - 14.7 seconds Final    INR 04/07/2024 1.03  <=3.30 Final    TSH 04/07/2024 0.658  0.358 - 3.740 uIU/mL Final    QRS Duration 04/07/2024 90  ms Final    OHS QTC Calculation 04/07/2024 450  ms Final    Triglycerides 04/07/2024 96  35 - 150 mg/dL Final    Cholesterol 04/07/2024 177  0 - 200 mg/dL Final    HDL Cholesterol 04/07/2024 79 (H)  40 - 60 mg/dL Final    Cholesterol/HDL Ratio (Risk Factor) 04/07/2024 2.2   Final    Non-HDL 04/07/2024 98  mg/dL Final    LDL Calculated 04/07/2024 79  mg/dL Final    LDL/HDL 04/07/2024 1.0   Final    VLDL 04/07/2024 19  mg/dL Final    PTT 04/07/2024 34.9  25.2 - 37.3 seconds Final    Troponin I High Sensitivity 04/07/2024 64.2 (HH)  <=60.4 pg/mL Final    WBC 04/07/2024 8.28  4.50 - 11.00 K/uL Final    RBC 04/07/2024 4.02 (L)  4.20 - 5.40 M/uL Final    Hemoglobin 04/07/2024 11.2 (L)  12.0 - 16.0 g/dL Final    Hematocrit 04/07/2024 34.1 (L)  38.0 - 47.0 % Final    MCV 04/07/2024 84.8  80.0 - 96.0 fL Final    MCH 04/07/2024 27.9  27.0 - 31.0 pg Final    MCHC 04/07/2024 32.8  32.0 - 36.0 g/dL Final    RDW 04/07/2024 19.6 (H)  11.5 - 14.5 % Final    Platelet Count 04/07/2024 291  150 - 400 K/uL Final    MPV 04/07/2024 9.6  9.4 - 12.4 fL Final    Neutrophils % 04/07/2024 65.2 (H)  53.0 - 65.0 % Final    Lymphocytes % 04/07/2024 26.0 (L)  27.0 - 41.0 % Final    Monocytes % 04/07/2024 6.8 (H)  2.0 - 6.0 % Final    Eosinophils % 04/07/2024 1.1  1.0 - 4.0 % Final    Basophils % 04/07/2024 0.5  0.0 - 1.0 % Final    Immature Granulocytes % 04/07/2024 0.4  0.0 - 0.4 % Final    nRBC, Auto 04/07/2024 0.0  <=0.0 % Final    Neutrophils, Abs 04/07/2024 5.41  1.80 - 7.70 K/uL Final    Lymphocytes, Absolute 04/07/2024 2.15  1.00 - 4.80 K/uL Final    Monocytes, Absolute 04/07/2024 0.56   0.00 - 0.80 K/uL Final    Eosinophils, Absolute 04/07/2024 0.09  0.00 - 0.50 K/uL Final    Basophils, Absolute 04/07/2024 0.04  0.00 - 0.20 K/uL Final    Immature Granulocytes, Absolute 04/07/2024 0.03  0.00 - 0.04 K/uL Final    nRBC, Absolute 04/07/2024 0.00  <=0.00 x10e3/uL Final    Diff Type 04/07/2024 Auto   Final    Troponin I High Sensitivity 04/07/2024 65.1 (HH)  <=60.4 pg/mL Final    SARS COV-2 MOLECULAR 04/07/2024 Negative  Negative, Invalid Final    INFLUENZA A MOLECULAR 04/07/2024 Negative  Negative Final    INFLUENZA B MOLECULAR  04/07/2024 Negative  Negative Final    Color, UA 04/07/2024 Colorless  Colorless, Straw, Yellow, Light Yellow, Dark Yellow Final    Clarity, UA 04/07/2024 Clear  Clear Final    pH, UA 04/07/2024 5.5  5.0 to 8.0 pH Units Final    Leukocytes, UA 04/07/2024 Negative  Negative Final    Nitrites, UA 04/07/2024 Negative  Negative Final    Protein, UA 04/07/2024 Negative  Negative Final    Glucose, UA 04/07/2024 Normal  Normal mg/dL Final    Ketones, UA 04/07/2024 Negative  Negative mg/dL Final    Urobilinogen, UA 04/07/2024 Normal  0.2, 1.0, Normal mg/dL Final    Bilirubin, UA 04/07/2024 Negative  Negative Final    Blood, UA 04/07/2024 Negative  Negative Final    Specific Gravity, UA 04/07/2024 1.008  <=1.030 Final   Office Visit on 02/28/2024   Component Date Value Ref Range Status    POC Amphetamines 02/28/2024 Negative  Negative, Inconclusive Final    POC Barbiturates 02/28/2024 Negative  Negative, Inconclusive Final    POC Benzodiazepines 02/28/2024 Negative  Negative, Inconclusive Final    POC Cocaine 02/28/2024 Negative  Negative, Inconclusive Final    POC THC 02/28/2024 Negative  Negative, Inconclusive Final    POC Methadone 02/28/2024 Negative  Negative, Inconclusive Final    POC Methamphetamine 02/28/2024 Negative  Negative, Inconclusive Final    POC Opiates 02/28/2024 Presumptive Positive (A)  Negative, Inconclusive Final    POC Oxycodone  02/28/2024 Negative  Negative, Inconclusive Final    POC Phencyclidine 02/28/2024 Negative  Negative, Inconclusive Final    POC Methylenedioxymethamphetamine * 02/28/2024 Negative  Negative, Inconclusive Final    POC Tricyclic Antidepressants 02/28/2024 Negative  Negative, Inconclusive Final    POC Buprenorphine 02/28/2024 Negative   Final     Acceptable 02/28/2024 Yes   Final    POC Temperature (Urine) 02/28/2024 92   Final   Admission on 12/06/2023, Discharged on 12/07/2023   Component Date Value Ref Range Status    Sodium 12/06/2023 136  136 - 145 mmol/L Final    Potassium 12/06/2023 4.2  3.5 - 5.1 mmol/L Final    Chloride 12/06/2023 105  98 - 107 mmol/L Final    CO2 12/06/2023 25  21 - 32 mmol/L Final    Anion Gap 12/06/2023 10  7 - 16 mmol/L Final    Glucose 12/06/2023 92  74 - 106 mg/dL Final    BUN 12/06/2023 31 (H)  7 - 18 mg/dL Final    Creatinine 12/06/2023 1.42 (H)  0.55 - 1.02 mg/dL Final    BUN/Creatinine Ratio 12/06/2023 22 (H)  6 - 20 Final    Calcium 12/06/2023 8.9  8.5 - 10.1 mg/dL Final    Total Protein 12/06/2023 6.3 (L)  6.4 - 8.2 g/dL Final    Albumin 12/06/2023 3.2 (L)  3.5 - 5.0 g/dL Final    Globulin 12/06/2023 3.1  2.0 - 4.0 g/dL Final    A/G Ratio 12/06/2023 1.0   Final    Bilirubin, Total 12/06/2023 0.4  >0.0 - 1.2 mg/dL Final    Alk Phos 12/06/2023 108  55 - 142 U/L Final    ALT 12/06/2023 26  13 - 56 U/L Final    AST 12/06/2023 39 (H)  15 - 37 U/L Final    eGFR 12/06/2023 39 (L)  >=60 mL/min/1.73m2 Final    Troponin I High Sensitivity 12/06/2023 59.1  <=60.4 pg/mL Final    ProBNP 12/06/2023 466 (H)  1 - 125 pg/mL Final    WBC 12/06/2023 17.79 (H)  4.50 - 11.00 K/uL Final    RBC 12/06/2023 4.12 (L)  4.20 - 5.40 M/uL Final    Hemoglobin 12/06/2023 11.6 (L)  12.0 - 16.0 g/dL Final    Hematocrit 12/06/2023 36.0 (L)  38.0 - 47.0 % Final    MCV 12/06/2023 87.4  80.0 - 96.0 fL Final    MCH 12/06/2023 28.2  27.0 - 31.0 pg Final    MCHC 12/06/2023  32.2  32.0 - 36.0 g/dL Final    RDW 12/06/2023 17.3 (H)  11.5 - 14.5 % Final    Platelet Count 12/06/2023 339  150 - 400 K/uL Final    MPV 12/06/2023 9.0 (L)  9.4 - 12.4 fL Final    Neutrophils % 12/06/2023 82.1 (H)  53.0 - 65.0 % Final    Lymphocytes % 12/06/2023 9.1 (L)  27.0 - 41.0 % Final    Monocytes % 12/06/2023 7.2 (H)  2.0 - 6.0 % Final    Eosinophils % 12/06/2023 0.2 (L)  1.0 - 4.0 % Final    Basophils % 12/06/2023 0.5  0.0 - 1.0 % Final    Immature Granulocytes % 12/06/2023 0.9 (H)  0.0 - 0.4 % Final    nRBC, Auto 12/06/2023 0.2 (H)  <=0.0 % Final    Neutrophils, Abs 12/06/2023 14.61 (H)  1.80 - 7.70 K/uL Final    Lymphocytes, Absolute 12/06/2023 1.62  1.00 - 4.80 K/uL Final    Monocytes, Absolute 12/06/2023 1.28 (H)  0.00 - 0.80 K/uL Final    Eosinophils, Absolute 12/06/2023 0.03  0.00 - 0.50 K/uL Final    Basophils, Absolute 12/06/2023 0.09  0.00 - 0.20 K/uL Final    Immature Granulocytes, Absolute 12/06/2023 0.16 (H)  0.00 - 0.04 K/uL Final    nRBC, Absolute 12/06/2023 0.03 (H)  <=0.00 x10e3/uL Final    Diff Type 12/06/2023 Auto   Final    Barbiturates, Urine 12/06/2023 Negative  Negative Final    Benzodiazepine, Urine 12/06/2023 Negative  Negative Final    Opiates, Urine 12/06/2023 Positive (A)  Negative Final    Phencyclidine, Urine 12/06/2023 Negative  Negative Final    Amphetamine, Urine 12/06/2023 Negative  Negative Final    Cannabinoid, Urine 12/06/2023 Negative  Negative Final    Cocaine, Urine 12/06/2023 Negative  Negative Final    Salicylate 12/06/2023 13.3  3.0 - 30.0 mg/dL Final    Acetaminophen 12/06/2023 <2 (L)  10 - 30 µg/mL Final    Acetaminophen 12/06/2023 4 (L)  10 - 30 µg/mL Final    Salicylate 12/06/2023 10.6  3.0 - 30.0 mg/dL Final         No orders of the defined types were placed in this encounter.        Requested Prescriptions     Signed Prescriptions Disp Refills    gabapentin (NEURONTIN) 300 MG capsule 90 capsule 2     Sig: Take 1 capsule (300 mg  total) by mouth 3 (three) times daily.    HYDROcodone-acetaminophen (NORCO)  mg per tablet 90 tablet 0     Sig: Take 1 tablet by mouth every 8 (eight) hours as needed for Pain.    HYDROcodone-acetaminophen (NORCO)  mg per tablet 90 tablet 0     Sig: Take 1 tablet by mouth every 8 (eight) hours.    HYDROcodone-acetaminophen (NORCO)  mg per tablet 90 tablet 0     Sig: Take 1 tablet by mouth every 8 (eight) hours as needed for Pain.       Assessment:     1. Lumbar radiculopathy    2. Spondylolisthesis, lumbar region    3. Thoracic radiculopathy           A's of Opioid Risk Assessment  Activity:Patient can perform ADL.   Analgesia:Patients pain is partially controlled by current medication. Patient has tried OTC medications such as Tylenol and Ibuprofen with out relief.   Adverse Effects: Patient denies constipation or sedation.  Aberrant Behavior:  reviewed with no aberrant drug seeking/taking behavior.  Overdose reversal drug naloxone discussed    Drug screen reviewed      Plan:    Narcan January 2023    Following pulmonary [facility]     January 25, 2024 [facility], closed compression fracture L3    Follow-up after lumbar L3/4 WILD # 1, April 19, 2024  She had 85% relief after procedure, the procedure did help improve her level function     She states that this time she would like to continue with conservative management    Continue home exercise program as directed    Continue current medication    Follow-up follow-up 3 months    Dr. Burns, April 2025    Bring original prescription medication bottles/container/box with labels to each visit

## 2024-04-29 ENCOUNTER — OFFICE VISIT (OUTPATIENT)
Dept: PAIN MEDICINE | Facility: CLINIC | Age: 74
End: 2024-04-29
Payer: MEDICARE

## 2024-04-29 VITALS
HEART RATE: 83 BPM | HEIGHT: 62 IN | SYSTOLIC BLOOD PRESSURE: 174 MMHG | WEIGHT: 90 LBS | BODY MASS INDEX: 16.56 KG/M2 | DIASTOLIC BLOOD PRESSURE: 80 MMHG | RESPIRATION RATE: 18 BRPM

## 2024-04-29 DIAGNOSIS — M54.16 LUMBAR RADICULOPATHY: Primary | Chronic | ICD-10-CM

## 2024-04-29 DIAGNOSIS — M43.16 SPONDYLOLISTHESIS, LUMBAR REGION: Chronic | ICD-10-CM

## 2024-04-29 DIAGNOSIS — M54.14 THORACIC RADICULOPATHY: Chronic | ICD-10-CM

## 2024-04-29 PROCEDURE — 1101F PT FALLS ASSESS-DOCD LE1/YR: CPT | Mod: CPTII,,, | Performed by: PHYSICIAN ASSISTANT

## 2024-04-29 PROCEDURE — 1159F MED LIST DOCD IN RCRD: CPT | Mod: CPTII,,, | Performed by: PHYSICIAN ASSISTANT

## 2024-04-29 PROCEDURE — 3288F FALL RISK ASSESSMENT DOCD: CPT | Mod: CPTII,,, | Performed by: PHYSICIAN ASSISTANT

## 2024-04-29 PROCEDURE — 99214 OFFICE O/P EST MOD 30 MIN: CPT | Mod: S$PBB,,, | Performed by: PHYSICIAN ASSISTANT

## 2024-04-29 PROCEDURE — 3079F DIAST BP 80-89 MM HG: CPT | Mod: CPTII,,, | Performed by: PHYSICIAN ASSISTANT

## 2024-04-29 PROCEDURE — 1125F AMNT PAIN NOTED PAIN PRSNT: CPT | Mod: CPTII,,, | Performed by: PHYSICIAN ASSISTANT

## 2024-04-29 PROCEDURE — 3008F BODY MASS INDEX DOCD: CPT | Mod: CPTII,,, | Performed by: PHYSICIAN ASSISTANT

## 2024-04-29 PROCEDURE — 3077F SYST BP >= 140 MM HG: CPT | Mod: CPTII,,, | Performed by: PHYSICIAN ASSISTANT

## 2024-04-29 PROCEDURE — 99214 OFFICE O/P EST MOD 30 MIN: CPT | Mod: PBBFAC | Performed by: PHYSICIAN ASSISTANT

## 2024-04-29 RX ORDER — HYDROCODONE BITARTRATE AND ACETAMINOPHEN 10; 325 MG/1; MG/1
1 TABLET ORAL EVERY 8 HOURS
Qty: 90 TABLET | Refills: 0 | Status: SHIPPED | OUTPATIENT
Start: 2024-04-29

## 2024-04-29 RX ORDER — GABAPENTIN 300 MG/1
300 CAPSULE ORAL 3 TIMES DAILY
Qty: 90 CAPSULE | Refills: 2 | Status: SHIPPED | OUTPATIENT
Start: 2024-04-29

## 2024-04-29 RX ORDER — HYDROCODONE BITARTRATE AND ACETAMINOPHEN 10; 325 MG/1; MG/1
1 TABLET ORAL EVERY 8 HOURS PRN
Qty: 90 TABLET | Refills: 0 | Status: SHIPPED | OUTPATIENT
Start: 2024-06-28 | End: 2024-07-28

## 2024-04-29 RX ORDER — HYDROCODONE BITARTRATE AND ACETAMINOPHEN 10; 325 MG/1; MG/1
1 TABLET ORAL EVERY 8 HOURS PRN
Qty: 90 TABLET | Refills: 0 | Status: SHIPPED | OUTPATIENT
Start: 2024-05-29 | End: 2024-06-28

## 2024-05-20 ENCOUNTER — TELEPHONE (OUTPATIENT)
Dept: PAIN MEDICINE | Facility: CLINIC | Age: 74
End: 2024-05-20
Payer: MEDICARE

## 2024-05-20 NOTE — TELEPHONE ENCOUNTER
----- Message from Bridgett Taveras sent at 5/16/2024  2:35 PM CDT -----  Regarding: rx  Pt is requesting to speak to provider would like a rx for a light brace for her shoulders please call pt back at [phone]    LAKISHA CORDOVA   05/20/2024   2:58 PM   Informed patient insurance will not cover shoulder brace. Medical store and [facility] numbers given.

## 2024-05-28 ENCOUNTER — OFFICE VISIT (OUTPATIENT)
Dept: FAMILY MEDICINE | Facility: CLINIC | Age: 74
End: 2024-05-28
Payer: MEDICARE

## 2024-05-28 VITALS
HEART RATE: 102 BPM | DIASTOLIC BLOOD PRESSURE: 79 MMHG | BODY MASS INDEX: 16.75 KG/M2 | WEIGHT: 91 LBS | SYSTOLIC BLOOD PRESSURE: 181 MMHG | HEIGHT: 62 IN

## 2024-05-28 DIAGNOSIS — K21.9 GASTROESOPHAGEAL REFLUX DISEASE, UNSPECIFIED WHETHER ESOPHAGITIS PRESENT: ICD-10-CM

## 2024-05-28 DIAGNOSIS — R63.0 DECREASED APPETITE: Chronic | ICD-10-CM

## 2024-05-28 DIAGNOSIS — I87.8 VENOUS STASIS: ICD-10-CM

## 2024-05-28 DIAGNOSIS — R10.84 GENERALIZED ABDOMINAL PAIN: ICD-10-CM

## 2024-05-28 DIAGNOSIS — F03.B0 MODERATE DEMENTIA WITHOUT BEHAVIORAL DISTURBANCE, PSYCHOTIC DISTURBANCE, MOOD DISTURBANCE, OR ANXIETY, UNSPECIFIED DEMENTIA TYPE: ICD-10-CM

## 2024-05-28 DIAGNOSIS — R10.9 ABDOMINAL PAIN, UNSPECIFIED ABDOMINAL LOCATION: Primary | ICD-10-CM

## 2024-05-28 PROCEDURE — 3077F SYST BP >= 140 MM HG: CPT | Mod: ,,, | Performed by: FAMILY MEDICINE

## 2024-05-28 PROCEDURE — 1159F MED LIST DOCD IN RCRD: CPT | Mod: ,,, | Performed by: FAMILY MEDICINE

## 2024-05-28 PROCEDURE — 1101F PT FALLS ASSESS-DOCD LE1/YR: CPT | Mod: ,,, | Performed by: FAMILY MEDICINE

## 2024-05-28 PROCEDURE — 3078F DIAST BP <80 MM HG: CPT | Mod: ,,, | Performed by: FAMILY MEDICINE

## 2024-05-28 PROCEDURE — 3008F BODY MASS INDEX DOCD: CPT | Mod: ,,, | Performed by: FAMILY MEDICINE

## 2024-05-28 PROCEDURE — 1125F AMNT PAIN NOTED PAIN PRSNT: CPT | Mod: ,,, | Performed by: FAMILY MEDICINE

## 2024-05-28 PROCEDURE — 99214 OFFICE O/P EST MOD 30 MIN: CPT | Mod: ,,, | Performed by: FAMILY MEDICINE

## 2024-05-28 PROCEDURE — 3288F FALL RISK ASSESSMENT DOCD: CPT | Mod: ,,, | Performed by: FAMILY MEDICINE

## 2024-05-28 RX ORDER — PANTOPRAZOLE SODIUM 40 MG/1
40 TABLET, DELAYED RELEASE ORAL
Qty: 60 TABLET | Refills: 2 | Status: SHIPPED | OUTPATIENT
Start: 2024-05-28 | End: 2024-08-26

## 2024-05-28 RX ORDER — CYPROHEPTADINE HYDROCHLORIDE 4 MG/1
4 TABLET ORAL 3 TIMES DAILY
Qty: 270 TABLET | Refills: 1 | Status: SHIPPED | OUTPATIENT
Start: 2024-05-28

## 2024-05-28 RX ORDER — SUCRALFATE 1 G/10ML
1 SUSPENSION ORAL 4 TIMES DAILY
Qty: 414 ML | Refills: 0 | Status: SHIPPED | OUTPATIENT
Start: 2024-05-28 | End: 2024-06-07

## 2024-05-29 PROBLEM — J44.1 COPD EXACERBATION: Status: RESOLVED | Noted: 2023-03-12 | Resolved: 2024-05-29

## 2024-05-29 PROBLEM — I10 ESSENTIAL HYPERTENSION: Status: ACTIVE | Noted: 2024-01-16

## 2024-05-29 PROBLEM — E78.5 HYPERLIPIDEMIA: Chronic | Status: ACTIVE | Noted: 2024-01-16

## 2024-05-29 PROBLEM — K21.00 GASTRO-ESOPHAGEAL REFLUX DISEASE WITH ESOPHAGITIS: Status: ACTIVE | Noted: 2024-01-16

## 2024-05-29 PROBLEM — F03.B0 MODERATE DEMENTIA WITHOUT BEHAVIORAL DISTURBANCE, PSYCHOTIC DISTURBANCE, MOOD DISTURBANCE, OR ANXIETY: Status: ACTIVE | Noted: 2024-05-29

## 2024-05-29 PROBLEM — F32.A DEPRESSIVE DISORDER: Chronic | Status: ACTIVE | Noted: 2024-01-16

## 2024-05-29 PROBLEM — G89.29 CHRONIC BACK PAIN: Status: ACTIVE | Noted: 2024-01-16

## 2024-05-29 PROBLEM — I87.8 VENOUS STASIS: Status: ACTIVE | Noted: 2024-05-29

## 2024-05-29 PROBLEM — F32.A DEPRESSIVE DISORDER: Status: ACTIVE | Noted: 2024-01-16

## 2024-05-29 PROBLEM — E78.5 HYPERLIPIDEMIA: Status: ACTIVE | Noted: 2024-01-16

## 2024-05-29 PROBLEM — G62.9 NEUROPATHY: Chronic | Status: ACTIVE | Noted: 2024-01-16

## 2024-05-29 PROBLEM — F03.B0 MODERATE DEMENTIA WITHOUT BEHAVIORAL DISTURBANCE, PSYCHOTIC DISTURBANCE, MOOD DISTURBANCE, OR ANXIETY: Chronic | Status: ACTIVE | Noted: 2024-05-29

## 2024-05-29 PROBLEM — H40.9 GLAUCOMA: Chronic | Status: ACTIVE | Noted: 2024-01-16

## 2024-05-29 PROBLEM — K21.00 GASTRO-ESOPHAGEAL REFLUX DISEASE WITH ESOPHAGITIS: Chronic | Status: ACTIVE | Noted: 2024-01-16

## 2024-05-29 PROBLEM — M54.9 CHRONIC BACK PAIN: Status: ACTIVE | Noted: 2024-01-16

## 2024-05-29 PROBLEM — I10 ESSENTIAL HYPERTENSION: Chronic | Status: ACTIVE | Noted: 2024-01-16

## 2024-05-29 PROBLEM — G62.9 NEUROPATHY: Status: ACTIVE | Noted: 2024-01-16

## 2024-05-29 PROBLEM — R10.84 GENERALIZED ABDOMINAL PAIN: Status: ACTIVE | Noted: 2020-10-07

## 2024-05-29 PROBLEM — H40.9 GLAUCOMA: Status: ACTIVE | Noted: 2024-01-16

## 2024-05-29 NOTE — ASSESSMENT & PLAN NOTE
Patient states she was recently in the hospital with generalized abdominal pain was found to have some sort of infection for which she had to take medication.  I am going to provide the patient with some sucralfate to help with with the patient describes as gastroesophageal reflux disease pain.   Attempt to discover records has led to speaking with the patient's family who states that patient has not been in the hospital for this problem for some time.

## 2024-05-29 NOTE — PROGRESS NOTES
"              Danilo Cloud IV, DO  [facility]  [address] [place] [address]  Phone: [phone]      Subjective     Name: Amelie Cerrato   Sex: female  YOB: 1950 (73 y.o.)  MRN: [id number]  Visit Date: 05/29/2024   Chief Complaint: Edema (Pt c/o leg and feet swelling) and Abdominal Pain        HISTORY OF PRESENT ILLNESS:    Chief Complaint   Patient presents with    Edema     Pt c/o leg and feet swelling    Abdominal Pain       HPI  See tests that keep you healthy and the problem oriented documentation below.    Tests to Keep You Healthy    Mammogram: DUE  Colon Cancer Screening: DUE  Last Blood Pressure <= 139/89 (5/28/2024): NO      Portions of this note may have been created with voice recognition software. Occasional "wrong-word" or "sound-a-like" substitutions may have occurred due to the inherent limitations of voice recognition software. Please, read the note carefully and recognize, using context, where substitutions have occurred.     PAST MEDICAL HISTORY:  Significant Diagnoses - Patient  has a past medical history of Anxiety and depression, Chronic kidney disease, stage 3a, Chronic pain syndrome, COPD (chronic obstructive pulmonary disease), COPD exacerbation (03/12/2023), Fracture of multiple pubic rami, left, closed, initial encounter (10/01/2021), HLD (hyperlipidemia), Hypertension, Lumbar compression fracture, sequela L1, L2, L4, L5, kyphoplasty (12/08/2021), Lumbar radiculopathy, Lumbar stenosis, Lumbosacral spondylosis, and Neuropathy.  Medications - Patient has a current medication list which includes the following long-term medication(s): amlodipine, aspirin, atorvastatin, brimonidine 0.2%, duloxetine, fluoxetine, furosemide, gabapentin, latanoprost, and pantoprazole.   Allergies - Patient is allergic to sulfa (sulfonamide antibiotics) and insect venom.  Surgeries - Patient  has a past surgical history that includes Hysterectomy; Cholecystectomy; " Epidural steroid injection into lumbar spine (N/A, 05/27/2020); Epidural steroid injection into thoracic spine (N/A, 02/03/2021); Injection of facet joint (Bilateral, 12/04/2020); Epidural steroid injection into thoracic spine (N/A, 3/18/2021); Injection of anesthetic agent around medial branch nerves innervating lumbar facet joint (Bilateral, 4/22/2021); Radiofrequency ablation of lumbar medial branch nerve at single level (Bilateral, 5/20/2021); Cystoscopy; Epidural steroid injection into thoracic spine (N/A, 12/23/2021); Injection of anesthetic agent around medial branch nerves innervating lumbar facet joint (Bilateral, 9/6/2022); Injection of anesthetic agent around medial branch nerves innervating lumbar facet joint (Bilateral, 10/11/2022); and Epidural steroid injection into lumbar spine (Bilateral, 4/16/2024).  Family History - Patient family history includes Heart disease in her father; Hypertension in her mother.      SOCIAL HISTORY:  Tobacco - Patient  reports that she has quit smoking. She has never been exposed to tobacco smoke. She has never used smokeless tobacco.   Alcohol - Patient  reports no history of alcohol use.   Recreational Drugs - Patient  reports no history of drug use.       Review of Systems   All other systems reviewed and are negative.       Past Medical History:   Diagnosis Date    Anxiety and depression     Chronic kidney disease, stage 3a     Chronic pain syndrome     COPD (chronic obstructive pulmonary disease)     COPD exacerbation 03/12/2023    Fracture of multiple pubic rami, left, closed, initial encounter 10/01/2021    HLD (hyperlipidemia)     Hypertension     Lumbar compression fracture, sequela L1, L2, L4, L5, kyphoplasty 12/08/2021    Lumbar radiculopathy     Lumbar stenosis     Lumbosacral spondylosis     Neuropathy         Review of patient's allergies indicates:   Allergen Reactions    Sulfa (sulfonamide antibiotics) Nausea And Vomiting and Itching     Insect venom      Note: - [employer]reesia 05/07/2019        Past Surgical History:   Procedure Laterality Date    CHOLECYSTECTOMY      CYSTOSCOPY      EPIDURAL STEROID INJECTION INTO LUMBAR SPINE N/A 05/27/2020    L1-2 WILD     EPIDURAL STEROID INJECTION INTO LUMBAR SPINE Bilateral 4/16/2024    Procedure: L3-4 WILD;  Surgeon: Arelis Burns MD;  Location: [facility];  Service: Pain Management;  Laterality: Bilateral;    EPIDURAL STEROID INJECTION INTO THORACIC SPINE N/A 02/03/2021    T9-10 WILD     EPIDURAL STEROID INJECTION INTO THORACIC SPINE N/A 3/18/2021    Procedure: INJECTION, STEROID, SPINE, THORACIC, EPIDURAL;  Surgeon: Arelis Burns MD;  Location: [facility];  Service: Pain Management;  Laterality: N/A;    EPIDURAL STEROID INJECTION INTO THORACIC SPINE N/A 12/23/2021    Procedure: Injection-steroid-epidural-thoracic T9/10;  Surgeon: Arelis Burns MD;  Location: [facility];  Service: Pain Management;  Laterality: N/A;  HAD VAC  WILL BRING CARD    HYSTERECTOMY      INJECTION OF ANESTHETIC AGENT AROUND MEDIAL BRANCH NERVES INNERVATING LUMBAR FACET JOINT Bilateral 4/22/2021    Procedure: Block-nerve-medial branch-lumbar Bilateral L3-4,4-5,5-S1 MBB #2;  Surgeon: Arelis Burns MD;  Location: [facility];  Service: Pain Management;  Laterality: Bilateral;    INJECTION OF ANESTHETIC AGENT AROUND MEDIAL BRANCH NERVES INNERVATING LUMBAR FACET JOINT Bilateral 9/6/2022    Procedure: Block-nerve-medial branch-lumbar, bilateral L4 through S1;  Surgeon: Arelis Burns MD;  Location: [facility];  Service: Pain Management;  Laterality: Bilateral;    INJECTION OF ANESTHETIC AGENT AROUND MEDIAL BRANCH NERVES INNERVATING LUMBAR FACET JOINT Bilateral 10/11/2022    Procedure: Block-nerve-medial branch-lumbar, bilateral L4 through S1;  Surgeon: Arelis Burns MD;  Location: [facility];  Service: Pain Management;  Laterality: Bilateral;  vaccinated.    INJECTION OF  "FACET JOINT Bilateral 12/04/2020    RADIOFREQUENCY ABLATION OF LUMBAR MEDIAL BRANCH NERVE AT SINGLE LEVEL Bilateral 5/20/2021    Procedure: RADIOFREQUENCY ABLATION, NERVE, SPINAL, LUMBAR, MEDIAL BRANCH, 1 LEVEL;  Surgeon: Arelis Burns MD;  Location: [facility];  Service: Pain Management;  Laterality: Bilateral;  Bilateral L3-S1 RFTC (both sides same day)        Family History   Problem Relation Name Age of Onset    Hypertension Mother      Heart disease Father         Current Outpatient Medications   Medication Instructions    amLODIPine (NORVASC) 5 mg, Oral, Daily    aspirin (ECOTRIN) 81 mg, Oral, Daily    atorvastatin (LIPITOR) 10 mg, Oral, Nightly    brimonidine 0.2% (ALPHAGAN) 0.2 % Drop 1 drop, Both Eyes, 2 times daily    cyproheptadine (PERIACTIN) 4 mg, Oral, 3 times daily    DULoxetine (CYMBALTA) 60 MG capsule TAKE ONE CAPSULE BY MOUTH EVERY TWELVE HOURS    FLUoxetine 10 mg, Oral, Daily    furosemide (LASIX) 40 MG tablet Take 1 tablet (40 mg total) by mouth 2 (two) times daily for 15 days, THEN 1 tablet (40 mg total) once daily.    gabapentin (NEURONTIN) 300 mg, Oral, 3 times daily    HYDROcodone-acetaminophen (NORCO)  mg per tablet 1 tablet, Oral, Every 8 hours PRN    HYDROcodone-acetaminophen (NORCO)  mg per tablet 1 tablet, Oral, Every 8 hours    [START ON 6/28/2024] HYDROcodone-acetaminophen (NORCO)  mg per tablet 1 tablet, Oral, Every 8 hours PRN    latanoprost 0.005 % ophthalmic solution 1 drop, Both Eyes, Nightly    mineral oil-hydrophil petrolat (AQUAPHOR) Oint Topical (Top), As needed (PRN)    ondansetron (ZOFRAN) 4 mg, Oral, Every 6 hours    pantoprazole (PROTONIX) 40 mg, Oral, 2 times daily before meals    sucralfate (CARAFATE) 1 g, Oral, 4 times daily        Objective     BP (!) 181/79   Pulse 102   Ht 5' 2" (1.575 m)   Wt 41.3 kg (91 lb)   LMP  (LMP Unknown)   BMI 16.64 kg/m²     Physical Exam  Constitutional:       General: She is not in " acute distress.     Appearance: Normal appearance. She is not ill-appearing.   HENT:      Head: Normocephalic and atraumatic.      Right Ear: External ear normal.      Left Ear: External ear normal.   Eyes:      Extraocular Movements: Extraocular movements intact.      Conjunctiva/sclera: Conjunctivae normal.   Cardiovascular:      Rate and Rhythm: Normal rate.      Heart sounds: No murmur heard.  Pulmonary:      Effort: Pulmonary effort is normal.   Musculoskeletal:      Cervical back: Normal range of motion.   Skin:     General: Skin is warm and dry.      Coloration: Skin is not jaundiced.      Findings: No rash.   Neurological:      Mental Status: She is alert.   Psychiatric:         Mood and Affect: Mood normal.        All recently obtained labs have been reviewed and discussed in detail with the patient.   Assessment     1. Abdominal pain, unspecified abdominal location    2. Decreased appetite    3. Gastroesophageal reflux disease, unspecified whether esophagitis present    4. Venous stasis    5. Moderate dementia without behavioral disturbance, psychotic disturbance, mood disturbance, or anxiety, unspecified dementia type    6. Generalized abdominal pain         Plan        Problem List Items Addressed This Visit          Neuro    Moderate dementia without behavioral disturbance, psychotic disturbance, mood disturbance, or anxiety (Chronic)      Patient appears to be having exacerbation of this problem secondary to thinking she was in the hospital over the last few weeks.  Speaking with family has resolved the fact that she has not been in the hospital for some time for the problem she discussed.            Cardiac/Vascular    Venous stasis       I have attempted to educate patient ways to help resolve this issue.  Resolution through conservative care has been ineffective to this point.  Recommend referral to the   Vein clinic which patient is quite excited about.         Relevant Orders    Ambulatory  referral/consult to [facility]       GI    Generalized abdominal pain       Patient states she was recently in the hospital with generalized abdominal pain was found to have some sort of infection for which she had to take medication.  I am going to provide the patient with some sucralfate to help with with the patient describes as gastroesophageal reflux disease pain.   Attempt to discover records has led to speaking with the patient's family who states that patient has not been in the hospital for this problem for some time.          Other Visit Diagnoses       Abdominal pain, unspecified abdominal location    -  Primary    Relevant Medications    sucralfate (CARAFATE) 100 mg/mL suspension    Decreased appetite  (Chronic)       Relevant Medications    cyproheptadine (PERIACTIN) 4 mg tablet    Gastroesophageal reflux disease, unspecified whether esophagitis present        Relevant Medications    pantoprazole (PROTONIX) 40 MG tablet            No follow-ups on file.    Patient advised that is symptoms worsen, they should call or report directly to local emergency department.    Danilo Cloud DO  [facility]

## 2024-05-29 NOTE — ASSESSMENT & PLAN NOTE
Patient appears to be having exacerbation of this problem secondary to thinking she was in the hospital over the last few weeks.  Speaking with family has resolved the fact that she has not been in the hospital for some time for the problem she discussed.

## 2024-05-29 NOTE — ASSESSMENT & PLAN NOTE
I have attempted to educate patient ways to help resolve this issue.  Resolution through conservative care has been ineffective to this point.  Recommend referral to the   Vein clinic which patient is quite excited about.

## 2024-06-10 ENCOUNTER — OFFICE VISIT (OUTPATIENT)
Dept: FAMILY MEDICINE | Facility: CLINIC | Age: 74
End: 2024-06-10
Payer: MEDICARE

## 2024-06-10 VITALS
BODY MASS INDEX: 16.13 KG/M2 | WEIGHT: 87.63 LBS | HEIGHT: 62 IN | HEART RATE: 105 BPM | SYSTOLIC BLOOD PRESSURE: 171 MMHG | DIASTOLIC BLOOD PRESSURE: 90 MMHG

## 2024-06-10 DIAGNOSIS — R52 PAIN: ICD-10-CM

## 2024-06-10 DIAGNOSIS — L03.115 CELLULITIS OF RIGHT LOWER EXTREMITY: Primary | ICD-10-CM

## 2024-06-10 PROCEDURE — 1101F PT FALLS ASSESS-DOCD LE1/YR: CPT | Mod: ,,, | Performed by: FAMILY MEDICINE

## 2024-06-10 PROCEDURE — 99213 OFFICE O/P EST LOW 20 MIN: CPT | Mod: 25,,, | Performed by: FAMILY MEDICINE

## 2024-06-10 PROCEDURE — 96372 THER/PROPH/DIAG INJ SC/IM: CPT | Mod: ,,, | Performed by: FAMILY MEDICINE

## 2024-06-10 PROCEDURE — 3288F FALL RISK ASSESSMENT DOCD: CPT | Mod: ,,, | Performed by: FAMILY MEDICINE

## 2024-06-10 PROCEDURE — 3077F SYST BP >= 140 MM HG: CPT | Mod: ,,, | Performed by: FAMILY MEDICINE

## 2024-06-10 PROCEDURE — 3008F BODY MASS INDEX DOCD: CPT | Mod: ,,, | Performed by: FAMILY MEDICINE

## 2024-06-10 PROCEDURE — 1159F MED LIST DOCD IN RCRD: CPT | Mod: ,,, | Performed by: FAMILY MEDICINE

## 2024-06-10 PROCEDURE — 3080F DIAST BP >= 90 MM HG: CPT | Mod: ,,, | Performed by: FAMILY MEDICINE

## 2024-06-10 PROCEDURE — 1125F AMNT PAIN NOTED PAIN PRSNT: CPT | Mod: ,,, | Performed by: FAMILY MEDICINE

## 2024-06-10 RX ORDER — CLINDAMYCIN HYDROCHLORIDE 300 MG/1
300 CAPSULE ORAL 4 TIMES DAILY
Qty: 40 CAPSULE | Refills: 0 | Status: SHIPPED | OUTPATIENT
Start: 2024-06-10 | End: 2024-06-20

## 2024-06-10 RX ORDER — KETOROLAC TROMETHAMINE 30 MG/ML
30 INJECTION, SOLUTION INTRAMUSCULAR; INTRAVENOUS
Status: DISCONTINUED | OUTPATIENT
Start: 2024-06-10 | End: 2024-06-10

## 2024-06-10 RX ORDER — KETOROLAC TROMETHAMINE 30 MG/ML
15 INJECTION, SOLUTION INTRAMUSCULAR; INTRAVENOUS
Status: COMPLETED | OUTPATIENT
Start: 2024-06-10 | End: 2024-06-10

## 2024-06-10 RX ADMIN — KETOROLAC TROMETHAMINE 15 MG: 30 INJECTION, SOLUTION INTRAMUSCULAR; INTRAVENOUS at 03:06

## 2024-06-10 NOTE — PROGRESS NOTES
"              Danilo Cloud IV, DO  [facility] [place]  [address], [place] [address]  Phone: [phone]      Subjective     Name: Amelie Cerrato   Sex: female  YOB: 1950 (73 y.o.)  MRN: [id number]  Visit Date: 06/10/2024   Chief Complaint: right leg pain (Edema ankles)        HISTORY OF PRESENT ILLNESS:    Chief Complaint   Patient presents with    right leg pain     Edema ankles       HPI  See tests that keep you healthy and the problem oriented documentation below.    Tests to Keep You Healthy    Mammogram: DUE  Colon Cancer Screening: DUE  Last Blood Pressure <= 139/89 (6/10/2024): NO      Portions of this note may have been created with voice recognition software. Occasional "wrong-word" or "sound-a-like" substitutions may have occurred due to the inherent limitations of voice recognition software. Please, read the note carefully and recognize, using context, where substitutions have occurred.     PAST MEDICAL HISTORY:  Significant Diagnoses - Patient  has a past medical history of Anxiety and depression, Chronic kidney disease, stage 3a, Chronic pain syndrome, COPD (chronic obstructive pulmonary disease), COPD exacerbation (03/12/2023), Fracture of multiple pubic rami, left, closed, initial encounter (10/01/2021), HLD (hyperlipidemia), Hypertension, Lumbar compression fracture, sequela L1, L2, L4, L5, kyphoplasty (12/08/2021), Lumbar radiculopathy, Lumbar stenosis, Lumbosacral spondylosis, and Neuropathy.  Medications - Patient has a current medication list which includes the following long-term medication(s): amlodipine, aspirin, atorvastatin, brimonidine 0.2%, duloxetine, fluoxetine, furosemide, gabapentin, latanoprost, and pantoprazole.   Allergies - Patient is allergic to sulfa (sulfonamide antibiotics) and insect venom.  Surgeries - Patient  has a past surgical history that includes Hysterectomy; Cholecystectomy; Epidural steroid injection into lumbar spine (N/A, " 05/27/2020); Epidural steroid injection into thoracic spine (N/A, 02/03/2021); Injection of facet joint (Bilateral, 12/04/2020); Epidural steroid injection into thoracic spine (N/A, 3/18/2021); Injection of anesthetic agent around medial branch nerves innervating lumbar facet joint (Bilateral, 4/22/2021); Radiofrequency ablation of lumbar medial branch nerve at single level (Bilateral, 5/20/2021); Cystoscopy; Epidural steroid injection into thoracic spine (N/A, 12/23/2021); Injection of anesthetic agent around medial branch nerves innervating lumbar facet joint (Bilateral, 9/6/2022); Injection of anesthetic agent around medial branch nerves innervating lumbar facet joint (Bilateral, 10/11/2022); and Epidural steroid injection into lumbar spine (Bilateral, 4/16/2024).  Family History - Patient family history includes Heart disease in her father; Hypertension in her mother.      SOCIAL HISTORY:  Tobacco - Patient  reports that she has quit smoking. She has never been exposed to tobacco smoke. She has never used smokeless tobacco.   Alcohol - Patient  reports no history of alcohol use.   Recreational Drugs - Patient  reports no history of drug use.       Review of Systems   All other systems reviewed and are negative.         Past Medical History:   Diagnosis Date    Anxiety and depression     Chronic kidney disease, stage 3a     Chronic pain syndrome     COPD (chronic obstructive pulmonary disease)     COPD exacerbation 03/12/2023    Fracture of multiple pubic rami, left, closed, initial encounter 10/01/2021    HLD (hyperlipidemia)     Hypertension     Lumbar compression fracture, sequela L1, L2, L4, L5, kyphoplasty 12/08/2021    Lumbar radiculopathy     Lumbar stenosis     Lumbosacral spondylosis     Neuropathy         Review of patient's allergies indicates:   Allergen Reactions    Sulfa (sulfonamide antibiotics) Nausea And Vomiting and Itching    Insect venom      Note: - [employer]reesia 05/07/2019        Past Surgical  History:   Procedure Laterality Date    CHOLECYSTECTOMY      CYSTOSCOPY      EPIDURAL STEROID INJECTION INTO LUMBAR SPINE N/A 05/27/2020    L1-2 WILD     EPIDURAL STEROID INJECTION INTO LUMBAR SPINE Bilateral 4/16/2024    Procedure: L3-4 WILD;  Surgeon: Arelis Burns MD;  Location: [facility];  Service: Pain Management;  Laterality: Bilateral;    EPIDURAL STEROID INJECTION INTO THORACIC SPINE N/A 02/03/2021    T9-10 WILD     EPIDURAL STEROID INJECTION INTO THORACIC SPINE N/A 3/18/2021    Procedure: INJECTION, STEROID, SPINE, THORACIC, EPIDURAL;  Surgeon: Arelis Burns MD;  Location: [facility];  Service: Pain Management;  Laterality: N/A;    EPIDURAL STEROID INJECTION INTO THORACIC SPINE N/A 12/23/2021    Procedure: Injection-steroid-epidural-thoracic T9/10;  Surgeon: Arelis Burns MD;  Location: [facility];  Service: Pain Management;  Laterality: N/A;  HAD VAC  WILL BRING CARD    HYSTERECTOMY      INJECTION OF ANESTHETIC AGENT AROUND MEDIAL BRANCH NERVES INNERVATING LUMBAR FACET JOINT Bilateral 4/22/2021    Procedure: Block-nerve-medial branch-lumbar Bilateral L3-4,4-5,5-S1 MBB #2;  Surgeon: Arelis Burns MD;  Location: [facility];  Service: Pain Management;  Laterality: Bilateral;    INJECTION OF ANESTHETIC AGENT AROUND MEDIAL BRANCH NERVES INNERVATING LUMBAR FACET JOINT Bilateral 9/6/2022    Procedure: Block-nerve-medial branch-lumbar, bilateral L4 through S1;  Surgeon: Arelis Burns MD;  Location: [facility];  Service: Pain Management;  Laterality: Bilateral;    INJECTION OF ANESTHETIC AGENT AROUND MEDIAL BRANCH NERVES INNERVATING LUMBAR FACET JOINT Bilateral 10/11/2022    Procedure: Block-nerve-medial branch-lumbar, bilateral L4 through S1;  Surgeon: Arelis Burns MD;  Location: [facility];  Service: Pain Management;  Laterality: Bilateral;  vaccinated.    INJECTION OF FACET JOINT Bilateral 12/04/2020    RADIOFREQUENCY ABLATION OF LUMBAR MEDIAL  "BRANCH NERVE AT SINGLE LEVEL Bilateral 5/20/2021    Procedure: RADIOFREQUENCY ABLATION, NERVE, SPINAL, LUMBAR, MEDIAL BRANCH, 1 LEVEL;  Surgeon: Arelis Burns MD;  Location: [facility];  Service: Pain Management;  Laterality: Bilateral;  Bilateral L3-S1 RFTC (both sides same day)        Family History   Problem Relation Name Age of Onset    Hypertension Mother      Heart disease Father         Current Outpatient Medications   Medication Instructions    amLODIPine (NORVASC) 5 mg, Oral, Daily    aspirin (ECOTRIN) 81 mg, Oral, Daily    atorvastatin (LIPITOR) 10 mg, Oral, Nightly    brimonidine 0.2% (ALPHAGAN) 0.2 % Drop 1 drop, Both Eyes, 2 times daily    clindamycin (CLEOCIN) 300 mg, Oral, 4 times daily    cyproheptadine (PERIACTIN) 4 mg, Oral, 3 times daily    DULoxetine (CYMBALTA) 60 MG capsule TAKE ONE CAPSULE BY MOUTH EVERY TWELVE HOURS    FLUoxetine 10 mg, Oral, Daily    furosemide (LASIX) 40 MG tablet Take 1 tablet (40 mg total) by mouth 2 (two) times daily for 15 days, THEN 1 tablet (40 mg total) once daily.    gabapentin (NEURONTIN) 300 mg, Oral, 3 times daily    HYDROcodone-acetaminophen (NORCO)  mg per tablet 1 tablet, Oral, Every 8 hours PRN    HYDROcodone-acetaminophen (NORCO)  mg per tablet 1 tablet, Oral, Every 8 hours    [START ON 6/28/2024] HYDROcodone-acetaminophen (NORCO)  mg per tablet 1 tablet, Oral, Every 8 hours PRN    latanoprost 0.005 % ophthalmic solution 1 drop, Both Eyes, Nightly    mineral oil-hydrophil petrolat (AQUAPHOR) Oint Topical (Top), As needed (PRN)    ondansetron (ZOFRAN) 4 mg, Oral, Every 6 hours    pantoprazole (PROTONIX) 40 mg, Oral, 2 times daily before meals        Objective     BP (!) 171/90   Pulse 105   Ht 5' 2" (1.575 m)   Wt 39.7 kg (87 lb 9.6 oz)   LMP  (LMP Unknown)   BMI 16.02 kg/m²     Physical Exam  Constitutional:       General: She is not in acute distress.     Appearance: Normal appearance. She is not ill-appearing.   HENT:      " Head: Normocephalic and atraumatic.      Right Ear: External ear normal.      Left Ear: External ear normal.   Eyes:      Extraocular Movements: Extraocular movements intact.      Conjunctiva/sclera: Conjunctivae normal.   Cardiovascular:      Rate and Rhythm: Normal rate.      Heart sounds: No murmur heard.  Pulmonary:      Effort: Pulmonary effort is normal.   Musculoskeletal:      Cervical back: Normal range of motion.   Skin:     General: Skin is warm and dry.      Coloration: Skin is not jaundiced.      Findings: No rash.   Neurological:      Mental Status: She is alert.   Psychiatric:         Mood and Affect: Mood normal.          All recently obtained labs have been reviewed and discussed in detail with the patient.   Assessment     1. Cellulitis of right lower extremity    2. Pain         Plan        Problem List Items Addressed This Visit          ID    Cellulitis of right lower extremity - Primary      Venous stasis dermatitis that has broken the skin and now has cellulitis.  Patient is allergic to sulfa antibiotics and will start with clindamycin 300 mg p.o. q.I.d. for 10 days.  Patient return back to the clinic on Thursday if not significantly improved by then.  Patient may need admission to the hospital.         Relevant Medications    clindamycin (CLEOCIN) 300 MG capsule     Other Visit Diagnoses       Pain        Relevant Medications    ketorolac injection 15 mg (Start on 6/10/2024  3:30 PM)            No follow-ups on file.    Patient advised that is symptoms worsen, they should call or report directly to local emergency department.    Danilo Cloud DO  [facility]

## 2024-06-10 NOTE — ASSESSMENT & PLAN NOTE
Venous stasis dermatitis that has broken the skin and now has cellulitis.  Patient is allergic to sulfa antibiotics and will start with clindamycin 300 mg p.o. q.I.d. for 10 days.  Patient return back to the clinic on Thursday if not significantly improved by then.  Patient may need admission to the hospital.

## (undated) DEVICE — Device

## (undated) DEVICE — KIT IV START RUSH

## (undated) DEVICE — CATH IV 22G X 1 AUTOGUARD

## (undated) DEVICE — SOL CONTINU-FLO SET 2 LAV

## (undated) DEVICE — CATH VERSA-KATH RADIO-OPAQUE

## (undated) DEVICE — CATH IV JELCO 22GX1 IN

## (undated) DEVICE — TRAY NERVE BLOCK (KC) PMA

## (undated) DEVICE — KIT IV START OCHSNER CUSTOM

## (undated) DEVICE — SLIPPER FALL PREV YELLOW XLG

## (undated) DEVICE — NEEDLE TUOHY 18G X 3 1/2 IN

## (undated) DEVICE — APPLICATOR CHLORAPREP HI-LITE TINTED ORANGE 26ML

## (undated) DEVICE — APPLICATOR CHLORAPREP CLR 10.5

## (undated) DEVICE — KIT MULTI-2 COOLED RF 17GA X 100MM

## (undated) DEVICE — NEEDLE SPINAL 22GX3.5 QUINCHE (KC)

## (undated) DEVICE — CATH IV JELCO 24GX3/4

## (undated) DEVICE — TRAY EPIDURAL SINGLE SHOT(PMA)

## (undated) DEVICE — GLOVE SURGICAL PROTEXIS PI SIZE 6.5

## (undated) DEVICE — APPLICATOR CHLORAPREP ORN 26ML

## (undated) DEVICE — SUCTION YANKER STRAIGHT TIP STERILE

## (undated) DEVICE — TRAY EPIDURAL SINGLE SHOT PMA

## (undated) DEVICE — KIT IV START 849

## (undated) DEVICE — NDL SPINAL CLR HUB 22GX4 3/4

## (undated) DEVICE — KIT COOLED RF PROBE 17G/150MM COOLIEF

## (undated) DEVICE — GLOVE PROTEXIS PI SYN SURG 6.5

## (undated) DEVICE — DRAPE SURGICAL 3/4 SHEET 52IN X 76IN STERILE

## (undated) DEVICE — SOL WATER STRL IRRIGATION 250ML

## (undated) DEVICE — SET IV SOL CONTIN-FLOW 10 DROP/ML (PRIMARY)

## (undated) DEVICE — NDL SPINAL SPINOCAN 22GX3.5

## (undated) DEVICE — TRAY NERVE BLOCK UNIV 10/CA

## (undated) DEVICE — SYRINGE 3CC LL 25GX5/8IN

## (undated) DEVICE — TOWEL SURGICAL BLUE COTTON 16INX26IN STERILE 4/PK

## (undated) DEVICE — NDL SPINAL 22GA 3.5 IN QUINCKE

## (undated) DEVICE — KIT COOLED RF PROBE 17G/100MM COOLIEF